# Patient Record
Sex: FEMALE | Race: WHITE | Employment: OTHER | URBAN - NONMETROPOLITAN AREA
[De-identification: names, ages, dates, MRNs, and addresses within clinical notes are randomized per-mention and may not be internally consistent; named-entity substitution may affect disease eponyms.]

---

## 2017-01-05 ENCOUNTER — OFFICE VISIT (OUTPATIENT)
Dept: PSYCHIATRY | Age: 67
End: 2017-01-05

## 2017-01-05 DIAGNOSIS — F31.9 BIPOLAR DEPRESSION (HCC): Primary | ICD-10-CM

## 2017-01-05 DIAGNOSIS — F43.10 POSTTRAUMATIC STRESS DISORDER: ICD-10-CM

## 2017-01-05 PROCEDURE — 99213 OFFICE O/P EST LOW 20 MIN: CPT | Performed by: PSYCHIATRY & NEUROLOGY

## 2017-01-05 RX ORDER — VENLAFAXINE HYDROCHLORIDE 75 MG/1
75 CAPSULE, EXTENDED RELEASE ORAL DAILY
Qty: 3 CAPSULE | Refills: 0 | Status: SHIPPED | OUTPATIENT
Start: 2017-01-05 | End: 2017-02-06

## 2017-01-05 RX ORDER — LORAZEPAM 1 MG/1
TABLET ORAL
Qty: 90 TABLET | Refills: 0 | Status: SHIPPED | OUTPATIENT
Start: 2017-01-05 | End: 2017-02-06 | Stop reason: SDUPTHER

## 2017-01-10 ENCOUNTER — TELEPHONE (OUTPATIENT)
Dept: ENDOCRINOLOGY | Age: 67
End: 2017-01-10

## 2017-01-10 DIAGNOSIS — E16.2 HYPOGLYCEMIA: ICD-10-CM

## 2017-01-12 RX ORDER — ACARBOSE 50 MG/1
50 TABLET ORAL
Qty: 225 TABLET | Refills: 1 | COMMUNITY
Start: 2017-01-12 | End: 2017-03-15 | Stop reason: SDUPTHER

## 2017-01-16 ENCOUNTER — TELEPHONE (OUTPATIENT)
Dept: PSYCHIATRY | Age: 67
End: 2017-01-16

## 2017-01-17 ENCOUNTER — TELEPHONE (OUTPATIENT)
Dept: PSYCHIATRY | Age: 67
End: 2017-01-17

## 2017-01-17 ENCOUNTER — TELEPHONE (OUTPATIENT)
Dept: BARIATRICS/WEIGHT MGMT | Age: 67
End: 2017-01-17

## 2017-01-25 ENCOUNTER — TELEPHONE (OUTPATIENT)
Dept: ENDOCRINOLOGY | Age: 67
End: 2017-01-25

## 2017-01-25 DIAGNOSIS — E55.9 HYPOVITAMINOSIS D: Primary | ICD-10-CM

## 2017-01-25 DIAGNOSIS — E04.2 MULTINODULAR GOITER: ICD-10-CM

## 2017-01-31 ENCOUNTER — TELEPHONE (OUTPATIENT)
Dept: ENDOCRINOLOGY | Age: 67
End: 2017-01-31

## 2017-01-31 DIAGNOSIS — E55.9 HYPOVITAMINOSIS D: Primary | ICD-10-CM

## 2017-02-01 ENCOUNTER — OFFICE VISIT (OUTPATIENT)
Dept: BARIATRICS/WEIGHT MGMT | Age: 67
End: 2017-02-01

## 2017-02-01 VITALS
HEIGHT: 65 IN | TEMPERATURE: 97.7 F | SYSTOLIC BLOOD PRESSURE: 132 MMHG | DIASTOLIC BLOOD PRESSURE: 80 MMHG | BODY MASS INDEX: 23.43 KG/M2 | HEART RATE: 72 BPM | WEIGHT: 140.6 LBS

## 2017-02-01 DIAGNOSIS — F32.89 OTHER DEPRESSION: ICD-10-CM

## 2017-02-01 DIAGNOSIS — M54.2 NECK PAIN: ICD-10-CM

## 2017-02-01 DIAGNOSIS — Z98.84 S/P GASTRIC BYPASS: Primary | ICD-10-CM

## 2017-02-01 DIAGNOSIS — E55.9 VITAMIN D DEFICIENCY: ICD-10-CM

## 2017-02-01 PROCEDURE — 99213 OFFICE O/P EST LOW 20 MIN: CPT | Performed by: SURGERY

## 2017-02-01 RX ORDER — ERGOCALCIFEROL (VITAMIN D2) 1250 MCG
50000 CAPSULE ORAL WEEKLY
Qty: 4 CAPSULE | Refills: 3 | Status: SHIPPED | OUTPATIENT
Start: 2017-02-01 | End: 2022-01-13

## 2017-02-06 ENCOUNTER — OFFICE VISIT (OUTPATIENT)
Dept: PSYCHIATRY | Age: 67
End: 2017-02-06

## 2017-02-06 DIAGNOSIS — F31.9 BIPOLAR DEPRESSION (HCC): Primary | ICD-10-CM

## 2017-02-06 DIAGNOSIS — F43.10 POSTTRAUMATIC STRESS DISORDER: ICD-10-CM

## 2017-02-06 PROCEDURE — 99213 OFFICE O/P EST LOW 20 MIN: CPT | Performed by: PSYCHIATRY & NEUROLOGY

## 2017-02-06 RX ORDER — VENLAFAXINE HYDROCHLORIDE 75 MG/1
75 CAPSULE, EXTENDED RELEASE ORAL DAILY
Qty: 30 CAPSULE | Refills: 3 | Status: SHIPPED | OUTPATIENT
Start: 2017-02-06 | End: 2017-05-02 | Stop reason: SDUPTHER

## 2017-02-06 RX ORDER — TRAZODONE HYDROCHLORIDE 100 MG/1
100 TABLET ORAL NIGHTLY PRN
Qty: 90 TABLET | Refills: 3 | Status: SHIPPED | OUTPATIENT
Start: 2017-02-06 | End: 2018-02-21 | Stop reason: SDUPTHER

## 2017-02-06 RX ORDER — LORAZEPAM 1 MG/1
TABLET ORAL
Qty: 60 TABLET | Refills: 0 | Status: SHIPPED | OUTPATIENT
Start: 2017-02-06 | End: 2018-02-13 | Stop reason: SDUPTHER

## 2017-03-06 ENCOUNTER — OFFICE VISIT (OUTPATIENT)
Dept: PSYCHIATRY | Age: 67
End: 2017-03-06

## 2017-03-06 DIAGNOSIS — F43.10 POSTTRAUMATIC STRESS DISORDER: ICD-10-CM

## 2017-03-06 DIAGNOSIS — F31.9 BIPOLAR DEPRESSION (HCC): Primary | ICD-10-CM

## 2017-03-06 PROCEDURE — 99213 OFFICE O/P EST LOW 20 MIN: CPT | Performed by: PSYCHIATRY & NEUROLOGY

## 2017-03-06 RX ORDER — LAMOTRIGINE 100 MG/1
TABLET ORAL
Qty: 270 TABLET | Refills: 3 | Status: SHIPPED | OUTPATIENT
Start: 2017-03-06 | End: 2018-04-20 | Stop reason: SDUPTHER

## 2017-03-15 ENCOUNTER — OFFICE VISIT (OUTPATIENT)
Dept: ENDOCRINOLOGY | Age: 67
End: 2017-03-15

## 2017-03-15 VITALS
HEART RATE: 91 BPM | SYSTOLIC BLOOD PRESSURE: 120 MMHG | RESPIRATION RATE: 18 BRPM | WEIGHT: 143 LBS | DIASTOLIC BLOOD PRESSURE: 60 MMHG | BODY MASS INDEX: 23.82 KG/M2 | HEIGHT: 65 IN

## 2017-03-15 DIAGNOSIS — E04.2 MULTINODULAR GOITER: ICD-10-CM

## 2017-03-15 DIAGNOSIS — Z98.84 S/P GASTRIC BYPASS: ICD-10-CM

## 2017-03-15 DIAGNOSIS — I10 ESSENTIAL HYPERTENSION: ICD-10-CM

## 2017-03-15 DIAGNOSIS — E16.2 HYPOGLYCEMIA: Primary | ICD-10-CM

## 2017-03-15 PROCEDURE — 99214 OFFICE O/P EST MOD 30 MIN: CPT | Performed by: INTERNAL MEDICINE

## 2017-03-15 RX ORDER — ACARBOSE 50 MG/1
50 TABLET ORAL
Qty: 225 TABLET | Refills: 1 | Status: SHIPPED | OUTPATIENT
Start: 2017-03-15 | End: 2017-04-13 | Stop reason: DRUGHIGH

## 2017-03-16 ENCOUNTER — TELEPHONE (OUTPATIENT)
Dept: ENDOCRINOLOGY | Age: 67
End: 2017-03-16

## 2017-03-20 ENCOUNTER — TELEPHONE (OUTPATIENT)
Dept: ENDOCRINOLOGY | Age: 67
End: 2017-03-20

## 2017-04-04 ENCOUNTER — OFFICE VISIT (OUTPATIENT)
Dept: BARIATRICS/WEIGHT MGMT | Age: 67
End: 2017-04-04

## 2017-04-04 VITALS
HEART RATE: 80 BPM | RESPIRATION RATE: 18 BRPM | BODY MASS INDEX: 23.53 KG/M2 | TEMPERATURE: 98.1 F | HEIGHT: 65 IN | DIASTOLIC BLOOD PRESSURE: 70 MMHG | WEIGHT: 141.2 LBS | SYSTOLIC BLOOD PRESSURE: 114 MMHG

## 2017-04-04 DIAGNOSIS — K91.2 POSTOPERATIVE INTESTINAL MALABSORPTION: ICD-10-CM

## 2017-04-04 DIAGNOSIS — E46 MALNUTRITION (HCC): ICD-10-CM

## 2017-04-04 DIAGNOSIS — F32.89 OTHER DEPRESSION: ICD-10-CM

## 2017-04-04 DIAGNOSIS — E55.9 VITAMIN D DEFICIENCY: ICD-10-CM

## 2017-04-04 DIAGNOSIS — I15.9 SECONDARY HYPERTENSION, UNSPECIFIED: ICD-10-CM

## 2017-04-04 DIAGNOSIS — Z98.84 S/P GASTRIC BYPASS: Primary | ICD-10-CM

## 2017-04-04 DIAGNOSIS — K90.89 OTHER SPECIFIED INTESTINAL MALABSORPTION: ICD-10-CM

## 2017-04-04 DIAGNOSIS — R25.1 TREMOR: ICD-10-CM

## 2017-04-04 DIAGNOSIS — R13.19 OTHER DYSPHAGIA: ICD-10-CM

## 2017-04-04 PROCEDURE — 99213 OFFICE O/P EST LOW 20 MIN: CPT | Performed by: SURGERY

## 2017-04-04 RX ORDER — METOCLOPRAMIDE HYDROCHLORIDE 5 MG/5ML
5-10 SOLUTION ORAL EVERY 6 HOURS PRN
Qty: 400 ML | Refills: 5 | Status: SHIPPED | OUTPATIENT
Start: 2017-04-04 | End: 2021-10-14

## 2017-04-04 RX ORDER — ONDANSETRON 4 MG/1
4 TABLET, ORALLY DISINTEGRATING ORAL EVERY 4 HOURS PRN
Qty: 60 TABLET | Refills: 5 | Status: SHIPPED | OUTPATIENT
Start: 2017-04-04

## 2017-04-05 ENCOUNTER — OFFICE VISIT (OUTPATIENT)
Dept: ENDOCRINOLOGY | Age: 67
End: 2017-04-05

## 2017-04-05 VITALS
DIASTOLIC BLOOD PRESSURE: 60 MMHG | SYSTOLIC BLOOD PRESSURE: 129 MMHG | HEART RATE: 76 BPM | RESPIRATION RATE: 18 BRPM | HEIGHT: 65 IN | BODY MASS INDEX: 23.91 KG/M2 | WEIGHT: 143.5 LBS

## 2017-04-05 DIAGNOSIS — E16.2 HYPOGLYCEMIA: Primary | ICD-10-CM

## 2017-04-05 DIAGNOSIS — E04.2 MULTINODULAR GOITER: ICD-10-CM

## 2017-04-05 PROCEDURE — 99213 OFFICE O/P EST LOW 20 MIN: CPT | Performed by: CLINICAL NURSE SPECIALIST

## 2017-04-05 ASSESSMENT — ENCOUNTER SYMPTOMS
CHEST TIGHTNESS: 0
VOICE CHANGE: 0
DIARRHEA: 0
ABDOMINAL PAIN: 0
EYE REDNESS: 0
COUGH: 0
WHEEZING: 0
NAUSEA: 0
SHORTNESS OF BREATH: 1
CONSTIPATION: 0

## 2017-04-12 ENCOUNTER — OFFICE VISIT (OUTPATIENT)
Dept: ENDOCRINOLOGY | Age: 67
End: 2017-04-12

## 2017-04-12 VITALS
HEIGHT: 65 IN | RESPIRATION RATE: 18 BRPM | WEIGHT: 138 LBS | HEART RATE: 82 BPM | DIASTOLIC BLOOD PRESSURE: 65 MMHG | BODY MASS INDEX: 22.99 KG/M2 | SYSTOLIC BLOOD PRESSURE: 136 MMHG

## 2017-04-12 DIAGNOSIS — R07.9 CHEST PAIN, UNSPECIFIED TYPE: ICD-10-CM

## 2017-04-12 DIAGNOSIS — E55.9 HYPOVITAMINOSIS D: ICD-10-CM

## 2017-04-12 DIAGNOSIS — E04.9 GOITER: ICD-10-CM

## 2017-04-12 DIAGNOSIS — E16.2 HYPOGLYCEMIA: Primary | ICD-10-CM

## 2017-04-12 PROCEDURE — 99215 OFFICE O/P EST HI 40 MIN: CPT | Performed by: INTERNAL MEDICINE

## 2017-04-13 ENCOUNTER — OFFICE VISIT (OUTPATIENT)
Dept: CARDIOLOGY | Age: 67
End: 2017-04-13

## 2017-04-13 ENCOUNTER — TELEPHONE (OUTPATIENT)
Dept: BARIATRICS/WEIGHT MGMT | Age: 67
End: 2017-04-13

## 2017-04-13 VITALS
SYSTOLIC BLOOD PRESSURE: 124 MMHG | BODY MASS INDEX: 24.46 KG/M2 | HEIGHT: 65 IN | DIASTOLIC BLOOD PRESSURE: 66 MMHG | HEART RATE: 75 BPM | WEIGHT: 146.8 LBS

## 2017-04-13 DIAGNOSIS — R06.02 SOB (SHORTNESS OF BREATH) ON EXERTION: ICD-10-CM

## 2017-04-13 DIAGNOSIS — Z82.49 FAMILY HISTORY OF PREMATURE CAD: ICD-10-CM

## 2017-04-13 DIAGNOSIS — R07.89 CHEST PAIN, ATYPICAL: Primary | ICD-10-CM

## 2017-04-13 DIAGNOSIS — Z01.818 PRE-OP EVALUATION: ICD-10-CM

## 2017-04-13 DIAGNOSIS — K20.90 ESOPHAGITIS: Primary | ICD-10-CM

## 2017-04-13 DIAGNOSIS — I10 ESSENTIAL HYPERTENSION: ICD-10-CM

## 2017-04-13 PROCEDURE — 99204 OFFICE O/P NEW MOD 45 MIN: CPT | Performed by: INTERNAL MEDICINE

## 2017-04-13 PROCEDURE — 93000 ELECTROCARDIOGRAM COMPLETE: CPT | Performed by: INTERNAL MEDICINE

## 2017-04-13 RX ORDER — ACARBOSE 50 MG/1
TABLET ORAL
COMMUNITY
End: 2017-04-18 | Stop reason: SDUPTHER

## 2017-04-13 RX ORDER — ACARBOSE 25 MG/1
75 TABLET ORAL
Qty: 270 TABLET | Refills: 3 | Status: SHIPPED | OUTPATIENT
Start: 2017-04-13 | End: 2017-04-24 | Stop reason: SDUPTHER

## 2017-04-17 PROBLEM — Z90.3 S/P GASTRECTOMY: Status: ACTIVE | Noted: 2017-04-17

## 2017-04-18 RX ORDER — ACARBOSE 50 MG/1
75 TABLET ORAL
Qty: 45 TABLET | Refills: 3 | Status: SHIPPED | OUTPATIENT
Start: 2017-04-18 | End: 2017-04-19 | Stop reason: SDUPTHER

## 2017-04-19 RX ORDER — ACARBOSE 50 MG/1
75 TABLET ORAL
Qty: 135 TABLET | Refills: 3 | Status: SHIPPED | OUTPATIENT
Start: 2017-04-19 | End: 2017-04-21

## 2017-04-21 ENCOUNTER — OFFICE VISIT (OUTPATIENT)
Dept: BARIATRICS/WEIGHT MGMT | Age: 67
End: 2017-04-21

## 2017-04-21 VITALS
DIASTOLIC BLOOD PRESSURE: 72 MMHG | SYSTOLIC BLOOD PRESSURE: 130 MMHG | HEIGHT: 65 IN | HEART RATE: 84 BPM | RESPIRATION RATE: 18 BRPM | BODY MASS INDEX: 23.69 KG/M2 | WEIGHT: 142.2 LBS | TEMPERATURE: 97.7 F

## 2017-04-21 DIAGNOSIS — Z98.890 HX OF ESOPHAGECTOMY: ICD-10-CM

## 2017-04-21 DIAGNOSIS — Z90.49 HX OF ESOPHAGECTOMY: ICD-10-CM

## 2017-04-21 DIAGNOSIS — E46 MALNUTRITION (HCC): ICD-10-CM

## 2017-04-21 DIAGNOSIS — R13.19 OTHER DYSPHAGIA: ICD-10-CM

## 2017-04-21 DIAGNOSIS — R25.1 TREMOR: ICD-10-CM

## 2017-04-21 DIAGNOSIS — E55.9 VITAMIN D DEFICIENCY: ICD-10-CM

## 2017-04-21 DIAGNOSIS — Z98.84 HISTORY OF ROUX-EN-Y GASTRIC BYPASS: Primary | ICD-10-CM

## 2017-04-21 DIAGNOSIS — F32.89 OTHER DEPRESSION: ICD-10-CM

## 2017-04-21 DIAGNOSIS — K90.89 OTHER SPECIFIED INTESTINAL MALABSORPTION: ICD-10-CM

## 2017-04-21 DIAGNOSIS — Z90.3 S/P GASTRECTOMY: ICD-10-CM

## 2017-04-21 DIAGNOSIS — I15.9 SECONDARY HYPERTENSION, UNSPECIFIED: ICD-10-CM

## 2017-04-21 PROCEDURE — 99213 OFFICE O/P EST LOW 20 MIN: CPT | Performed by: SURGERY

## 2017-04-24 DIAGNOSIS — E16.2 HYPOGLYCEMIA: Primary | ICD-10-CM

## 2017-04-24 RX ORDER — ACARBOSE 50 MG/1
50 TABLET ORAL
Qty: 90 TABLET | Refills: 5 | Status: SHIPPED | OUTPATIENT
Start: 2017-04-24 | End: 2017-09-05 | Stop reason: SDUPTHER

## 2017-05-02 ENCOUNTER — OFFICE VISIT (OUTPATIENT)
Dept: PSYCHIATRY | Age: 67
End: 2017-05-02

## 2017-05-02 DIAGNOSIS — F43.10 POSTTRAUMATIC STRESS DISORDER: ICD-10-CM

## 2017-05-02 DIAGNOSIS — F31.9 BIPOLAR DEPRESSION (HCC): Primary | ICD-10-CM

## 2017-05-02 PROCEDURE — 99213 OFFICE O/P EST LOW 20 MIN: CPT | Performed by: PSYCHIATRY & NEUROLOGY

## 2017-05-02 RX ORDER — VENLAFAXINE HYDROCHLORIDE 75 MG/1
75 CAPSULE, EXTENDED RELEASE ORAL DAILY
Qty: 90 CAPSULE | Refills: 3 | Status: SHIPPED | OUTPATIENT
Start: 2017-05-02 | End: 2018-04-20 | Stop reason: SDUPTHER

## 2017-05-04 ENCOUNTER — OFFICE VISIT (OUTPATIENT)
Dept: ENDOCRINOLOGY | Age: 67
End: 2017-05-04

## 2017-05-04 VITALS
HEART RATE: 80 BPM | WEIGHT: 139.5 LBS | SYSTOLIC BLOOD PRESSURE: 126 MMHG | RESPIRATION RATE: 16 BRPM | HEIGHT: 64 IN | BODY MASS INDEX: 23.82 KG/M2 | DIASTOLIC BLOOD PRESSURE: 70 MMHG

## 2017-05-04 DIAGNOSIS — E16.2 HYPOGLYCEMIA: Primary | ICD-10-CM

## 2017-05-04 PROCEDURE — 99214 OFFICE O/P EST MOD 30 MIN: CPT | Performed by: INTERNAL MEDICINE

## 2017-05-05 ENCOUNTER — OFFICE VISIT (OUTPATIENT)
Dept: CARDIOLOGY | Age: 67
End: 2017-05-05

## 2017-05-05 ENCOUNTER — TELEPHONE (OUTPATIENT)
Dept: ENDOCRINOLOGY | Age: 67
End: 2017-05-05

## 2017-05-05 VITALS
BODY MASS INDEX: 23.69 KG/M2 | SYSTOLIC BLOOD PRESSURE: 118 MMHG | WEIGHT: 142.2 LBS | DIASTOLIC BLOOD PRESSURE: 70 MMHG | HEIGHT: 65 IN | HEART RATE: 78 BPM

## 2017-05-05 DIAGNOSIS — R06.02 SOB (SHORTNESS OF BREATH) ON EXERTION: ICD-10-CM

## 2017-05-05 DIAGNOSIS — I10 ESSENTIAL HYPERTENSION: Primary | ICD-10-CM

## 2017-05-05 DIAGNOSIS — Z87.898 HISTORY OF CHEST PAIN: ICD-10-CM

## 2017-05-05 DIAGNOSIS — R13.14 PHARYNGOESOPHAGEAL DYSPHAGIA: ICD-10-CM

## 2017-05-05 PROCEDURE — 99213 OFFICE O/P EST LOW 20 MIN: CPT | Performed by: INTERNAL MEDICINE

## 2017-05-22 ENCOUNTER — TELEPHONE (OUTPATIENT)
Dept: ENDOCRINOLOGY | Age: 67
End: 2017-05-22

## 2017-05-24 ENCOUNTER — TELEPHONE (OUTPATIENT)
Dept: ENDOCRINOLOGY | Age: 67
End: 2017-05-24

## 2017-06-26 RX ORDER — LORAZEPAM 1 MG/1
TABLET ORAL
Qty: 90 TABLET | Refills: 0 | Status: SHIPPED | OUTPATIENT
Start: 2017-06-26 | End: 2017-09-05 | Stop reason: SDUPTHER

## 2017-06-29 ENCOUNTER — TELEPHONE (OUTPATIENT)
Dept: ENDOCRINOLOGY | Age: 67
End: 2017-06-29

## 2017-08-02 ENCOUNTER — OFFICE VISIT (OUTPATIENT)
Dept: PSYCHIATRY | Age: 67
End: 2017-08-02
Payer: MEDICARE

## 2017-08-02 DIAGNOSIS — F43.10 POSTTRAUMATIC STRESS DISORDER: ICD-10-CM

## 2017-08-02 DIAGNOSIS — F31.9 BIPOLAR DEPRESSION (HCC): Primary | ICD-10-CM

## 2017-08-02 PROCEDURE — 99213 OFFICE O/P EST LOW 20 MIN: CPT | Performed by: PSYCHIATRY & NEUROLOGY

## 2017-08-02 RX ORDER — VENLAFAXINE HYDROCHLORIDE 150 MG/1
150 CAPSULE, EXTENDED RELEASE ORAL DAILY
Qty: 90 CAPSULE | Refills: 3 | Status: SHIPPED | OUTPATIENT
Start: 2017-08-02 | End: 2018-07-20

## 2017-09-05 ENCOUNTER — OFFICE VISIT (OUTPATIENT)
Dept: ENDOCRINOLOGY | Age: 67
End: 2017-09-05
Payer: MEDICARE

## 2017-09-05 VITALS
BODY MASS INDEX: 22.99 KG/M2 | HEIGHT: 65 IN | RESPIRATION RATE: 18 BRPM | SYSTOLIC BLOOD PRESSURE: 115 MMHG | DIASTOLIC BLOOD PRESSURE: 63 MMHG | HEART RATE: 77 BPM | WEIGHT: 138 LBS

## 2017-09-05 DIAGNOSIS — E16.2 HYPOGLYCEMIA: Primary | ICD-10-CM

## 2017-09-05 DIAGNOSIS — E55.9 HYPOVITAMINOSIS D: ICD-10-CM

## 2017-09-05 DIAGNOSIS — E51.9 VITAMIN B1 DEFICIENCY: ICD-10-CM

## 2017-09-05 DIAGNOSIS — E04.2 MULTINODULAR GOITER: ICD-10-CM

## 2017-09-05 DIAGNOSIS — E21.3 HYPERPARATHYROIDISM (HCC): ICD-10-CM

## 2017-09-05 PROCEDURE — 99214 OFFICE O/P EST MOD 30 MIN: CPT | Performed by: INTERNAL MEDICINE

## 2017-09-05 RX ORDER — THIAMINE HCL 50 MG
50 TABLET ORAL DAILY
Qty: 30 TABLET | Refills: 3 | Status: SHIPPED | OUTPATIENT
Start: 2017-09-05 | End: 2021-10-14

## 2017-09-05 RX ORDER — ACARBOSE 50 MG/1
50 TABLET ORAL
Qty: 270 TABLET | Refills: 1 | Status: SHIPPED | OUTPATIENT
Start: 2017-09-05 | End: 2018-03-06 | Stop reason: SDUPTHER

## 2017-09-05 NOTE — PROGRESS NOTES
Value Date    CHOL 174 09/09/2016    CHOL 188 06/09/2015     Lab Results   Component Value Date    TRIG 50 09/09/2016    TRIG 61 06/09/2015     Lab Results   Component Value Date    HDL 70 09/09/2016    HDL 78 06/09/2015     Lab Results   Component Value Date    LDLCALC 94 09/09/2016    LDLCALC 98 06/09/2015     No results found for: LABVLDL, VLDL  No results found for: CHOLHDLRATIO      Review of Systems  Constitutional: negative for chills and fevers  Eyes: negative for irritation, redness and visual disturbance  Respiratory: negative for cough, shortness of breath and wheezing  Cardiovascular: negative for chest pain, irregular heart beat and palpitations    The remainder of systems were reviewed and negative. Objective:   /63  Pulse 77  Resp 18  Ht 5' 4.96\" (1.65 m)  Wt 138 lb (62.6 kg)  LMP 03/20/1985  BMI 22.99 kg/m2    General:  alert, appears stated age, cooperative and no distress   Oropharynx: normal findings: lips normal without lesions, buccal mucosa normal, gums healthy and tongue midline and normal    Eyes:  negative findings: lids and lashes normal, conjunctivae and sclerae normal, corneas clear and pupils equal, round, reactive to light and accomodation       Neck: no adenopathy, no carotid bruit, no JVD and supple, symmetrical, trachea midline   Thyroid:  nodular   Lung: clear to auscultation bilaterally   Heart:  regular rate and rhythm, S1, S2 normal, no murmur, click, rub or gallop and normal apical impulse   Abdomen: soft, non-tender; bowel sounds normal; no masses,  no organomegaly   Extremities: extremities normal, atraumatic, no cyanosis or edema and Homans sign is negative, no sign of DVT   Pulses: 2+ and symmetric   Skin: warm and dry, no hyperpigmentation, vitiligo, or suspicious lesions   Neuro: normal without focal findings, mental status, speech normal, alert and oriented x3, MARQUEZ, cranial nerves 2-12 intact, muscle tone and strength normal and symmetric.

## 2017-09-05 NOTE — MR AVS SNAPSHOT
After Visit Summary             Maryellen Jaimes   2017 11:00 AM   Office Visit    Description:  Female : 1950   Provider:  Julissa Mueller MD   Department:  Endocrine Diabetes Metabolism [facility]              Your Follow-Up and Future Appointments         Below is a list of your follow-up and future appointments. This may not be a complete list as you may have made appointments directly with providers that we are not aware of or your providers may have made some for you. Please call your providers to confirm appointments. It is important to keep your appointments. Please bring your current insurance card, photo ID, co-pay, and all medication bottles to your appointment. If self-pay, payment is expected at the time of service. Your To-Do List     Future Appointments Provider Department Dept Phone    2017 2:20 PM Julissa Mueller MD Endocrine Diabetes Metabolism [facility] [phone]    Please arrive 15 minutes prior to appointment, bring photo ID and insurance card. Please arrive 15 minutes prior to appointment, bring photo ID and insurance card. Future Orders Complete By Expires    Vitamin B1 [BOT538 Custom]  2017         Information from Your Visit        Department     Name Address Phone Fax    Endocrine Diabetes Metabolism [facility] [address] [address] [facility].  [address]  [address] [place] [address] Avenue      You Were Seen for:         Comments    Hypoglycemia   [141666]         Vital Signs     Blood Pressure Pulse Respirations Height Weight Last Menstrual Period    115/63 77 18 5' 4.96\" (1.65 m) 138 lb (62.6 kg) 1985    Body Mass Index Smoking Status                22.99 kg/m2 Never Smoker             Today's Medication Changes          These changes are accurate as of: 17 12:19 PM.  If you have any questions, ask your nurse or doctor. START taking these medications           vitamin B-1 50 MG tablet   Instructions:   Take 1 tablet by mouth daily at age 72. Based upon the results and risk factors for bone loss, your provider will recommend whether this needs to be repeated. 12/4/2015    Pneumococcal Vaccines (two) for all adults aged 72 and over (1 of 2 - PCV13) 12/4/2015    Yearly Flu Vaccine (1) 8/1/2017    Mammograms are recommended every 2 years for low/average risk patients aged 48 - 69, and every year for high risk patients per updated national guidelines. However these guidelines can be individualized by your provider. 3/17/2019    Cholesterol Screening 9/9/2021    Tetanus Combination Vaccine (2 - Td) 8/11/2022            [employer] Signup           Our records indicate that you have an active [email or web address] account. You can view your After Visit Summary by going to [email or web address] and logging in with your [email or web address] username and password. If you don't have a [email or web address] username and password but a parent or guardian has access to your record, the parent or guardian should login with their own [email or web address] username and password and access your record to view the After Visit Summary. Additional Information  If you have questions, please contact the physician practice where you receive care. Remember, [email or web address] is NOT to be used for urgent needs. For medical emergencies, dial 911. For questions regarding your [email or web address] account call [phone]. If you have a clinical question, please call your doctor's office.

## 2017-09-05 NOTE — LETTER
Endocrine Diabetes Metabolism [facility]  [address]  [address] [place] [address]  Phone: [phone]  Fax: [phone]    Jose Angel Huber MD      10/01/17    Dr. Vilma Halsted    Patient: Mamie Trujillo  MR Number: [id number]  YOB: 1950  Date of Visit: 9/5/2017      Dear Dr. Vilma Halsted    Thank you for the request for consultation for Mamie Trujillo to me for the evaluation of   Chief Complaint   Patient presents with    Other     Hypoglycemia    Discuss Labs     no new orders   . Below are the relevant portions of my assessment and plan of care. Subjective:   68-year-old female who returns for hypoglycemia which developed around [address] following gastric bypass. The patient also has low vitamin D and a multinodular goiter. 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 Patient is treated with acarbose but has previously had intermittent episodes of hypoglycemia. At the last visit I sent her to the [facility] clinic for a second opinion regarding the hypoglycemia, which has been problematic recently. She saw Dr. Lisbeth Lara from the division of endocrinology and Dr. Emmanuel Pruitt from GI surgery. She was placed back on acarbose and advised on blood glucose monitoring. In addition she had some labs which showed low levels of vitamin B1. Low vitamin D levels were confirmed. She has not had severe hypoglycemia since she saw me. She has the support of her family. The patient also had a discussion regarding reoperation with the surgeon. She is grappling with this suggestion and has not yet made up her mind. Regarding the thyroid, there is no pain, choking or hoarseness of the voice.   Past Medical History:   Diagnosis Date    Arthritis     generalized    Bowel obstruction ([address] 2009    Bronchitis 6/2014    DVT, lower extremity ([address] daily 1200 mL 3    metoclopramide (REGLAN) 10 MG/10ML SOLN Take 5-10 mLs by mouth every 6 hours as needed (nausea or vomiting) 400 mL 5    ondansetron (ZOFRAN ODT) 4 MG disintegrating tablet Take 1 tablet by mouth every 4 hours as needed for Nausea or Vomiting 60 tablet 5    lamoTRIgine (LAMICTAL) 100 MG tablet TAKE ONE TABLET BY MOUTH EVERY MORNING AND TWO TABLETS EVERY EVENING AS DIRECTED 270 tablet 3    LORazepam (ATIVAN) 1 MG tablet TAKE ONE TABLET BY MOUTH AT BEDTIME 60 tablet 0    traZODone (DESYREL) 100 MG tablet Take 1 tablet by mouth nightly as needed for Sleep 90 tablet 3    ergocalciferol (ERGOCALCIFEROL) 30088 UNITS capsule Take 1 capsule by mouth once a week (Patient taking differently: Take 50,000 Units by mouth Twice a Week ) 4 capsule 3    vitamin D (CHOLECALCIFEROL) 1000 UNIT TABS tablet Take 2 tablets by mouth daily 60 tablet     glucose blood VI test strips (KIET CONTOUR TEST) strip Pt testing blood sugar 4 times daily. Dx: E16.2  Please add lancets 450 each 1    Cyanocobalamin (VITAMIN B-12 IJ) Inject 1,000 mcg as directed every 30 days.  Multiple Vitamins-Minerals (MULTIVITAMIN PO) Take 1 tablet by mouth daily.  lisinopril (PRINIVIL) 5 MG tablet Take 5 mg by mouth daily.  venlafaxine (EFFEXOR XR) 150 MG extended release capsule Take 1 capsule by mouth daily Combine with 75 mg for total of 225 mg/d 90 capsule 3    Blood Glucose Monitoring Suppl (KIET CONTOUR MONITOR) W/DEVICE KIT 1 kit by Does not apply route once for 1 dose 1 kit 0     No current facility-administered medications for this visit.       No Known Allergies  Health Maintenance   Topic Date Due    Hepatitis C screen  1950    Colon cancer screen colonoscopy  12/04/2000    Zostavax vaccine  12/04/2010    DEXA (modify frequency per FRAX score)  12/04/2015    Pneumococcal low/med risk (1 of 2 - PCV13) 12/04/2015    Flu vaccine (1) 08/01/2017    Breast cancer screen  03/17/2019 Skin: warm and dry, no hyperpigmentation, vitiligo, or suspicious lesions   Neuro: normal without focal findings, mental status, speech normal, alert and oriented x3, MARQUEZ, cranial nerves 2-12 intact, muscle tone and strength normal and symmetric. Assessment/Plan:     1. Hypoglycemia : Relatively stable on medical management. Continue frequent small meals with monitoring as needed. I do not know how long we can keep her going with these regiment alone, therefore I advised her to reconsider the surgical option. 2. Hypovitaminosis D: Multifactorial.  Replacement is underway. 3. Multinodular goiter : Clinically stable. We will continue to monitor for growth. 4. 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 It will be replaced and monitored. Also be checked in a month. 5. Hyperparathyroidism ([address]): Related to calcium and vitamin D malabsorption. We'll monitor. Orders Placed This Encounter   Procedures    Vitamin B1     Standing Status:   Future     Standing Expiration Date:   9/5/2018       · The risks and benefits of my recommendations, as well as other treatment options were discussed with the patient today. Questions were answered. · Follow up: 3 months and as needed. If you have questions, please do not hesitate to call me. I look forward to following Jin along with you.     Sincerely,        Jose Angel Huber MD

## 2017-09-19 ENCOUNTER — HOSPITAL ENCOUNTER (OUTPATIENT)
Dept: GENERAL RADIOLOGY | Age: 67
Discharge: HOME | End: 2017-09-19
Payer: MEDICARE

## 2017-09-19 ENCOUNTER — HOSPITAL ENCOUNTER (OUTPATIENT)
Age: 67
Discharge: HOME | End: 2017-09-19
Payer: MEDICARE

## 2017-09-19 DIAGNOSIS — R52 PAIN: ICD-10-CM

## 2017-09-19 PROCEDURE — 73130 X-RAY EXAM OF HAND: CPT

## 2017-10-18 RX ORDER — LORAZEPAM 1 MG/1
TABLET ORAL
Qty: 90 TABLET | Refills: 0 | Status: SHIPPED | OUTPATIENT
Start: 2017-10-18 | End: 2018-07-20 | Stop reason: SDUPTHER

## 2018-01-31 ENCOUNTER — HOSPITAL ENCOUNTER (OUTPATIENT)
Dept: GENERAL RADIOLOGY | Age: 68
Discharge: HOME | End: 2018-01-31
Payer: MEDICARE

## 2018-01-31 ENCOUNTER — HOSPITAL ENCOUNTER (OUTPATIENT)
Age: 68
Discharge: HOME | End: 2018-01-31
Payer: MEDICARE

## 2018-01-31 DIAGNOSIS — R52 PAIN: ICD-10-CM

## 2018-01-31 PROCEDURE — 73030 X-RAY EXAM OF SHOULDER: CPT

## 2018-01-31 PROCEDURE — 72040 X-RAY EXAM NECK SPINE 2-3 VW: CPT

## 2018-02-02 ENCOUNTER — HOSPITAL ENCOUNTER (OUTPATIENT)
Age: 68
Discharge: HOME | End: 2018-02-02
Payer: MEDICARE

## 2018-02-02 LAB
ALBUMIN SERPL-MCNC: 4.3 G/DL (ref 3.5–5.1)
ALP BLD-CCNC: 100 U/L (ref 38–126)
ALT SERPL-CCNC: 18 U/L (ref 11–66)
ANION GAP SERPL CALCULATED.3IONS-SCNC: 12 MEQ/L (ref 8–16)
ANISOCYTOSIS: ABNORMAL
AST SERPL-CCNC: 19 U/L (ref 5–40)
BASOPHILS # BLD: 0.4 %
BASOPHILS ABSOLUTE: 0 THOU/MM3 (ref 0–0.1)
BILIRUB SERPL-MCNC: 0.2 MG/DL (ref 0.3–1.2)
BUN BLDV-MCNC: 13 MG/DL (ref 7–22)
CALCIUM SERPL-MCNC: 9.2 MG/DL (ref 8.5–10.5)
CHLORIDE BLD-SCNC: 100 MEQ/L (ref 98–111)
CHOLESTEROL, TOTAL: 175 MG/DL (ref 100–199)
CO2: 29 MEQ/L (ref 23–33)
CREAT SERPL-MCNC: 0.7 MG/DL (ref 0.4–1.2)
EOSINOPHIL # BLD: 1.5 %
EOSINOPHILS ABSOLUTE: 0.1 THOU/MM3 (ref 0–0.4)
FERRITIN: 23 NG/ML (ref 10–291)
FOLATE: 7.5 NG/ML (ref 4.8–24.2)
GFR SERPL CREATININE-BSD FRML MDRD: 83 ML/MIN/1.73M2
GLUCOSE BLD-MCNC: 81 MG/DL (ref 70–108)
HCT VFR BLD CALC: 38.2 % (ref 37–47)
HDLC SERPL-MCNC: 82 MG/DL
HEMOGLOBIN: 12.2 GM/DL (ref 12–16)
IRON: 57 UG/DL (ref 50–170)
LDL CHOLESTEROL CALCULATED: 83 MG/DL
LYMPHOCYTES # BLD: 22.1 %
LYMPHOCYTES ABSOLUTE: 1.3 THOU/MM3 (ref 1–4.8)
MCH RBC QN AUTO: 29.2 PG (ref 27–31)
MCHC RBC AUTO-ENTMCNC: 31.9 GM/DL (ref 33–37)
MCV RBC AUTO: 91.8 FL (ref 81–99)
MONOCYTES # BLD: 6.2 %
MONOCYTES ABSOLUTE: 0.4 THOU/MM3 (ref 0.4–1.3)
NUCLEATED RED BLOOD CELLS: 0 /100 WBC
PDW BLD-RTO: 15.6 % (ref 11.5–14.5)
PLATELET # BLD: 257 THOU/MM3 (ref 130–400)
PMV BLD AUTO: 8.6 FL (ref 7.4–10.4)
POTASSIUM SERPL-SCNC: 4.5 MEQ/L (ref 3.5–5.2)
RBC # BLD: 4.17 MILL/MM3 (ref 4.2–5.4)
SEG NEUTROPHILS: 69.8 %
SEGMENTED NEUTROPHILS ABSOLUTE COUNT: 4.1 THOU/MM3 (ref 1.8–7.7)
SODIUM BLD-SCNC: 141 MEQ/L (ref 135–145)
TOTAL IRON BINDING CAPACITY: 330 UG/DL (ref 171–450)
TOTAL IRON BINDING CAPACITY: 347 UG/DL (ref 171–450)
TOTAL PROTEIN: 6.6 G/DL (ref 6.1–8)
TRIGL SERPL-MCNC: 51 MG/DL (ref 0–199)
TSH SERPL DL<=0.05 MIU/L-ACNC: 1.61 UIU/ML (ref 0.4–4.2)
VITAMIN B-12: 213 PG/ML (ref 211–911)
WBC # BLD: 5.9 THOU/MM3 (ref 4.8–10.8)

## 2018-02-02 PROCEDURE — 80053 COMPREHEN METABOLIC PANEL: CPT

## 2018-02-02 PROCEDURE — 36415 COLL VENOUS BLD VENIPUNCTURE: CPT

## 2018-02-02 PROCEDURE — 85025 COMPLETE CBC W/AUTO DIFF WBC: CPT

## 2018-02-02 PROCEDURE — 83550 IRON BINDING TEST: CPT

## 2018-02-02 PROCEDURE — 82607 VITAMIN B-12: CPT

## 2018-02-02 PROCEDURE — 82746 ASSAY OF FOLIC ACID SERUM: CPT

## 2018-02-02 PROCEDURE — 84443 ASSAY THYROID STIM HORMONE: CPT

## 2018-02-02 PROCEDURE — 83540 ASSAY OF IRON: CPT

## 2018-02-02 PROCEDURE — 84466 ASSAY OF TRANSFERRIN: CPT

## 2018-02-02 PROCEDURE — 80061 LIPID PANEL: CPT

## 2018-02-02 PROCEDURE — 82728 ASSAY OF FERRITIN: CPT

## 2018-02-04 LAB — TRANSFERRIN: 276 MG/DL (ref 200–400)

## 2018-02-09 ENCOUNTER — HOSPITAL ENCOUNTER (OUTPATIENT)
Dept: PHYSICAL THERAPY | Age: 68
Setting detail: THERAPIES SERIES
Discharge: HOME | End: 2018-02-09
Payer: MEDICARE

## 2018-02-09 PROCEDURE — G8991 OTHER PT/OT GOAL STATUS: HCPCS

## 2018-02-09 PROCEDURE — 97110 THERAPEUTIC EXERCISES: CPT

## 2018-02-09 PROCEDURE — 97161 PT EVAL LOW COMPLEX 20 MIN: CPT

## 2018-02-09 PROCEDURE — 97140 MANUAL THERAPY 1/> REGIONS: CPT

## 2018-02-09 PROCEDURE — G8990 OTHER PT/OT CURRENT STATUS: HCPCS

## 2018-02-09 ASSESSMENT — PAIN DESCRIPTION - PAIN TYPE: TYPE: CHRONIC PAIN

## 2018-02-09 ASSESSMENT — PAIN SCALES - GENERAL: PAINLEVEL_OUTOF10: 5

## 2018-02-09 ASSESSMENT — PAIN DESCRIPTION - LOCATION: LOCATION: NECK

## 2018-02-09 NOTE — PROGRESS NOTES
I certify that I have examined the patient below and determined that Physical Medicine and Rehabilitation service is necessary; that the secondary diagnosis for the provision of rehabilitation services is consistent with identified needs; that service will be furnished on an outpatient basis while the patient is in my care; that I approve the above plan of care for up to 90 days or as specifically noted above and will review it within that time frame or more often if the patients condition requires. Attestation, signature or co-signature of physician indicates approval of certification requirements.    ________________________ ____________ __________  Physician Signature   Date   Time       [address] [facility]     Time In: 1400  Time Out: 1445  Minutes: 45  Timed Code Treatment Minutes: 23 Minutes     Date: 2018  Patient Name: Hiwot Mccauley,  Gender:  female        CSN: [id number]   : 1950  (79 y.o.)        Referring Practitioner: Rachana Fajardo CNP      Diagnosis: neck pain, R shoulder pain  Treatment Diagnosis: neck pain, R shoulder pain  Additional Pertinent Hx: history of HTN, bariatric surgery, L knee scope, low blood sugar, no L clavical related to bariatic surgery and infection       General:  PT Visit Information  Onset Date: 18  PT Insurance Information: [facility], unlimited visits, massage is covered, $40 copay  Total # of Visits to Date: 1  Plan of Care/Certification Expiration Date: 18  Progress Note Due Date: 18  Progress Note Counter: 1/10        Restrictions/Precautions: General Precautions    See Medical History Questionnaire for information related to allergies and medications. Subjective:  Chart Reviewed: Yes  Patient assessed for rehabilitation services?: Yes  Family / Caregiver Present: No  Comments: next physician visit PRN.  symptoms increased with being on feet, looking straight

## 2018-02-13 RX ORDER — LORAZEPAM 1 MG/1
TABLET ORAL
Qty: 90 TABLET | Refills: 0 | Status: SHIPPED | OUTPATIENT
Start: 2018-02-13 | End: 2018-04-20 | Stop reason: SDUPTHER

## 2018-02-15 ENCOUNTER — HOSPITAL ENCOUNTER (OUTPATIENT)
Dept: PHYSICAL THERAPY | Age: 68
Setting detail: THERAPIES SERIES
Discharge: HOME | End: 2018-02-15
Payer: MEDICARE

## 2018-02-15 PROCEDURE — 97110 THERAPEUTIC EXERCISES: CPT

## 2018-02-15 PROCEDURE — 97140 MANUAL THERAPY 1/> REGIONS: CPT

## 2018-02-15 ASSESSMENT — PAIN DESCRIPTION - LOCATION: LOCATION: NECK

## 2018-02-15 ASSESSMENT — PAIN DESCRIPTION - PAIN TYPE: TYPE: CHRONIC PAIN

## 2018-02-15 ASSESSMENT — PAIN SCALES - GENERAL: PAINLEVEL_OUTOF10: 5

## 2018-02-19 ENCOUNTER — HOSPITAL ENCOUNTER (OUTPATIENT)
Dept: PHYSICAL THERAPY | Age: 68
Setting detail: THERAPIES SERIES
Discharge: HOME | End: 2018-02-19
Payer: MEDICARE

## 2018-02-19 PROCEDURE — 97140 MANUAL THERAPY 1/> REGIONS: CPT

## 2018-02-19 PROCEDURE — 97110 THERAPEUTIC EXERCISES: CPT

## 2018-02-19 ASSESSMENT — PAIN DESCRIPTION - LOCATION: LOCATION: NECK;SHOULDER

## 2018-02-19 ASSESSMENT — PAIN SCALES - GENERAL: PAINLEVEL_OUTOF10: 4

## 2018-02-19 ASSESSMENT — PAIN DESCRIPTION - PAIN TYPE: TYPE: CHRONIC PAIN

## 2018-02-19 ASSESSMENT — PAIN DESCRIPTION - ORIENTATION: ORIENTATION: RIGHT

## 2018-02-21 ENCOUNTER — OFFICE VISIT (OUTPATIENT)
Dept: PSYCHIATRY | Age: 68
End: 2018-02-21
Payer: MEDICARE

## 2018-02-21 DIAGNOSIS — F31.9 BIPOLAR DEPRESSION (HCC): Primary | ICD-10-CM

## 2018-02-21 DIAGNOSIS — F43.10 POSTTRAUMATIC STRESS DISORDER: ICD-10-CM

## 2018-02-21 PROCEDURE — 99213 OFFICE O/P EST LOW 20 MIN: CPT | Performed by: PSYCHIATRY & NEUROLOGY

## 2018-02-21 RX ORDER — TRAZODONE HYDROCHLORIDE 100 MG/1
100 TABLET ORAL NIGHTLY PRN
Qty: 90 TABLET | Refills: 3 | Status: SHIPPED | OUTPATIENT
Start: 2018-02-21 | End: 2019-04-27 | Stop reason: SDUPTHER

## 2018-02-21 NOTE — PROGRESS NOTES
content:  Homocidal ideation: none  Suicidal Ideation:  denies suicidal ideation  Delusions:  no evidence of delusions  Perceptual Disturbance:  denies any perceptual disturbance  Cognition:  oriented to person, place, and time  Concentration succeeded  Memory intact  Fund of knowledge:  good  Abstract thinking:  good  Insight:  good  Judgment:  good    DIAGNOSTIC IMPRESSION  Bipolar depression  PTSD    Plan  No changes  Will await findings from [facility] [employer] [facility] [employer] [place] re medication changes  Current Outpatient Prescriptions   Medication Sig Dispense Refill    traZODone (DESYREL) 100 MG tablet Take 1 tablet by mouth nightly as needed for Sleep 90 tablet 3    LORazepam (ATIVAN) 1 MG tablet TAKE ONE TABLET BY MOUTH AT BEDTIME. 90 tablet 0    LORazepam (ATIVAN) 1 MG tablet TAKE ONE TABLET BY MOUTH AT BEDTIME 90 tablet 0    Thiamine HCl (VITAMIN B-1) 50 MG tablet Take 1 tablet by mouth daily 30 tablet 3    acarbose (PRECOSE) 50 MG tablet Take 1 tablet by mouth 3 times daily (with meals) 270 tablet 1    venlafaxine (EFFEXOR XR) 150 MG extended release capsule Take 1 capsule by mouth daily Combine with 75 mg for total of 225 mg/d 90 capsule 3    venlafaxine (EFFEXOR XR) 75 MG extended release capsule Take 1 capsule by mouth daily Continue on the 150s at the same time.  Total daily will be 225 mg/d. 90 capsule 3    cyproheptadine (PERIACTIN) 4 MG tablet Take 4 mg by mouth 2 times daily as needed      sucralfate (CARAFATE) 1 GM/10ML suspension Take 10 mLs by mouth 4 times daily 1200 mL 3    metoclopramide (REGLAN) 10 MG/10ML SOLN Take 5-10 mLs by mouth every 6 hours as needed (nausea or vomiting) 400 mL 5    ondansetron (ZOFRAN ODT) 4 MG disintegrating tablet Take 1 tablet by mouth every 4 hours as needed for Nausea or Vomiting 60 tablet 5    lamoTRIgine (LAMICTAL) 100 MG tablet TAKE ONE TABLET BY MOUTH EVERY MORNING AND TWO TABLETS EVERY EVENING AS DIRECTED 270 tablet 3    ergocalciferol (ERGOCALCIFEROL) 97187 UNITS capsule

## 2018-02-23 ENCOUNTER — HOSPITAL ENCOUNTER (OUTPATIENT)
Dept: PHYSICAL THERAPY | Age: 68
Setting detail: THERAPIES SERIES
Discharge: HOME | End: 2018-02-23
Payer: MEDICARE

## 2018-02-23 PROCEDURE — 97140 MANUAL THERAPY 1/> REGIONS: CPT

## 2018-02-23 PROCEDURE — 97110 THERAPEUTIC EXERCISES: CPT

## 2018-02-23 ASSESSMENT — PAIN DESCRIPTION - ORIENTATION: ORIENTATION: RIGHT

## 2018-02-23 ASSESSMENT — PAIN DESCRIPTION - PAIN TYPE: TYPE: CHRONIC PAIN

## 2018-02-23 ASSESSMENT — PAIN DESCRIPTION - LOCATION: LOCATION: NECK;SHOULDER

## 2018-02-23 ASSESSMENT — PAIN SCALES - GENERAL: PAINLEVEL_OUTOF10: 4

## 2018-02-23 NOTE — PROGRESS NOTES
myofascial release  Exercise 3: CERVICALretraction X 10, shoulder roll, scap squeeze X 10 ea. Exercise 5: cervical isomets: 5 X 5 sec ea bilat. Exercise 6: door stretch: 3 X bilat  Exercise 7: cervical stretches: upper trap stretch with hand behind back  X 3 bilat. levator stretch X 3 bilat, cervical rotation  Exercise 8: demo release to upper trap/levator R with cane         Activity Tolerance:  Activity Tolerance: Patient Tolerated treatment well    Assessment: Body structures, Functions, Activity limitations: Decreased functional mobility , Decreased ROM, Decreased strength  Assessment: tightness R neck better with positional release  Prognosis: Good  REQUIRES PT FOLLOW UP: Yes    Patient Education:  Patient Education: cervical and lumbar roll, cervical retraction, shoulder roll, scap squeeze. posturing. upper trap/levator stretch, door stretch, head flexion and lateral bend. self release with use of cane                      Plan:  Times per week: 2X/week  Plan weeks: 4 weeks  Specific instructions for Next Treatment: modalities, manual therapy/massage PRN.  ROM, stretching, strength, core strength, posturing  Current Treatment Recommendations: Strengthening, ROM, Functional Mobility Training, IADL Training, Manual Therapy - Joint Manipulation, Manual Therapy - Soft Tissue Mobilization, Home Exercise Program, Modalities, Equipment Evaluation, Education, & procurement, Patient/Caregiver Education & Training  Plan Comment: progress as tolerated    Goals:  Patient goals : decrease pain    Short term goals  Time Frame for Short term goals: see LTG due to short ELOS    Long term goals  Time Frame for Long term goals : 4 weeks  Long term goal 1: decrease average pain <2/10 to tolerate reading 30 min, shopping 1 hour, standing >30 min for meal prep with no increase in symptoms  Long term goal 2: improve posturing to neutral with <3VC/visit for symptom control with all ADL's  Long term goal 3: improve cervical ROM

## 2018-02-26 ENCOUNTER — TELEPHONE (OUTPATIENT)
Dept: PSYCHIATRY | Age: 68
End: 2018-02-26

## 2018-03-02 ENCOUNTER — HOSPITAL ENCOUNTER (OUTPATIENT)
Dept: PHYSICAL THERAPY | Age: 68
Setting detail: THERAPIES SERIES
Discharge: HOME | End: 2018-03-02
Payer: MEDICARE

## 2018-03-05 ENCOUNTER — HOSPITAL ENCOUNTER (OUTPATIENT)
Dept: PHYSICAL THERAPY | Age: 68
Setting detail: THERAPIES SERIES
Discharge: HOME | End: 2018-03-05
Payer: MEDICARE

## 2018-03-05 ENCOUNTER — TELEPHONE (OUTPATIENT)
Dept: ENDOCRINOLOGY | Age: 68
End: 2018-03-05

## 2018-03-05 DIAGNOSIS — E55.9 HYPOVITAMINOSIS D: ICD-10-CM

## 2018-03-05 DIAGNOSIS — E16.2 HYPOGLYCEMIA: Primary | ICD-10-CM

## 2018-03-05 DIAGNOSIS — E04.2 MULTINODULAR GOITER: ICD-10-CM

## 2018-03-05 PROCEDURE — 97140 MANUAL THERAPY 1/> REGIONS: CPT

## 2018-03-05 PROCEDURE — 97110 THERAPEUTIC EXERCISES: CPT

## 2018-03-05 ASSESSMENT — PAIN SCALES - GENERAL: PAINLEVEL_OUTOF10: 3

## 2018-03-05 ASSESSMENT — PAIN DESCRIPTION - PAIN TYPE: TYPE: CHRONIC PAIN

## 2018-03-05 ASSESSMENT — PAIN DESCRIPTION - LOCATION: LOCATION: NECK;SHOULDER

## 2018-03-06 DIAGNOSIS — E16.2 HYPOGLYCEMIA: ICD-10-CM

## 2018-03-06 RX ORDER — ACARBOSE 50 MG/1
50 TABLET ORAL
Qty: 270 TABLET | Refills: 0 | Status: SHIPPED | OUTPATIENT
Start: 2018-03-06

## 2018-03-09 ENCOUNTER — HOSPITAL ENCOUNTER (OUTPATIENT)
Dept: PHYSICAL THERAPY | Age: 68
Setting detail: THERAPIES SERIES
Discharge: HOME | End: 2018-03-09
Payer: MEDICARE

## 2018-03-09 PROCEDURE — 97140 MANUAL THERAPY 1/> REGIONS: CPT

## 2018-03-09 PROCEDURE — 97110 THERAPEUTIC EXERCISES: CPT

## 2018-03-09 ASSESSMENT — PAIN DESCRIPTION - LOCATION: LOCATION: NECK

## 2018-03-09 ASSESSMENT — PAIN DESCRIPTION - PAIN TYPE: TYPE: CHRONIC PAIN

## 2018-03-09 ASSESSMENT — PAIN SCALES - GENERAL: PAINLEVEL_OUTOF10: 4

## 2018-03-09 ASSESSMENT — PAIN DESCRIPTION - ORIENTATION: ORIENTATION: RIGHT

## 2018-03-12 ENCOUNTER — APPOINTMENT (OUTPATIENT)
Dept: PHYSICAL THERAPY | Age: 68
End: 2018-03-12
Payer: MEDICARE

## 2018-03-21 ENCOUNTER — HOSPITAL ENCOUNTER (OUTPATIENT)
Dept: PHYSICAL THERAPY | Age: 68
Setting detail: THERAPIES SERIES
Discharge: HOME | End: 2018-03-21
Payer: MEDICARE

## 2018-03-21 NOTE — PROGRESS NOTES
[facility]    Patient Name: Irene Rao        CSN: [id number]   YOB: 1950  Gender: female  Referring Practitioner: Avinash Arce CNP  Diagnosis: neck pain, R shoulder pain    Patient is discharged from Physical Therapy services at this time. See last note for details related to results of therapy and goal achievement. Reason for discharge:  Patient called and cancelled last visit today due to weather.  She stated she did not want to reschedule as she felt she is doing pretty good with her neck pain      Lasha Watson, PT [id number]

## 2018-04-05 ENCOUNTER — OFFICE VISIT (OUTPATIENT)
Dept: UROLOGY | Age: 68
End: 2018-04-05
Payer: MEDICARE

## 2018-04-05 VITALS
HEIGHT: 65 IN | WEIGHT: 138.6 LBS | SYSTOLIC BLOOD PRESSURE: 119 MMHG | BODY MASS INDEX: 23.09 KG/M2 | DIASTOLIC BLOOD PRESSURE: 73 MMHG

## 2018-04-05 DIAGNOSIS — R39.12 WEAK URINARY STREAM: Primary | ICD-10-CM

## 2018-04-05 DIAGNOSIS — R10.2 PELVIC PAIN IN FEMALE: ICD-10-CM

## 2018-04-05 DIAGNOSIS — R35.0 URINARY FREQUENCY: Primary | ICD-10-CM

## 2018-04-05 DIAGNOSIS — R33.9 INCOMPLETE BLADDER EMPTYING: ICD-10-CM

## 2018-04-05 DIAGNOSIS — R35.0 URINARY FREQUENCY: ICD-10-CM

## 2018-04-05 LAB
BILIRUBIN URINE: NEGATIVE
BLOOD URINE, POC: NEGATIVE
CHARACTER, URINE: CLEAR
COLOR, URINE: YELLOW
GLUCOSE URINE: NEGATIVE MG/DL
KETONES, URINE: NEGATIVE
LEUKOCYTE CLUMPS, URINE: ABNORMAL
NITRITE, URINE: NEGATIVE
PH, URINE: 5.5
POST VOID RESIDUAL (PVR): 0 ML
PROTEIN, URINE: NEGATIVE MG/DL
SPECIFIC GRAVITY, URINE: 1.02 (ref 1–1.03)
UROBILINOGEN, URINE: 0.2 EU/DL

## 2018-04-05 PROCEDURE — 99204 OFFICE O/P NEW MOD 45 MIN: CPT | Performed by: NURSE PRACTITIONER

## 2018-04-05 PROCEDURE — 51798 US URINE CAPACITY MEASURE: CPT | Performed by: NURSE PRACTITIONER

## 2018-04-05 PROCEDURE — 81003 URINALYSIS AUTO W/O SCOPE: CPT | Performed by: NURSE PRACTITIONER

## 2018-04-13 ENCOUNTER — TELEPHONE (OUTPATIENT)
Dept: UROLOGY | Age: 68
End: 2018-04-13

## 2018-04-16 ENCOUNTER — TELEPHONE (OUTPATIENT)
Dept: UROLOGY | Age: 68
End: 2018-04-16

## 2018-04-19 ENCOUNTER — PROCEDURE VISIT (OUTPATIENT)
Dept: UROLOGY | Age: 68
End: 2018-04-19
Payer: MEDICARE

## 2018-04-19 VITALS
WEIGHT: 137.8 LBS | HEIGHT: 65 IN | BODY MASS INDEX: 22.96 KG/M2 | SYSTOLIC BLOOD PRESSURE: 132 MMHG | DIASTOLIC BLOOD PRESSURE: 74 MMHG

## 2018-04-19 DIAGNOSIS — R35.0 URINARY FREQUENCY: Primary | ICD-10-CM

## 2018-04-19 DIAGNOSIS — R10.2 PELVIC PAIN IN FEMALE: Primary | ICD-10-CM

## 2018-04-19 LAB
BILIRUBIN URINE: ABNORMAL
BLOOD URINE, POC: NEGATIVE
CHARACTER, URINE: CLEAR
COLOR, URINE: YELLOW
GLUCOSE URINE: NEGATIVE MG/DL
KETONES, URINE: ABNORMAL
LEUKOCYTE CLUMPS, URINE: ABNORMAL
NITRITE, URINE: NEGATIVE
PH, URINE: 5.5
PROTEIN, URINE: NEGATIVE MG/DL
SPECIFIC GRAVITY, URINE: >= 1.03 (ref 1–1.03)
UROBILINOGEN, URINE: 0.2 EU/DL

## 2018-04-19 PROCEDURE — 52000 CYSTOURETHROSCOPY: CPT | Performed by: UROLOGY

## 2018-04-19 PROCEDURE — 99999 PR OFFICE/OUTPT VISIT,PROCEDURE ONLY: CPT | Performed by: UROLOGY

## 2018-04-19 PROCEDURE — 81003 URINALYSIS AUTO W/O SCOPE: CPT | Performed by: UROLOGY

## 2018-04-20 ENCOUNTER — OFFICE VISIT (OUTPATIENT)
Dept: PSYCHIATRY | Age: 68
End: 2018-04-20
Payer: MEDICARE

## 2018-04-20 DIAGNOSIS — F43.10 POSTTRAUMATIC STRESS DISORDER: ICD-10-CM

## 2018-04-20 DIAGNOSIS — F31.9 BIPOLAR DEPRESSION (HCC): Primary | ICD-10-CM

## 2018-04-20 PROCEDURE — 99212 OFFICE O/P EST SF 10 MIN: CPT | Performed by: PSYCHIATRY & NEUROLOGY

## 2018-04-20 RX ORDER — LAMOTRIGINE 100 MG/1
TABLET ORAL
Qty: 270 TABLET | Refills: 3 | Status: SHIPPED | OUTPATIENT
Start: 2018-04-20 | End: 2019-03-19 | Stop reason: SDUPTHER

## 2018-04-20 RX ORDER — LORAZEPAM 1 MG/1
TABLET ORAL
Qty: 90 TABLET | Refills: 0 | Status: SHIPPED | OUTPATIENT
Start: 2018-04-20 | End: 2019-02-15 | Stop reason: SDUPTHER

## 2018-04-20 RX ORDER — VENLAFAXINE HYDROCHLORIDE 75 MG/1
75 CAPSULE, EXTENDED RELEASE ORAL DAILY
Qty: 90 CAPSULE | Refills: 3 | Status: SHIPPED | OUTPATIENT
Start: 2018-04-20 | End: 2018-07-20

## 2018-04-26 ENCOUNTER — TELEPHONE (OUTPATIENT)
Dept: UROLOGY | Age: 68
End: 2018-04-26

## 2018-04-27 ENCOUNTER — HOSPITAL ENCOUNTER (OUTPATIENT)
Dept: MRI IMAGING | Age: 68
Discharge: HOME | End: 2018-04-27
Payer: MEDICARE

## 2018-04-27 ENCOUNTER — HOSPITAL ENCOUNTER (OUTPATIENT)
Age: 68
Discharge: HOME | End: 2018-04-27
Payer: MEDICARE

## 2018-04-27 ENCOUNTER — OFFICE VISIT (OUTPATIENT)
Dept: NEUROLOGY | Age: 68
End: 2018-04-27
Payer: MEDICARE

## 2018-04-27 VITALS
HEART RATE: 82 BPM | BODY MASS INDEX: 23.32 KG/M2 | SYSTOLIC BLOOD PRESSURE: 110 MMHG | WEIGHT: 140 LBS | DIASTOLIC BLOOD PRESSURE: 70 MMHG | HEIGHT: 65 IN

## 2018-04-27 DIAGNOSIS — G25.0 BENIGN HEAD TREMOR: ICD-10-CM

## 2018-04-27 DIAGNOSIS — G25.2 ACTION TREMOR: ICD-10-CM

## 2018-04-27 DIAGNOSIS — G25.2 ACTION TREMOR: Primary | ICD-10-CM

## 2018-04-27 DIAGNOSIS — R35.0 URINARY FREQUENCY: ICD-10-CM

## 2018-04-27 DIAGNOSIS — R10.2 PELVIC PAIN IN FEMALE: ICD-10-CM

## 2018-04-27 LAB
CREAT SERPL-MCNC: 0.8 MG/DL (ref 0.6–1.3)
FOLATE: 7.4 NG/ML (ref 4.8–24.2)
GFR, ESTIMATED: 76 ML/MIN/1.73M2
VITAMIN B-12: 316 PG/ML (ref 211–911)

## 2018-04-27 PROCEDURE — 99204 OFFICE O/P NEW MOD 45 MIN: CPT | Performed by: PSYCHIATRY & NEUROLOGY

## 2018-04-27 PROCEDURE — 6360000004 HC RX CONTRAST MEDICATION: Performed by: UROLOGY

## 2018-04-27 PROCEDURE — 72197 MRI PELVIS W/O & W/DYE: CPT

## 2018-04-27 PROCEDURE — 36415 COLL VENOUS BLD VENIPUNCTURE: CPT

## 2018-04-27 PROCEDURE — 84207 ASSAY OF VITAMIN B-6: CPT

## 2018-04-27 PROCEDURE — 82607 VITAMIN B-12: CPT

## 2018-04-27 PROCEDURE — 82746 ASSAY OF FOLIC ACID SERUM: CPT

## 2018-04-27 PROCEDURE — 82565 ASSAY OF CREATININE: CPT

## 2018-04-27 PROCEDURE — A9579 GAD-BASE MR CONTRAST NOS,1ML: HCPCS | Performed by: UROLOGY

## 2018-04-27 RX ORDER — ROPINIROLE 0.5 MG/1
TABLET, FILM COATED ORAL
Qty: 90 TABLET | Refills: 3 | Status: SHIPPED | OUTPATIENT
Start: 2018-04-27 | End: 2018-04-27

## 2018-04-27 RX ADMIN — GADOTERIDOL 15 ML: 279.3 INJECTION, SOLUTION INTRAVENOUS at 14:45

## 2018-04-30 ENCOUNTER — TELEPHONE (OUTPATIENT)
Dept: NEUROLOGY | Age: 68
End: 2018-04-30

## 2018-05-01 LAB — VITAMIN B6: 16.2 NMOL/L (ref 20–125)

## 2018-05-02 ENCOUNTER — TELEPHONE (OUTPATIENT)
Dept: NEUROLOGY | Age: 68
End: 2018-05-02

## 2018-05-02 ENCOUNTER — HOSPITAL ENCOUNTER (OUTPATIENT)
Dept: ULTRASOUND IMAGING | Age: 68
Discharge: HOME | End: 2018-05-02
Payer: MEDICARE

## 2018-05-02 ENCOUNTER — HOSPITAL ENCOUNTER (OUTPATIENT)
Dept: MAMMOGRAPHY | Age: 68
Discharge: HOME | End: 2018-05-02
Payer: MEDICARE

## 2018-05-02 DIAGNOSIS — E04.1 THYROID NODULE: ICD-10-CM

## 2018-05-02 DIAGNOSIS — Z12.39 SCREENING BREAST EXAMINATION: ICD-10-CM

## 2018-05-02 PROCEDURE — 77063 BREAST TOMOSYNTHESIS BI: CPT

## 2018-05-02 PROCEDURE — 76536 US EXAM OF HEAD AND NECK: CPT

## 2018-05-03 ENCOUNTER — TELEPHONE (OUTPATIENT)
Dept: UROLOGY | Age: 68
End: 2018-05-03

## 2018-05-03 ENCOUNTER — OFFICE VISIT (OUTPATIENT)
Dept: UROLOGY | Age: 68
End: 2018-05-03
Payer: MEDICARE

## 2018-05-03 VITALS
SYSTOLIC BLOOD PRESSURE: 92 MMHG | HEIGHT: 65 IN | BODY MASS INDEX: 23.32 KG/M2 | DIASTOLIC BLOOD PRESSURE: 64 MMHG | WEIGHT: 140 LBS

## 2018-05-03 DIAGNOSIS — M25.552 ISCHIAL PAIN, LEFT: ICD-10-CM

## 2018-05-03 DIAGNOSIS — R39.198 DIFFICULTY VOIDING: ICD-10-CM

## 2018-05-03 DIAGNOSIS — R39.12 WEAK URINARY STREAM: Primary | ICD-10-CM

## 2018-05-03 LAB
BACTERIA: ABNORMAL /HPF
BILIRUBIN URINE: NEGATIVE
BILIRUBIN URINE: NEGATIVE
BLOOD URINE, POC: NEGATIVE
BLOOD, URINE: NEGATIVE
CASTS 2: ABNORMAL /LPF
CASTS UA: ABNORMAL /LPF
CHARACTER, URINE: ABNORMAL
CHARACTER, URINE: CLEAR
COLOR, URINE: YELLOW
COLOR: YELLOW
CRYSTALS, UA: ABNORMAL
EPITHELIAL CELLS, UA: ABNORMAL /HPF
GLUCOSE URINE: NEGATIVE MG/DL
GLUCOSE URINE: NEGATIVE MG/DL
KETONES, URINE: NEGATIVE
KETONES, URINE: NEGATIVE
LEUKOCYTE CLUMPS, URINE: ABNORMAL
LEUKOCYTE ESTERASE, URINE: ABNORMAL
MISCELLANEOUS 2: ABNORMAL
NITRITE, URINE: NEGATIVE
NITRITE, URINE: NEGATIVE
PH UA: 5.5
PH, URINE: 5.5
POST VOID RESIDUAL (PVR): 0 ML
PROTEIN UA: NEGATIVE
PROTEIN, URINE: NEGATIVE MG/DL
RBC URINE: ABNORMAL /HPF
RENAL EPITHELIAL, UA: ABNORMAL
SPECIFIC GRAVITY, URINE: 1.02 (ref 1–1.03)
SPECIFIC GRAVITY, URINE: >= 1.03 (ref 1–1.03)
UROBILINOGEN, URINE: 0.2 EU/DL
UROBILINOGEN, URINE: 0.2 EU/DL
WBC UA: ABNORMAL /HPF
YEAST: ABNORMAL

## 2018-05-03 PROCEDURE — 81003 URINALYSIS AUTO W/O SCOPE: CPT | Performed by: PHYSICIAN ASSISTANT

## 2018-05-03 PROCEDURE — 99213 OFFICE O/P EST LOW 20 MIN: CPT | Performed by: PHYSICIAN ASSISTANT

## 2018-05-03 PROCEDURE — 51798 US URINE CAPACITY MEASURE: CPT | Performed by: PHYSICIAN ASSISTANT

## 2018-05-03 RX ORDER — CYANOCOBALAMIN (VITAMIN B-12) 3000MCG/ML
DROPS SUBLINGUAL
COMMUNITY
End: 2021-10-14

## 2018-05-03 RX ORDER — LANOLIN ALCOHOL/MO/W.PET/CERES
50 CREAM (GRAM) TOPICAL DAILY
COMMUNITY

## 2018-05-04 ENCOUNTER — TELEPHONE (OUTPATIENT)
Dept: NEUROLOGY | Age: 68
End: 2018-05-04

## 2018-05-05 LAB — URINE CULTURE REFLEX: NORMAL

## 2018-05-10 ENCOUNTER — TELEPHONE (OUTPATIENT)
Dept: UROLOGY | Age: 68
End: 2018-05-10

## 2018-05-11 ENCOUNTER — TELEPHONE (OUTPATIENT)
Dept: UROLOGY | Age: 68
End: 2018-05-11

## 2018-05-11 ENCOUNTER — APPOINTMENT (OUTPATIENT)
Dept: CT IMAGING | Age: 68
End: 2018-05-11
Payer: MEDICARE

## 2018-05-11 ENCOUNTER — HOSPITAL ENCOUNTER (OUTPATIENT)
Dept: CT IMAGING | Age: 68
Discharge: HOME | End: 2018-05-11
Payer: MEDICARE

## 2018-05-11 DIAGNOSIS — G25.0 BENIGN HEAD TREMOR: ICD-10-CM

## 2018-05-11 DIAGNOSIS — G25.2 ACTION TREMOR: ICD-10-CM

## 2018-05-11 PROCEDURE — 70450 CT HEAD/BRAIN W/O DYE: CPT

## 2018-05-15 ENCOUNTER — TELEPHONE (OUTPATIENT)
Dept: UROLOGY | Age: 68
End: 2018-05-15

## 2018-05-16 ENCOUNTER — TELEPHONE (OUTPATIENT)
Dept: UROLOGY | Age: 68
End: 2018-05-16

## 2018-05-22 ENCOUNTER — HOSPITAL ENCOUNTER (OUTPATIENT)
Dept: CT IMAGING | Age: 68
Discharge: HOME | End: 2018-05-22
Payer: MEDICARE

## 2018-05-22 DIAGNOSIS — M25.552 ISCHIAL PAIN, LEFT: ICD-10-CM

## 2018-05-22 PROCEDURE — 6360000004 HC RX CONTRAST MEDICATION: Performed by: PHYSICIAN ASSISTANT

## 2018-05-22 PROCEDURE — 72193 CT PELVIS W/DYE: CPT

## 2018-05-22 RX ADMIN — IOPAMIDOL 100 ML: 755 INJECTION, SOLUTION INTRAVENOUS at 11:43

## 2018-05-23 ENCOUNTER — TELEPHONE (OUTPATIENT)
Dept: UROLOGY | Age: 68
End: 2018-05-23

## 2018-05-24 ENCOUNTER — TELEPHONE (OUTPATIENT)
Dept: UROLOGY | Age: 68
End: 2018-05-24

## 2018-05-30 ENCOUNTER — TELEPHONE (OUTPATIENT)
Dept: UROLOGY | Age: 68
End: 2018-05-30

## 2018-07-10 ENCOUNTER — TELEPHONE (OUTPATIENT)
Dept: UROLOGY | Age: 68
End: 2018-07-10

## 2018-07-10 NOTE — TELEPHONE ENCOUNTER
07/10/2018 Patient called office cancelled appointment with Lena De Jesus 07/12/2018 because  At [facility] said she does not need appointment. Patient asking what  From [facility] said in his report to our office which is in chart. Please call patient at [phone]. da

## 2018-07-16 ENCOUNTER — HOSPITAL ENCOUNTER (OUTPATIENT)
Age: 68
Discharge: HOME | End: 2018-07-16
Payer: MEDICARE

## 2018-07-16 LAB — GLUCOSE, TWO-HOUR POSTPRANDIAL: 82 MG/DL (ref 70–108)

## 2018-07-16 PROCEDURE — 36415 COLL VENOUS BLD VENIPUNCTURE: CPT

## 2018-07-16 PROCEDURE — 82947 ASSAY GLUCOSE BLOOD QUANT: CPT

## 2018-07-16 PROCEDURE — 84681 ASSAY OF C-PEPTIDE: CPT

## 2018-07-17 LAB — C-PEPTIDE: 1.4 NG/ML (ref 1.1–4.4)

## 2018-07-20 ENCOUNTER — OFFICE VISIT (OUTPATIENT)
Dept: PSYCHIATRY | Age: 68
End: 2018-07-20
Payer: MEDICARE

## 2018-07-20 DIAGNOSIS — F43.10 PTSD (POST-TRAUMATIC STRESS DISORDER): Primary | ICD-10-CM

## 2018-07-20 DIAGNOSIS — F31.9 BIPOLAR 1 DISORDER, DEPRESSED (HCC): ICD-10-CM

## 2018-07-20 PROCEDURE — 99213 OFFICE O/P EST LOW 20 MIN: CPT | Performed by: PSYCHIATRY & NEUROLOGY

## 2018-07-20 RX ORDER — LORAZEPAM 1 MG/1
TABLET ORAL
Qty: 90 TABLET | Refills: 0 | Status: SHIPPED | OUTPATIENT
Start: 2018-07-20 | End: 2018-10-17 | Stop reason: SDUPTHER

## 2018-07-20 RX ORDER — VENLAFAXINE HYDROCHLORIDE 150 MG/1
150 CAPSULE, EXTENDED RELEASE ORAL 2 TIMES DAILY
Qty: 180 CAPSULE | Refills: 3 | Status: SHIPPED | OUTPATIENT
Start: 2018-07-20 | End: 2019-09-18 | Stop reason: SDUPTHER

## 2018-07-20 NOTE — PROGRESS NOTES
IMPRESSION  Bipolar I depressed  PTSD    Plan  Increase Effexor  Current Outpatient Prescriptions   Medication Sig Dispense Refill    LORazepam (ATIVAN) 1 MG tablet TAKE ONE TABLET BY MOUTH AT BEDTIME. 90 tablet 0    venlafaxine (EFFEXOR XR) 150 MG extended release capsule Take 1 capsule by mouth 2 times daily 180 capsule 3    vitamin B-6 (PYRIDOXINE) 50 MG tablet Take 50 mg by mouth daily      Cyanocobalamin (VITAMIN B12) 3000 MCG/ML LIQD Place under the tongue      lamoTRIgine (LAMICTAL) 100 MG tablet TAKE ONE TABLET BY MOUTH EVERY MORNING AND TWO TABLETS EVERY EVENING AS DIRECTED 270 tablet 3    acarbose (PRECOSE) 50 MG tablet Take 1 tablet by mouth 3 times daily (with meals) 270 tablet 0    traZODone (DESYREL) 100 MG tablet Take 1 tablet by mouth nightly as needed for Sleep 90 tablet 3    Thiamine HCl (VITAMIN B-1) 50 MG tablet Take 1 tablet by mouth daily 30 tablet 3    cyproheptadine (PERIACTIN) 4 MG tablet Take 4 mg by mouth 2 times daily as needed      metoclopramide (REGLAN) 10 MG/10ML SOLN Take 5-10 mLs by mouth every 6 hours as needed (nausea or vomiting) 400 mL 5    ondansetron (ZOFRAN ODT) 4 MG disintegrating tablet Take 1 tablet by mouth every 4 hours as needed for Nausea or Vomiting 60 tablet 5    ergocalciferol (ERGOCALCIFEROL) 84246 UNITS capsule Take 1 capsule by mouth once a week (Patient taking differently: Take 50,000 Units by mouth Twice a Week ) 4 capsule 3    vitamin D (CHOLECALCIFEROL) 1000 UNIT TABS tablet Take 2 tablets by mouth daily 60 tablet     Blood Glucose Monitoring Suppl (KIET CONTOUR MONITOR) W/DEVICE KIT 1 kit by Does not apply route once for 1 dose 1 kit 0    glucose blood VI test strips (KIET CONTOUR TEST) strip Pt testing blood sugar 4 times daily. Dx: E16.2  Please add lancets 450 each 1    Cyanocobalamin (VITAMIN B-12 IJ) Inject 1,000 mcg as directed every 30 days.  Multiple Vitamins-Minerals (MULTIVITAMIN PO) Take 1 tablet by mouth daily.      

## 2018-09-19 ENCOUNTER — OFFICE VISIT (OUTPATIENT)
Dept: PSYCHIATRY | Age: 68
End: 2018-09-19
Payer: MEDICARE

## 2018-09-19 DIAGNOSIS — F31.9 BIPOLAR 1 DISORDER, DEPRESSED (HCC): Primary | ICD-10-CM

## 2018-09-19 DIAGNOSIS — F43.10 PTSD (POST-TRAUMATIC STRESS DISORDER): ICD-10-CM

## 2018-09-19 PROCEDURE — 99213 OFFICE O/P EST LOW 20 MIN: CPT | Performed by: PSYCHIATRY & NEUROLOGY

## 2018-09-19 NOTE — PROGRESS NOTES
KIT 1 kit by Does not apply route once for 1 dose 1 kit 0    glucose blood VI test strips (KIET CONTOUR TEST) strip Pt testing blood sugar 4 times daily. Dx: E16.2  Please add lancets 450 each 1    Cyanocobalamin (VITAMIN B-12 IJ) Inject 1,000 mcg as directed every 30 days.  Multiple Vitamins-Minerals (MULTIVITAMIN PO) Take 1 tablet by mouth daily.  lisinopril (PRINIVIL) 5 MG tablet Take 5 mg by mouth daily. No current facility-administered medications for this visit.

## 2018-09-26 PROBLEM — Z01.818 PRE-OP EVALUATION: Status: RESOLVED | Noted: 2017-04-13 | Resolved: 2018-09-26

## 2018-10-01 ENCOUNTER — OFFICE VISIT (OUTPATIENT)
Dept: NEUROLOGY | Age: 68
End: 2018-10-01
Payer: MEDICARE

## 2018-10-01 VITALS
DIASTOLIC BLOOD PRESSURE: 66 MMHG | HEART RATE: 62 BPM | HEIGHT: 66 IN | SYSTOLIC BLOOD PRESSURE: 110 MMHG | BODY MASS INDEX: 21.86 KG/M2 | WEIGHT: 136 LBS

## 2018-10-01 DIAGNOSIS — G25.2 ACTION TREMOR: Primary | ICD-10-CM

## 2018-10-01 DIAGNOSIS — G25.0 BENIGN HEAD TREMOR: ICD-10-CM

## 2018-10-01 PROCEDURE — 99213 OFFICE O/P EST LOW 20 MIN: CPT | Performed by: PSYCHIATRY & NEUROLOGY

## 2018-10-01 NOTE — PROGRESS NOTES
WO CONTRAST    CLINICAL INFORMATION: Action tremor, Benign head tremor. COMPARISON: None available. Correlation is made to MRI of the brain dated January 4, 2010. TECHNIQUE: Noncontrast 5 mm axial images were obtained through the brain. All CT scans at this facility use dose modulation, iterative reconstruction, and/or weight-based dosing when appropriate to reduce radiation dose to as low as reasonably achievable. FINDINGS:    There is prominence of the sulcal spaces and ventricular system secondary to generalized, age-related parenchymal volume loss. No intracranial hemorrhage is seen. No mass, mass effect or extra-axial fluid collection is identified. The ventricles are   midline without evidence of hydrocephalus. The basal cisterns are patent. The visualized orbits, temporal bone structures and paranasal sinuses are unremarkable. The calvarium is intact without acute fracture or aggressive, bony destructive process. Impression  Mild, age-related parenchymal volume loss is present. No acute intracranial abnormality is identified. **This report has been created using voice recognition software. It may contain minor errors which are inherent in voice recognition technology. **    Final report electronically signed by Dr. Suzie Wheeler on 5/11/2018 1:34 PM          Assessment:     Diagnosis Orders   1. Action tremor     2. Benign head tremor          She reports her tremor is the same, no progression, her exam is unchanged. There is no voice tremor. She has mild head tremor. She is not interested in medications this time. After detailed discussion with patient we agreed on the following plan. Plan:  1. Continue with current medications. 2. Consider primidone next. 3. Call with any new symptoms or concerns. 4. Follow up in 6 months. Please call if any questions.      Rosie Chao MD

## 2018-10-17 ENCOUNTER — OFFICE VISIT (OUTPATIENT)
Dept: PSYCHIATRY | Age: 68
End: 2018-10-17
Payer: MEDICARE

## 2018-10-17 DIAGNOSIS — F31.9 BIPOLAR 1 DISORDER, DEPRESSED (HCC): ICD-10-CM

## 2018-10-17 DIAGNOSIS — F43.10 PTSD (POST-TRAUMATIC STRESS DISORDER): ICD-10-CM

## 2018-10-17 PROCEDURE — 99212 OFFICE O/P EST SF 10 MIN: CPT | Performed by: PSYCHIATRY & NEUROLOGY

## 2018-10-17 RX ORDER — LORAZEPAM 1 MG/1
TABLET ORAL
Qty: 90 TABLET | Refills: 0 | Status: SHIPPED | OUTPATIENT
Start: 2018-10-17 | End: 2019-01-17

## 2018-11-21 ENCOUNTER — TELEPHONE (OUTPATIENT)
Dept: NEUROLOGY | Age: 68
End: 2018-11-21

## 2018-11-21 RX ORDER — PRIMIDONE 50 MG/1
50 TABLET ORAL NIGHTLY
Qty: 30 TABLET | Refills: 1 | Status: SHIPPED | OUTPATIENT
Start: 2018-11-21 | End: 2019-01-10

## 2019-01-02 ENCOUNTER — TELEPHONE (OUTPATIENT)
Dept: NEUROLOGY | Age: 69
End: 2019-01-02

## 2019-01-03 ENCOUNTER — OFFICE VISIT (OUTPATIENT)
Dept: PSYCHIATRY | Age: 69
End: 2019-01-03
Payer: MEDICARE

## 2019-01-03 DIAGNOSIS — F31.4 BIPOLAR 1 DISORDER, DEPRESSED, SEVERE (HCC): Primary | ICD-10-CM

## 2019-01-03 DIAGNOSIS — F43.10 POST TRAUMATIC STRESS DISORDER: ICD-10-CM

## 2019-01-03 PROCEDURE — 99213 OFFICE O/P EST LOW 20 MIN: CPT | Performed by: PSYCHIATRY & NEUROLOGY

## 2019-01-10 ENCOUNTER — TELEPHONE (OUTPATIENT)
Dept: PSYCHIATRY | Age: 69
End: 2019-01-10

## 2019-01-10 ENCOUNTER — OFFICE VISIT (OUTPATIENT)
Dept: PSYCHIATRY | Age: 69
End: 2019-01-10
Payer: MEDICARE

## 2019-01-10 DIAGNOSIS — F31.32 BIPOLAR 1 DISORDER, DEPRESSED, MODERATE (HCC): Primary | ICD-10-CM

## 2019-01-10 DIAGNOSIS — F43.10 POST TRAUMATIC STRESS DISORDER: ICD-10-CM

## 2019-01-10 PROCEDURE — 99213 OFFICE O/P EST LOW 20 MIN: CPT | Performed by: PSYCHIATRY & NEUROLOGY

## 2019-01-24 ENCOUNTER — HOSPITAL ENCOUNTER (OUTPATIENT)
Age: 69
Discharge: HOME | End: 2019-01-24
Payer: MEDICARE

## 2019-01-24 LAB
ALBUMIN SERPL-MCNC: 4.1 G/DL (ref 3.5–5.1)
ALP BLD-CCNC: 84 U/L (ref 38–126)
ALT SERPL-CCNC: 13 U/L (ref 11–66)
ANION GAP SERPL CALCULATED.3IONS-SCNC: 11 MEQ/L (ref 8–16)
AST SERPL-CCNC: 14 U/L (ref 5–40)
BILIRUB SERPL-MCNC: 0.3 MG/DL (ref 0.3–1.2)
BUN BLDV-MCNC: 19 MG/DL (ref 7–22)
CALCIUM SERPL-MCNC: 8.9 MG/DL (ref 8.5–10.5)
CHLORIDE BLD-SCNC: 105 MEQ/L (ref 98–111)
CO2: 25 MEQ/L (ref 23–33)
CREAT SERPL-MCNC: 0.8 MG/DL (ref 0.4–1.2)
GFR SERPL CREATININE-BSD FRML MDRD: 71 ML/MIN/1.73M2
POTASSIUM SERPL-SCNC: 3.9 MEQ/L (ref 3.5–5.2)
SODIUM BLD-SCNC: 141 MEQ/L (ref 135–145)
TOTAL PROTEIN: 6.1 G/DL (ref 6.1–8)
VITAMIN D 25-HYDROXY: 20 NG/ML (ref 30–100)

## 2019-01-24 PROCEDURE — 84075 ASSAY ALKALINE PHOSPHATASE: CPT

## 2019-01-24 PROCEDURE — 84630 ASSAY OF ZINC: CPT

## 2019-01-24 PROCEDURE — 84155 ASSAY OF PROTEIN SERUM: CPT

## 2019-01-24 PROCEDURE — 82652 VIT D 1 25-DIHYDROXY: CPT

## 2019-01-24 PROCEDURE — 82525 ASSAY OF COPPER: CPT

## 2019-01-24 PROCEDURE — 84425 ASSAY OF VITAMIN B-1: CPT

## 2019-01-24 PROCEDURE — 84590 ASSAY OF VITAMIN A: CPT

## 2019-01-24 PROCEDURE — 82306 VITAMIN D 25 HYDROXY: CPT

## 2019-01-24 PROCEDURE — 82310 ASSAY OF CALCIUM: CPT

## 2019-01-24 PROCEDURE — 36415 COLL VENOUS BLD VENIPUNCTURE: CPT

## 2019-01-24 PROCEDURE — 84520 ASSAY OF UREA NITROGEN: CPT

## 2019-01-24 PROCEDURE — 80051 ELECTROLYTE PANEL: CPT

## 2019-01-24 PROCEDURE — 84460 ALANINE AMINO (ALT) (SGPT): CPT

## 2019-01-24 PROCEDURE — 83036 HEMOGLOBIN GLYCOSYLATED A1C: CPT

## 2019-01-24 PROCEDURE — 82247 BILIRUBIN TOTAL: CPT

## 2019-01-24 PROCEDURE — 84450 TRANSFERASE (AST) (SGOT): CPT

## 2019-01-24 PROCEDURE — 82607 VITAMIN B-12: CPT

## 2019-01-24 PROCEDURE — 82040 ASSAY OF SERUM ALBUMIN: CPT

## 2019-01-24 PROCEDURE — 82565 ASSAY OF CREATININE: CPT

## 2019-01-24 PROCEDURE — 83970 ASSAY OF PARATHORMONE: CPT

## 2019-01-24 PROCEDURE — 82947 ASSAY GLUCOSE BLOOD QUANT: CPT

## 2019-01-25 LAB
AVERAGE GLUCOSE: 66 MG/DL (ref 70–126)
GLUCOSE, TWO-HOUR POSTPRANDIAL: 79 MG/DL (ref 70–108)
HBA1C MFR BLD: 4.2 % (ref 4.4–6.4)
PTH INTACT: 75.5 PG/ML (ref 15–65)
VITAMIN B-12: 317 PG/ML (ref 211–911)

## 2019-01-27 LAB — VITAMIN D 1,25-DIHYDROXY: 67.7 PG/ML (ref 19.9–79.3)

## 2019-01-28 LAB
COPPER: 74 UG/DL (ref 80–155)
ZINC: 52 UG/DL (ref 60–120)

## 2019-01-29 LAB — RETINOL (VITAMIN A): NORMAL

## 2019-01-30 RX ORDER — QUETIAPINE FUMARATE 25 MG/1
TABLET, FILM COATED ORAL
Qty: 30 TABLET | Refills: 3 | Status: SHIPPED | OUTPATIENT
Start: 2019-01-30 | End: 2019-01-31 | Stop reason: SDUPTHER

## 2019-01-31 LAB — VITAMIN B1 WHOLE BLOOD: 109 NMOL/L (ref 70–180)

## 2019-01-31 RX ORDER — QUETIAPINE FUMARATE 50 MG/1
TABLET, FILM COATED ORAL
Qty: 30 TABLET | Refills: 3 | Status: SHIPPED | OUTPATIENT
Start: 2019-01-31 | End: 2019-02-15 | Stop reason: SDUPTHER

## 2019-02-13 ENCOUNTER — HOSPITAL ENCOUNTER (OUTPATIENT)
Dept: PHYSICAL THERAPY | Age: 69
Setting detail: THERAPIES SERIES
Discharge: HOME | End: 2019-02-13
Payer: MEDICARE

## 2019-02-13 PROCEDURE — 97116 GAIT TRAINING THERAPY: CPT

## 2019-02-13 PROCEDURE — 97161 PT EVAL LOW COMPLEX 20 MIN: CPT

## 2019-02-15 ENCOUNTER — OFFICE VISIT (OUTPATIENT)
Dept: PSYCHIATRY | Age: 69
End: 2019-02-15
Payer: MEDICARE

## 2019-02-15 DIAGNOSIS — F43.10 PTSD (POST-TRAUMATIC STRESS DISORDER): ICD-10-CM

## 2019-02-15 DIAGNOSIS — F31.32 BIPOLAR 1 DISORDER, DEPRESSED, MODERATE (HCC): Primary | ICD-10-CM

## 2019-02-15 PROCEDURE — 99213 OFFICE O/P EST LOW 20 MIN: CPT | Performed by: PSYCHIATRY & NEUROLOGY

## 2019-02-15 RX ORDER — QUETIAPINE FUMARATE 50 MG/1
TABLET, FILM COATED ORAL
Qty: 30 TABLET | Refills: 3 | Status: SHIPPED | OUTPATIENT
Start: 2019-02-15 | End: 2019-09-18

## 2019-02-15 RX ORDER — QUETIAPINE FUMARATE 25 MG/1
25 TABLET, FILM COATED ORAL NIGHTLY
Qty: 30 TABLET | Refills: 3 | Status: SHIPPED | OUTPATIENT
Start: 2019-02-15 | End: 2019-06-18

## 2019-02-15 RX ORDER — LORAZEPAM 1 MG/1
TABLET ORAL
Qty: 90 TABLET | Refills: 0 | Status: SHIPPED | OUTPATIENT
Start: 2019-02-15 | End: 2019-03-19 | Stop reason: SDUPTHER

## 2019-02-20 ENCOUNTER — HOSPITAL ENCOUNTER (OUTPATIENT)
Dept: PHYSICAL THERAPY | Age: 69
Setting detail: THERAPIES SERIES
End: 2019-02-20
Payer: MEDICARE

## 2019-02-22 ENCOUNTER — APPOINTMENT (OUTPATIENT)
Dept: PHYSICAL THERAPY | Age: 69
End: 2019-02-22
Payer: MEDICARE

## 2019-02-25 ENCOUNTER — APPOINTMENT (OUTPATIENT)
Dept: PHYSICAL THERAPY | Age: 69
End: 2019-02-25
Payer: MEDICARE

## 2019-03-14 ENCOUNTER — OFFICE VISIT (OUTPATIENT)
Dept: NEUROLOGY | Age: 69
End: 2019-03-14
Payer: MEDICARE

## 2019-03-14 VITALS
HEIGHT: 63 IN | HEART RATE: 80 BPM | WEIGHT: 132 LBS | BODY MASS INDEX: 23.39 KG/M2 | SYSTOLIC BLOOD PRESSURE: 122 MMHG | DIASTOLIC BLOOD PRESSURE: 74 MMHG

## 2019-03-14 DIAGNOSIS — G25.2 ACTION TREMOR: Primary | ICD-10-CM

## 2019-03-14 DIAGNOSIS — G25.0 BENIGN HEAD TREMOR: ICD-10-CM

## 2019-03-14 PROCEDURE — 99213 OFFICE O/P EST LOW 20 MIN: CPT | Performed by: NURSE PRACTITIONER

## 2019-03-19 ENCOUNTER — OFFICE VISIT (OUTPATIENT)
Dept: PSYCHIATRY | Age: 69
End: 2019-03-19
Payer: MEDICARE

## 2019-03-19 DIAGNOSIS — F43.10 POST TRAUMATIC STRESS DISORDER: ICD-10-CM

## 2019-03-19 DIAGNOSIS — F31.75 BIPOLAR DISORDER, IN PARTIAL REMISSION, MOST RECENT EPISODE DEPRESSED (HCC): Primary | ICD-10-CM

## 2019-03-19 DIAGNOSIS — F43.10 PTSD (POST-TRAUMATIC STRESS DISORDER): ICD-10-CM

## 2019-03-19 PROCEDURE — 99212 OFFICE O/P EST SF 10 MIN: CPT | Performed by: PSYCHIATRY & NEUROLOGY

## 2019-03-19 RX ORDER — LORAZEPAM 1 MG/1
TABLET ORAL
Qty: 90 TABLET | Refills: 0 | Status: SHIPPED | OUTPATIENT
Start: 2019-03-19 | End: 2019-08-01 | Stop reason: SDUPTHER

## 2019-03-19 RX ORDER — LAMOTRIGINE 100 MG/1
TABLET ORAL
Qty: 270 TABLET | Refills: 3 | Status: SHIPPED | OUTPATIENT
Start: 2019-03-19 | End: 2020-05-04

## 2019-04-29 RX ORDER — TRAZODONE HYDROCHLORIDE 100 MG/1
100 TABLET ORAL NIGHTLY PRN
Qty: 90 TABLET | Refills: 3 | Status: SHIPPED | OUTPATIENT
Start: 2019-04-29 | End: 2019-06-18

## 2019-04-29 NOTE — TELEPHONE ENCOUNTER
Sami is requesting a medication refill for Trazodone 100mg;#90 with 3 refills;last with a start date of 02/21/18 and last refill date 11/29/18. Patient's last completed appt was on 03/19/19 to return on 06/19/19.     Medication is loaded pending your approval,

## 2019-06-18 ENCOUNTER — OFFICE VISIT (OUTPATIENT)
Dept: PSYCHIATRY | Age: 69
End: 2019-06-18
Payer: MEDICARE

## 2019-06-18 DIAGNOSIS — F43.10 POST TRAUMATIC STRESS DISORDER: ICD-10-CM

## 2019-06-18 DIAGNOSIS — F31.75 BIPOLAR DISORDER, IN PARTIAL REMISSION, MOST RECENT EPISODE DEPRESSED (HCC): Primary | ICD-10-CM

## 2019-06-18 PROCEDURE — 99213 OFFICE O/P EST LOW 20 MIN: CPT | Performed by: PSYCHIATRY & NEUROLOGY

## 2019-06-18 NOTE — PROGRESS NOTES
Chief Complaint   Patient presents with    3 Month Follow-Up     Bipolar PTSD     Alysia Salmon returned after 3 months to follow-up on bipolar disorder and PTSD. She is doing quite well. We reviewed her medications, and made sure that she has an adequate supply. I did not need to give her any refills today, but she will be needing lorazepam soon. She is not having any side effects. She told me that she lowered the dose of Seroquel to 50 mg nightly because it was overly sedating. She feels better on the lower dose. She is also stopped the trazodone because she felt it was causing her to be foggy. She feels better on the doses that she is on currently. I made no changes.   Mental Status Examination    Level of consciousness:  within normal limits  Appearance:  well-appearing, street clothes, in chair, good grooming and good hygiene  Behavior/Motor:  no abnormalities noted  Attitude toward examiner:  cooperative, attentive and good eye contact  Speech:  spontaneous, normal rate, normal volume and well articulated  Mood:  euthymic  Affect:  mood congruent  Thought processes:  linear, goal directed and coherent  Thought content:  Homocidal ideation: none  Suicidal Ideation:  none   Delusions:  no evidence of delusions  Perceptual Disturbance:  denies any perceptual disturbance  Cognition:  oriented to person, place, and time  Concentration succeeded  Memory intact  Fund of knowledge:  good  Abstract thinking:  good  Insight:  good  Judgment:  good    DIAGNOSTIC IMPRESSION  Bipolar depression in remission  PTSD    Plan  Dose reductions as above  Current Outpatient Medications   Medication Sig Dispense Refill    lamoTRIgine (LAMICTAL) 100 MG tablet TAKE ONE TABLET BY MOUTH EVERY MORNING AND TWO TABLETS EVERY EVENING AS DIRECTED 270 tablet 3    QUEtiapine (SEROQUEL) 50 MG tablet Take 1 tablet at bedtime 30 tablet 3    venlafaxine (EFFEXOR XR) 150 MG extended release capsule Take 1 capsule by mouth 2 times daily 180 capsule 3    vitamin B-6 (PYRIDOXINE) 50 MG tablet Take 50 mg by mouth daily      Cyanocobalamin (VITAMIN B12) 3000 MCG/ML LIQD Place under the tongue      acarbose (PRECOSE) 50 MG tablet Take 1 tablet by mouth 3 times daily (with meals) 270 tablet 0    Thiamine HCl (VITAMIN B-1) 50 MG tablet Take 1 tablet by mouth daily 30 tablet 3    cyproheptadine (PERIACTIN) 4 MG tablet Take 4 mg by mouth 2 times daily as needed      metoclopramide (REGLAN) 10 MG/10ML SOLN Take 5-10 mLs by mouth every 6 hours as needed (nausea or vomiting) 400 mL 5    ondansetron (ZOFRAN ODT) 4 MG disintegrating tablet Take 1 tablet by mouth every 4 hours as needed for Nausea or Vomiting 60 tablet 5    ergocalciferol (ERGOCALCIFEROL) 61285 UNITS capsule Take 1 capsule by mouth once a week (Patient taking differently: Take 50,000 Units by mouth Twice a Week ) 4 capsule 3    vitamin D (CHOLECALCIFEROL) 1000 UNIT TABS tablet Take 2 tablets by mouth daily 60 tablet     Blood Glucose Monitoring Suppl ([employer] CONTOUR MONITOR) W/DEVICE KIT 1 kit by Does not apply route once for 1 dose 1 kit 0    glucose blood VI test strips (KIET CONTOUR TEST) strip Pt testing blood sugar 4 times daily. Dx: E16.2  Please add lancets 450 each 1    Cyanocobalamin (VITAMIN B-12 IJ) Inject 1,000 mcg as directed every 30 days.  Multiple Vitamins-Minerals (MULTIVITAMIN PO) Take 1 tablet by mouth daily.  lisinopril (PRINIVIL) 5 MG tablet Take 5 mg by mouth daily. No current facility-administered medications for this visit.

## 2019-08-05 ENCOUNTER — TELEPHONE (OUTPATIENT)
Dept: PSYCHIATRY | Age: 69
End: 2019-08-05

## 2019-08-07 ENCOUNTER — HOSPITAL ENCOUNTER (OUTPATIENT)
Age: 69
Discharge: HOME | End: 2019-08-07
Payer: MEDICARE

## 2019-08-07 ENCOUNTER — HOSPITAL ENCOUNTER (OUTPATIENT)
Dept: GENERAL RADIOLOGY | Age: 69
Discharge: HOME | End: 2019-08-07
Payer: MEDICARE

## 2019-08-07 DIAGNOSIS — M25.531 RIGHT WRIST PAIN: ICD-10-CM

## 2019-08-07 DIAGNOSIS — M25.521 RIGHT ELBOW PAIN: ICD-10-CM

## 2019-08-07 DIAGNOSIS — M79.641 HAND PAIN, RIGHT: ICD-10-CM
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
## 2019-08-07 PROCEDURE — 73110 X-RAY EXAM OF WRIST: CPT

## 2019-08-07 PROCEDURE — 73080 X-RAY EXAM OF ELBOW: CPT

## 2019-08-07 PROCEDURE — 73130 X-RAY EXAM OF HAND: CPT

## 2019-09-18 ENCOUNTER — OFFICE VISIT (OUTPATIENT)
Dept: PSYCHIATRY | Age: 69
End: 2019-09-18
Payer: MEDICARE

## 2019-09-18 DIAGNOSIS — F31.75 BIPOLAR DISORDER, IN PARTIAL REMISSION, MOST RECENT EPISODE DEPRESSED (HCC): Primary | ICD-10-CM

## 2019-09-18 DIAGNOSIS — F43.10 POST TRAUMATIC STRESS DISORDER: ICD-10-CM

## 2019-09-18 PROCEDURE — 99213 OFFICE O/P EST LOW 20 MIN: CPT | Performed by: PSYCHIATRY & NEUROLOGY

## 2019-09-18 RX ORDER — VENLAFAXINE HYDROCHLORIDE 150 MG/1
150 CAPSULE, EXTENDED RELEASE ORAL 2 TIMES DAILY
Qty: 180 CAPSULE | Refills: 3 | Status: SHIPPED | OUTPATIENT
Start: 2019-09-18 | End: 2020-09-21

## 2019-09-18 RX ORDER — QUETIAPINE FUMARATE 25 MG/1
TABLET, FILM COATED ORAL
Qty: 90 TABLET | Refills: 3 | Status: SHIPPED | OUTPATIENT
Start: 2019-09-18 | End: 2020-07-14 | Stop reason: SDUPTHER

## 2019-09-18 RX ORDER — QUETIAPINE FUMARATE 50 MG/1
TABLET, FILM COATED ORAL
Qty: 90 TABLET | Refills: 3 | Status: SHIPPED | OUTPATIENT
Start: 2019-09-18 | End: 2020-07-14 | Stop reason: SDUPTHER

## 2019-09-30 ENCOUNTER — HOSPITAL ENCOUNTER (OUTPATIENT)
Age: 69
Discharge: HOME | End: 2019-09-30
Payer: MEDICARE

## 2019-09-30 LAB
ALBUMIN SERPL-MCNC: 4.1 G/DL (ref 3.5–5.1)
ALP BLD-CCNC: 123 U/L (ref 38–126)
ALT SERPL-CCNC: 16 U/L (ref 11–66)
ANION GAP SERPL CALCULATED.3IONS-SCNC: 9 MEQ/L (ref 8–16)
AST SERPL-CCNC: 20 U/L (ref 5–40)
AVERAGE GLUCOSE: 75 MG/DL (ref 70–126)
BILIRUB SERPL-MCNC: < 0.2 MG/DL (ref 0.3–1.2)
BILIRUBIN DIRECT: < 0.2 MG/DL (ref 0–0.3)
BUN BLDV-MCNC: 16 MG/DL (ref 7–22)
CALCIUM SERPL-MCNC: 9.3 MG/DL (ref 8.5–10.5)
CHLORIDE BLD-SCNC: 105 MEQ/L (ref 98–111)
CO2: 29 MEQ/L (ref 23–33)
CREAT SERPL-MCNC: 0.7 MG/DL (ref 0.4–1.2)
GFR SERPL CREATININE-BSD FRML MDRD: 83 ML/MIN/1.73M2
GLUCOSE, TWO-HOUR POSTPRANDIAL: 85 MG/DL (ref 70–108)
HBA1C MFR BLD: 4.5 % (ref 4.4–6.4)
POTASSIUM SERPL-SCNC: 4.3 MEQ/L (ref 3.5–5.2)
PTH INTACT: 100.1 PG/ML (ref 15–65)
SODIUM BLD-SCNC: 143 MEQ/L (ref 135–145)
TOTAL PROTEIN: 6.4 G/DL (ref 6.1–8)
VITAMIN D 25-HYDROXY: 13 NG/ML (ref 30–100)

## 2019-09-30 PROCEDURE — 84590 ASSAY OF VITAMIN A: CPT

## 2019-09-30 PROCEDURE — 84520 ASSAY OF UREA NITROGEN: CPT

## 2019-09-30 PROCEDURE — 82310 ASSAY OF CALCIUM: CPT

## 2019-09-30 PROCEDURE — 36415 COLL VENOUS BLD VENIPUNCTURE: CPT

## 2019-09-30 PROCEDURE — 82652 VIT D 1 25-DIHYDROXY: CPT

## 2019-09-30 PROCEDURE — 82306 VITAMIN D 25 HYDROXY: CPT

## 2019-09-30 PROCEDURE — 80051 ELECTROLYTE PANEL: CPT

## 2019-09-30 PROCEDURE — 80076 HEPATIC FUNCTION PANEL: CPT

## 2019-09-30 PROCEDURE — 83970 ASSAY OF PARATHORMONE: CPT

## 2019-09-30 PROCEDURE — 82947 ASSAY GLUCOSE BLOOD QUANT: CPT

## 2019-09-30 PROCEDURE — 83036 HEMOGLOBIN GLYCOSYLATED A1C: CPT

## 2019-09-30 PROCEDURE — 82565 ASSAY OF CREATININE: CPT

## 2019-10-03 LAB — VITAMIN D 1,25-DIHYDROXY: 56.1 PG/ML (ref 19.9–79.3)

## 2019-10-04 LAB — RETINOL (VITAMIN A): NORMAL

## 2019-11-05 DIAGNOSIS — F43.10 PTSD (POST-TRAUMATIC STRESS DISORDER): ICD-10-CM

## 2019-11-05 RX ORDER — LORAZEPAM 1 MG/1
TABLET ORAL
Qty: 90 TABLET | Refills: 0 | Status: SHIPPED | OUTPATIENT
Start: 2019-11-05 | End: 2020-01-27

## 2020-03-12 ENCOUNTER — HOSPITAL ENCOUNTER (OUTPATIENT)
Age: 70
Discharge: HOME | End: 2020-03-12
Payer: MEDICARE

## 2020-03-12 ENCOUNTER — NURSE ONLY (OUTPATIENT)
Dept: LAB | Age: 70
End: 2020-03-12

## 2020-03-12 LAB
BACTERIA: ABNORMAL /HPF
BILIRUBIN URINE: NEGATIVE
BLOOD, URINE: NEGATIVE
CASTS 2: ABNORMAL /LPF
CASTS UA: ABNORMAL /LPF
CHARACTER, URINE: CLEAR
COLOR: YELLOW
CRYSTALS, UA: ABNORMAL
EPITHELIAL CELLS, UA: ABNORMAL /HPF
GLUCOSE URINE: NEGATIVE MG/DL
KETONES, URINE: NEGATIVE
LEUKOCYTE ESTERASE, URINE: ABNORMAL
MISCELLANEOUS 2: ABNORMAL
NITRITE, URINE: NEGATIVE
PH UA: 5 (ref 5–9)
PROTEIN UA: NEGATIVE
RBC URINE: ABNORMAL /HPF
RENAL EPITHELIAL, UA: ABNORMAL
SPECIFIC GRAVITY, URINE: 1.02 (ref 1–1.03)
UROBILINOGEN, URINE: 0.2 EU/DL (ref 0–1)
WBC UA: ABNORMAL /HPF
YEAST: ABNORMAL

## 2020-04-14 RX ORDER — LORAZEPAM 1 MG/1
TABLET ORAL
Qty: 90 TABLET | Refills: 0 | Status: SHIPPED | OUTPATIENT
Start: 2020-04-14 | End: 2020-07-14 | Stop reason: SDUPTHER

## 2020-05-04 RX ORDER — LAMOTRIGINE 100 MG/1
TABLET ORAL
Qty: 270 TABLET | Refills: 3 | Status: SHIPPED | OUTPATIENT
Start: 2020-05-04 | End: 2021-05-11 | Stop reason: SDUPTHER

## 2020-05-04 NOTE — TELEPHONE ENCOUNTER
Selma and Stuart Blanchard called into the office stating that she needs a refill on her Lamictal 100mg;#270 with 3 refills;last with a start date of 03/19/19 and last refill date of 02/04/20. Medication is pending your approval for the #270 with 3 refills. Patient's last completed appt was on 09/18/19 to return on 06/15/20; there have been a couple cancellations due to the COVID-19 outbreak.

## 2020-06-15 ENCOUNTER — VIRTUAL VISIT (OUTPATIENT)
Dept: PSYCHIATRY | Age: 70
End: 2020-06-15
Payer: MEDICARE

## 2020-06-15 PROCEDURE — 99213 OFFICE O/P EST LOW 20 MIN: CPT | Performed by: PSYCHIATRY & NEUROLOGY

## 2020-06-15 NOTE — PROGRESS NOTES
tablet Take 1 bedtime daily in addition to the 50s 90 tablet 3    QUEtiapine (SEROQUEL) 50 MG tablet Take 1 bedtime daily in addition to the 25s 90 tablet 3    venlafaxine (EFFEXOR XR) 150 MG extended release capsule Take 1 capsule by mouth 2 times daily 180 capsule 3    vitamin B-6 (PYRIDOXINE) 50 MG tablet Take 50 mg by mouth daily      Cyanocobalamin (VITAMIN B12) 3000 MCG/ML LIQD Place under the tongue      acarbose (PRECOSE) 50 MG tablet Take 1 tablet by mouth 3 times daily (with meals) 270 tablet 0    Thiamine HCl (VITAMIN B-1) 50 MG tablet Take 1 tablet by mouth daily 30 tablet 3    cyproheptadine (PERIACTIN) 4 MG tablet Take 4 mg by mouth 2 times daily as needed      metoclopramide (REGLAN) 10 MG/10ML SOLN Take 5-10 mLs by mouth every 6 hours as needed (nausea or vomiting) 400 mL 5    ondansetron (ZOFRAN ODT) 4 MG disintegrating tablet Take 1 tablet by mouth every 4 hours as needed for Nausea or Vomiting 60 tablet 5    ergocalciferol (ERGOCALCIFEROL) 64821 UNITS capsule Take 1 capsule by mouth once a week (Patient taking differently: Take 50,000 Units by mouth Twice a Week ) 4 capsule 3    vitamin D (CHOLECALCIFEROL) 1000 UNIT TABS tablet Take 2 tablets by mouth daily 60 tablet     Blood Glucose Monitoring Suppl ([employer] CONTOUR MONITOR) W/DEVICE KIT 1 kit by Does not apply route once for 1 dose 1 kit 0    glucose blood VI test strips (KIET CONTOUR TEST) strip Pt testing blood sugar 4 times daily. Dx: E16.2  Please add lancets 450 each 1    Cyanocobalamin (VITAMIN B-12 IJ) Inject 1,000 mcg as directed every 30 days.  Multiple Vitamins-Minerals (MULTIVITAMIN PO) Take 1 tablet by mouth daily.  lisinopril (PRINIVIL) 5 MG tablet Take 5 mg by mouth daily. No current facility-administered medications for this visit.

## 2020-06-25 ENCOUNTER — TELEPHONE (OUTPATIENT)
Dept: PSYCHIATRY | Age: 70
End: 2020-06-25

## 2020-06-29 ENCOUNTER — HOSPITAL ENCOUNTER (OUTPATIENT)
Age: 70
Discharge: HOME | End: 2020-06-29
Payer: MEDICARE

## 2020-06-29 LAB
ANION GAP SERPL CALCULATED.3IONS-SCNC: 13 MEQ/L (ref 8–16)
BUN BLDV-MCNC: 16 MG/DL (ref 7–22)
CALCIUM SERPL-MCNC: 8.6 MG/DL (ref 8.5–10.5)
CHLORIDE BLD-SCNC: 107 MEQ/L (ref 98–111)
CO2: 21 MEQ/L (ref 23–33)
CREAT SERPL-MCNC: 0.8 MG/DL (ref 0.4–1.2)
GFR SERPL CREATININE-BSD FRML MDRD: 71 ML/MIN/1.73M2
GLUCOSE BLD-MCNC: 74 MG/DL (ref 70–108)
POTASSIUM SERPL-SCNC: 4.5 MEQ/L (ref 3.5–5.2)
SODIUM BLD-SCNC: 141 MEQ/L (ref 135–145)

## 2020-06-29 PROCEDURE — 82652 VIT D 1 25-DIHYDROXY: CPT

## 2020-06-29 PROCEDURE — 83036 HEMOGLOBIN GLYCOSYLATED A1C: CPT

## 2020-06-29 PROCEDURE — 80048 BASIC METABOLIC PNL TOTAL CA: CPT

## 2020-06-29 PROCEDURE — 82306 VITAMIN D 25 HYDROXY: CPT

## 2020-06-29 PROCEDURE — 36415 COLL VENOUS BLD VENIPUNCTURE: CPT

## 2020-06-29 PROCEDURE — 83970 ASSAY OF PARATHORMONE: CPT

## 2020-06-30 LAB
AVERAGE GLUCOSE: 75 MG/DL (ref 70–126)
HBA1C MFR BLD: 4.5 % (ref 4.4–6.4)
PTH INTACT: 118.1 PG/ML (ref 15–65)
VITAMIN D 25-HYDROXY: 19 NG/ML (ref 30–100)

## 2020-07-02 LAB — VITAMIN D 1,25-DIHYDROXY: 72.4 PG/ML (ref 19.9–79.3)

## 2020-07-13 ENCOUNTER — TELEPHONE (OUTPATIENT)
Dept: PSYCHIATRY | Age: 70
End: 2020-07-13

## 2020-07-13 NOTE — TELEPHONE ENCOUNTER
Mamie Emanuel was scheduled for a follow up appt. On 7/13, she sell asleep and slept right thru this appt. She apologized and has rescheduled for 7/14.

## 2020-07-14 ENCOUNTER — VIRTUAL VISIT (OUTPATIENT)
Dept: PSYCHIATRY | Age: 70
End: 2020-07-14
Payer: MEDICARE

## 2020-07-14 PROCEDURE — 99212 OFFICE O/P EST SF 10 MIN: CPT | Performed by: PSYCHIATRY & NEUROLOGY

## 2020-07-14 RX ORDER — QUETIAPINE FUMARATE 50 MG/1
TABLET, FILM COATED ORAL
Qty: 90 TABLET | Refills: 3 | Status: SHIPPED | OUTPATIENT
Start: 2020-07-14 | End: 2020-11-17

## 2020-07-14 RX ORDER — LORAZEPAM 1 MG/1
TABLET ORAL
Qty: 90 TABLET | Refills: 0 | Status: SHIPPED | OUTPATIENT
Start: 2020-07-14 | End: 2020-10-28 | Stop reason: SDUPTHER

## 2020-07-14 RX ORDER — QUETIAPINE FUMARATE 25 MG/1
TABLET, FILM COATED ORAL
Qty: 90 TABLET | Refills: 3 | Status: SHIPPED | OUTPATIENT
Start: 2020-07-14 | End: 2020-11-17

## 2020-07-14 NOTE — PROGRESS NOTES
Chief Complaint   Patient presents with    1 Month Follow-Up     Bipolar PTSD     Frank Vega is a 71 y.o. female being evaluated by a Virtual Visit (video visit) encounter to address concerns as mentioned above. A caregiver was present when appropriate. Due to this being a TeleHealth encounter ([facility]), evaluation of the following organ systems was limited: Vitals/Constitutional/EENT/Resp/CV/GI//MS/Neuro/Skin/Heme-Lymph-Imm. Pursuant to the emergency declaration under the [facility], [address] [place] [address] authority and the [employer] Act, this Virtual Visit was conducted with patient's (and/or legal guardian's) consent, to reduce the patient's risk of exposure to COVID-19 and provide necessary medical care. The patient (and/or legal guardian) has also been advised to contact this office for worsening conditions or problems, and seek emergency medical treatment and/or call 911 if deemed necessary. Patient identification was verified at the start of the visit: Yes    Total time spent for this encounter: Not billed by time    Services were provided through a video synchronous discussion virtually to substitute for in-person clinic visit. Patient and provider were located at their individual homes. --Kj Valdez MD on 7/14/2020 at 2:44 PM    An electronic signature was used to authenticate this note. Army Ba was interviewed by milli mitchell for this appointment. She is generally doing well. She is having some medical issues with arthritis, and apparently they are reluctant to give her oral medication because of gastric symptoms, so she is going to be trying some topical preparation. I am not sure what that might be. Mood wise she is holding her own. She did not feel it necessary to alter her medicines any. She did try to discontinue the quetiapine and was not successful.   She was not sleeping well, and returned to the 75 mg evening dose. She did ask for a reorder of that, as well as lorazepam.  All of those were given.     Mental Status Examination    Level of consciousness:  within normal limits  Appearance:  well-appearing, street clothes, in chair, good grooming and good hygiene  Behavior/Motor:  no abnormalities noted  Attitude toward examiner:  cooperative, attentive and good eye contact  Speech:  spontaneous, normal rate, normal volume and well articulated  Mood:  euthymic  Affect:  mood congruent  Thought processes:  linear, goal directed and coherent  Thought content:  Homocidal ideation: none  Suicidal Ideation:  denies suicidal ideation  Delusions:  no evidence of delusions  Perceptual Disturbance:  denies any perceptual disturbance  Cognition:  oriented to person, place, and time  Concentration succeeded  Memory intact  Fund of knowledge:  good  Abstract thinking:  good  Insight:  good  Judgment:  good  Anxiety:   Generalized: No  Excessive Worry: No  Panic Attacks: No  Frequency:  Housebound: No   Obsession: No   Compulsion: No  Flashbacks:Yes  Nightmares Yes  Hyperarousal Yes     DIAGNOSTIC IMPRESSION  Bipolar, partial remission  PTSD    Plan  No changes  Current Outpatient Medications   Medication Sig Dispense Refill    QUEtiapine (SEROQUEL) 25 MG tablet Take 1 bedtime daily in addition to the 50s 90 tablet 3    QUEtiapine (SEROQUEL) 50 MG tablet Take 1 bedtime daily in addition to the 25s 90 tablet 3    LORazepam (ATIVAN) 1 MG tablet TAKE 1 TABLET BY MOUTH AT BEDTIME 90 tablet 0    lamoTRIgine (LAMICTAL) 100 MG tablet TAKE 1 TABLET BY MOUTH IN THE MORNING AND 2 IN THE EVENING AS DIRECTED 270 tablet 3    venlafaxine (EFFEXOR XR) 150 MG extended release capsule Take 1 capsule by mouth 2 times daily 180 capsule 3    vitamin B-6 (PYRIDOXINE) 50 MG tablet Take 50 mg by mouth daily      Cyanocobalamin (VITAMIN B12) 3000 MCG/ML LIQD Place under the tongue      acarbose (PRECOSE) 50 MG tablet Take 1 tablet by mouth 3 times daily (with meals) 270 tablet 0    Thiamine HCl (VITAMIN B-1) 50 MG tablet Take 1 tablet by mouth daily 30 tablet 3    cyproheptadine (PERIACTIN) 4 MG tablet Take 4 mg by mouth 2 times daily as needed      metoclopramide (REGLAN) 10 MG/10ML SOLN Take 5-10 mLs by mouth every 6 hours as needed (nausea or vomiting) 400 mL 5    ondansetron (ZOFRAN ODT) 4 MG disintegrating tablet Take 1 tablet by mouth every 4 hours as needed for Nausea or Vomiting 60 tablet 5    ergocalciferol (ERGOCALCIFEROL) 46496 UNITS capsule Take 1 capsule by mouth once a week (Patient taking differently: Take 50,000 Units by mouth Twice a Week ) 4 capsule 3    vitamin D (CHOLECALCIFEROL) 1000 UNIT TABS tablet Take 2 tablets by mouth daily 60 tablet     Blood Glucose Monitoring Suppl ([employer] CONTOUR MONITOR) W/DEVICE KIT 1 kit by Does not apply route once for 1 dose 1 kit 0    glucose blood VI test strips (KIET CONTOUR TEST) strip Pt testing blood sugar 4 times daily. Dx: E16.2  Please add lancets 450 each 1    Cyanocobalamin (VITAMIN B-12 IJ) Inject 1,000 mcg as directed every 30 days.  Multiple Vitamins-Minerals (MULTIVITAMIN PO) Take 1 tablet by mouth daily.  lisinopril (PRINIVIL) 5 MG tablet Take 5 mg by mouth daily. No current facility-administered medications for this visit.

## 2020-08-17 ENCOUNTER — HOSPITAL ENCOUNTER (EMERGENCY)
Age: 70
Discharge: HOME | End: 2020-08-17
Attending: EMERGENCY MEDICINE
Payer: MEDICARE

## 2020-08-17 ENCOUNTER — APPOINTMENT (OUTPATIENT)
Dept: GENERAL RADIOLOGY | Age: 70
End: 2020-08-17
Payer: MEDICARE

## 2020-08-17 VITALS
RESPIRATION RATE: 16 BRPM | BODY MASS INDEX: 21.86 KG/M2 | DIASTOLIC BLOOD PRESSURE: 63 MMHG | SYSTOLIC BLOOD PRESSURE: 125 MMHG | TEMPERATURE: 97.5 F | HEIGHT: 66 IN | HEART RATE: 83 BPM | OXYGEN SATURATION: 100 % | WEIGHT: 136 LBS

## 2020-08-17 PROCEDURE — 73630 X-RAY EXAM OF FOOT: CPT

## 2020-08-17 PROCEDURE — 73610 X-RAY EXAM OF ANKLE: CPT

## 2020-08-17 PROCEDURE — 99283 EMERGENCY DEPT VISIT LOW MDM: CPT

## 2020-08-17 PROCEDURE — 6370000000 HC RX 637 (ALT 250 FOR IP): Performed by: EMERGENCY MEDICINE

## 2020-08-17 PROCEDURE — 99282 EMERGENCY DEPT VISIT SF MDM: CPT

## 2020-08-17 RX ORDER — ACETAMINOPHEN 500 MG
TABLET ORAL
Status: DISPENSED
Start: 2020-08-17 | End: 2020-08-18

## 2020-08-17 RX ORDER — ACETAMINOPHEN 500 MG
1000 TABLET ORAL ONCE
Status: COMPLETED | OUTPATIENT
Start: 2020-08-17 | End: 2020-08-17

## 2020-08-17 RX ADMIN — ACETAMINOPHEN 1000 MG: 500 TABLET ORAL at 16:47

## 2020-08-17 ASSESSMENT — PAIN SCALES - GENERAL: PAINLEVEL_OUTOF10: 8

## 2020-08-17 ASSESSMENT — ENCOUNTER SYMPTOMS
VOMITING: 0
BACK PAIN: 0
SHORTNESS OF BREATH: 0

## 2020-08-17 NOTE — ED NOTES
Pt. Presents to ED with c/o foot pain, pt. Was kneeling and went to stand up and twisted foot. Pt. Has swelling and slight bruising to top of left foot, ice pack to area. Skin intact. CMS intact. Pt. Has pain to left ankle with palpation.   Ricarda Longo RN  08/17/20 [address] [place] [address]  08/17/20 3425

## 2020-08-17 NOTE — ED PROVIDER NOTES
[address]  [address] [address] [address]  Phone: [phone]    eMERGENCY dEPARTMENT eNCOUnter           [facility]       Chief Complaint   Patient presents with   [address] Injury       Nurses Notes reviewed and I agree except as noted in the HPI. HISTORY OF PRESENT ILLNESS    Gely Reddy is a 71 y.o. female who presented via private vehicle. Chief complaint: Fall, left foot and ankle pain. She tripped and fell at home about 2 hours ago and twisted her left ankle. She is complaining of mild sharp persistent left ankle pain which is worse with walking. Pain did not radiate anywhere. She denies foot weakness or numbness. She also is complaining of mild left foot pain. She was able to walk on her heel. She denies other injuries or symptoms. REVIEW OF SYSTEMS     Review of Systems   Constitutional: Negative for fever. Respiratory: Negative for shortness of breath. Cardiovascular: Negative for chest pain. Gastrointestinal: Negative for vomiting. Musculoskeletal: Negative for back pain and neck pain. Left foot and ankle pain as mentioned above   Neurological: Negative for syncope. Psychiatric/Behavioral: Negative for confusion. PAST MEDICAL HISTORY    has a past medical history of Arthritis, Bowel obstruction ([address] Bronchitis, DVT, lower extremity ([address] History of blood transfusion, Hx of blood clots, Hypertension, Hypoglycemia, Kidney stone, Movement disorder, Multinodular goiter, Muscle strain of left lower extremity, and Psychiatric problem. SURGICAL HISTORY      has a past surgical history that includes Hysterectomy (1992); Cholecystectomy (1994); Gastric bypass surgery (1999); Esophagectomy (2000); Colon surgery (2001); other surgical history; Abdomen surgery; Appendectomy; Dilatation, esophagus; Knee cartilage surgery (Left, october 2014); Foot surgery (Right, 10/14/15);  Upper gastrointestinal endoscopy (06/2016); Suppl (KIET CONTOUR MONITOR) W/DEVICE KIT ONCE Starting Mon 2016, Disp-1 kit, R-0, Normal      glucose blood VI test strips (KIET CONTOUR TEST) strip Disp-450 each, R-1, NormalPt testing blood sugar 4 times daily. Dx: E16.2  Please add lancets       ! ! - Potential duplicate medications found. Please discuss with provider. ALLERGIES     has No Known Allergies. FAMILY HISTORY     She indicated that her mother is . She indicated that her father is . She indicated that her sister is alive. She indicated that her brother is alive. She indicated that her maternal grandmother is . She indicated that her maternal grandfather is . She indicated that her paternal grandmother is . She indicated that her paternal grandfather is . family history includes Cancer in her mother; Diabetes in her father and mother; High Blood Pressure in her brother and sister; Obesity in her sister; Stroke in her father; Thyroid Disease in her sister. SOCIAL HISTORY      reports that she has never smoked. She has never used smokeless tobacco. She reports current alcohol use. She reports that she does not use drugs. PHYSICAL EXAM     INITIAL VITALS:  height is 5' 6\" (1.676 m) and weight is 136 lb (61.7 kg). Her temporal temperature is 97.5 °F (36.4 °C). Her blood pressure is 125/63 and her pulse is 83. Her respiration is 16 and oxygen saturation is 100%. Physical Exam   Constitutional: She appears well-developed and well-nourished. No distress. HENT:   Head: Atraumatic. Neck: Normal range of motion. Neck supple. No C-spine tenderness   Cardiovascular: Normal rate and regular rhythm. No murmur heard. Pulmonary/Chest: Effort normal and breath sounds normal.   Musculoskeletal:      Comments: Examination of the left ankle showed mild swelling of the lateral side, there is slight tenderness to the lateral malleolus and the lateral soft tissue area.   Ligaments are stable. Examination of the left foot showed mild swelling and ecchymosis to the distal dorsal aspect of the foot. She has normal neurovascular examination of her left foot. Neurological: She is alert. DIFFERENTIAL DIAGNOSIS:       DIAGNOSTIC RESULTS       RADIOLOGY:   Left foot and ankle x-rays were unremarkable. LABS:   Labs Reviewed - No data to display    EMERGENCY DEPARTMENT COURSE:   Vitals:    Vitals:    08/17/20 1558   BP: 125/63   Pulse: 83   Resp: 16   Temp: 97.5 °F (36.4 °C)   TempSrc: Temporal   SpO2: 100%   Weight: 136 lb (61.7 kg)   Height: 5' 6\" (1.676 m)     Received Tylenol and ice pack, she reported improvement. She understood the diagnosis and discharge instructions. FINAL IMPRESSION      1. Sprain of left ankle, unspecified ligament, initial encounter    2. Contusion of fifth toe of left foot, initial encounter          DISPOSITION/PLAN   She was discharged home in good condition, she was given discharge instructions and was advised to return if worse or new symptoms.   PATIENT REFERRED TO:  Pauline Calderón MD  [address] [place] [address]    In 2 days        DISCHARGE MEDICATIONS:  Discharge Medication List as of 8/17/2020  4:41 PM          (Please note that portions of this note were completed with a voice recognition program.  Efforts were made to edit the dictations but occasionally words are mis-transcribed.)    MD Nia Kramer Asa, Asa, MD  08/17/20 1559

## 2020-08-17 NOTE — ED NOTES
AVS rev'd with pt. And copy given. Pulse regular. Extremities warm. Respirations regular and quiet. Mucous membranes pink & moist. Alert and oriented times 3. No nausea or vomiting. Range of motion within patient's limits. Skin pink, warm and dry. Calm and cooperative.      Nadia Lui RN  08/17/20 0786

## 2020-09-21 RX ORDER — VENLAFAXINE HYDROCHLORIDE 150 MG/1
CAPSULE, EXTENDED RELEASE ORAL
Qty: 180 CAPSULE | Refills: 0 | Status: SHIPPED | OUTPATIENT
Start: 2020-09-21 | End: 2020-11-17 | Stop reason: SDUPTHER

## 2020-09-21 NOTE — TELEPHONE ENCOUNTER
THIS IS A DR. DUARTE PATIENT; HE IS OUT OF THE OFFICE FOR SEVERAL WEEKS. [facility] [employer] is requesting a medication refill on [facility]'s behalf for Effexor XR 150mg;#180 with 3 refills; last with a start date of 09/18/19 and last refill date of 06/23/20. Medication is pending your approval for a 90 day supply with 0 refills due to the original provider being out of the office; she is scheduled to return with Dr. Felipa Lui on 10/14/20.

## 2020-10-28 RX ORDER — LORAZEPAM 1 MG/1
TABLET ORAL
Qty: 90 TABLET | Refills: 0 | Status: SHIPPED | OUTPATIENT
Start: 2020-10-28 | End: 2021-01-25 | Stop reason: SDUPTHER

## 2020-11-17 ENCOUNTER — VIRTUAL VISIT (OUTPATIENT)
Dept: PSYCHIATRY | Age: 70
End: 2020-11-17
Payer: MEDICARE

## 2020-11-17 PROCEDURE — 99442 PR PHYS/QHP TELEPHONE EVALUATION 11-20 MIN: CPT | Performed by: PSYCHIATRY & NEUROLOGY

## 2020-11-17 RX ORDER — QUETIAPINE FUMARATE 100 MG/1
100 TABLET, FILM COATED ORAL NIGHTLY
Qty: 90 TABLET | Refills: 3 | Status: SHIPPED | OUTPATIENT
Start: 2020-11-17 | End: 2021-10-14 | Stop reason: SDUPTHER

## 2020-11-17 RX ORDER — VENLAFAXINE HYDROCHLORIDE 150 MG/1
CAPSULE, EXTENDED RELEASE ORAL
Qty: 180 CAPSULE | Refills: 0 | Status: SHIPPED | OUTPATIENT
Start: 2020-11-17 | End: 2022-01-13 | Stop reason: SDUPTHER

## 2020-11-17 NOTE — PROGRESS NOTES
Chief Complaint   Patient presents with    3 Month Follow-Up     Bipolar PTSD     Gaye Zarate is a 71 y.o. female evaluated via telephone on 11/17/2020. Consent:  She and/or health care decision maker is aware that that she may receive a bill for this telephone service, depending on her insurance coverage, and has provided verbal consent to proceed: Yes      Documentation:  I communicated with the patient and/or health care decision maker about her psychiatric condition. Details of this discussion including any medical advice provided: yes      I affirm this is a Patient Initiated Episode with a Patient who has not had a related appointment within my department in the past 7 days or scheduled within the next 24 hours. Patient identification was verified at the start of the visit: Yes    Total Time: minutes: 11-20 minutes    Note: not billable if this call serves to triage the patient into an appointment for the relevant concern      Marvel Galarza was interviewed by telephone for this appointment. It had been scheduled for video, but we were unable to get the audio to work so went to the telephone. Reed Hester describes some roughening of her bipolar illness. She notes in particular that she has been more irritable. She thinks other people do not notice but she does. Looking at her regimen, she is on a rather low dose of quetiapine, 75 mg at bedtime only. I suggested that we try increasing that to 100 mg and she was agreeable. I put in a prescription for that level, discontinuing the 25's and 50s that she has currently. I told her she could use those tablets by making them add up to 100 mg. She also asked for a refill on venlafaxine, which was provided without change. We will plan to follow-up in 3 months.   However I cautioned her to let me know if this maneuver does not give her relief, because I can do additional dose increases or change other drugs to try to get this under control before it becomes bad.     Mental Status Examination    Level of consciousness:  within normal limits  Appearance:  well-appearing while on video  Behavior/Motor:  no abnormalities noted  Attitude toward examiner:  cooperative  Speech:  spontaneous, normal rate, normal volume and well articulated  Mood:  irritable  Affect:  mood congruent  Thought processes:  linear, goal directed and coherent  Thought content:  Homocidal ideation: none  Suicidal Ideation:  denies suicidal ideation  Delusions:  no evidence of delusions  Perceptual Disturbance:  denies any perceptual disturbance  Cognition:  oriented to person, place, and time  Concentration succeeded  Memory intact  Fund of knowledge:  good  Abstract thinking:  good  Insight:  good  Judgment:  good  Anxiety:   Generalized: No  Excessive Worry: No  Panic Attacks: No  Frequency:  Housebound: No   Obsession: No   Compulsion: No  Flashbacks:Yes  Nightmares Yes  Hyperarousal Yes     DIAGNOSTIC IMPRESSION  Bipolar moderate  PTSD    Plan  Increase quetiapine slightly    Current Outpatient Medications   Medication Sig Dispense Refill    QUEtiapine (SEROQUEL) 100 MG tablet Take 1 tablet by mouth nightly 90 tablet 3    venlafaxine (EFFEXOR XR) 150 MG extended release capsule Take 1 capsule by mouth twice daily 180 capsule 0    LORazepam (ATIVAN) 1 MG tablet TAKE 1 TABLET BY MOUTH AT BEDTIME 90 tablet 0    lamoTRIgine (LAMICTAL) 100 MG tablet TAKE 1 TABLET BY MOUTH IN THE MORNING AND 2 IN THE EVENING AS DIRECTED 270 tablet 3    vitamin B-6 (PYRIDOXINE) 50 MG tablet Take 50 mg by mouth daily      Cyanocobalamin (VITAMIN B12) 3000 MCG/ML LIQD Place under the tongue      acarbose (PRECOSE) 50 MG tablet Take 1 tablet by mouth 3 times daily (with meals) 270 tablet 0    Thiamine HCl (VITAMIN B-1) 50 MG tablet Take 1 tablet by mouth daily 30 tablet 3    metoclopramide (REGLAN) 10 MG/10ML SOLN Take 5-10 mLs by mouth every 6 hours as needed (nausea or vomiting) 400 mL 5  ondansetron (ZOFRAN ODT) 4 MG disintegrating tablet Take 1 tablet by mouth every 4 hours as needed for Nausea or Vomiting 60 tablet 5    ergocalciferol (ERGOCALCIFEROL) 15623 UNITS capsule Take 1 capsule by mouth once a week (Patient taking differently: Take 50,000 Units by mouth Twice a Week ) 4 capsule 3    vitamin D (CHOLECALCIFEROL) 1000 UNIT TABS tablet Take 2 tablets by mouth daily 60 tablet     Blood Glucose Monitoring Suppl ([employer] CONTOUR MONITOR) W/DEVICE KIT 1 kit by Does not apply route once for 1 dose 1 kit 0    glucose blood VI test strips ([employer] CONTOUR TEST) strip Pt testing blood sugar 4 times daily. Dx: E16.2  Please add lancets 450 each 1    Cyanocobalamin (VITAMIN B-12 IJ) Inject 1,000 mcg as directed every 30 days.  Multiple Vitamins-Minerals (MULTIVITAMIN PO) Take 1 tablet by mouth daily.  lisinopril (PRINIVIL) 5 MG tablet Take 5 mg by mouth daily. No current facility-administered medications for this visit.

## 2021-01-04 ENCOUNTER — HOSPITAL ENCOUNTER (OUTPATIENT)
Age: 71
Discharge: HOME | End: 2021-01-04
Payer: MEDICARE

## 2021-01-04 LAB
ERYTHROCYTE [DISTWIDTH] IN BLOOD BY AUTOMATED COUNT: 14.6 % (ref 11.5–14.5)
ERYTHROCYTE [DISTWIDTH] IN BLOOD BY AUTOMATED COUNT: 52.8 FL (ref 35–45)
HCT VFR BLD CALC: 38.1 % (ref 37–47)
HEMOGLOBIN: 11.5 GM/DL (ref 12–16)
MCH RBC QN AUTO: 29.8 PG (ref 26–33)
MCHC RBC AUTO-ENTMCNC: 30.2 GM/DL (ref 32.2–35.5)
MCV RBC AUTO: 98.7 FL (ref 81–99)
PLATELET # BLD: 288 THOU/MM3 (ref 130–400)
PMV BLD AUTO: 10.4 FL (ref 9.4–12.4)
RBC # BLD: 3.86 MILL/MM3 (ref 4.2–5.4)
WBC # BLD: 6.2 THOU/MM3 (ref 4.8–10.8)

## 2021-01-04 PROCEDURE — 84100 ASSAY OF PHOSPHORUS: CPT

## 2021-01-04 PROCEDURE — 80048 BASIC METABOLIC PNL TOTAL CA: CPT

## 2021-01-04 PROCEDURE — 82306 VITAMIN D 25 HYDROXY: CPT

## 2021-01-04 PROCEDURE — 83970 ASSAY OF PARATHORMONE: CPT

## 2021-01-04 PROCEDURE — 82652 VIT D 1 25-DIHYDROXY: CPT

## 2021-01-04 PROCEDURE — 85027 COMPLETE CBC AUTOMATED: CPT

## 2021-01-04 PROCEDURE — 82607 VITAMIN B-12: CPT

## 2021-01-04 PROCEDURE — 36415 COLL VENOUS BLD VENIPUNCTURE: CPT

## 2021-01-05 LAB
ANION GAP SERPL CALCULATED.3IONS-SCNC: 8 MEQ/L (ref 8–16)
BUN BLDV-MCNC: 12 MG/DL (ref 7–22)
CALCIUM SERPL-MCNC: 9.2 MG/DL (ref 8.5–10.5)
CHLORIDE BLD-SCNC: 103 MEQ/L (ref 98–111)
CO2: 28 MEQ/L (ref 23–33)
CREAT SERPL-MCNC: 0.8 MG/DL (ref 0.4–1.2)
GFR SERPL CREATININE-BSD FRML MDRD: 71 ML/MIN/1.73M2
GLUCOSE BLD-MCNC: 80 MG/DL (ref 70–108)
PHOSPHORUS: 3.8 MG/DL (ref 2.4–4.7)
POTASSIUM SERPL-SCNC: 4.4 MEQ/L (ref 3.5–5.2)
PTH INTACT: 120.2 PG/ML (ref 15–65)
SODIUM BLD-SCNC: 139 MEQ/L (ref 135–145)
VITAMIN B-12: 342 PG/ML (ref 211–911)
VITAMIN D 25-HYDROXY: 13 NG/ML (ref 30–100)

## 2021-01-06 LAB — VITAMIN D 1,25-DIHYDROXY: 67.9 PG/ML (ref 19.9–79.3)

## 2021-01-25 ENCOUNTER — TELEPHONE (OUTPATIENT)
Dept: PSYCHIATRY | Age: 71
End: 2021-01-25

## 2021-01-25 DIAGNOSIS — F43.10 PTSD (POST-TRAUMATIC STRESS DISORDER): ICD-10-CM

## 2021-01-25 RX ORDER — LORAZEPAM 1 MG/1
TABLET ORAL
Qty: 14 TABLET | Refills: 0 | Status: SHIPPED | OUTPATIENT
Start: 2021-01-25 | End: 2021-02-09 | Stop reason: SDUPTHER

## 2021-01-25 NOTE — TELEPHONE ENCOUNTER
Fredis Willett is a former patient of Dr. Binu Cunha. She attended an appointment 11/17 and is to resume care with you on 2/9. She has requested a 90 day supply of Ativan 1mg/HS. Medication has not been loaded until further direction is given. Since patient is new to provider, it was uncertain if 90 day refill would be appropriate. The last 90 day script was written on 10/28/20.

## 2021-02-03 ENCOUNTER — HOSPITAL ENCOUNTER (OUTPATIENT)
Age: 71
Discharge: HOME | End: 2021-02-03
Payer: MEDICARE

## 2021-02-03 ENCOUNTER — HOSPITAL ENCOUNTER (OUTPATIENT)
Dept: GENERAL RADIOLOGY | Age: 71
Discharge: HOME | End: 2021-02-03
Payer: MEDICARE

## 2021-02-03 DIAGNOSIS — M25.531 RIGHT WRIST PAIN: ICD-10-CM
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
## 2021-02-03 DIAGNOSIS — M79.641 RIGHT HAND PAIN: ICD-10-CM

## 2021-02-03 PROCEDURE — 73130 X-RAY EXAM OF HAND: CPT

## 2021-02-03 PROCEDURE — 73110 X-RAY EXAM OF WRIST: CPT

## 2021-02-09 ENCOUNTER — OFFICE VISIT (OUTPATIENT)
Dept: PSYCHIATRY | Age: 71
End: 2021-02-09
Payer: MEDICARE

## 2021-02-09 DIAGNOSIS — F31.75 BIPOLAR DISORDER, IN PARTIAL REMISSION, MOST RECENT EPISODE DEPRESSED (HCC): Primary | ICD-10-CM

## 2021-02-09 DIAGNOSIS — F43.10 PTSD (POST-TRAUMATIC STRESS DISORDER): ICD-10-CM

## 2021-02-09 PROCEDURE — 99205 OFFICE O/P NEW HI 60 MIN: CPT | Performed by: REGISTERED NURSE

## 2021-02-09 RX ORDER — LORAZEPAM 1 MG/1
TABLET ORAL
Qty: 90 TABLET | Refills: 0 | Status: SHIPPED | OUTPATIENT
Start: 2021-02-09 | End: 2021-05-11

## 2021-02-09 NOTE — PROGRESS NOTES
This note will not be viewable in [employer] for the following reason(s). This is a Psychotherapy Note. [address] [id number]  Dept: [phone]  Dept Fax: [phone]  Loc: [phone]    Visit Date: 2/9/2021    SUBJECTIVE DATA     CHIEF COMPLAINT:    Chief Complaint   Patient presents with    Follow-up    Medication Refill       History obtained from: patient    HISTORY OF PRESENT ILLNESS:    Eduard Osborne is a 79 y.o. female who presents to the office for a follow-up appointment. She is a transfer patient from Dr. Sandy Mar and a new patient to this provider. Reports she feels \"more even\" consistently. Started Primidone last year for tremors and immediately began experiencing severe suicidal ideation with \"very detailed plan\" and intent. Suffered a major depressive episode at the time, which took nearly a year to go into remission. Sleep has been a concern some nights as she is awoken by nightmares. Does not wish to make any changes to her medications. Sometimes feels as though she has increasing depression when her blood sugars are unstable; reports they have been well-maintained for the past couple of years. Denies any other concerns at this time. Denies thoughts to harm self  Denies homicidal ideations  Denies hallucinations  Denies delusional thinking    Depression   Patient denies anhedonia, depressed mood, difficulty concentrating, fatigue, feelings of worthlessness/guilt, hopelessness, hypersomnia, impaired memory, insomnia, psychomotor agitation, psychomotor retardation, recurrent thoughts of death, suicidal attempt, suicidal thoughts with specific plan, suicidal thoughts without plan, weight gain and weight loss. Family history significant for no psychiatric illness. Possible organic causes contributing are: none.  Risk factors: negative life event traumatic medical crisis and previous episode of depression. Previous treatment includes medication. She complains of the following side effects from the treatment: none. PTSD--related to the medical crisis she experienced starting 20 years ago   Marked cognitive, affective, and behavioral symptoms in response to reminders of a traumatic event  including flashbacks, severe anxiety, dissociative episodes, fleeing, and combative behaviors. Reports attempting to avoid experiences that may elicit symptoms. Has experienced emotional numbing, diminished interest in everyday activities, and detachment from others at times. Reports she has been experiencing nightmares over the past 20 years involving flashbacks to extremely detailed events that occurred during her medical crisis. She wakes up in a panic and often cannot return to sleep easily, if at all. The nightmares occur about 4 or 5 times a month. The only pattern Mehnaz Morales is able to identify is that she is much more tired than usual at bedtime on the nights she has the nightmares. She is very bothered by the nightmares and feels disappointed in herself because she believes she should \"be over it by now\". Medications  Ativan 1mg at night  Seroquel 100mg at night  Effexor XR 150mg   Lamictal 100mg 1 tablet in morning and 2 tablets in the evening    PSYCHIATRIC HISTORY:  Patient has had prior care with the following:    [x] Psychiatrist    [] Psychologist    [] Other Therapist    [] None    The patient has had 0 lifetime suicide attempts. Patient reports 0 psych hospital admissions.     Past psychiatric medications include:   Trintellix  Celexa  trazodone      Adverse reactionsfrom psychotropic medications:    denies      Lifetime Psychiatric Review of Systems         Arabella or Hypomania:  yes - history of manic episodes starting in early 20s; last manic episode \"many years ago\"     Panic Attacks:  no     Phobias:  no     Obsessions and Compulsions:  no     Body or Vocal Tics:  no     Hallucinations:  yes - Family History  denies    PAST MEDICAL HISTORY:    Past Medical History:   Diagnosis Date    Arthritis     generalized    Bowel obstruction ([address] 2009    Bronchitis 6/2014    D[facility] lower [facility] [address]     History of blood transfusion     Hx of blood clots     DVT    Hypertension     Hypoglycemia     Kidney stone 2008    Movement disorder     Multinodular goiter 2/26/2015    Muscle strain of left lower extremity 8/27/2014    Psychiatric problem     Depression       PAST SURGICAL HISTORY:    Past Surgical History:   Procedure Laterality Date    ABDOMEN SURGERY      APPENDECTOMY      CHOLECYSTECTOMY  1994    COLON SURGERY  2001    substernal colon intra position    COLONOSCOPY  06/21/2016    DILATATION, ESOPHAGUS      ENDOSCOPY, COLON, DIAGNOSTIC      ESOPHAGECTOMY  2000    FOOT SURGERY Right 10/14/15    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    GASTRIC BYPASS SURGERY  1999    HYSTERECTOMY  1992    KNEE CARTILAGE SURGERY Left october 2014    OTHER SURGICAL HISTORY      substernal colon interposition    UPPER GASTROINTESTINAL ENDOSCOPY  06/2016       PREVIOUSMEDICATIONS:  Outpatient Medications Prior to Visit   Medication Sig Dispense Refill    QUEtiapine (SEROQUEL) 100 MG tablet Take 1 tablet by mouth nightly 90 tablet 3    venlafaxine (EFFEXOR XR) 150 MG extended release capsule Take 1 capsule by mouth twice daily 180 capsule 0    lamoTRIgine (LAMICTAL) 100 MG tablet TAKE 1 TABLET BY MOUTH IN THE MORNING AND 2 IN THE EVENING AS DIRECTED 270 tablet 3    LORazepam (ATIVAN) 1 MG tablet TAKE 1 TABLET BY MOUTH AT BEDTIME 14 tablet 0    vitamin B-6 (PYRIDOXINE) 50 MG tablet Take 50 mg by mouth daily      Cyanocobalamin (VITAMIN B12) 3000 MCG/ML LIQD Place under the tongue      acarbose (PRECOSE) 50 MG tablet Take 1 tablet by mouth 3 times daily (with meals) 270 tablet 0    Thiamine HCl (VITAMIN B-1) 50 MG tablet Take 1 tablet by mouth daily 30 tablet 3    metoclopramide (REGLAN) 10 MG/10ML SOLN Take 5-10 mLs by mouth every 6 hours as needed (nausea or vomiting) 400 mL 5    ondansetron (ZOFRAN ODT) 4 MG disintegrating tablet Take 1 tablet by mouth every 4 hours as needed for Nausea or Vomiting 60 tablet 5    ergocalciferol (ERGOCALCIFEROL) 06089 UNITS capsule Take 1 capsule by mouth once a week (Patient taking differently: Take 50,000 Units by mouth Twice a Week ) 4 capsule 3    vitamin D (CHOLECALCIFEROL) 1000 UNIT TABS tablet Take 2 tablets by mouth daily 60 tablet     Blood Glucose Monitoring Suppl ([place] CONTOUR MONITOR) W/DEVICE KIT 1 kit by Does not apply route once for 1 dose 1 kit 0    glucose blood VI test strips (KIET CONTOUR TEST) strip Pt testing blood sugar 4 times daily. Dx: E16.2  Please add lancets 450 each 1    Cyanocobalamin (VITAMIN B-12 IJ) Inject 1,000 mcg as directed every 30 days.  Multiple Vitamins-Minerals (MULTIVITAMIN PO) Take 1 tablet by mouth daily.  lisinopril (PRINIVIL) 5 MG tablet Take 5 mg by mouth daily. No facility-administered medications prior to visit. ALLERGIES:    Primidone    REVIEW OF SYSTEMS:    Review of Systems    The patient sees Thena Cogan, MD as her primary care provider. SPECIALISTS: endocrinologist    OBJECTIVE DATA     LMP 03/20/1985 Comment: hysterectomy in her 35s    Physical Exam    Mental Status Evaluation:   Orientation: Alert, oriented, thought content appropriate   Mood:. Euthymic and Within Normal Limits      Affect:  Normal and Mood Congruent      Appearance:  Age Appropriate, Casually Dressed, Within Normal Limits, Clean, Well Groomed and Clothing Appropriate for Age   Activity:  Within Normal Limits, Cooperative, Good Eye Contact and Seated Calmly   Gait/Posture: Normal   Speech:  Clear, Fluent, Normal Pitch and Volume, Age and Situation Appropriate   Thought Process: Within Normal Limits   Thought Content:   Within Normal Limits   Cognition:  Grossly Intact   Memory: Intact   Insight:  Age Appropriate Judgment: Good   Suicidal Ideations: Denies Suicidal Ideation   Homicidal Ideations: Negative for homicidal ideation   Medication Side Effects: Absent       Attention Span Attention span and concentration were age appropriate       Screenings Completed in This Encounter:     Anxiety and Depression:                   No flowsheet data found. Interpretation of MARCI-7 score: 5-9 = mild anxiety, 10-14 = moderate anxiety, 15+ = severe anxiety. Recommend referral to behavioral health for scores 10 or greater. DIAGNOSIS AND ASSESSMENT DATA     DIAGNOSIS:   1. PTSD (post-traumatic stress disorder)        PLAN   Follow-up:  Return in about 6 months (around 8/9/2021), or if symptoms worsen or fail to improve, for medication management, follow-up. Continue medications as prescribed    Prescriptions for this encounter:  New Prescriptions    No medications on file       Orders Placed This Encounter   Medications    LORazepam (ATIVAN) 1 MG tablet     Sig: TAKE 1 TABLET BY MOUTH AT BEDTIME     Dispense:  90 tablet     Refill:  0       Medications Discontinued During This Encounter   Medication Reason    LORazepam (ATIVAN) 1 MG tablet REORDER       Additional orders:  No orders of the defined types were placed in this encounter. Risks, potential side effects, possibledrug-drug interactions, benefits and alternate treatments discussed in detail. All questions answered. Patient stated understanding and is agreeable to treatment plan. Patient has been instructed to seek emergency help via the emergency and/or calling 911 should symptoms become severe, worsen, or with other concerning symptoms. Patient instructed to goimmediately to the emergency room and/or call 911 with any suicidal or homicidal ideations or if audio/visual hallucinations develop  Patient stated understanding and agrees. Patient given crisis center information.     I spent a total of 60 minutes with the patient and over half of that time was spent on counselingand coordination of care regarding topics discussed above. Provider Signature:  Electronically signed by EVA Lopez CNP on 2/9/2021 at 7:55 PM    **This report has been created using voice recognition software. It may contain minor errors which are inherent in voice recognition technology. **

## 2021-05-11 DIAGNOSIS — F43.10 PTSD (POST-TRAUMATIC STRESS DISORDER): ICD-10-CM

## 2021-05-11 RX ORDER — LAMOTRIGINE 100 MG/1
TABLET ORAL
Qty: 270 TABLET | Refills: 0 | Status: SHIPPED | OUTPATIENT
Start: 2021-05-11 | End: 2021-09-07 | Stop reason: SDUPTHER

## 2021-05-11 RX ORDER — LORAZEPAM 1 MG/1
TABLET ORAL
Qty: 30 TABLET | Refills: 0 | Status: SHIPPED | OUTPATIENT
Start: 2021-05-11 | End: 2021-06-21 | Stop reason: SDUPTHER

## 2021-06-21 DIAGNOSIS — F43.10 PTSD (POST-TRAUMATIC STRESS DISORDER): ICD-10-CM

## 2021-06-21 RX ORDER — LORAZEPAM 1 MG/1
1 TABLET ORAL NIGHTLY PRN
Qty: 30 TABLET | Refills: 0 | Status: SHIPPED | OUTPATIENT
Start: 2021-06-21 | End: 2021-07-19

## 2021-06-21 NOTE — TELEPHONE ENCOUNTER
Lindsey Wilsonlist called into the office leaving a VM stating that she needs a refill on her Ativan 1mg;#30 with 0 refills;last with a start date of 05/11/21. She reports that she is out and she takes 1 nightly; she is requesting for the medication to be filled today if possible; states that she is having an outpatient procedure tomorrow and will not be able to  tomorrow. Medication is pending your approval for a 30 day supply with 0 refills; she is scheduled to return on 10/14/21 with this provider; she is a transfer from Dr. Chris Hines and Yolanda Curry; she last seen Yolanda Curry in Feb 2021 and was to return in 08/2021.

## 2021-07-15 DIAGNOSIS — F43.10 PTSD (POST-TRAUMATIC STRESS DISORDER): ICD-10-CM

## 2021-07-15 NOTE — TELEPHONE ENCOUNTER
Sharyn Castro is a former patient of Dr. Qamar Murray who was last seen 2/9 and is to resume care with you 10/14/21. [facility] has requested a refill of Ativan 1mg/hs indicating that the last 30 day refill was dispensed 6/21/21.     Medication has been loaded pending approval.

## 2021-07-15 NOTE — TELEPHONE ENCOUNTER
This writer spoke with patient . She has a broken finger and has had reconstructive surgery involving removing part of a bone on it and it is in a cast.  She takes norco at night and regular tylenol during the day. She has been on the two medications (norco/ativan) for 21 days and expects to discontinue Norco when she gets her cast off on 7/30. She does not have Narcan at home.

## 2021-07-15 NOTE — TELEPHONE ENCOUNTER
Please check with patient if she is on Hydrocodone/APAP (Norco)? OARRs was reviewed and it appears she picked up 2 prescriptions for Norco - one at the end of June and the other at the beginning of July. Did she have surgery? Is she still on the medication? If she is still on the medication does she have Narcan at home? Also verify how many tabs of the Lorazepam she has remaining.

## 2021-07-19 RX ORDER — LORAZEPAM 1 MG/1
1 TABLET ORAL NIGHTLY PRN
Qty: 30 TABLET | Refills: 0 | Status: SHIPPED | OUTPATIENT
Start: 2021-07-19 | End: 2021-08-12

## 2021-08-11 DIAGNOSIS — F43.10 PTSD (POST-TRAUMATIC STRESS DISORDER): ICD-10-CM

## 2021-08-12 RX ORDER — LORAZEPAM 1 MG/1
1 TABLET ORAL NIGHTLY PRN
Qty: 30 TABLET | Refills: 0 | Status: SHIPPED | OUTPATIENT
Start: 2021-08-12 | End: 2021-09-07

## 2021-08-12 NOTE — TELEPHONE ENCOUNTER
Sami is requesting a medication refill on [facility]'s behalf for Ativan 1mg;#30 with 0 refills; last with a start and fill date of 07/19/21. Medication is pending your approval for a 30 day supply with 0 refills; she is scheduled to return on 10/14/21; she was last seen on 02/09/21 by Bianca Clay; she had also seen Dr. Linda Soriano as well.

## 2021-09-07 DIAGNOSIS — F43.10 PTSD (POST-TRAUMATIC STRESS DISORDER): ICD-10-CM

## 2021-09-07 RX ORDER — LAMOTRIGINE 100 MG/1
TABLET ORAL
Qty: 90 TABLET | Refills: 0 | Status: SHIPPED | OUTPATIENT
Start: 2021-09-07 | End: 2021-10-12 | Stop reason: SDUPTHER

## 2021-09-07 RX ORDER — LORAZEPAM 1 MG/1
1 TABLET ORAL NIGHTLY PRN
Qty: 30 TABLET | Refills: 0 | Status: SHIPPED | OUTPATIENT
Start: 2021-09-07 | End: 2021-10-07

## 2021-09-07 NOTE — TELEPHONE ENCOUNTER
OARRs reviewed. Approved refills for only 30 days, including the Lorazepam even though the request is about 10 days early. I have not yet seen the patient; refills are only to cover the patient. Will discuss 90d Rx at visit.

## 2021-09-07 NOTE — TELEPHONE ENCOUNTER
[place] along with Morena Simmons called into the office requesting a couple medications: Ativan 1mg;#30 with 0 refills;last with a start date of 08/12/21 and last refill date of 08/16/21 along with Lamictal 100mg;#270 with 0 refills;last with a start date of 05/11/21. She is requesting that all her medications are sent in for 90 days so that they are all on a schedule. Due to not having seen this provider yet; her Lamictal is pending your approval for the 90 day supply and her Ativan is for a 30 day supply. She is scheduled to be seen on 10/14/21. Last seen on 02/09/21 by Ana Field.

## 2021-10-11 DIAGNOSIS — F43.10 PTSD (POST-TRAUMATIC STRESS DISORDER): ICD-10-CM

## 2021-10-11 RX ORDER — LAMOTRIGINE 100 MG/1
TABLET ORAL
Qty: 90 TABLET | Refills: 0 | OUTPATIENT
Start: 2021-10-11

## 2021-10-11 RX ORDER — LORAZEPAM 1 MG/1
TABLET ORAL
Qty: 30 TABLET | Refills: 0 | OUTPATIENT
Start: 2021-10-11

## 2021-10-11 NOTE — TELEPHONE ENCOUNTER
[facility] along with Erma Menjivar called into the office stating that she does not have enough lamictal 100mg;#90 with 0 refills to get until her Thursday appt; she reports that the last fill date was on the last sent date of 09/07/21. While on the phone, she said that she had #4 left of her Ativan so that would be addressed at her appt this week; she understood. Medication is pending your approval for a 30 day supply with 0 refills; she is a transfer patient.

## 2021-10-12 RX ORDER — LAMOTRIGINE 100 MG/1
TABLET ORAL
Qty: 90 TABLET | Refills: 0 | Status: SHIPPED | OUTPATIENT
Start: 2021-10-12 | End: 2021-11-02 | Stop reason: SDUPTHER

## 2021-10-14 ENCOUNTER — OFFICE VISIT (OUTPATIENT)
Dept: PSYCHIATRY | Age: 71
End: 2021-10-14
Payer: MEDICARE

## 2021-10-14 VITALS — HEIGHT: 66 IN | WEIGHT: 132.6 LBS | BODY MASS INDEX: 21.31 KG/M2

## 2021-10-14 DIAGNOSIS — Z79.899 LONG TERM CURRENT USE OF ANTIPSYCHOTIC MEDICATION: ICD-10-CM

## 2021-10-14 DIAGNOSIS — E55.9 VITAMIN D DEFICIENCY: ICD-10-CM

## 2021-10-14 DIAGNOSIS — F41.9 ANXIETY: ICD-10-CM

## 2021-10-14 DIAGNOSIS — F43.10 PTSD (POST-TRAUMATIC STRESS DISORDER): Primary | ICD-10-CM

## 2021-10-14 PROCEDURE — 90792 PSYCH DIAG EVAL W/MED SRVCS: CPT | Performed by: NURSE PRACTITIONER

## 2021-10-14 RX ORDER — LORAZEPAM 1 MG/1
1 TABLET ORAL NIGHTLY PRN
COMMUNITY
End: 2021-10-14 | Stop reason: SDUPTHER

## 2021-10-14 RX ORDER — QUETIAPINE FUMARATE 100 MG/1
100 TABLET, FILM COATED ORAL NIGHTLY
Qty: 90 TABLET | Refills: 0 | Status: SHIPPED | OUTPATIENT
Start: 2021-10-14 | End: 2022-01-13 | Stop reason: SDUPTHER

## 2021-10-14 RX ORDER — LORAZEPAM 1 MG/1
1 TABLET ORAL NIGHTLY PRN
Qty: 30 TABLET | Refills: 2 | Status: SHIPPED | OUTPATIENT
Start: 2021-10-14 | End: 2022-01-12
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
## 2021-11-02 RX ORDER — LAMOTRIGINE 100 MG/1
TABLET ORAL
Qty: 270 TABLET | Refills: 0 | Status: SHIPPED | OUTPATIENT
Start: 2021-11-02 | End: 2022-01-13 | Stop reason: SDUPTHER

## 2021-11-02 NOTE — TELEPHONE ENCOUNTER
[address] [place] [address] is requesting refills on the behalf of the patient for lamotrigine 100mg tablet, 1 tablet in the morning and 2 tablets in the evening as directed, 90 day supply. Last Rx was sent 10/12/21 for 90 tablets, 0 refills. Patient is scheduled to return 01/13/21 for a follow up. Prescription loaded for Lamotrigine 100mg, 1 tablet in the morning, 2 tablets in the evening as directed. #270 tablets for 90 days, 0 refills.

## 2021-11-15 ENCOUNTER — HOSPITAL ENCOUNTER (OUTPATIENT)
Age: 71
Setting detail: SPECIMEN
Discharge: HOME | End: 2021-11-15
Payer: MEDICARE

## 2021-11-15 PROCEDURE — 87636 SARSCOV2 & INF A&B AMP PRB: CPT

## 2021-11-16 LAB
INFLUENZA A: NOT DETECTED
INFLUENZA B: NOT DETECTED
SARS-COV-2 RNA, RT PCR: NOT DETECTED

## 2021-11-16 RX ORDER — LAMOTRIGINE 100 MG/1
TABLET ORAL
Qty: 270 TABLET | Refills: 0 | Status: CANCELLED | OUTPATIENT
Start: 2021-11-16

## 2021-11-16 NOTE — TELEPHONE ENCOUNTER
Patient called stating she lost her Rx for lamotrigine, states she has been without medication 2-3 days. She did contact pharmacy prior and they gave her 3 days worth of medication and advised her to contact pcp. Patient states she believes she picked up only #90 tablets. Patient was advised that insurance may not cover additional prescription. Called [facility] pharmacy, spoke with Min, he reports lamotrigine was dispensed on 11/05/2021 #270 tablets. Patient will need new prescription.

## 2021-11-16 NOTE — TELEPHONE ENCOUNTER
Patient called back in and spoke with Shola Sanabria, she found her prescription bottle and no longer needs medication sent to pharmacy.

## 2022-01-06 ENCOUNTER — HOSPITAL ENCOUNTER (OUTPATIENT)
Age: 72
Discharge: HOME | End: 2022-01-06
Payer: MEDICARE

## 2022-01-06 DIAGNOSIS — F41.9 ANXIETY: ICD-10-CM

## 2022-01-06 DIAGNOSIS — Z79.899 LONG TERM CURRENT USE OF ANTIPSYCHOTIC MEDICATION: ICD-10-CM

## 2022-01-06 DIAGNOSIS — E55.9 VITAMIN D DEFICIENCY: ICD-10-CM

## 2022-01-06 DIAGNOSIS — F43.10 PTSD (POST-TRAUMATIC STRESS DISORDER): ICD-10-CM

## 2022-01-06 LAB
ALBUMIN SERPL-MCNC: 3.8 G/DL (ref 3.5–5.1)
ALP BLD-CCNC: 118 U/L (ref 38–126)
ALT SERPL-CCNC: 19 U/L (ref 11–66)
ANION GAP SERPL CALCULATED.3IONS-SCNC: 11 MEQ/L (ref 8–16)
AST SERPL-CCNC: 17 U/L (ref 5–40)
BASOPHILS # BLD: 0.7 %
BASOPHILS ABSOLUTE: 0 THOU/MM3 (ref 0–0.1)
BILIRUB SERPL-MCNC: 0.3 MG/DL (ref 0.3–1.2)
BUN BLDV-MCNC: 15 MG/DL (ref 7–22)
CALCIUM SERPL-MCNC: 9 MG/DL (ref 8.5–10.5)
CHLORIDE BLD-SCNC: 108 MEQ/L (ref 98–111)
CHOLESTEROL, TOTAL: 146 MG/DL (ref 100–199)
CO2: 26 MEQ/L (ref 23–33)
CREAT SERPL-MCNC: 0.7 MG/DL (ref 0.4–1.2)
EOSINOPHIL # BLD: 1.2 %
EOSINOPHILS ABSOLUTE: 0.1 THOU/MM3 (ref 0–0.4)
ERYTHROCYTE [DISTWIDTH] IN BLOOD BY AUTOMATED COUNT: 14.6 % (ref 11.5–14.5)
ERYTHROCYTE [DISTWIDTH] IN BLOOD BY AUTOMATED COUNT: 52.7 FL (ref 35–45)
GFR SERPL CREATININE-BSD FRML MDRD: 82 ML/MIN/1.73M2
GLUCOSE BLD-MCNC: 72 MG/DL (ref 70–108)
HCT VFR BLD CALC: 37 % (ref 37–47)
HDLC SERPL-MCNC: 62 MG/DL
HEMOGLOBIN: 11.2 GM/DL (ref 12–16)
IMMATURE GRANS (ABS): 0.01 THOU/MM3 (ref 0–0.07)
IMMATURE GRANULOCYTES: 0.2 %
LDL CHOLESTEROL CALCULATED: 74 MG/DL
LYMPHOCYTES # BLD: 22.1 %
LYMPHOCYTES ABSOLUTE: 1.3 THOU/MM3 (ref 1–4.8)
MCH RBC QN AUTO: 29.5 PG (ref 26–33)
MCHC RBC AUTO-ENTMCNC: 30.3 GM/DL (ref 32.2–35.5)
MCV RBC AUTO: 97.4 FL (ref 81–99)
MONOCYTES # BLD: 7.2 %
MONOCYTES ABSOLUTE: 0.4 THOU/MM3 (ref 0.4–1.3)
NUCLEATED RED BLOOD CELLS: 0 /100 WBC
PLATELET # BLD: 264 THOU/MM3 (ref 130–400)
PMV BLD AUTO: 10.4 FL (ref 9.4–12.4)
POTASSIUM SERPL-SCNC: 3.9 MEQ/L (ref 3.5–5.2)
RBC # BLD: 3.8 MILL/MM3 (ref 4.2–5.4)
SEG NEUTROPHILS: 68.6 %
SEGMENTED NEUTROPHILS ABSOLUTE COUNT: 4 THOU/MM3 (ref 1.8–7.7)
SODIUM BLD-SCNC: 145 MEQ/L (ref 135–145)
TOTAL PROTEIN: 5.6 G/DL (ref 6.1–8)
TRIGL SERPL-MCNC: 50 MG/DL (ref 0–199)
TSH SERPL DL<=0.05 MIU/L-ACNC: 2.05 UIU/ML (ref 0.4–4.2)
WBC # BLD: 5.8 THOU/MM3 (ref 4.8–10.8)

## 2022-01-06 PROCEDURE — 84436 ASSAY OF TOTAL THYROXINE: CPT

## 2022-01-06 PROCEDURE — 82306 VITAMIN D 25 HYDROXY: CPT

## 2022-01-06 PROCEDURE — 84443 ASSAY THYROID STIM HORMONE: CPT

## 2022-01-06 PROCEDURE — 85025 COMPLETE CBC W/AUTO DIFF WBC: CPT

## 2022-01-06 PROCEDURE — 93005 ELECTROCARDIOGRAM TRACING: CPT | Performed by: NURSE PRACTITIONER

## 2022-01-06 PROCEDURE — 80053 COMPREHEN METABOLIC PANEL: CPT

## 2022-01-06 PROCEDURE — 80061 LIPID PANEL: CPT

## 2022-01-06 PROCEDURE — 82746 ASSAY OF FOLIC ACID SERUM: CPT

## 2022-01-06 PROCEDURE — 83970 ASSAY OF PARATHORMONE: CPT

## 2022-01-06 PROCEDURE — 82607 VITAMIN B-12: CPT

## 2022-01-06 PROCEDURE — 36415 COLL VENOUS BLD VENIPUNCTURE: CPT

## 2022-01-07 LAB
EKG ATRIAL RATE: 76 BPM
EKG P AXIS: 60 DEGREES
EKG P-R INTERVAL: 148 MS
EKG Q-T INTERVAL: 374 MS
EKG QRS DURATION: 52 MS
EKG QTC CALCULATION (BAZETT): 420 MS
EKG R AXIS: 46 DEGREES
EKG T AXIS: 59 DEGREES
EKG VENTRICULAR RATE: 76 BPM
FOLATE: 5.6 NG/ML (ref 4.8–24.2)
PTH INTACT: 100.7 PG/ML (ref 15–65)
VITAMIN B-12: < 150 PG/ML (ref 211–911)
VITAMIN D 25-HYDROXY: 13 NG/ML (ref 30–100)

## 2022-01-07 PROCEDURE — 93010 ELECTROCARDIOGRAM REPORT: CPT | Performed by: INTERNAL MEDICINE

## 2022-01-09 LAB — THYROXINE (T4): 4 UG/DL (ref 4.5–10.9)

## 2022-01-13 ENCOUNTER — TELEPHONE (OUTPATIENT)
Dept: PSYCHIATRY | Age: 72
End: 2022-01-13

## 2022-01-13 ENCOUNTER — OFFICE VISIT (OUTPATIENT)
Dept: PSYCHIATRY | Age: 72
End: 2022-01-13
Payer: MEDICARE

## 2022-01-13 DIAGNOSIS — F43.10 PTSD (POST-TRAUMATIC STRESS DISORDER): Primary | ICD-10-CM

## 2022-01-13 DIAGNOSIS — F41.9 ANXIETY: ICD-10-CM

## 2022-01-13 PROCEDURE — 99214 OFFICE O/P EST MOD 30 MIN: CPT | Performed by: NURSE PRACTITIONER

## 2022-01-13 RX ORDER — QUETIAPINE FUMARATE 100 MG/1
100 TABLET, FILM COATED ORAL NIGHTLY
Qty: 90 TABLET | Refills: 0 | Status: SHIPPED | OUTPATIENT
Start: 2022-01-13 | End: 2022-06-16 | Stop reason: SDUPTHER

## 2022-01-13 RX ORDER — LORAZEPAM 1 MG/1
1 TABLET ORAL NIGHTLY PRN
Qty: 30 TABLET | Refills: 2 | Status: SHIPPED | OUTPATIENT
Start: 2022-01-13 | End: 2022-04-18

## 2022-01-13 RX ORDER — LAMOTRIGINE 100 MG/1
TABLET ORAL
Qty: 270 TABLET | Refills: 0 | Status: SHIPPED | OUTPATIENT
Start: 2022-01-13 | End: 2022-05-19

## 2022-01-13 RX ORDER — LORAZEPAM 1 MG/1
1 TABLET ORAL NIGHTLY
COMMUNITY
End: 2022-01-13 | Stop reason: SDUPTHER

## 2022-01-13 RX ORDER — VENLAFAXINE HYDROCHLORIDE 150 MG/1
CAPSULE, EXTENDED RELEASE ORAL
Qty: 180 CAPSULE | Refills: 0 | Status: SHIPPED | OUTPATIENT
Start: 2022-01-13 | End: 2022-06-16 | Stop reason: SDUPTHER

## 2022-01-13 RX ORDER — VENLAFAXINE HYDROCHLORIDE 75 MG/1
75 CAPSULE, EXTENDED RELEASE ORAL DAILY
Qty: 30 CAPSULE | Refills: 1 | Status: SHIPPED | OUTPATIENT
Start: 2022-01-13 | End: 2022-02-10
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
## 2022-01-13 NOTE — TELEPHONE ENCOUNTER
Per provider; patient is wanting to get all of her medications filled on the same dates; I spoke with Teri Box at [facility], she reported the following:    Effexor XR 75mg is able to fill as of today 01/13/22, XR 150mg (last filled on 12/13/21 so she is not able to pick it up until 02/19/22, Seroquel 100mg (last filled on 12/13/21) can not  until 03/01/22, Lamictal 100mg is able to be filled as of today 01/13/22 and her Ativan 1mg is able to also be filled as of today 01/13/22. Patient is agreeable to filling partials so that she is able to  all her Rxs on the same day; please advise on partials.

## 2022-01-14 NOTE — TELEPHONE ENCOUNTER
Is there any way the pharmacy can utilize the Rx they have on file for the medications to do the partials and override with the insurance company or do cash pay with the patient? It seems the only medications off the schedule are the Effexor XR and Seroquel. Is there any way we can get the Effexor XR 150mg filled on 3/13/2022 which is when she will get her Ativan again? For the seroquel, will they need a Rx specifically for the number of days from 3/1/2022 to April 13, 2022 which would be when she gets the Ativan filled? Patient gets the Ativan filled monthly. Her other medications are 90 days. We are trying to get all other medications such that they are filled every 90d but at the same time of the month as the Ativan so that patient does not have to make numerous trips to the pharmacy.

## 2022-01-14 NOTE — TELEPHONE ENCOUNTER
Spoke with the pharmacy; staff states that since she just picked up her Effexor XR medications along with her Seroquel \"not that long ago\" then they would have to wait until the March time to be able to put a note on only filling partials at that time. I expressed to them that the patient is wanting it focused around when she she fills her Ativan Rx; she said that they would still have to wait until the March time?

## 2022-01-18 ENCOUNTER — TELEPHONE (OUTPATIENT)
Dept: PSYCHOLOGY | Age: 72
End: 2022-01-18

## 2022-01-18 NOTE — TELEPHONE ENCOUNTER
Pt called in. She states she started the increase of Effexor on Sunday (1/16/2022) to 225mg daily but is not noticing any difference yet. Pt is wanting to know how long she should wait to see a difference. Please advise.

## 2022-01-18 NOTE — TELEPHONE ENCOUNTER
Patient notified, states she had hoped medication would work a little quicker but states she will call the office should she need anything else and will give the medication some more time to take effect.

## 2022-02-10 ENCOUNTER — TELEPHONE (OUTPATIENT)
Dept: PSYCHIATRY | Age: 72
End: 2022-02-10

## 2022-02-10 NOTE — TELEPHONE ENCOUNTER
Please have patient verify what dose of Effexor XR she is taking. Verify symptoms/concerns. Any stressors?

## 2022-02-10 NOTE — TELEPHONE ENCOUNTER
Carlotta Fritz called back into the office; she said that she is taking Effexor XR 2 daily of the 150mg dose and then 1 daily of her Effexor XR 75mg dose; she read straight from her bottles in which it states a total of only 225mg/daily but with the 2 daily of 150mgs that exceeds that amount. I double checked with her several times to verify the dose she is taking and she is certain that she takes 2-150mg and 1-75mg dose. She reports that she does not have any new stressors in her life, she said that when she is unable to handle things, she always calls her son and she knew that something was not right because she breaks down in tears, has been more worrisome and anxious. She said that she thought she was \"getting used\" to her new dosage. She noticed the changes around 2-3 days after starting the new increased dose. She noted that she only has 2 left of her 75mg dose; but the pharmacy should have 1 refill left. Please advise.

## 2022-02-10 NOTE — TELEPHONE ENCOUNTER
Spoke with Alyssa Zeng and informed her of this information; she repeated the instructions back to me and understood and said \"sounds good. \"

## 2022-02-10 NOTE — TELEPHONE ENCOUNTER
Darion Layne arrived late for her appt ([address] [place] [address]) scheduled for Veronica Stevens was r/s for a 3/17 appt. Darion Layne indicated justin she is still feeling depressed, having episodes of crying, she and son feel that she was doing better before the change (effexor increase) and requesting to go back to that. Office staff told pt that medication can take up to 6/8 weeks to show full benefit. Asking for any additional advice.

## 2022-03-17 ENCOUNTER — OFFICE VISIT (OUTPATIENT)
Dept: PSYCHIATRY | Age: 72
End: 2022-03-17
Payer: MEDICARE

## 2022-03-17 DIAGNOSIS — F41.9 ANXIETY: ICD-10-CM

## 2022-03-17 DIAGNOSIS — F43.10 PTSD (POST-TRAUMATIC STRESS DISORDER): Primary | ICD-10-CM

## 2022-03-17 PROCEDURE — 99213 OFFICE O/P EST LOW 20 MIN: CPT | Performed by: NURSE PRACTITIONER
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
## 2022-03-21 RX ORDER — QUETIAPINE FUMARATE 100 MG/1
100 TABLET, FILM COATED ORAL NIGHTLY
Qty: 90 TABLET | Refills: 0 | OUTPATIENT
Start: 2022-03-21

## 2022-03-21 NOTE — TELEPHONE ENCOUNTER
[facility] is requesting a refill on the following medications:  Requested Prescriptions     Pending Prescriptions Disp Refills    QUEtiapine (SEROQUEL) 100 MG tablet [Pharmacy Med Name: QUEtiapine Fumarate 100 MG Oral Tablet] 90 tablet 0     Sig: Take 1 tablet by mouth nightly       Date of last visit: 3/17/2022  Date of next visit (if applicable):6/16/2022  Date of last refill: 12/23/2021 per request. Refill sent 01/13/2022. Pharmacy is closed. Will reach out to verify that refills are needed.    Pharmacy Name: [facility]

## 2022-04-18 DIAGNOSIS — F41.9 ANXIETY: ICD-10-CM

## 2022-04-18 NOTE — TELEPHONE ENCOUNTER
[facility] is requesting a medication refill on Adry's behalf for Ativan 1mg;#30 with 2 refills; last with a start dte of 01/13/22 and last refill date of 03/13/22. Medication is pending your approval for a 30 day supply with 2 refills; she is scheduled to return on 06/16/22. Last seen on 03/17/22.

## 2022-04-19 RX ORDER — LORAZEPAM 1 MG/1
TABLET ORAL
Qty: 30 TABLET | Refills: 2 | Status: SHIPPED | OUTPATIENT
Start: 2022-04-19 | End: 2022-06-16 | Stop reason: DRUGHIGH

## 2022-05-18 NOTE — TELEPHONE ENCOUNTER
Spoke with Sharyn Castro; she notes that she takes her medication regularly and (1 tablet in the AM and 2 in the evening); she counted the pills while on the phone, she has 15 pills left.

## 2022-05-18 NOTE — TELEPHONE ENCOUNTER
Please have patient verify how much she has remaining and how she is taking the medication. Based on last fill date of 3/13/2022 for 90d supply she should have more than 10 pills remaining. She should have around 2 weeks remaining.

## 2022-05-18 NOTE — TELEPHONE ENCOUNTER
[facility] is requesting a medication refill on Adry's behalf for Lamictal 100mg along with Harleen Wilkinson calling in herself as well requesting the same medication; she notes that she has about 10 pills left. The last Rx was sent in on 01/13/22 but the pharmacy marked the last fill for 03/13/22 for a 90 day supply. Medication is pending your approval for a 90 day supply (patient's request) with 0 refills; she is scheduled to return on 06/16/22. Last seen on 03/17/22.

## 2022-05-19 RX ORDER — LAMOTRIGINE 100 MG/1
TABLET ORAL
Qty: 270 TABLET | Refills: 0 | Status: SHIPPED | OUTPATIENT
Start: 2022-05-19 | End: 2022-06-16 | Stop reason: SDUPTHER

## 2022-06-01 ENCOUNTER — APPOINTMENT (OUTPATIENT)
Dept: CT IMAGING | Age: 72
End: 2022-06-01
Payer: MEDICARE

## 2022-06-01 ENCOUNTER — APPOINTMENT (OUTPATIENT)
Dept: GENERAL RADIOLOGY | Age: 72
End: 2022-06-01
Payer: MEDICARE

## 2022-06-01 ENCOUNTER — HOSPITAL ENCOUNTER (EMERGENCY)
Age: 72
Discharge: HOME | End: 2022-06-01
Attending: EMERGENCY MEDICINE
Payer: MEDICARE

## 2022-06-01 VITALS
SYSTOLIC BLOOD PRESSURE: 144 MMHG | RESPIRATION RATE: 13 BRPM | HEART RATE: 76 BPM | BODY MASS INDEX: 20.09 KG/M2 | WEIGHT: 125 LBS | DIASTOLIC BLOOD PRESSURE: 84 MMHG | OXYGEN SATURATION: 98 % | HEIGHT: 66 IN

## 2022-06-01 DIAGNOSIS — S16.1XXA ACUTE STRAIN OF NECK MUSCLE, INITIAL ENCOUNTER: ICD-10-CM

## 2022-06-01 DIAGNOSIS — S09.90XA INJURY OF HEAD, INITIAL ENCOUNTER: ICD-10-CM

## 2022-06-01 DIAGNOSIS — S80.01XA CONTUSION OF RIGHT KNEE, INITIAL ENCOUNTER: ICD-10-CM

## 2022-06-01 DIAGNOSIS — N30.00 ACUTE CYSTITIS WITHOUT HEMATURIA: ICD-10-CM
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
## 2022-06-01 LAB
ALBUMIN SERPL-MCNC: 3.4 GM/DL (ref 3.4–5)
ALP BLD-CCNC: 132 U/L (ref 46–116)
ALT SERPL-CCNC: 28 U/L (ref 14–63)
AMORPHOUS: ABNORMAL
ANION GAP: 12 MEQ/L (ref 8–16)
AST SERPL-CCNC: 22 U/L (ref 15–37)
BACTERIA: ABNORMAL
BASOPHILS # BLD: 0.4 % (ref 0–3)
BILIRUB SERPL-MCNC: 0.3 MG/DL (ref 0.2–1)
BILIRUBIN URINE: NEGATIVE
BLOOD, URINE: NEGATIVE
BUN BLDV-MCNC: 24 MG/DL (ref 7–18)
CASTS UA: ABNORMAL /LPF
CHARACTER, URINE: ABNORMAL
CHLORIDE BLD-SCNC: 107 MEQ/L (ref 98–107)
CO2: 26 MEQ/L (ref 21–32)
COLOR: YELLOW
CREAT SERPL-MCNC: 0.8 MG/DL (ref 0.6–1.3)
CRYSTALS, UA: ABNORMAL
EKG ATRIAL RATE: 72 BPM
EKG P AXIS: 52 DEGREES
EKG P-R INTERVAL: 154 MS
EKG Q-T INTERVAL: 402 MS
EKG QRS DURATION: 56 MS
EKG QTC CALCULATION (BAZETT): 440 MS
EKG R AXIS: 38 DEGREES
EKG T AXIS: 55 DEGREES
EKG VENTRICULAR RATE: 72 BPM
EOSINOPHILS RELATIVE PERCENT: 4.4 % (ref 0–4)
EPITHELIAL CELLS, UA: ABNORMAL /HPF
GFR, ESTIMATED: 75 ML/MIN/1.73M2
GLUCOSE BLD-MCNC: 60 MG/DL (ref 74–106)
GLUCOSE, URINE: NEGATIVE MG/DL
HCT VFR BLD CALC: 33.1 % (ref 37–47)
HEMOGLOBIN: 10.9 GM/DL (ref 12–16)
KETONES, URINE: NEGATIVE
LEUKOCYTE ESTERASE, URINE: NEGATIVE
LYMPHOCYTES # BLD: 20.1 % (ref 15–47)
MAGNESIUM: 1.8 MG/DL (ref 1.8–2.4)
MCH RBC QN AUTO: 29.1 PG (ref 27–31)
MCHC RBC AUTO-ENTMCNC: 32.9 GM/DL (ref 33–37)
MCV RBC AUTO: 88.5 FL (ref 81–99)
MONOCYTES: 8 % (ref 0–12)
MUCUS: ABNORMAL
NITRITE, URINE: POSITIVE
PDW BLD-RTO: 15.2 % (ref 11.5–14.5)
PH UA: 5.5 (ref 5–9)
PLATELET # BLD: 263 THOU/MM3 (ref 130–400)
PMV BLD AUTO: 7.8 FL (ref 7.4–10.4)
POC CALCIUM: 8.4 MG/DL (ref 8.5–10.1)
POTASSIUM SERPL-SCNC: 3.6 MEQ/L (ref 3.5–5.1)
PROTEIN UA: NEGATIVE MG/DL
RBC # BLD: 3.74 MILL/MM3 (ref 4.2–5.4)
RBC UA: ABNORMAL /HPF
REFLEX TO URINE C & S: ABNORMAL
SEGS: 67.1 % (ref 43–75)
SODIUM BLD-SCNC: 145 MEQ/L (ref 136–145)
SPECIFIC GRAVITY UA: >= 1.03 (ref 1–1.03)
TOTAL PROTEIN: 5.9 GM/DL (ref 6.4–8.2)
TROPONIN, HIGH SENSITIVITY: 5.9 PG/ML (ref 0–51.3)
UROBILINOGEN, URINE: 0.2 EU/DL (ref 0–1)
WBC # BLD: 6.9 THOU/MM3 (ref 4.8–10.8)
WBC UA: ABNORMAL /HPF

## 2022-06-01 PROCEDURE — 93010 ELECTROCARDIOGRAM REPORT: CPT | Performed by: INTERNAL MEDICINE

## 2022-06-01 PROCEDURE — 85025 COMPLETE CBC W/AUTO DIFF WBC: CPT

## 2022-06-01 PROCEDURE — 81001 URINALYSIS AUTO W/SCOPE: CPT

## 2022-06-01 PROCEDURE — 71046 X-RAY EXAM CHEST 2 VIEWS: CPT

## 2022-06-01 PROCEDURE — 84484 ASSAY OF TROPONIN QUANT: CPT

## 2022-06-01 PROCEDURE — 73564 X-RAY EXAM KNEE 4 OR MORE: CPT

## 2022-06-01 PROCEDURE — 6370000000 HC RX 637 (ALT 250 FOR IP): Performed by: EMERGENCY MEDICINE

## 2022-06-01 PROCEDURE — 80053 COMPREHEN METABOLIC PANEL: CPT

## 2022-06-01 PROCEDURE — 83735 ASSAY OF MAGNESIUM: CPT

## 2022-06-01 PROCEDURE — 72125 CT NECK SPINE W/O DYE: CPT

## 2022-06-01 PROCEDURE — 99285 EMERGENCY DEPT VISIT HI MDM: CPT

## 2022-06-01 PROCEDURE — 93005 ELECTROCARDIOGRAM TRACING: CPT | Performed by: EMERGENCY MEDICINE

## 2022-06-01 PROCEDURE — 70450 CT HEAD/BRAIN W/O DYE: CPT

## 2022-06-01 RX ORDER — NITROFURANTOIN 25; 75 MG/1; MG/1
100 CAPSULE ORAL 2 TIMES DAILY
Qty: 20 CAPSULE | Refills: 0 | Status: SHIPPED | OUTPATIENT
Start: 2022-06-01 | End: 2022-06-11

## 2022-06-01 RX ORDER — NITROFURANTOIN 25; 75 MG/1; MG/1
100 CAPSULE ORAL ONCE
Status: COMPLETED | OUTPATIENT
Start: 2022-06-01 | End: 2022-06-01

## 2022-06-01 RX ADMIN — Medication: at 18:24

## 2022-06-01 RX ADMIN — NITROFURANTOIN MONOHYDRATE/MACROCRYSTALLINE 100 MG: 25; 75 CAPSULE ORAL at 18:12

## 2022-06-01 ASSESSMENT — PAIN DESCRIPTION - LOCATION: LOCATION: EYE

## 2022-06-01 ASSESSMENT — PAIN DESCRIPTION - FREQUENCY: FREQUENCY: INTERMITTENT

## 2022-06-01 ASSESSMENT — PAIN DESCRIPTION - PAIN TYPE: TYPE: ACUTE PAIN

## 2022-06-01 ASSESSMENT — PAIN SCALES - GENERAL: PAINLEVEL_OUTOF10: 7

## 2022-06-01 ASSESSMENT — PAIN - FUNCTIONAL ASSESSMENT: PAIN_FUNCTIONAL_ASSESSMENT: 0-10

## 2022-06-01 ASSESSMENT — PAIN DESCRIPTION - DESCRIPTORS: DESCRIPTORS: ACHING

## 2022-06-01 ASSESSMENT — PAIN DESCRIPTION - ORIENTATION: ORIENTATION: LEFT

## 2022-06-01 NOTE — ED NOTES
Patient in stable condition. Alert and oriented x3. Unlabored breathing present. Patient aware of plan of care. Patient discharge instructions given and explained. Follow up information instructions given. Prescription order explained to patient. Pharmacy with patient verified. Patient agreeable to plan of care. Patient states understanding and denies any questions or concerns. Patient ambulated out of ER with no complications.         Chantel Schaefer RN  06/01/22 [id number]

## 2022-06-01 NOTE — ED PROVIDER NOTES
[facility]  eMERGENCY dEPARTMENT eNCOUnter             [address] COMPLAINT    Chief Complaint   Patient presents with    Fall    Dizziness       Nurses Notes reviewed and I agree except as noted in the HPI. HPI    Horace Xavier is a 70 y.o. female who presents for evaluation of injuries sustained 5/29/2022. She was home alone, lost her balance, and fell into a door jam. She was able to get herself up after about 10 minutes, and and went to bed. She has since developed a lot of bruising and swelling around her left eye, left cheek, and pain and swelling of the right knee and left upper arm. Pain is 7/10, aching, constant. She has a chronic soft tissue swelling in the right side of her chest and neck, which she states are the same as usual, and not painful. She is complaining of headache, and some word-finding difficulty ever since the fall. REVIEW OF SYSTEMS      Review of Systems   Constitutional: Positive for malaise/fatigue. HENT: Negative for congestion and sore throat. Eyes: Negative for blurred vision. Respiratory: Negative for cough and shortness of breath. Cardiovascular: Negative for chest pain and palpitations. Gastrointestinal: Negative for abdominal pain and vomiting. Genitourinary: Positive for frequency. Negative for dysuria. Musculoskeletal: Positive for falls and myalgias. Negative for back pain and neck pain. Neurological: Positive for weakness and headaches. Negative for dizziness, sensory change, focal weakness and loss of consciousness. Endo/Heme/Allergies: Bruises/bleeds easily.        PAST MEDICAL HISTORY     has a past medical history of Arthritis, Bowel obstruction ([address] Bronchitis, DVT, lower extremity ([address] History of blood transfusion, Hx of blood clots, Hypertension, Hypoglycemia, Kidney stone, Movement disorder, Multinodular goiter, Muscle strain of left lower extremity, Psychiatric problem, and Vitamin D deficiency. SURGICAL HISTORY     has a past surgical history that includes Hysterectomy (1992); Cholecystectomy (1994); Gastric bypass surgery (1999); Esophagectomy (2000); Colon surgery (2001); other surgical history; Abdomen surgery; Appendectomy; Dilatation, esophagus; Knee cartilage surgery (Left, october 2014); Foot surgery (Right, 10/14/15); Upper gastrointestinal endoscopy (06/2016); Colonoscopy (06/21/2016); Endoscopy, colon, diagnostic; and Hand tendon surgery (Right, 06/2021). CURRENT MEDICATIONS    Discharge Medication List as of 6/1/2022  6:12 PM      CONTINUE these medications which have NOT CHANGED    Details   lamoTRIgine (LAMICTAL) 100 MG tablet TAKE 1 TABLET BY MOUTH IN THE MORNING AND 2 TABLETS IN THE EVENING AS DIRECTED, Disp-270 tablet, R-0Normal      LORazepam (ATIVAN) 1 MG tablet TAKE 1 TABLET BY MOUTH NIGHTLY AS NEEDED FOR ANXIETY FOR 90 DAYS, Disp-30 tablet, R-2Normal      VITAMIN D, CHOLECALCIFEROL, PO Take 1,000 Units by mouth four times a weekHistorical Med      QUEtiapine (SEROQUEL) 100 MG tablet Take 1 tablet by mouth nightly, Disp-90 tablet, R-0Normal      venlafaxine (EFFEXOR XR) 150 MG extended release capsule Take 1 capsule by mouth twice daily, Disp-180 capsule, R-0Normal      vitamin B-6 (PYRIDOXINE) 50 MG tablet Take 50 mg by mouth dailyHistorical Med      acarbose (PRECOSE) 50 MG tablet Take 1 tablet by mouth 3 times daily (with meals), Disp-270 tablet, R-0Normal      ondansetron (ZOFRAN ODT) 4 MG disintegrating tablet Take 1 tablet by mouth every 4 hours as needed for Nausea or Vomiting, Disp-60 tablet, R-5Normal      Blood Glucose Monitoring Suppl (KIET CONTOUR MONITOR) W/DEVICE KIT ONCE Starting Mon 2/1/2016, Disp-1 kit, R-0, Normal      glucose blood VI test strips (KIET CONTOUR TEST) strip Disp-450 each, R-1, NormalPt testing blood sugar 4 times daily. Dx: E16.2  Please add lancets      Cyanocobalamin (VITAMIN B-12 IJ) Inject 1,000 mcg as directed every 30 days. Multiple Vitamins-Minerals (MULTIVITAMIN PO) Take 1 tablet by mouth daily. lisinopril (PRINIVIL) 5 MG tablet Take 5 mg by mouth daily. ALLERGIES    is allergic to primidone. FAMILY HISTORY    She indicated that her mother is . She indicated that her father is . She indicated that both of her sisters are alive. She indicated that her brother is alive. She indicated that her maternal grandmother is . She indicated that her maternal grandfather is . She indicated that her paternal grandmother is . She indicated that her paternal grandfather is . family history includes Cancer in her mother; Depression in her sister; Diabetes in her father and mother; Heart Disease in her brother and sister; High Blood Pressure in her brother and sister; Obesity in her sister; Stroke in her father; Thyroid Disease in her sister. SOCIAL HISTORY     reports that she has never smoked. She has never used smokeless tobacco. She reports previous alcohol use. She reports that she does not use drugs. PHYSICAL EXAM       INITIAL VITALS: BP (!) 144/84   Pulse 76   Resp 13   Ht 5' 6\" (1.676 m)   Wt 125 lb (56.7 kg)   LMP 1985 Comment: hysterectomy in her 30s  SpO2 98%   BMI 20.18 kg/m²      Physical Exam  Vitals and nursing note reviewed. Exam conducted with a chaperone present. Constitutional:       Appearance: She is not toxic-appearing. HENT:      Head:      Comments: Fading contusion left parietal area, large amount of subacute bruising, swelling, hematoma left periorbital and left cheek. Right Ear: Tympanic membrane and ear canal normal.      Left Ear: Tympanic membrane and ear canal normal.      Nose: No congestion or rhinorrhea. Mouth/Throat:      Mouth: Mucous membranes are moist.      Pharynx: No oropharyngeal exudate or posterior oropharyngeal erythema.    Eyes:      Conjunctiva/sclera: Conjunctivae normal.      Pupils: Pupils are equal, round, and reactive to light. Neck:      Comments: Large, soft, soft tissue mass left side of neck and chest. Nontender, chronic, per patient. Cardiovascular:      Rate and Rhythm: Normal rate and regular rhythm. Heart sounds: No murmur heard. Pulmonary:      Effort: Pulmonary effort is normal. No respiratory distress. Breath sounds: Normal breath sounds. No wheezing. Abdominal:      General: Bowel sounds are normal.      Palpations: Abdomen is soft. There is no mass. Tenderness: There is no abdominal tenderness. Musculoskeletal:         General: Tenderness: right knee with bruising, no deformity, FROM. Cervical back: Neck supple. No tenderness. Comments: Bruising left upper arm, FROM, no deformity   Skin:     General: Skin is warm and dry. Coloration: Skin is pale. Findings: Bruising present. Neurological:      General: No focal deficit present. Mental Status: She is alert and oriented to person, place, and time. Psychiatric:         Behavior: Behavior normal.             DIAGNOSTIC RESULTS    EKG     Normal sinus rhythm  Normal ECG  When compared with ECG of 06-JAN-2022 13:57,  No significant change was found  Confirmed by Franci Corral ([id number]) on 6/1/2022 6:20:32 PM      RADIOLOGY:    CT HEAD WO CONTRAST   Final Result   No acute abnormality of the brain. This document has been electronically signed by: Riley Case MD on    06/01/2022 05:08 PM      All CTs at this facility use dose modulation techniques and iterative    reconstructions, and/or weight-based dosing   when appropriate to reduce radiation to a low as reasonably achievable. CT CERVICAL SPINE WO CONTRAST   Final Result   1. No acute cervical spine fracture. 2. Very large collection of gas within the left anterior pleural or    extrapleural space extending into the neck.  This likely represents a large    diaphragmatic hernia containing a dilated segment of bowel but etiology is    not determined with certainty. Recommend further evaluation with a CT    chest, abdomen and pelvis. This document has been electronically signed by: Lamar Fonseca MD on    06/01/2022 05:18 PM      All CTs at this facility use dose modulation techniques and iterative    reconstructions, and/or weight-based dosing   when appropriate to reduce radiation to a low as reasonably achievable. XR CHEST (2 VW)   Final Result   Stable radiographic appearance of the chest. No evidence of an acute process. **This report has been created using voice recognition software. It may contain minor errors which are inherent in voice recognition technology. **      Final report electronically signed by Dr. Karen Moore MD on 6/1/2022 4:56 PM      XR KNEE RIGHT (MIN 4 VIEWS)   Final Result    Mild tricompartmental osteoarthritis which has mildly worsened in the interval.               **This report has been created using voice recognition software. It may contain minor errors which are inherent in voice recognition technology. **      Final report electronically signed by Dr. Karen Moore MD on 6/1/2022 4:52 PM             LABS:     Labs Reviewed   CBC WITH AUTO DIFFERENTIAL - Abnormal; Notable for the following components:       Result Value    RBC 3.74 (*)     Hemoglobin 10.9 (*)     Hematocrit 33.1 (*)     MCHC 32.9 (*)     RDW 15.2 (*)     Eosinophils % 4.4 (*)     All other components within normal limits   COMPREHENSIVE METABOLIC PANEL - Abnormal; Notable for the following components:    Glucose 60 (*)     BUN 24 (*)     POC CALCIUM 8.4 (*)     Alkaline Phosphatase 132 (*)     Total Protein 5.9 (*)     All other components within normal limits   URINALYSIS WITH REFLEX TO CULTURE - Abnormal; Notable for the following components:    Nitrite, Urine POSITIVE (*)     All other components within normal limits   GLOMERULAR FILTRATION RATE, ESTIMATED - Abnormal; Notable for the following components:    GFR, Estimated 75 (*)     All other components within normal limits   TROPONIN   MAGNESIUM   ANION GAP       Vitals:    Vitals:    06/01/22 1605   BP: (!) 144/84   Pulse: 76   Resp: 13   SpO2: 98%   Weight: 125 lb (56.7 kg)   Height: 5' 6\" (1.676 m)       EMERGENCY DEPARTMENT COURSE:    She remained stable. First dose Macrobid given for UTI. No fractures found. 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 She will follow up with her physician. FINAL IMPRESSION      1. Fall on same level from slipping, tripping or stumbling, initial encounter    2. Acute cystitis without hematuria    3. Injury of head, initial encounter    4. Acute strain of neck muscle, initial encounter    5.  Contusion of right knee, initial encounter        DISPOSITION/PLAN    DISPOSITION Decision To Discharge 06/01/2022 06:09:34 PM      PATIENT REFERRED TO:    Nati Conrad MD  [address] [place] [address]    Schedule an appointment as soon as possible for a visit         DISCHARGE MEDICATIONS:    Discharge Medication List as of 6/1/2022  6:12 PM      START taking these medications    Details   nitrofurantoin, macrocrystal-monohydrate, (MACROBID) 100 MG capsule Take 1 capsule by mouth 2 times daily for 10 days, Disp-20 capsule, R-0Normal                (Please note that portions of this note were completed with a voice recognition program.  Efforts were made to edit the dictations but occasionally words are mis-transcribed.)      Kelsi Chao MD  06/02/22 0038

## 2022-06-01 NOTE — ED TRIAGE NOTES
Pt arrival to the ER pt fell a few days ago on Sunday at home alone. Patient reports she hit her head on her door and hurt her head and left arm and right knee. Patient thinks she was on the ground maybe ten minutes before getting up and went to bed right away. Pt has severe bruising to the left eye, swelling of the left cheek, right knee pain and swelling, bruise noted on left upper arm. Denies N/V/D. Patient states she has been having issues with finding her words- son states its been worse today and her cheek has swollen a lot more the last half hour. Patient is alert and oriented breathing with ease. Patient came from family doctor in [facility] and they suggested her to come here. Son at bedside.

## 2022-06-02 ASSESSMENT — ENCOUNTER SYMPTOMS
VOMITING: 0
BLURRED VISION: 0
COUGH: 0
SORE THROAT: 0
ABDOMINAL PAIN: 0
SHORTNESS OF BREATH: 0
BACK PAIN: 0

## 2022-06-10 ENCOUNTER — APPOINTMENT (OUTPATIENT)
Dept: GENERAL RADIOLOGY | Age: 72
End: 2022-06-10
Payer: MEDICARE

## 2022-06-10 ENCOUNTER — HOSPITAL ENCOUNTER (EMERGENCY)
Age: 72
Discharge: HOME | End: 2022-06-10
Attending: FAMILY MEDICINE
Payer: MEDICARE

## 2022-06-10 ENCOUNTER — APPOINTMENT (OUTPATIENT)
Dept: CT IMAGING | Age: 72
End: 2022-06-10
Payer: MEDICARE

## 2022-06-10 VITALS
SYSTOLIC BLOOD PRESSURE: 161 MMHG | TEMPERATURE: 97.6 F | OXYGEN SATURATION: 96 % | RESPIRATION RATE: 16 BRPM | DIASTOLIC BLOOD PRESSURE: 79 MMHG | HEART RATE: 88 BPM

## 2022-06-10 DIAGNOSIS — S60.221A CONTUSION OF RIGHT HAND, INITIAL ENCOUNTER: ICD-10-CM

## 2022-06-10 DIAGNOSIS — R26.9 GAIT DISTURBANCE: ICD-10-CM
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
## 2022-06-10 DIAGNOSIS — I10 ESSENTIAL HYPERTENSION: ICD-10-CM
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
## 2022-06-10 DIAGNOSIS — S09.90XA CLOSED HEAD INJURY, INITIAL ENCOUNTER: ICD-10-CM

## 2022-06-10 LAB
ALBUMIN SERPL-MCNC: 3.2 GM/DL (ref 3.4–5)
ALP BLD-CCNC: 166 U/L (ref 46–116)
ALT SERPL-CCNC: 34 U/L (ref 14–63)
ANION GAP: 8 MEQ/L (ref 8–16)
AST SERPL-CCNC: 28 U/L (ref 15–37)
BASOPHILS # BLD: 0.4 % (ref 0–3)
BILIRUB SERPL-MCNC: 0.2 MG/DL (ref 0.2–1)
BUN BLDV-MCNC: 12 MG/DL (ref 7–18)
CHLORIDE BLD-SCNC: 107 MEQ/L (ref 98–107)
CO2: 29 MEQ/L (ref 21–32)
CREAT SERPL-MCNC: 0.7 MG/DL (ref 0.6–1.3)
EOSINOPHILS RELATIVE PERCENT: 3.5 % (ref 0–4)
GFR, ESTIMATED: 88 ML/MIN/1.73M2
GLUCOSE BLD-MCNC: 86 MG/DL (ref 74–106)
HCT VFR BLD CALC: 33 % (ref 37–47)
HEMOGLOBIN: 10.8 GM/DL (ref 12–16)
LYMPHOCYTES # BLD: 20.1 % (ref 15–47)
MCH RBC QN AUTO: 28.9 PG (ref 27–31)
MCHC RBC AUTO-ENTMCNC: 32.8 GM/DL (ref 33–37)
MCV RBC AUTO: 88.2 FL (ref 81–99)
MONOCYTES: 7.6 % (ref 0–12)
PDW BLD-RTO: 15.3 % (ref 11.5–14.5)
PLATELET # BLD: 292 THOU/MM3 (ref 130–400)
PMV BLD AUTO: 7.4 FL (ref 7.4–10.4)
POC CALCIUM: 8.6 MG/DL (ref 8.5–10.1)
POTASSIUM SERPL-SCNC: 4 MEQ/L (ref 3.5–5.1)
RBC # BLD: 3.74 MILL/MM3 (ref 4.2–5.4)
SEGS: 68.4 % (ref 43–75)
SODIUM BLD-SCNC: 144 MEQ/L (ref 136–145)
TOTAL PROTEIN: 5.9 GM/DL (ref 6.4–8.2)
WBC # BLD: 7.4 THOU/MM3 (ref 4.8–10.8)

## 2022-06-10 PROCEDURE — 36415 COLL VENOUS BLD VENIPUNCTURE: CPT

## 2022-06-10 PROCEDURE — 85025 COMPLETE CBC W/AUTO DIFF WBC: CPT

## 2022-06-10 PROCEDURE — 73130 X-RAY EXAM OF HAND: CPT

## 2022-06-10 PROCEDURE — 70450 CT HEAD/BRAIN W/O DYE: CPT

## 2022-06-10 PROCEDURE — 73110 X-RAY EXAM OF WRIST: CPT

## 2022-06-10 PROCEDURE — 99284 EMERGENCY DEPT VISIT MOD MDM: CPT

## 2022-06-10 PROCEDURE — 80053 COMPREHEN METABOLIC PANEL: CPT

## 2022-06-10 PROCEDURE — 72125 CT NECK SPINE W/O DYE: CPT

## 2022-06-10 ASSESSMENT — ENCOUNTER SYMPTOMS
SHORTNESS OF BREATH: 0
WHEEZING: 0
ABDOMINAL PAIN: 0
NAUSEA: 0
VOMITING: 0
SORE THROAT: 0
DIARRHEA: 0
COUGH: 0
COLOR CHANGE: 1
BACK PAIN: 0

## 2022-06-10 ASSESSMENT — PAIN DESCRIPTION - LOCATION: LOCATION: HEAD

## 2022-06-10 ASSESSMENT — PAIN SCALES - GENERAL: PAINLEVEL_OUTOF10: 8

## 2022-06-10 ASSESSMENT — PAIN - FUNCTIONAL ASSESSMENT: PAIN_FUNCTIONAL_ASSESSMENT: 0-10

## 2022-06-10 NOTE — ED PROVIDER NOTES
[address]/[facility] COMPLAINT       Chief Complaint   Patient presents with    Fall    Headache    Hand Pain     bilateral       Nurses Notes reviewed and I agree except as noted in the HPI. HISTORY OF PRESENT ILLNESS    Maria G Azevedo is a 70 y.o. female who presents for evaluation of closed head injury after sustaining a fall yesterday. Patient states that she got up from a bent knee position and then felt dizzy, lost her balance, and fell. She hit her head on a mental and also tried to catch her fall with her hand and so now notes some pain involving the right thumb and hand. Patient does have some pain in the wrist as well but does maintain good range of motion. Range of motion in the thumb is at baseline. Patient had no loss of consciousness. She has pain in the right side of her neck that has been present since the last time she fell which was 10 days ago. Bruising to her face from that fall has improved. She no longer has a hematoma noted to the left forehead. Patient reports that she is had a balance issue for the past 4 years. She has had a work-up and nothing in particular was found. She states that when she ambulates she does walk from \"side to side\". REVIEW OF SYSTEMS     Review of Systems   Constitutional: Negative for activity change, appetite change, chills and fever. HENT: Negative for ear pain and sore throat. Respiratory: Negative for cough, shortness of breath and wheezing. Cardiovascular: Negative for chest pain and leg swelling. Gastrointestinal: Negative for abdominal pain, diarrhea, nausea and vomiting. Genitourinary: Negative for dysuria, flank pain and hematuria. Musculoskeletal: Positive for arthralgias and gait problem. Negative for back pain, joint swelling and neck pain. Skin: Positive for color change. Negative for rash and wound. Neurological: Positive for headaches.  Negative for weakness and light-headedness. Psychiatric/Behavioral: Negative for agitation and hallucinations. The patient is not nervous/anxious. PAST MEDICAL HISTORY    has a past medical history of Arthritis, Bowel obstruction ([address] Bronchitis, DVT, lower extremity ([address] History of blood transfusion, Hx of blood clots, Hypertension, Hypoglycemia, Kidney stone, Movement disorder, Multinodular goiter, Muscle strain of left lower extremity, Psychiatric problem, and Vitamin D deficiency. SURGICAL HISTORY      has a past surgical history that includes Hysterectomy (1992); Cholecystectomy (1994); Gastric bypass surgery (1999); Esophagectomy (2000); Colon surgery (2001); other surgical history; Abdomen surgery; Appendectomy; Dilatation, esophagus; Knee cartilage surgery (Left, october 2014); Foot surgery (Right, 10/14/15); Upper gastrointestinal endoscopy (06/2016); Colonoscopy (06/21/2016); Endoscopy, colon, diagnostic; and Hand tendon surgery (Right, 06/2021). CURRENT MEDICATIONS       Previous Medications    ACARBOSE (PRECOSE) 50 MG TABLET    Take 1 tablet by mouth 3 times daily (with meals)    BLOOD GLUCOSE MONITORING SUPPL (SYMIC BIOMEDICAL CONTOUR MONITOR) W/DEVICE KIT    1 kit by Does not apply route once for 1 dose    CYANOCOBALAMIN (VITAMIN B-12 IJ)    Inject 1,000 mcg as directed every 30 days. GLUCOSE BLOOD VI TEST STRIPS (KIET CONTOUR TEST) STRIP    Pt testing blood sugar 4 times daily. Dx: E16.2  Please add lancets    LAMOTRIGINE (LAMICTAL) 100 MG TABLET    TAKE 1 TABLET BY MOUTH IN THE MORNING AND 2 TABLETS IN THE EVENING AS DIRECTED    LISINOPRIL (PRINIVIL) 5 MG TABLET    Take 5 mg by mouth daily. LORAZEPAM (ATIVAN) 1 MG TABLET    TAKE 1 TABLET BY MOUTH NIGHTLY AS NEEDED FOR ANXIETY FOR 90 DAYS    MULTIPLE VITAMINS-MINERALS (MULTIVITAMIN PO)    Take 1 tablet by mouth daily.     NITROFURANTOIN, MACROCRYSTAL-MONOHYDRATE, (MACROBID) 100 MG CAPSULE    Take 1 capsule by mouth 2 times daily for 10 days ONDANSETRON (ZOFRAN ODT) 4 MG DISINTEGRATING TABLET    Take 1 tablet by mouth every 4 hours as needed for Nausea or Vomiting    QUETIAPINE (SEROQUEL) 100 MG TABLET    Take 1 tablet by mouth nightly    VENLAFAXINE (EFFEXOR XR) 150 MG EXTENDED RELEASE CAPSULE    Take 1 capsule by mouth twice daily    VITAMIN B-6 (PYRIDOXINE) 50 MG TABLET    Take 50 mg by mouth daily    VITAMIN D, CHOLECALCIFEROL, PO    Take 1,000 Units by mouth four times a week       ALLERGIES     is allergic to primidone. FAMILY HISTORY     She indicated that her mother is . She indicated that her father is . She indicated that both of her sisters are alive. She indicated that her brother is alive. She indicated that her maternal grandmother is . She indicated that her maternal grandfather is . She indicated that her paternal grandmother is . She indicated that her paternal grandfather is . family history includes Cancer in her mother; Depression in her sister; Diabetes in her father and mother; Heart Disease in her brother and sister; High Blood Pressure in her brother and sister; Obesity in her sister; Stroke in her father; Thyroid Disease in her sister. SOCIAL HISTORY      reports that she has never smoked. She has never used smokeless tobacco. She reports previous alcohol use. She reports that she does not use drugs. PHYSICAL EXAM     INITIAL VITALS:  temperature is 97.6 °F (36.4 °C). Her blood pressure is 161/79 (abnormal) and her pulse is 88. Her respiration is 16 and oxygen saturation is 96%. Physical Exam  Vitals and nursing note reviewed. Constitutional:       General: She is not in acute distress. Appearance: She is well-developed. HENT:      Head: Normocephalic and atraumatic. Ears:      Comments: Hearing aids in place  Eyes:      General:         Right eye: No discharge. Left eye: No discharge. Extraocular Movements: Extraocular movements intact. voice recognition technology. **      Final report electronically signed by DR Hans Preston on 6/10/2022 1:01 PM      CT Head WO Contrast   Final Result      1. Stable CT scan of the brain, no interval change since previous study dated 6/1/2022. **This report has been created using voice recognition software. It may contain minor errors which are inherent in voice recognition technology. **      Final report electronically signed by DR Hans Preston on 6/10/2022 12:54 PM      XR WRIST RIGHT (MIN 3 VIEWS)   Final Result   No fracture or acute bony abnormality is noted in the right hand and right wrist. Moderate chronic arthritic changes are most pronounced at the first [facility] joint where there are erosions at the base of the fifth metacarpal.      **This report has been created using voice recognition software. It may contain minor errors which are inherent in voice recognition technology. **      Final report electronically signed by Dr Oma Camarillo on 6/10/2022 12:42 PM      XR HAND RIGHT (MIN 3 VIEWS)   Final Result   No fracture or acute bony abnormality is noted in the right hand and right wrist. Moderate chronic arthritic changes are most pronounced at the first [facility] joint where there are erosions at the base of the fifth metacarpal.      **This report has been created using voice recognition software. It may contain minor errors which are inherent in voice recognition technology. **      Final report electronically signed by Dr Oma Camarillo on 6/10/2022 12:42 PM            LABS:   Labs Reviewed   CBC WITH AUTO DIFFERENTIAL - Abnormal; Notable for the following components:       Result Value    RBC 3.74 (*)     Hemoglobin 10.8 (*)     Hematocrit 33.0 (*)     MCHC 32.8 (*)     RDW 15.3 (*)     All other components within normal limits   COMPREHENSIVE METABOLIC PANEL - Abnormal; Notable for the following components:    Alkaline Phosphatase 166 (*)     Total Protein 5.9 (*)     Albumin 3.2 (*)     All other components within normal limits   GLOMERULAR FILTRATION RATE, ESTIMATED - Abnormal; Notable for the following components:    GFR, Estimated 88 (*)     All other components within normal limits   ANION GAP       DEPARTMENT COURSE:   Vitals:    Vitals:    06/10/22 1126 06/10/22 1331   BP: (!) 167/76 (!) 161/79   Pulse: 88    Resp: 16    Temp: 97.6 °F (36.4 °C)    SpO2: 96%        MDM:  Presents after sustaining a fall at home yesterday. Patient is alert and oriented and neurological examination is within normal limits except for gait. Patient does veer to the right when ambulating and she reports that this has been occurring for the past 4 years. Imaging studies performed today do not reveal any concerning findings. Patient's blood pressure noted to be elevated so she is recommend to continue taking her blood pressure medication as directed. She is recommended to move slowly when transitioning between positions. She is also recommended to get a Life Alert and not climb on step stools or ladders. Written prescription for wheeled walker with brakes also provided. Recommended follow-up with PCP as soon as possible. CRITICAL CARE:   None    CONSULTS:  None    PROCEDURES:  None    FINAL IMPRESSION      1. Fall at home, initial encounter    2. Closed head injury, initial encounter    3. Contusion of right hand, initial encounter    4. Gait disturbance    5.  Essential hypertension          DISPOSITION/PLAN   Discharge    PATIENT REFERRED TO:  Bhakti Howard MD  [address] [place] [address] [address]  Daren Dudley y  [phone]    Schedule an appointment as soon as possible for a visit         DISCHARGEMEDICATIONS:  New Prescriptions    No medications on file       (Please note that portions of this note were completedwith a voice recognition program.  Efforts were made to edit the dictations but occasionally words are mis-transcribed.)    MD Emily Anand MD  06/10/22 1403

## 2022-06-10 NOTE — ED NOTES
Patient in stable condition. Alert and oriented x3. Unlabored breathing present. Patient aware of plan of care. Patient discharge instructions given and explained. Follow up information instructions given. Prescription order explained to patient. Dr. Rafal Bello gave written order for a walker with brakes for patient to fill. Patient agreeable to plan of care. Patient states understanding and denies any questions or concerns. Patient ambulated out of ER with standy by assist due to unstable gait with no further complications.         Deja Carcamo RN  06/10/22 [id number]

## 2022-06-10 NOTE — ED TRIAGE NOTES
Pt states standing up and lost balance yesterday. Reports headache and bilateral hand pain at this time. Denies loss of consciousness.

## 2022-06-16 ENCOUNTER — OFFICE VISIT (OUTPATIENT)
Dept: PSYCHIATRY | Age: 72
End: 2022-06-16
Payer: MEDICARE

## 2022-06-16 DIAGNOSIS — Z91.81 HISTORY OF FALL: ICD-10-CM

## 2022-06-16 DIAGNOSIS — F43.10 PTSD (POST-TRAUMATIC STRESS DISORDER): Primary | ICD-10-CM

## 2022-06-16 DIAGNOSIS — F41.9 ANXIETY: ICD-10-CM

## 2022-06-16 DIAGNOSIS — R41.3 MEMORY CHANGE: ICD-10-CM

## 2022-06-16 PROCEDURE — 99214 OFFICE O/P EST MOD 30 MIN: CPT | Performed by: NURSE PRACTITIONER

## 2022-06-16 PROCEDURE — 1123F ACP DISCUSS/DSCN MKR DOCD: CPT | Performed by: NURSE PRACTITIONER

## 2022-06-16 RX ORDER — LORAZEPAM 0.5 MG/1
0.5 TABLET ORAL NIGHTLY PRN
Qty: 30 TABLET | Refills: 2 | Status: SHIPPED | OUTPATIENT
Start: 2022-06-16 | End: 2022-09-01 | Stop reason: HOSPADM

## 2022-06-16 RX ORDER — VENLAFAXINE HYDROCHLORIDE 150 MG/1
CAPSULE, EXTENDED RELEASE ORAL
Qty: 180 CAPSULE | Refills: 0 | Status: SHIPPED | OUTPATIENT
Start: 2022-06-16 | End: 2022-09-01 | Stop reason: SDUPTHER

## 2022-06-16 RX ORDER — LAMOTRIGINE 100 MG/1
TABLET ORAL
Qty: 270 TABLET | Refills: 0 | Status: SHIPPED | OUTPATIENT
Start: 2022-06-16 | End: 2022-09-01 | Stop reason: SDUPTHER

## 2022-06-16 RX ORDER — QUETIAPINE FUMARATE 100 MG/1
100 TABLET, FILM COATED ORAL NIGHTLY
Qty: 90 TABLET | Refills: 0 | Status: SHIPPED | OUTPATIENT
Start: 2022-06-16 | End: 2022-09-01 | Stop reason: SDUPTHER
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
## 2022-07-19 ENCOUNTER — OFFICE VISIT (OUTPATIENT)
Dept: PSYCHIATRY | Age: 72
End: 2022-07-19
Payer: MEDICARE

## 2022-07-19 VITALS — HEIGHT: 65 IN | BODY MASS INDEX: 20.19 KG/M2 | WEIGHT: 121.2 LBS

## 2022-07-19 DIAGNOSIS — R41.3 MEMORY CHANGE: ICD-10-CM

## 2022-07-19 DIAGNOSIS — F43.10 PTSD (POST-TRAUMATIC STRESS DISORDER): Primary | ICD-10-CM

## 2022-07-19 DIAGNOSIS — Z91.81 HISTORY OF FALL: ICD-10-CM

## 2022-07-19 DIAGNOSIS — F41.9 ANXIETY: ICD-10-CM

## 2022-07-19 PROCEDURE — 90833 PSYTX W PT W E/M 30 MIN: CPT | Performed by: NURSE PRACTITIONER

## 2022-07-19 PROCEDURE — 99213 OFFICE O/P EST LOW 20 MIN: CPT | Performed by: NURSE PRACTITIONER

## 2022-07-21 ENCOUNTER — TELEPHONE (OUTPATIENT)
Dept: PSYCHIATRY | Age: 72
End: 2022-07-21

## 2022-07-21 NOTE — TELEPHONE ENCOUNTER
Sarah Loron called into the office stating her case with home health was closed as she was out of [place] for several weeks. She asks to have new referral placed to home health.

## 2022-07-22 DIAGNOSIS — Z91.81 HISTORY OF FALL: ICD-10-CM

## 2022-07-22 DIAGNOSIS — R41.3 MEMORY CHANGE: Primary | ICD-10-CM
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
## 2022-08-24 ENCOUNTER — HOSPITAL ENCOUNTER (OUTPATIENT)
Age: 72
Discharge: HOME | End: 2022-08-24
Payer: MEDICARE

## 2022-08-24 LAB
ALBUMIN SERPL-MCNC: 4.1 G/DL (ref 3.5–5.1)
ALP BLD-CCNC: 129 U/L (ref 38–126)
ALT SERPL-CCNC: 20 U/L (ref 11–66)
ANION GAP SERPL CALCULATED.3IONS-SCNC: 12 MEQ/L (ref 8–16)
AST SERPL-CCNC: 19 U/L (ref 5–40)
BASOPHILS # BLD: 0.4 %
BASOPHILS ABSOLUTE: 0 THOU/MM3 (ref 0–0.1)
BILIRUB SERPL-MCNC: 0.2 MG/DL (ref 0.3–1.2)
BUN BLDV-MCNC: 20 MG/DL (ref 7–22)
CALCIUM SERPL-MCNC: 9.1 MG/DL (ref 8.5–10.5)
CHLORIDE BLD-SCNC: 104 MEQ/L (ref 98–111)
CHOLESTEROL, FASTING: 147 MG/DL (ref 100–199)
CO2: 24 MEQ/L (ref 23–33)
CREAT SERPL-MCNC: 0.7 MG/DL (ref 0.4–1.2)
EOSINOPHIL # BLD: 1.4 %
EOSINOPHILS ABSOLUTE: 0.1 THOU/MM3 (ref 0–0.4)
ERYTHROCYTE [DISTWIDTH] IN BLOOD BY AUTOMATED COUNT: 15.7 % (ref 11.5–14.5)
ERYTHROCYTE [DISTWIDTH] IN BLOOD BY AUTOMATED COUNT: 54.7 FL (ref 35–45)
GFR SERPL CREATININE-BSD FRML MDRD: 82 ML/MIN/1.73M2
GLUCOSE FASTING: 90 MG/DL (ref 70–108)
HCT VFR BLD CALC: 37.6 % (ref 37–47)
HDLC SERPL-MCNC: 71 MG/DL
HEMOGLOBIN: 11.7 GM/DL (ref 12–16)
IMMATURE GRANS (ABS): 0.04 THOU/MM3 (ref 0–0.07)
IMMATURE GRANULOCYTES: 0.4 %
LDL CHOLESTEROL CALCULATED: 66 MG/DL
LYMPHOCYTES # BLD: 20.7 %
LYMPHOCYTES ABSOLUTE: 1.9 THOU/MM3 (ref 1–4.8)
MCH RBC QN AUTO: 29.6 PG (ref 26–33)
MCHC RBC AUTO-ENTMCNC: 31.1 GM/DL (ref 32.2–35.5)
MCV RBC AUTO: 95.2 FL (ref 81–99)
MONOCYTES # BLD: 7.3 %
MONOCYTES ABSOLUTE: 0.7 THOU/MM3 (ref 0.4–1.3)
NUCLEATED RED BLOOD CELLS: 0 /100 WBC
PLATELET # BLD: 310 THOU/MM3 (ref 130–400)
PMV BLD AUTO: 10.2 FL (ref 9.4–12.4)
POTASSIUM SERPL-SCNC: 4.2 MEQ/L (ref 3.5–5.2)
PTH INTACT: 84.4 PG/ML (ref 15–65)
RBC # BLD: 3.95 MILL/MM3 (ref 4.2–5.4)
SEG NEUTROPHILS: 69.8 %
SEGMENTED NEUTROPHILS ABSOLUTE COUNT: 6.4 THOU/MM3 (ref 1.8–7.7)
SODIUM BLD-SCNC: 140 MEQ/L (ref 135–145)
TOTAL PROTEIN: 5.7 G/DL (ref 6.1–8)
TRIGLYCERIDE, FASTING: 51 MG/DL (ref 0–199)
VITAMIN D 25-HYDROXY: 16 NG/ML (ref 30–100)
WBC # BLD: 9.2 THOU/MM3 (ref 4.8–10.8)

## 2022-08-24 PROCEDURE — 82652 VIT D 1 25-DIHYDROXY: CPT

## 2022-08-24 PROCEDURE — 83970 ASSAY OF PARATHORMONE: CPT

## 2022-08-24 PROCEDURE — 80061 LIPID PANEL: CPT

## 2022-08-24 PROCEDURE — 80053 COMPREHEN METABOLIC PANEL: CPT

## 2022-08-24 PROCEDURE — 85025 COMPLETE CBC W/AUTO DIFF WBC: CPT

## 2022-08-24 PROCEDURE — 36415 COLL VENOUS BLD VENIPUNCTURE: CPT

## 2022-08-24 PROCEDURE — 85027 COMPLETE CBC AUTOMATED: CPT

## 2022-08-24 PROCEDURE — 82306 VITAMIN D 25 HYDROXY: CPT

## 2022-08-27 LAB — VITAMIN D 1,25-DIHYDROXY: 63.9 PG/ML (ref 19.9–79.3)

## 2022-09-01 ENCOUNTER — OFFICE VISIT (OUTPATIENT)
Dept: PSYCHIATRY | Age: 72
End: 2022-09-01
Payer: MEDICARE

## 2022-09-01 VITALS — HEIGHT: 65 IN | WEIGHT: 112.6 LBS | BODY MASS INDEX: 18.76 KG/M2

## 2022-09-01 DIAGNOSIS — Z91.81 HISTORY OF FALL: ICD-10-CM

## 2022-09-01 DIAGNOSIS — F43.10 PTSD (POST-TRAUMATIC STRESS DISORDER): Primary | ICD-10-CM

## 2022-09-01 DIAGNOSIS — F41.9 ANXIETY: ICD-10-CM

## 2022-09-01 DIAGNOSIS — R41.3 MEMORY CHANGE: ICD-10-CM

## 2022-09-01 PROCEDURE — 90833 PSYTX W PT W E/M 30 MIN: CPT | Performed by: NURSE PRACTITIONER

## 2022-09-01 PROCEDURE — 99214 OFFICE O/P EST MOD 30 MIN: CPT | Performed by: NURSE PRACTITIONER

## 2022-09-01 RX ORDER — VENLAFAXINE HYDROCHLORIDE 150 MG/1
CAPSULE, EXTENDED RELEASE ORAL
Qty: 180 CAPSULE | Refills: 1 | Status: SHIPPED | OUTPATIENT
Start: 2022-09-01

## 2022-09-01 RX ORDER — LAMOTRIGINE 100 MG/1
TABLET ORAL
Qty: 270 TABLET | Refills: 0 | Status: SHIPPED | OUTPATIENT
Start: 2022-09-01

## 2022-09-01 RX ORDER — QUETIAPINE FUMARATE 100 MG/1
100 TABLET, FILM COATED ORAL NIGHTLY
Qty: 90 TABLET | Refills: 1 | Status: SHIPPED | OUTPATIENT
Start: 2022-09-01
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
## 2022-09-22 ENCOUNTER — APPOINTMENT (OUTPATIENT)
Dept: CT IMAGING | Age: 72
End: 2022-09-22
Payer: MEDICARE

## 2022-09-22 ENCOUNTER — HOSPITAL ENCOUNTER (EMERGENCY)
Age: 72
Discharge: HOME | End: 2022-09-22
Payer: MEDICARE

## 2022-09-22 ENCOUNTER — APPOINTMENT (OUTPATIENT)
Dept: GENERAL RADIOLOGY | Age: 72
End: 2022-09-22
Payer: MEDICARE

## 2022-09-22 VITALS
HEART RATE: 84 BPM | BODY MASS INDEX: 18.76 KG/M2 | RESPIRATION RATE: 12 BRPM | SYSTOLIC BLOOD PRESSURE: 132 MMHG | OXYGEN SATURATION: 97 % | TEMPERATURE: 97.8 F | DIASTOLIC BLOOD PRESSURE: 94 MMHG | WEIGHT: 112.6 LBS | HEIGHT: 65 IN

## 2022-09-22 DIAGNOSIS — S42.351A CLOSED DISPLACED COMMINUTED FRACTURE OF SHAFT OF RIGHT HUMERUS, INITIAL ENCOUNTER: Primary | ICD-10-CM

## 2022-09-22 LAB — GLUCOSE BLD-MCNC: 93 MG/DL (ref 70–108)

## 2022-09-22 PROCEDURE — 72125 CT NECK SPINE W/O DYE: CPT

## 2022-09-22 PROCEDURE — 96374 THER/PROPH/DIAG INJ IV PUSH: CPT

## 2022-09-22 PROCEDURE — 96375 TX/PRO/DX INJ NEW DRUG ADDON: CPT

## 2022-09-22 PROCEDURE — 73060 X-RAY EXAM OF HUMERUS: CPT

## 2022-09-22 PROCEDURE — 29105 APPLICATION LONG ARM SPLINT: CPT

## 2022-09-22 PROCEDURE — 6360000002 HC RX W HCPCS

## 2022-09-22 PROCEDURE — 6360000002 HC RX W HCPCS: Performed by: NURSE PRACTITIONER

## 2022-09-22 PROCEDURE — 70450 CT HEAD/BRAIN W/O DYE: CPT

## 2022-09-22 PROCEDURE — 99284 EMERGENCY DEPT VISIT MOD MDM: CPT

## 2022-09-22 PROCEDURE — 73030 X-RAY EXAM OF SHOULDER: CPT

## 2022-09-22 PROCEDURE — 82948 REAGENT STRIP/BLOOD GLUCOSE: CPT

## 2022-09-22 PROCEDURE — 96376 TX/PRO/DX INJ SAME DRUG ADON: CPT

## 2022-09-22 PROCEDURE — 93005 ELECTROCARDIOGRAM TRACING: CPT | Performed by: NURSE PRACTITIONER

## 2022-09-22 RX ORDER — ONDANSETRON 2 MG/ML
INJECTION INTRAMUSCULAR; INTRAVENOUS
Status: COMPLETED
Start: 2022-09-22 | End: 2022-09-22

## 2022-09-22 RX ORDER — ONDANSETRON 2 MG/ML
4 INJECTION INTRAMUSCULAR; INTRAVENOUS ONCE
Status: COMPLETED | OUTPATIENT
Start: 2022-09-22 | End: 2022-09-22

## 2022-09-22 RX ORDER — OXYCODONE HYDROCHLORIDE AND ACETAMINOPHEN 5; 325 MG/1; MG/1
1 TABLET ORAL EVERY 6 HOURS PRN
Qty: 20 TABLET | Refills: 0 | Status: SHIPPED | OUTPATIENT
Start: 2022-09-22 | End: 2022-09-27

## 2022-09-22 RX ADMIN — ONDANSETRON 4 MG: 2 INJECTION INTRAMUSCULAR; INTRAVENOUS at 18:46

## 2022-09-22 RX ADMIN — HYDROMORPHONE HYDROCHLORIDE 1 MG: 1 INJECTION, SOLUTION INTRAMUSCULAR; INTRAVENOUS; SUBCUTANEOUS at 18:46

## 2022-09-22 RX ADMIN — HYDROMORPHONE HYDROCHLORIDE 1 MG: 1 INJECTION, SOLUTION INTRAMUSCULAR; INTRAVENOUS; SUBCUTANEOUS at 20:14

## 2022-09-22 RX ADMIN — Medication 1 MG: at 18:46

## 2022-09-22 ASSESSMENT — ENCOUNTER SYMPTOMS
DIARRHEA: 0
CHEST TIGHTNESS: 0
RHINORRHEA: 0
VOMITING: 0
PHOTOPHOBIA: 0
EYE PAIN: 0
BACK PAIN: 0
SHORTNESS OF BREATH: 0
COLOR CHANGE: 0
ABDOMINAL PAIN: 0
ABDOMINAL DISTENTION: 0
NAUSEA: 1

## 2022-09-22 ASSESSMENT — PAIN DESCRIPTION - ORIENTATION: ORIENTATION: RIGHT

## 2022-09-22 ASSESSMENT — PAIN DESCRIPTION - LOCATION: LOCATION: SHOULDER

## 2022-09-22 ASSESSMENT — PAIN SCALES - GENERAL: PAINLEVEL_OUTOF10: 10

## 2022-09-22 NOTE — ED NOTES
Pt medicated per verbal order from Louis Christianson NP, [address] [place] [address] at bedside.       Kristie Whitehead RN  09/22/22 1340
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
## 2022-09-23 LAB
EKG ATRIAL RATE: 83 BPM
EKG P AXIS: 57 DEGREES
EKG P-R INTERVAL: 144 MS
EKG Q-T INTERVAL: 344 MS
EKG QRS DURATION: 54 MS
EKG QTC CALCULATION (BAZETT): 404 MS
EKG R AXIS: 56 DEGREES
EKG T AXIS: 23 DEGREES
EKG VENTRICULAR RATE: 83 BPM

## 2022-09-23 PROCEDURE — 93010 ELECTROCARDIOGRAM REPORT: CPT | Performed by: INTERNAL MEDICINE

## 2022-09-26 ENCOUNTER — TELEPHONE (OUTPATIENT)
Dept: PSYCHIATRY | Age: 72
End: 2022-09-26

## 2022-09-26 NOTE — TELEPHONE ENCOUNTER
Misbah Burns called in concerned about medications. She has broken her leg and was started on Percocet due to theinjury. She is concerned about any interactions with the medications that Lanre Gamez prescribes. Scheduled patient to follow up 09/30/2022. Please advise if medication changes are needed at this time.

## 2022-09-27 NOTE — TELEPHONE ENCOUNTER
Percocet is okay with her current psychotropic medications. It may cause some increased drowsiness, especially given patient age, but overall should be tolerated. What is the reason for the appointment?

## 2022-09-27 NOTE — TELEPHONE ENCOUNTER
Patient informed and voiced understanding. Patient was scheduled 09/29/2022 for medication follow up after appointment 09/01/2022. This appointment was rescheduled as provider was unavailable at time of appointment. Patient did ask to R/S for 10/03/2022 instead as she will be looking into nursing home options 09/29/2022. Appointment rescheduled.

## 2022-09-29 DIAGNOSIS — F41.9 ANXIETY: ICD-10-CM

## 2022-09-29 RX ORDER — LORAZEPAM 0.5 MG/1
0.5 TABLET ORAL NIGHTLY PRN
Qty: 30 TABLET | Refills: 0 | Status: CANCELLED | OUTPATIENT
Start: 2022-09-29 | End: 2022-10-29

## 2022-09-29 NOTE — TELEPHONE ENCOUNTER
Meagan Page called requesting a refill on the following medications:  Requested Prescriptions     Pending Prescriptions Disp Refills    LORazepam (ATIVAN) 0.5 MG tablet 30 tablet 0     Sig: Take 1 tablet by mouth nightly as needed for Anxiety for up to 30 days. Aidee Varela reports that she does try to take a half tablet instead of a whole tablet.      Date of last visit: 9/1/2022  Date of next visit (if applicable):10/3/2022  [facility]

## 2022-09-30 NOTE — TELEPHONE ENCOUNTER
Cleo Galeana states she called the office and informed the office that she could not d/c the medication. She states when she tried to d/c the medication she couldn't sleep. States she was up for two nights with no sleep. Cleo Galeana reports this was about 10 days after she was advised to d/c so she re-started medication at previous lower dose. Cleo Duffymino states she is unable to cut the 0.5 mg as it's difficult to cut without breaking into multiple pieces-even with a pill cutter. Cleo Galeana asks if smaller dose is available in Lorazepam so she doesn't have to cut tablets. Cleo Galeana states she is scheduled to have surgery Tuesday and she should cancel her appointment Monday with provider. Advised to keep appointment as it's a good idea to follow up regarding medications. Cleo Galeana states her son should be home to help her with the video appointment.

## 2022-10-03 ENCOUNTER — TELEMEDICINE (OUTPATIENT)
Dept: PSYCHIATRY | Age: 72
End: 2022-10-03
Payer: MEDICARE

## 2022-10-03 DIAGNOSIS — R41.3 MEMORY CHANGE: ICD-10-CM

## 2022-10-03 DIAGNOSIS — Z91.81 HISTORY OF FALL: ICD-10-CM

## 2022-10-03 DIAGNOSIS — F41.9 ANXIETY: ICD-10-CM

## 2022-10-03 DIAGNOSIS — F43.10 PTSD (POST-TRAUMATIC STRESS DISORDER): Primary | ICD-10-CM

## 2022-10-03 PROCEDURE — 99214 OFFICE O/P EST MOD 30 MIN: CPT | Performed by: NURSE PRACTITIONER

## 2022-10-03 PROCEDURE — 1123F ACP DISCUSS/DSCN MKR DOCD: CPT | Performed by: NURSE PRACTITIONER
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
## 2022-10-27 ENCOUNTER — TRANSCRIBE ORDERS (OUTPATIENT)
Dept: ADMINISTRATIVE | Age: 72
End: 2022-10-27

## 2022-10-27 DIAGNOSIS — Z91.81 HISTORY OF FALLING: Primary | ICD-10-CM

## 2022-11-28 ENCOUNTER — HOSPITAL ENCOUNTER (OUTPATIENT)
Dept: MRI IMAGING | Age: 72
Discharge: HOME | End: 2022-11-28
Payer: MEDICARE

## 2022-11-28 ENCOUNTER — HOSPITAL ENCOUNTER (OUTPATIENT)
Dept: NEUROLOGY | Age: 72
Discharge: HOME | End: 2022-11-28
Payer: MEDICARE

## 2022-11-28 DIAGNOSIS — Z91.81 HISTORY OF FALLING: ICD-10-CM

## 2022-11-28 PROCEDURE — 95819 EEG AWAKE AND ASLEEP: CPT | Performed by: PSYCHIATRY & NEUROLOGY

## 2022-11-28 PROCEDURE — 95819 EEG AWAKE AND ASLEEP: CPT

## 2022-11-28 NOTE — PROGRESS NOTES
[facility] Laboratory Technician worksheet       EEG Date: 2022    Name: Swapnil Jones   : 1950   Age: 70 y.o. SEX: female    Room: op    MRN: [id number]     CSN: [id number]    Ordering Provider: ALICIA Langley  EEG Number: [id number] Time of Test:  1130    Hand: Right   Sedation: No    H.V. Done: No  age protocol  Photic: Yes    Sleep: Yes   Drowsy: Yes   Sleep Deprived: Yes    Seizures observed: no    Mentality: alert       Clinical History: patient has been having falls recently, no LOC or or head hits reported. CT of the head 2022  Impression   1. No acute intracranial findings. 2. Mild right frontal scalp swelling. Past Medical History:       Diagnosis Date    Arthritis     generalized    Bowel obstruction ([address]     Bronchitis 2014    DVT, lower extremity [facility]     History of blood transfusion     Hx of blood clots     DVT    Hypertension     Hypoglycemia     Kidney stone     Movement disorder     Multinodular goiter 2015    Muscle strain of left lower extremity 2014    Psychiatric problem     Depression    Vitamin D deficiency          Prior to Admission medications    Medication Sig Start Date End Date Taking?  Authorizing Provider   oxyCODONE-Acetaminophen (PERCOCET PO) Take by mouth    Historical Provider, MD   QUEtiapine (SEROQUEL) 100 MG tablet Take 1 tablet by mouth nightly 22   EVA Saldana CNP   venlafaxine (EFFEXOR XR) 150 MG extended release capsule Take 1 capsule by mouth twice daily 22   EVA Saldana CNP   lamoTRIgine (LAMICTAL) 100 MG tablet TAKE 1 TABLET BY MOUTH IN THE MORNING AND 2 TABLETS IN THE EVENING AS DIRECTED 22   EVA Saldana CNP   VITAMIN D, CHOLECALCIFEROL, PO Take 1,000 Units by mouth four times a week    Historical Provider, MD   vitamin B-6 (PYRIDOXINE) 50 MG tablet Take 50 mg by mouth daily    Historical Provider, MD   acarbose (PRECOSE) 50 MG tablet Take 1 tablet by mouth 3 times daily (with meals)  Patient taking differently: Take 50 mg by mouth 4 times daily 3/6/18   EVA Balderas - CNS   ondansetron (ZOFRAN ODT) 4 MG disintegrating tablet Take 1 tablet by mouth every 4 hours as needed for Nausea or Vomiting 4/4/17   Vinay Coehn MD   Blood Glucose Monitoring Suppl ([employer] CONTOUR MONITOR) W/DEVICE KIT 1 kit by Does not apply route once for 1 dose 2/1/16 3/14/19  EVA Escoto CNP   glucose blood VI test strips (KIET CONTOUR TEST) strip Pt testing blood sugar 4 times daily. Dx: E16.2  Please add lancets 11/11/15   EVA Mcginnis CNP   Cyanocobalamin (VITAMIN B-12 IJ) Inject 1,000 mcg as directed every 30 days. Historical Provider, MD   Multiple Vitamins-Minerals (MULTIVITAMIN PO) Take 1 tablet by mouth daily. Historical Provider, MD   lisinopril (PRINIVIL;ZESTRIL) 5 MG tablet Take 5 mg by mouth daily.       Historical Provider, MD       Technician: Donell Mustafa 11/28/2022

## 2023-05-05 ENCOUNTER — HOSPITAL ENCOUNTER (OUTPATIENT)
Dept: CT IMAGING | Age: 73
Discharge: HOME | End: 2023-05-05
Payer: MEDICARE

## 2023-05-05 DIAGNOSIS — R10.9 STOMACH ACHE: ICD-10-CM

## 2023-05-05 LAB
CREAT BLD-MCNC: 0.8 MG/DL (ref 0.5–1.2)
GFR SERPL CREATININE-BSD FRML MDRD: > 60 ML/MIN/1.73M2

## 2023-05-05 PROCEDURE — 6360000004 HC RX CONTRAST MEDICATION: Performed by: INTERNAL MEDICINE

## 2023-05-05 PROCEDURE — 74177 CT ABD & PELVIS W/CONTRAST: CPT

## 2023-05-05 PROCEDURE — 82565 ASSAY OF CREATININE: CPT

## 2023-05-05 RX ADMIN — IOPAMIDOL 100 ML: 755 INJECTION, SOLUTION INTRAVENOUS at 12:30

## 2023-05-10 ENCOUNTER — HOSPITAL ENCOUNTER (OUTPATIENT)
Dept: MAMMOGRAPHY | Age: 73
Discharge: HOME | End: 2023-05-10
Payer: MEDICARE

## 2023-05-10 DIAGNOSIS — Z12.39 BREAST SCREENING: ICD-10-CM

## 2023-05-10 PROCEDURE — 77063 BREAST TOMOSYNTHESIS BI: CPT

## 2023-06-11 RX ORDER — TEMAZEPAM 15 MG/1
CAPSULE ORAL
Qty: 30 CAPSULE | Refills: 0 | OUTPATIENT
Start: 2023-06-11

## 2023-09-04 NOTE — TELEPHONE ENCOUNTER
[facility] has requested refills of Atvian 1mg/hs and a 90 day supply of Lamictal 100mg one tab in the morning and two/hs on Prabha's behalf. She attended an appointment 2/9 and is to return 8/10.
Approved--Controlled substances monitoring: possible medication side effects, risk of tolerance and/or dependence, and alternative treatments discussed, no signs of potential drug abuse or diversion identified, and OARRS report reviewed today- activity consistent with treatment plan    Electronically signed by EVA Kwan CNP on 5/11/21 at 9:23 AM EDT
Alert and oriented to person, place and time

## 2024-06-13 NOTE — TELEPHONE ENCOUNTER
Left message to reschedule the home sleep study.   This was discontinued at last visit. Please clarify with patient the reason she is seeking refill.

## 2024-10-07 ENCOUNTER — TELEPHONE (OUTPATIENT)
Dept: ENT CLINIC | Age: 74
End: 2024-10-07

## 2024-10-07 NOTE — TELEPHONE ENCOUNTER
Patient is coming for an appt 10/8 for a marisa of the neck and no images were sent.  Please check into this.

## 2024-10-08 ENCOUNTER — HOSPITAL ENCOUNTER (OUTPATIENT)
Dept: MRI IMAGING | Age: 74
Discharge: HOME | End: 2024-10-08
Attending: RADIOLOGY

## 2024-10-08 ENCOUNTER — OFFICE VISIT (OUTPATIENT)
Dept: ENT CLINIC | Age: 74
End: 2024-10-08

## 2024-10-08 VITALS
WEIGHT: 137.6 LBS | BODY MASS INDEX: 22.11 KG/M2 | DIASTOLIC BLOOD PRESSURE: 82 MMHG | TEMPERATURE: 97.1 F | OXYGEN SATURATION: 99 % | SYSTOLIC BLOOD PRESSURE: 136 MMHG | HEART RATE: 85 BPM | HEIGHT: 66 IN

## 2024-10-08 DIAGNOSIS — R13.14 PHARYNGOESOPHAGEAL DYSPHAGIA: ICD-10-CM

## 2024-10-08 DIAGNOSIS — Z90.3 S/P GASTRECTOMY: Primary | ICD-10-CM

## 2024-10-08 DIAGNOSIS — Z00.6 ENCOUNTER FOR EXAMINATION FOR NORMAL COMPARISON OR CONTROL IN CLINICAL RESEARCH PROGRAM: ICD-10-CM

## 2025-06-03 ENCOUNTER — LAB (OUTPATIENT)
Dept: LAB | Age: 75
End: 2025-06-03

## 2025-06-05 ENCOUNTER — TRANSCRIBE ORDERS (OUTPATIENT)
Dept: ADMINISTRATIVE | Age: 75
End: 2025-06-05

## 2025-06-05 DIAGNOSIS — C18.9 MALIGNANT NEOPLASM OF COLON, UNSPECIFIED PART OF COLON (HCC): Primary | ICD-10-CM

## 2025-06-09 ENCOUNTER — HOSPITAL ENCOUNTER (OUTPATIENT)
Dept: CT IMAGING | Age: 75
Discharge: HOME | End: 2025-06-09
Attending: INTERNAL MEDICINE
Payer: MEDICARE

## 2025-06-09 DIAGNOSIS — C18.9 MALIGNANT NEOPLASM OF COLON, UNSPECIFIED PART OF COLON (HCC): ICD-10-CM

## 2025-06-09 LAB — POC CREATININE WHOLE BLOOD: 0.7 MG/DL (ref 0.5–1.2)

## 2025-06-09 PROCEDURE — 82565 ASSAY OF CREATININE: CPT

## 2025-06-09 PROCEDURE — 74178 CT ABD&PLV WO CNTR FLWD CNTR: CPT

## 2025-06-09 PROCEDURE — 74177 CT ABD & PELVIS W/CONTRAST: CPT

## 2025-06-09 PROCEDURE — 6360000004 HC RX CONTRAST MEDICATION: Performed by: INTERNAL MEDICINE

## 2025-06-09 RX ORDER — IOPAMIDOL 755 MG/ML
80 INJECTION, SOLUTION INTRAVASCULAR
Status: COMPLETED | OUTPATIENT
Start: 2025-06-09 | End: 2025-06-09

## 2025-06-09 RX ADMIN — IOHEXOL 50 ML: 240 INJECTION, SOLUTION INTRATHECAL; INTRAVASCULAR; INTRAVENOUS; ORAL at 15:03

## 2025-06-09 RX ADMIN — IOPAMIDOL 80 ML: 755 INJECTION, SOLUTION INTRAVENOUS at 15:03

## 2025-06-11 ENCOUNTER — PREP FOR PROCEDURE (OUTPATIENT)
Dept: SURGERY | Age: 75
End: 2025-06-11

## 2025-06-11 ENCOUNTER — HOSPITAL ENCOUNTER (OUTPATIENT)
Age: 75
Discharge: HOME | End: 2025-06-11
Payer: MEDICARE

## 2025-06-11 ENCOUNTER — OFFICE VISIT (OUTPATIENT)
Dept: SURGERY | Age: 75
End: 2025-06-11
Payer: MEDICARE

## 2025-06-11 VITALS
SYSTOLIC BLOOD PRESSURE: 162 MMHG | OXYGEN SATURATION: 98 % | TEMPERATURE: 97.4 F | WEIGHT: 132 LBS | HEIGHT: 65 IN | HEART RATE: 82 BPM | BODY MASS INDEX: 21.99 KG/M2 | DIASTOLIC BLOOD PRESSURE: 92 MMHG

## 2025-06-11 DIAGNOSIS — Z01.818 PRE-OP TESTING: ICD-10-CM

## 2025-06-11 DIAGNOSIS — C19 ADENOCARCINOMA OF RECTOSIGMOID JUNCTION (HCC): Primary | ICD-10-CM

## 2025-06-11 DIAGNOSIS — C19 CANCER OF RECTOSIGMOID JUNCTION (HCC): ICD-10-CM

## 2025-06-11 DIAGNOSIS — C19 ADENOCARCINOMA OF RECTOSIGMOID JUNCTION (HCC): ICD-10-CM

## 2025-06-11 LAB
ALBUMIN SERPL BCG-MCNC: 4.4 G/DL (ref 3.4–4.9)
ALP SERPL-CCNC: 147 U/L (ref 38–126)
ALT SERPL W/O P-5'-P-CCNC: 38 U/L (ref 10–35)
ANION GAP SERPL CALC-SCNC: 12 MEQ/L (ref 8–16)
AST SERPL-CCNC: 29 U/L (ref 10–35)
BILIRUB SERPL-MCNC: 0.2 MG/DL (ref 0.3–1.2)
BUN SERPL-MCNC: 12 MG/DL (ref 8–23)
CALCIUM SERPL-MCNC: 10.2 MG/DL (ref 8.8–10.2)
CEA SERPL-MCNC: 10.8 NG/ML (ref 0–3.8)
CHLORIDE SERPL-SCNC: 101 MEQ/L (ref 98–111)
CO2 SERPL-SCNC: 28 MEQ/L (ref 22–29)
CREAT SERPL-MCNC: 0.7 MG/DL (ref 0.5–0.9)
DEPRECATED RDW RBC AUTO: 49 FL (ref 35–45)
EKG ATRIAL RATE: 71 BPM
EKG P AXIS: 72 DEGREES
EKG P-R INTERVAL: 150 MS
EKG Q-T INTERVAL: 406 MS
EKG QRS DURATION: 54 MS
EKG QTC CALCULATION (BAZETT): 441 MS
EKG R AXIS: 39 DEGREES
EKG T AXIS: 77 DEGREES
EKG VENTRICULAR RATE: 71 BPM
ERYTHROCYTE [DISTWIDTH] IN BLOOD BY AUTOMATED COUNT: 13.7 % (ref 11.5–14.5)
GFR SERPL CREATININE-BSD FRML MDRD: 90 ML/MIN/1.73M2
GLUCOSE SERPL-MCNC: 101 MG/DL (ref 74–109)
HCT VFR BLD AUTO: 43.8 % (ref 37–47)
HGB BLD-MCNC: 14.2 GM/DL (ref 12–16)
MCH RBC QN AUTO: 31.5 PG (ref 26–33)
MCHC RBC AUTO-ENTMCNC: 32.4 GM/DL (ref 32.2–35.5)
MCV RBC AUTO: 97.1 FL (ref 81–99)
PLATELET # BLD AUTO: 321 THOU/MM3 (ref 130–400)
PMV BLD AUTO: 9.8 FL (ref 9.4–12.4)
POTASSIUM SERPL-SCNC: 4.7 MEQ/L (ref 3.5–5.2)
PROT SERPL-MCNC: 7 G/DL (ref 6.4–8.3)
RBC # BLD AUTO: 4.51 MILL/MM3 (ref 4.2–5.4)
SODIUM SERPL-SCNC: 141 MEQ/L (ref 135–145)
WBC # BLD AUTO: 8.5 THOU/MM3 (ref 4.8–10.8)

## 2025-06-11 PROCEDURE — 3077F SYST BP >= 140 MM HG: CPT | Performed by: SURGERY

## 2025-06-11 PROCEDURE — 1036F TOBACCO NON-USER: CPT | Performed by: SURGERY

## 2025-06-11 PROCEDURE — 80053 COMPREHEN METABOLIC PANEL: CPT

## 2025-06-11 PROCEDURE — G8420 CALC BMI NORM PARAMETERS: HCPCS | Performed by: SURGERY

## 2025-06-11 PROCEDURE — 99204 OFFICE O/P NEW MOD 45 MIN: CPT | Performed by: SURGERY

## 2025-06-11 PROCEDURE — 3080F DIAST BP >= 90 MM HG: CPT | Performed by: SURGERY

## 2025-06-11 PROCEDURE — 1123F ACP DISCUSS/DSCN MKR DOCD: CPT | Performed by: SURGERY

## 2025-06-11 PROCEDURE — 93010 ELECTROCARDIOGRAM REPORT: CPT | Performed by: INTERNAL MEDICINE

## 2025-06-11 PROCEDURE — G8400 PT W/DXA NO RESULTS DOC: HCPCS | Performed by: SURGERY

## 2025-06-11 PROCEDURE — G8427 DOCREV CUR MEDS BY ELIG CLIN: HCPCS | Performed by: SURGERY

## 2025-06-11 PROCEDURE — 93005 ELECTROCARDIOGRAM TRACING: CPT | Performed by: SURGERY

## 2025-06-11 PROCEDURE — 82378 CARCINOEMBRYONIC ANTIGEN: CPT

## 2025-06-11 PROCEDURE — 36415 COLL VENOUS BLD VENIPUNCTURE: CPT

## 2025-06-11 PROCEDURE — 85027 COMPLETE CBC AUTOMATED: CPT

## 2025-06-11 PROCEDURE — 3017F COLORECTAL CA SCREEN DOC REV: CPT | Performed by: SURGERY

## 2025-06-11 PROCEDURE — 1090F PRES/ABSN URINE INCON ASSESS: CPT | Performed by: SURGERY

## 2025-06-11 RX ORDER — ACETAMINOPHEN 325 MG/1
650 TABLET ORAL EVERY 6 HOURS PRN
COMMUNITY

## 2025-06-11 RX ORDER — BUSPIRONE HYDROCHLORIDE 7.5 MG/1
7.5 TABLET ORAL DAILY PRN
COMMUNITY
Start: 2025-06-01

## 2025-06-11 RX ORDER — LORATADINE 10 MG/1
10 TABLET ORAL DAILY
COMMUNITY
Start: 2025-06-06

## 2025-06-11 RX ORDER — BUSPIRONE HYDROCHLORIDE 10 MG/1
10 TABLET ORAL NIGHTLY
COMMUNITY
Start: 2025-06-01

## 2025-06-12 ENCOUNTER — TELEPHONE (OUTPATIENT)
Dept: SURGERY | Age: 75
End: 2025-06-12

## 2025-06-12 RX ORDER — METRONIDAZOLE 500 MG/1
TABLET ORAL
Qty: 3 TABLET | Refills: 0 | Status: SHIPPED | OUTPATIENT
Start: 2025-06-12

## 2025-06-12 ASSESSMENT — ENCOUNTER SYMPTOMS
CHOKING: 1
APNEA: 0
SORE THROAT: 0
STRIDOR: 0
COLOR CHANGE: 0
TROUBLE SWALLOWING: 1
PHOTOPHOBIA: 0
EYE ITCHING: 1
SHORTNESS OF BREATH: 0
NAUSEA: 1
BLOOD IN STOOL: 0
ABDOMINAL PAIN: 1
RHINORRHEA: 1
CHEST TIGHTNESS: 0
COUGH: 1
WHEEZING: 0
EYE PAIN: 0
RECTAL PAIN: 0
CONSTIPATION: 1
DIARRHEA: 1
SINUS PRESSURE: 0
VOMITING: 0
FACIAL SWELLING: 0
BACK PAIN: 1
ANAL BLEEDING: 0
EYE REDNESS: 0
VOICE CHANGE: 1
ABDOMINAL DISTENTION: 1
EYE DISCHARGE: 0

## 2025-06-12 NOTE — PROGRESS NOTES
VITAMIN D, CHOLECALCIFEROL, PO Take 1,000 Units by mouth four times a week      vitamin B-6 (PYRIDOXINE) 50 MG tablet Take 1 tablet by mouth daily      acarbose (PRECOSE) 50 MG tablet Take 1 tablet by mouth 3 times daily (with meals) 270 tablet 0    ondansetron (ZOFRAN ODT) 4 MG disintegrating tablet Take 1 tablet by mouth every 4 hours as needed for Nausea or Vomiting 60 tablet 5    glucose blood VI test strips (KIET CONTOUR TEST) strip Pt testing blood sugar 4 times daily.  Dx: E16.2  Please add lancets 450 each 1    Cyanocobalamin (VITAMIN B-12 IJ) Inject 1,000 mcg as directed every 30 days.      Blood Glucose Monitoring Suppl ([employer] CONTOUR MONITOR) W/DEVICE KIT 1 kit by Does not apply route once for 1 dose 1 kit 0     No current facility-administered medications for this visit.       No Known Allergies    Family History   Problem Relation Age of Onset    Cancer Mother         thyroid    Diabetes Mother     Diabetes Father     Stroke Father     Parkinson's Disease Father     Obesity Sister         HX of Gastric Bypass for 1 sister     Thyroid Disease Sister     High Blood Pressure Sister     Depression Sister     Heart Disease Sister     High Blood Pressure Brother     Heart Disease Brother     Breast Cancer Paternal Aunt 40    Breast Cancer Paternal Aunt 42       Social History     Socioeconomic History    Marital status:      Spouse name: Not on file    Number of children: 2    Years of education: 15    Highest education level: Some college, no degree   Occupational History    Not on file   Tobacco Use    Smoking status: Never     Passive exposure: Never    Smokeless tobacco: Never   Vaping Use    Vaping status: Never Used   Substance and Sexual Activity    Alcohol use: Not Currently     Alcohol/week: 0.0 standard drinks of alcohol    Drug use: No    Sexual activity: Not Currently   Other Topics Concern    Not on file   Social History Narrative    10/14/2021    LEVEL OF EDUCATION: graduated high

## 2025-06-12 NOTE — TELEPHONE ENCOUNTER
Per Uriel Madera yes she can start it whenever or after surgery. Will not interfere with anything.     Patient notified and voiced understanding.  Patient has also opted to not be seen through PAT nurses prior to surgery.  Her Home Health nurse went over everything with her again today and she does not feel she needs to be seen.

## 2025-06-12 NOTE — PROGRESS NOTES
KELVIN; I spoke with Prabha, She currently is in Assisted Living at [facility]. Her nurse came in this morning and they reviewed her pre op instructions that she received from Dr. Hale's office and ordered the dulcolax, Miralax and liquid items needed for bowel prep the day before surgery.     We made a plan for Prabha's nurse to call KELVIN [phone] tomorrow when she's at [facility] and we can do a speaker call and review medications instructions and PAT review at that time.  Thank you.

## 2025-06-12 NOTE — H&P
Adry Moura (:  1950)      ASSESSMENT:  1.  Rectosigmoid colon adenocarcinoma  2.  History gastric bypass  3.  History subtotal esophagectomy with colonic interposition  4.  Total gastrectomy  5.  History hysterectomy, cholecystectomy and appendectomy     PLAN:  1. Schedule Adry for open low anterior resection  2. She will undergo pre-operative clearance per anesthesia guidelines with risk factors listed under the past medical history diagnosis & problem list.  3. The risks, benefits and alternatives were discussed with Adry including non-operative management.  Robotic, laparoscopic and open techniques discussed. All questions answered. She understands and wishes to proceed with surgical intervention.  4. Restrictions discussed with Adry and she expresses understanding.  5. She is advised to call back directly if there are further questions/concerns, or if her symptoms worsen prior to surgery.  6.  Follow-up GI service as directed     SUBJECTIVE/OBJECTIVE:          Chief Complaint   Patient presents with    Surgical Consult       NP ref by Dr. Dixon - Invasive moderately differentiated colorectal adenocarcinoma      NANI Armando is a 74-year-old female who presents with a new diagnosis of colorectal mass found to be adenocarcinoma at 16 cm.  She has a complex history with what I believed to have been a bariatric surgery in .  This subsequently developed complications with a lobulated mass in the upper esophagus.  At [facility] she underwent a near-total esophagectomy with a colonic interposition.  She subsequently had feeding tubes and also developed gastric necrosis.  Had to go back to surgery for a total gastrectomy in addition to her subtotal esophagectomy.  Currently has a [place] jejunostomy per reports that replaced her esophagus.  History of polyps and required colonoscopy.  Mass found to be about 3 cm in diameter.  Milka ink placed distally.  Upper endoscopy appears to be okay

## 2025-06-12 NOTE — TELEPHONE ENCOUNTER
Patient scheduled for an open low anterior resection on 06-19.  Patient's dentist prescribed Clindamycin as a precautionary as she needs to have a tooth extracted;She does not have any active infection.  She is wanting to hold off having her tooth pulled until after her surgery with Dr. Hale.      Is it okay for her to take the Clindamycin 300mg BID #10 prior to her surgery on 06-10 or should she wait?

## 2025-06-17 ENCOUNTER — PREP FOR PROCEDURE (OUTPATIENT)
Dept: SURGERY | Age: 75
End: 2025-06-17

## 2025-06-17 RX ORDER — METRONIDAZOLE 500 MG/100ML
500 INJECTION, SOLUTION INTRAVENOUS
Status: CANCELLED | OUTPATIENT
Start: 2025-06-17 | End: 2025-06-17

## 2025-06-17 RX ORDER — SODIUM CHLORIDE 9 MG/ML
INJECTION, SOLUTION INTRAVENOUS PRN
Status: CANCELLED | OUTPATIENT
Start: 2025-06-17

## 2025-06-17 RX ORDER — SODIUM CHLORIDE 0.9 % (FLUSH) 0.9 %
5-40 SYRINGE (ML) INJECTION PRN
Status: CANCELLED | OUTPATIENT
Start: 2025-06-17

## 2025-06-17 RX ORDER — SODIUM CHLORIDE 9 MG/ML
INJECTION, SOLUTION INTRAVENOUS CONTINUOUS
Status: CANCELLED | OUTPATIENT
Start: 2025-06-17

## 2025-06-17 RX ORDER — SODIUM CHLORIDE 0.9 % (FLUSH) 0.9 %
5-40 SYRINGE (ML) INJECTION EVERY 12 HOURS SCHEDULED
Status: CANCELLED | OUTPATIENT
Start: 2025-06-17

## 2025-06-18 ENCOUNTER — ANESTHESIA EVENT (OUTPATIENT)
Dept: OPERATING ROOM | Age: 75
End: 2025-06-18
Payer: MEDICARE

## 2025-06-19 ENCOUNTER — ANESTHESIA (OUTPATIENT)
Dept: OPERATING ROOM | Age: 75
End: 2025-06-19
Payer: MEDICARE

## 2025-06-19 ENCOUNTER — HOSPITAL ENCOUNTER (INPATIENT)
Age: 75
LOS: 7 days | Discharge: NURSING, REHAB OR LONG-TERM CARE | DRG: 330 | End: 2025-06-26
Attending: SURGERY | Admitting: SURGERY
Payer: MEDICARE

## 2025-06-19 DIAGNOSIS — C19 CANCER OF RECTOSIGMOID JUNCTION (HCC): ICD-10-CM

## 2025-06-19 DIAGNOSIS — Z90.49 S/P PARTIAL RESECTION OF COLON: Primary | ICD-10-CM

## 2025-06-19 DIAGNOSIS — F31.32 BIPOLAR 1 DISORDER, DEPRESSED, MODERATE (HCC): ICD-10-CM

## 2025-06-19 PROBLEM — C18.9 COLON CANCER (HCC): Status: ACTIVE | Noted: 2025-06-19

## 2025-06-19 LAB
ABO GROUP BLD: NORMAL
GLUCOSE BLD STRIP.AUTO-MCNC: 131 MG/DL (ref 70–108)
GLUCOSE BLD STRIP.AUTO-MCNC: 199 MG/DL (ref 70–108)
GLUCOSE BLD STRIP.AUTO-MCNC: 204 MG/DL (ref 70–108)
HCT VFR BLD AUTO: 39.1 % (ref 37–47)
HGB BLD-MCNC: 12.7 GM/DL (ref 12–16)
IAT IGG-SP REAG SERPL QL: NORMAL
POTASSIUM SERPL-SCNC: 3.6 MEQ/L (ref 3.5–5.2)
RH BLD: NORMAL

## 2025-06-19 PROCEDURE — 2580000003 HC RX 258

## 2025-06-19 PROCEDURE — 3700000001 HC ADD 15 MINUTES (ANESTHESIA): Performed by: SURGERY

## 2025-06-19 PROCEDURE — 82948 REAGENT STRIP/BLOOD GLUCOSE: CPT

## 2025-06-19 PROCEDURE — 7100000001 HC PACU RECOVERY - ADDTL 15 MIN: Performed by: SURGERY

## 2025-06-19 PROCEDURE — 3600000014 HC SURGERY LEVEL 4 ADDTL 15MIN: Performed by: SURGERY

## 2025-06-19 PROCEDURE — 86885 COOMBS TEST INDIRECT QUAL: CPT

## 2025-06-19 PROCEDURE — 6360000002 HC RX W HCPCS: Performed by: NURSE PRACTITIONER

## 2025-06-19 PROCEDURE — 2709999900 HC NON-CHARGEABLE SUPPLY: Performed by: SURGERY

## 2025-06-19 PROCEDURE — 88307 TISSUE EXAM BY PATHOLOGIST: CPT

## 2025-06-19 PROCEDURE — 7100000011 HC PHASE II RECOVERY - ADDTL 15 MIN: Performed by: SURGERY

## 2025-06-19 PROCEDURE — 7100000000 HC PACU RECOVERY - FIRST 15 MIN: Performed by: SURGERY

## 2025-06-19 PROCEDURE — 3700000000 HC ANESTHESIA ATTENDED CARE: Performed by: SURGERY

## 2025-06-19 PROCEDURE — 6370000000 HC RX 637 (ALT 250 FOR IP): Performed by: NURSE PRACTITIONER

## 2025-06-19 PROCEDURE — 2720000010 HC SURG SUPPLY STERILE: Performed by: SURGERY

## 2025-06-19 PROCEDURE — 44145 PARTIAL REMOVAL OF COLON: CPT | Performed by: SURGERY

## 2025-06-19 PROCEDURE — 3600000004 HC SURGERY LEVEL 4 BASE: Performed by: SURGERY

## 2025-06-19 PROCEDURE — 2580000003 HC RX 258: Performed by: NURSE PRACTITIONER

## 2025-06-19 PROCEDURE — 86900 BLOOD TYPING SEROLOGIC ABO: CPT

## 2025-06-19 PROCEDURE — P9045 ALBUMIN (HUMAN), 5%, 250 ML: HCPCS

## 2025-06-19 PROCEDURE — 85014 HEMATOCRIT: CPT

## 2025-06-19 PROCEDURE — 2500000003 HC RX 250 WO HCPCS

## 2025-06-19 PROCEDURE — 2500000003 HC RX 250 WO HCPCS: Performed by: NURSE PRACTITIONER

## 2025-06-19 PROCEDURE — 7100000010 HC PHASE II RECOVERY - FIRST 15 MIN: Performed by: SURGERY

## 2025-06-19 PROCEDURE — 86901 BLOOD TYPING SEROLOGIC RH(D): CPT

## 2025-06-19 PROCEDURE — 6360000002 HC RX W HCPCS: Performed by: STUDENT IN AN ORGANIZED HEALTH CARE EDUCATION/TRAINING PROGRAM

## 2025-06-19 PROCEDURE — 6360000002 HC RX W HCPCS

## 2025-06-19 PROCEDURE — 84132 ASSAY OF SERUM POTASSIUM: CPT

## 2025-06-19 PROCEDURE — 36415 COLL VENOUS BLD VENIPUNCTURE: CPT

## 2025-06-19 PROCEDURE — 85018 HEMOGLOBIN: CPT

## 2025-06-19 PROCEDURE — 1200000000 HC SEMI PRIVATE

## 2025-06-19 DEVICE — INTEGRA® MILTEX® BETHUNE RIB SHEARS, 14-1/4", DOUBLE CURVED HANDLES
Type: IMPLANTABLE DEVICE | Status: FUNCTIONAL
Brand: INTEGRA® MILTEX®

## 2025-06-19 RX ORDER — ONDANSETRON 4 MG/1
4 TABLET, ORALLY DISINTEGRATING ORAL EVERY 8 HOURS PRN
Status: DISCONTINUED | OUTPATIENT
Start: 2025-06-19 | End: 2025-06-26 | Stop reason: HOSPADM

## 2025-06-19 RX ORDER — SODIUM CHLORIDE 9 MG/ML
INJECTION, SOLUTION INTRAVENOUS CONTINUOUS
Status: DISCONTINUED | OUTPATIENT
Start: 2025-06-19 | End: 2025-06-19 | Stop reason: HOSPADM

## 2025-06-19 RX ORDER — ALBUMIN HUMAN 50 G/1000ML
SOLUTION INTRAVENOUS
Status: DISCONTINUED | OUTPATIENT
Start: 2025-06-19 | End: 2025-06-19 | Stop reason: SDUPTHER

## 2025-06-19 RX ORDER — TEMAZEPAM 15 MG/1
15 CAPSULE ORAL NIGHTLY PRN
Status: DISCONTINUED | OUTPATIENT
Start: 2025-06-19 | End: 2025-06-26 | Stop reason: HOSPADM

## 2025-06-19 RX ORDER — DIPHENHYDRAMINE HYDROCHLORIDE 50 MG/ML
12.5 INJECTION, SOLUTION INTRAMUSCULAR; INTRAVENOUS
Status: CANCELLED | OUTPATIENT
Start: 2025-06-19

## 2025-06-19 RX ORDER — LAMOTRIGINE 100 MG/1
200 TABLET ORAL NIGHTLY
Status: DISCONTINUED | OUTPATIENT
Start: 2025-06-19 | End: 2025-06-26 | Stop reason: HOSPADM

## 2025-06-19 RX ORDER — FENTANYL CITRATE 50 UG/ML
50 INJECTION, SOLUTION INTRAMUSCULAR; INTRAVENOUS EVERY 5 MIN PRN
Refills: 0 | Status: CANCELLED | OUTPATIENT
Start: 2025-06-19

## 2025-06-19 RX ORDER — POTASSIUM CHLORIDE 1500 MG/1
40 TABLET, EXTENDED RELEASE ORAL PRN
Status: DISCONTINUED | OUTPATIENT
Start: 2025-06-19 | End: 2025-06-26 | Stop reason: HOSPADM

## 2025-06-19 RX ORDER — SODIUM CHLORIDE 9 MG/ML
INJECTION, SOLUTION INTRAVENOUS PRN
Status: DISCONTINUED | OUTPATIENT
Start: 2025-06-19 | End: 2025-06-19 | Stop reason: HOSPADM

## 2025-06-19 RX ORDER — DROPERIDOL 2.5 MG/ML
0.62 INJECTION, SOLUTION INTRAMUSCULAR; INTRAVENOUS
Status: COMPLETED | OUTPATIENT
Start: 2025-06-19 | End: 2025-06-19

## 2025-06-19 RX ORDER — DEXTROSE MONOHYDRATE 100 MG/ML
INJECTION, SOLUTION INTRAVENOUS CONTINUOUS PRN
Status: DISCONTINUED | OUTPATIENT
Start: 2025-06-19 | End: 2025-06-26 | Stop reason: HOSPADM

## 2025-06-19 RX ORDER — LOSARTAN POTASSIUM 50 MG/1
50 TABLET ORAL EVERY MORNING
Status: DISCONTINUED | OUTPATIENT
Start: 2025-06-20 | End: 2025-06-26 | Stop reason: HOSPADM

## 2025-06-19 RX ORDER — HYDROMORPHONE HYDROCHLORIDE 2 MG/ML
INJECTION, SOLUTION INTRAMUSCULAR; INTRAVENOUS; SUBCUTANEOUS
Status: DISCONTINUED | OUTPATIENT
Start: 2025-06-19 | End: 2025-06-19 | Stop reason: SDUPTHER

## 2025-06-19 RX ORDER — DROPERIDOL 2.5 MG/ML
INJECTION, SOLUTION INTRAMUSCULAR; INTRAVENOUS
Status: COMPLETED
Start: 2025-06-19 | End: 2025-06-19

## 2025-06-19 RX ORDER — SODIUM CHLORIDE 0.9 % (FLUSH) 0.9 %
5-40 SYRINGE (ML) INJECTION PRN
Status: CANCELLED | OUTPATIENT
Start: 2025-06-19

## 2025-06-19 RX ORDER — GLUCAGON 1 MG/ML
1 KIT INJECTION PRN
Status: DISCONTINUED | OUTPATIENT
Start: 2025-06-19 | End: 2025-06-26 | Stop reason: HOSPADM

## 2025-06-19 RX ORDER — ONDANSETRON 2 MG/ML
4 INJECTION INTRAMUSCULAR; INTRAVENOUS
Status: CANCELLED | OUTPATIENT
Start: 2025-06-19

## 2025-06-19 RX ORDER — ROCURONIUM BROMIDE 10 MG/ML
INJECTION, SOLUTION INTRAVENOUS
Status: DISCONTINUED | OUTPATIENT
Start: 2025-06-19 | End: 2025-06-19 | Stop reason: SDUPTHER

## 2025-06-19 RX ORDER — SODIUM CHLORIDE 0.9 % (FLUSH) 0.9 %
5-40 SYRINGE (ML) INJECTION EVERY 12 HOURS SCHEDULED
Status: DISCONTINUED | OUTPATIENT
Start: 2025-06-19 | End: 2025-06-19 | Stop reason: HOSPADM

## 2025-06-19 RX ORDER — ONDANSETRON 2 MG/ML
4 INJECTION INTRAMUSCULAR; INTRAVENOUS EVERY 6 HOURS PRN
Status: DISCONTINUED | OUTPATIENT
Start: 2025-06-19 | End: 2025-06-26 | Stop reason: HOSPADM

## 2025-06-19 RX ORDER — SODIUM CHLORIDE 0.9 % (FLUSH) 0.9 %
5-40 SYRINGE (ML) INJECTION EVERY 12 HOURS SCHEDULED
Status: CANCELLED | OUTPATIENT
Start: 2025-06-19

## 2025-06-19 RX ORDER — LAMOTRIGINE 100 MG/1
100 TABLET ORAL EVERY MORNING
Status: DISCONTINUED | OUTPATIENT
Start: 2025-06-20 | End: 2025-06-26 | Stop reason: HOSPADM

## 2025-06-19 RX ORDER — SODIUM CHLORIDE, SODIUM LACTATE, POTASSIUM CHLORIDE, CALCIUM CHLORIDE 600; 310; 30; 20 MG/100ML; MG/100ML; MG/100ML; MG/100ML
INJECTION, SOLUTION INTRAVENOUS
Status: DISCONTINUED | OUTPATIENT
Start: 2025-06-19 | End: 2025-06-19 | Stop reason: SDUPTHER

## 2025-06-19 RX ORDER — METRONIDAZOLE 500 MG/100ML
500 INJECTION, SOLUTION INTRAVENOUS
Status: COMPLETED | OUTPATIENT
Start: 2025-06-19 | End: 2025-06-19

## 2025-06-19 RX ORDER — ENOXAPARIN SODIUM 100 MG/ML
40 INJECTION SUBCUTANEOUS DAILY
Status: DISCONTINUED | OUTPATIENT
Start: 2025-06-20 | End: 2025-06-26 | Stop reason: HOSPADM

## 2025-06-19 RX ORDER — PROPOFOL 10 MG/ML
INJECTION, EMULSION INTRAVENOUS
Status: DISCONTINUED | OUTPATIENT
Start: 2025-06-19 | End: 2025-06-19 | Stop reason: SDUPTHER

## 2025-06-19 RX ORDER — SODIUM CHLORIDE 0.9 % (FLUSH) 0.9 %
5-40 SYRINGE (ML) INJECTION PRN
Status: DISCONTINUED | OUTPATIENT
Start: 2025-06-19 | End: 2025-06-26 | Stop reason: HOSPADM

## 2025-06-19 RX ORDER — BUSPIRONE HYDROCHLORIDE 10 MG/1
10 TABLET ORAL NIGHTLY
Status: DISCONTINUED | OUTPATIENT
Start: 2025-06-19 | End: 2025-06-26 | Stop reason: HOSPADM

## 2025-06-19 RX ORDER — OXYCODONE HYDROCHLORIDE 5 MG/1
10 TABLET ORAL EVERY 4 HOURS PRN
Status: DISCONTINUED | OUTPATIENT
Start: 2025-06-19 | End: 2025-06-24

## 2025-06-19 RX ORDER — MORPHINE SULFATE 2 MG/ML
2 INJECTION, SOLUTION INTRAMUSCULAR; INTRAVENOUS
Status: DISCONTINUED | OUTPATIENT
Start: 2025-06-19 | End: 2025-06-26 | Stop reason: HOSPADM

## 2025-06-19 RX ORDER — QUETIAPINE FUMARATE 25 MG/1
25 TABLET, FILM COATED ORAL NIGHTLY
Status: DISCONTINUED | OUTPATIENT
Start: 2025-06-19 | End: 2025-06-26 | Stop reason: HOSPADM

## 2025-06-19 RX ORDER — INSULIN LISPRO 100 [IU]/ML
0-4 INJECTION, SOLUTION INTRAVENOUS; SUBCUTANEOUS
Status: DISCONTINUED | OUTPATIENT
Start: 2025-06-19 | End: 2025-06-26 | Stop reason: HOSPADM

## 2025-06-19 RX ORDER — LIDOCAINE HCL/PF 100 MG/5ML
SYRINGE (ML) INJECTION
Status: DISCONTINUED | OUTPATIENT
Start: 2025-06-19 | End: 2025-06-19 | Stop reason: SDUPTHER

## 2025-06-19 RX ORDER — OXYCODONE HYDROCHLORIDE 5 MG/1
5 TABLET ORAL EVERY 4 HOURS PRN
Status: DISCONTINUED | OUTPATIENT
Start: 2025-06-19 | End: 2025-06-24

## 2025-06-19 RX ORDER — MORPHINE SULFATE 4 MG/ML
4 INJECTION, SOLUTION INTRAMUSCULAR; INTRAVENOUS
Status: DISCONTINUED | OUTPATIENT
Start: 2025-06-19 | End: 2025-06-26 | Stop reason: HOSPADM

## 2025-06-19 RX ORDER — SODIUM CHLORIDE 0.9 % (FLUSH) 0.9 %
5-40 SYRINGE (ML) INJECTION PRN
Status: DISCONTINUED | OUTPATIENT
Start: 2025-06-19 | End: 2025-06-19 | Stop reason: HOSPADM

## 2025-06-19 RX ORDER — SODIUM CHLORIDE 0.9 % (FLUSH) 0.9 %
5-40 SYRINGE (ML) INJECTION EVERY 12 HOURS SCHEDULED
Status: DISCONTINUED | OUTPATIENT
Start: 2025-06-19 | End: 2025-06-26 | Stop reason: HOSPADM

## 2025-06-19 RX ORDER — SODIUM CHLORIDE 9 MG/ML
INJECTION, SOLUTION INTRAVENOUS CONTINUOUS
Status: DISCONTINUED | OUTPATIENT
Start: 2025-06-19 | End: 2025-06-22

## 2025-06-19 RX ORDER — METRONIDAZOLE 500 MG/100ML
INJECTION, SOLUTION INTRAVENOUS
Status: DISCONTINUED | OUTPATIENT
Start: 2025-06-19 | End: 2025-06-19 | Stop reason: SDUPTHER

## 2025-06-19 RX ORDER — DEXAMETHASONE SODIUM PHOSPHATE 4 MG/ML
INJECTION, SOLUTION INTRA-ARTICULAR; INTRALESIONAL; INTRAMUSCULAR; INTRAVENOUS; SOFT TISSUE
Status: DISCONTINUED | OUTPATIENT
Start: 2025-06-19 | End: 2025-06-19 | Stop reason: SDUPTHER

## 2025-06-19 RX ORDER — SODIUM CHLORIDE 9 MG/ML
INJECTION, SOLUTION INTRAVENOUS PRN
Status: CANCELLED | OUTPATIENT
Start: 2025-06-19

## 2025-06-19 RX ORDER — POTASSIUM CHLORIDE 7.45 MG/ML
10 INJECTION INTRAVENOUS PRN
Status: DISCONTINUED | OUTPATIENT
Start: 2025-06-19 | End: 2025-06-26 | Stop reason: HOSPADM

## 2025-06-19 RX ORDER — SODIUM CHLORIDE 9 MG/ML
INJECTION, SOLUTION INTRAVENOUS PRN
Status: DISCONTINUED | OUTPATIENT
Start: 2025-06-19 | End: 2025-06-26 | Stop reason: HOSPADM

## 2025-06-19 RX ORDER — FENTANYL CITRATE 50 UG/ML
INJECTION, SOLUTION INTRAMUSCULAR; INTRAVENOUS
Status: DISCONTINUED | OUTPATIENT
Start: 2025-06-19 | End: 2025-06-19 | Stop reason: SDUPTHER

## 2025-06-19 RX ORDER — PANTOPRAZOLE SODIUM 40 MG/10ML
40 INJECTION, POWDER, LYOPHILIZED, FOR SOLUTION INTRAVENOUS DAILY
Status: DISCONTINUED | OUTPATIENT
Start: 2025-06-20 | End: 2025-06-24

## 2025-06-19 RX ORDER — VENLAFAXINE HYDROCHLORIDE 75 MG/1
225 CAPSULE, EXTENDED RELEASE ORAL
Status: DISCONTINUED | OUTPATIENT
Start: 2025-06-20 | End: 2025-06-26 | Stop reason: HOSPADM

## 2025-06-19 RX ORDER — NALOXONE HYDROCHLORIDE 0.4 MG/ML
INJECTION, SOLUTION INTRAMUSCULAR; INTRAVENOUS; SUBCUTANEOUS PRN
Status: CANCELLED | OUTPATIENT
Start: 2025-06-19

## 2025-06-19 RX ORDER — LOSARTAN POTASSIUM 25 MG/1
25 TABLET ORAL EVERY EVENING
Status: DISCONTINUED | OUTPATIENT
Start: 2025-06-19 | End: 2025-06-26 | Stop reason: HOSPADM

## 2025-06-19 RX ADMIN — FENTANYL CITRATE 50 MCG: 50 INJECTION, SOLUTION INTRAMUSCULAR; INTRAVENOUS at 07:46

## 2025-06-19 RX ADMIN — MORPHINE SULFATE 4 MG: 4 INJECTION, SOLUTION INTRAMUSCULAR; INTRAVENOUS at 18:09

## 2025-06-19 RX ADMIN — ONDANSETRON 4 MG: 2 INJECTION, SOLUTION INTRAMUSCULAR; INTRAVENOUS at 15:16

## 2025-06-19 RX ADMIN — SODIUM CHLORIDE: 0.9 INJECTION, SOLUTION INTRAVENOUS at 07:22

## 2025-06-19 RX ADMIN — QUETIAPINE FUMARATE 25 MG: 25 TABLET ORAL at 20:27

## 2025-06-19 RX ADMIN — LOSARTAN POTASSIUM 25 MG: 25 TABLET, FILM COATED ORAL at 16:55

## 2025-06-19 RX ADMIN — FENTANYL CITRATE 50 MCG: 50 INJECTION, SOLUTION INTRAMUSCULAR; INTRAVENOUS at 08:29

## 2025-06-19 RX ADMIN — HYDROMORPHONE HYDROCHLORIDE 0.5 MG: 1 INJECTION, SOLUTION INTRAMUSCULAR; INTRAVENOUS; SUBCUTANEOUS at 10:35

## 2025-06-19 RX ADMIN — MORPHINE SULFATE 4 MG: 4 INJECTION, SOLUTION INTRAMUSCULAR; INTRAVENOUS at 15:15

## 2025-06-19 RX ADMIN — HYDROMORPHONE HYDROCHLORIDE 0.5 MG: 1 INJECTION, SOLUTION INTRAMUSCULAR; INTRAVENOUS; SUBCUTANEOUS at 10:30

## 2025-06-19 RX ADMIN — INSULIN LISPRO 1 UNITS: 100 INJECTION, SOLUTION INTRAVENOUS; SUBCUTANEOUS at 20:27

## 2025-06-19 RX ADMIN — LAMOTRIGINE 200 MG: 100 TABLET ORAL at 20:27

## 2025-06-19 RX ADMIN — WATER 2000 MG: 1 INJECTION INTRAMUSCULAR; INTRAVENOUS; SUBCUTANEOUS at 08:00

## 2025-06-19 RX ADMIN — SODIUM CHLORIDE: 0.9 INJECTION, SOLUTION INTRAVENOUS at 16:50

## 2025-06-19 RX ADMIN — ONDANSETRON 4 MG: 2 INJECTION, SOLUTION INTRAMUSCULAR; INTRAVENOUS at 13:23

## 2025-06-19 RX ADMIN — Medication 80 MG: at 07:46

## 2025-06-19 RX ADMIN — DROPERIDOL 0.62 MG: 2.5 INJECTION, SOLUTION INTRAMUSCULAR; INTRAVENOUS at 10:40

## 2025-06-19 RX ADMIN — HYDROMORPHONE HYDROCHLORIDE 0.25 MG: 2 INJECTION INTRAMUSCULAR; INTRAVENOUS; SUBCUTANEOUS at 09:36

## 2025-06-19 RX ADMIN — ROCURONIUM BROMIDE 30 MG: 10 INJECTION, SOLUTION INTRAVENOUS at 08:29

## 2025-06-19 RX ADMIN — ROCURONIUM BROMIDE 50 MG: 10 INJECTION, SOLUTION INTRAVENOUS at 07:46

## 2025-06-19 RX ADMIN — SODIUM CHLORIDE, POTASSIUM CHLORIDE, SODIUM LACTATE AND CALCIUM CHLORIDE: 600; 310; 30; 20 INJECTION, SOLUTION INTRAVENOUS at 07:37

## 2025-06-19 RX ADMIN — BUSPIRONE HYDROCHLORIDE 10 MG: 10 TABLET ORAL at 20:27

## 2025-06-19 RX ADMIN — FENTANYL CITRATE 50 MCG: 50 INJECTION, SOLUTION INTRAMUSCULAR; INTRAVENOUS at 08:40

## 2025-06-19 RX ADMIN — FENTANYL CITRATE 50 MCG: 50 INJECTION, SOLUTION INTRAMUSCULAR; INTRAVENOUS at 08:19

## 2025-06-19 RX ADMIN — METRONIDAZOLE 500 MG: 500 INJECTION, SOLUTION INTRAVENOUS at 07:30

## 2025-06-19 RX ADMIN — ALBUMIN (HUMAN) 12.5 G: 12.5 INJECTION, SOLUTION INTRAVENOUS at 09:22

## 2025-06-19 RX ADMIN — DEXAMETHASONE SODIUM PHOSPHATE 8 MG: 4 INJECTION, SOLUTION INTRAMUSCULAR; INTRAVENOUS at 07:54

## 2025-06-19 RX ADMIN — OXYCODONE 10 MG: 5 TABLET ORAL at 20:27

## 2025-06-19 RX ADMIN — ALBUMIN (HUMAN) 12.5 G: 12.5 INJECTION, SOLUTION INTRAVENOUS at 09:03

## 2025-06-19 RX ADMIN — METRONIDAZOLE 500 MG: 500 SOLUTION INTRAVENOUS at 07:57

## 2025-06-19 RX ADMIN — SUGAMMADEX 200 MG: 100 INJECTION, SOLUTION INTRAVENOUS at 09:55

## 2025-06-19 RX ADMIN — ROCURONIUM BROMIDE 20 MG: 10 INJECTION, SOLUTION INTRAVENOUS at 09:02

## 2025-06-19 RX ADMIN — PROPOFOL 150 MG: 10 INJECTION, EMULSION INTRAVENOUS at 07:46

## 2025-06-19 RX ADMIN — NALOXEGOL OXALATE 25 MG: 25 TABLET, FILM COATED ORAL at 07:19

## 2025-06-19 RX ADMIN — SODIUM CHLORIDE, POTASSIUM CHLORIDE, SODIUM LACTATE AND CALCIUM CHLORIDE: 600; 310; 30; 20 INJECTION, SOLUTION INTRAVENOUS at 10:01

## 2025-06-19 RX ADMIN — HYDROMORPHONE HYDROCHLORIDE 0.5 MG: 2 INJECTION INTRAMUSCULAR; INTRAVENOUS; SUBCUTANEOUS at 09:12

## 2025-06-19 ASSESSMENT — PAIN DESCRIPTION - ORIENTATION: ORIENTATION: MID

## 2025-06-19 ASSESSMENT — PAIN DESCRIPTION - DESCRIPTORS
DESCRIPTORS: SHARP
DESCRIPTORS: ACHING
DESCRIPTORS: SHARP
DESCRIPTORS: ACHING

## 2025-06-19 ASSESSMENT — PAIN DESCRIPTION - LOCATION
LOCATION: ABDOMEN

## 2025-06-19 ASSESSMENT — PAIN DESCRIPTION - PAIN TYPE
TYPE: SURGICAL PAIN

## 2025-06-19 ASSESSMENT — PAIN - FUNCTIONAL ASSESSMENT
PAIN_FUNCTIONAL_ASSESSMENT: 0-10
PAIN_FUNCTIONAL_ASSESSMENT: PREVENTS OR INTERFERES WITH MANY ACTIVE NOT PASSIVE ACTIVITIES

## 2025-06-19 ASSESSMENT — PAIN SCALES - GENERAL
PAINLEVEL_OUTOF10: 5
PAINLEVEL_OUTOF10: 0
PAINLEVEL_OUTOF10: 5
PAINLEVEL_OUTOF10: 5
PAINLEVEL_OUTOF10: 7
PAINLEVEL_OUTOF10: 5
PAINLEVEL_OUTOF10: 8
PAINLEVEL_OUTOF10: 5
PAINLEVEL_OUTOF10: 7

## 2025-06-19 NOTE — ANESTHESIA POSTPROCEDURE EVALUATION
Department of Anesthesiology  Postprocedure Note    Patient: Adry Moura  MRN: [id number]  YOB: 1950  Date of evaluation: 6/19/2025    Procedure Summary       Date: 06/19/25 Room / Location: [facility] OR 07 / [facility] OR    Anesthesia Start: 0737 Anesthesia Stop: 1020    Procedure: Open low anterior resection Diagnosis:       Cancer of rectosigmoid junction (HCC)      (Cancer of rectosigmoid junction (HCC) [C19])    Surgeons: Leoncio Hale MD Responsible Provider: Chemo Martinez DO    Anesthesia Type: general ASA Status: 3            Anesthesia Type: No value filed.    Amadeo Phase I: Amadeo Score: 9    Amadeo Phase II:      Anesthesia Post Evaluation    Patient location during evaluation: PACU  Patient participation: complete - patient participated  Level of consciousness: awake and alert  Airway patency: patent  Nausea & Vomiting: no vomiting and nausea  Cardiovascular status: hemodynamically stable  Respiratory status: acceptable  Hydration status: stable  Pain management: adequate    No notable events documented.

## 2025-06-19 NOTE — INTERVAL H&P NOTE
Update History & Physical    The patient's History and Physical was reviewed with the patient and I examined the patient. There was no change. The surgical site was confirmed by the patient and me.     Plan: The risks, benefits, expected outcome, and alternative to the recommended procedure have been discussed with the patient. Patient understands and wants to proceed with the procedure.     Electronically signed by Leoncio Hale MD on 6/19/2025 at 6:03 AM

## 2025-06-19 NOTE — PROGRESS NOTES
Pt admitted to  5K7 via via cart/stretcher from surgery.  Complaints: surgery.    IV lactated Ringer's infusing into the hand left, condition patent and no redness at a rate of 100 mls/ hour with about 100 mls in the bag still. IV site free of s/s of infection or infiltration. Vital signs obtained. Assessment and data collection initiated. Two nurse skin assessment performed by Darcie VILLASENOR and Fozia VILLASENOR. Oriented to room. Policies and procedures for  explained. All questions answered with no further questions at this time. Fall prevention and safety brochure discussed with patient.  Bed alarm on. Call light in reach.Oriented to room.   Darcie Burgos RN, RN 6/19/2025 5:54 PM     Explained patients right to have family, representative or physician notified of their admission.  Patient has Declined for physician to be notified.  Patient has Declined for family/representative to be notified.

## 2025-06-19 NOTE — BRIEF OP NOTE
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 Item Serial No.  Lot No. LRB No. Used Action   GRAFT HUM TISS L 8 X W 5 CM SZ 40 SQCM AMNION-CHORION-AMNION - [id number]  GRAFT HUM TISS L 8 X W 5 CM SZ 40 SQCM AMNION-CHORION-AMNION  [employer] [id number] N/A 1 Implanted         Drains:   Negative Pressure Wound Therapy Abdomen Lower;Medial (Active)       Urinary Catheter 06/19/25 Orellana-Temperature (Active)       Findings:  Infection Present At Time Of Surgery (PATOS) (choose all levels that have infection present):  No infection present    Other Findings: see op note    Colon Resection  Operation performed with curative intent Yes   Tumor Location (select all that apply) Rectosigmoid junction   Extent of colon and vascular resection (select all that apply) Other LAR       Electronically signed by Leoncio Hale MD on 6/19/2025 at 10:08 AM

## 2025-06-19 NOTE — PROGRESS NOTES
1016 Arouses to name on arrival to PACU , HOB elevated and O2 3L NC applied , C/O # 7 ABD pain and drifts back to sleep   1024 awake and oriented C/O # 7 ABD pain ,  1030 Medicated with Dilaudid 0.5 mg IV  1035 Pain unchanged , Medicated with Dilaudid 0.5 mg IV   1040 C/o slight nausea , medicated with Droperidol , pt states pain easing up  1055 eyes closed resp easy , awakens to name , states pain a # 5 and nausea gone   Meets criteria for discharge , transported to [facility]  1103 Meets criteria for discharge , transported

## 2025-06-19 NOTE — OP NOTE
[address] [address]                            OPERATIVE REPORT      PATIENT NAME: MYRIAM AMBROCIO              : 1950  MED REC NO: [id number]                       ROOM: [facility]                                               (General) POOL                                               RM    ACCOUNT NO: [id number]                       ADMIT DATE: 2025  PROVIDER: Leoncio Hale MD      DATE OF PROCEDURE:  2025    SURGEON:  Leoncio Hale MD    PREOPERATIVE DIAGNOSES:    1. Rectosigmoid colonic adenocarcinoma.  2. History of gastric bypass.  3. History of subtotal esophagectomy with colonic interposition.  4. Total gastrectomy.  5. Hysterectomy, cholecystectomy, and appendectomy.    POSTOPERATIVE DIAGNOSES:    1. Rectosigmoid colonic adenocarcinoma.  2. History of gastric bypass.  3. History of subtotal esophagectomy with colonic interposition.  4. Total gastrectomy.  5. Hysterectomy, cholecystectomy, and appendectomy.    PROCEDURE:  Low anterior resection.    ASSISTANTS:  PARAG Crook and Uriel Madera CNP.    ANESTHESIA:  General/local.    ESTIMATED BLOOD LOSS:  40 mL.    DRAINS:  None.    COMPLICATIONS:  None.    DISPOSITION:  Stable to the recovery room.    INDICATIONS:  The patient is a 74-year-old female, who was found to have a rectosigmoid mass that was adenocarcinoma.  She was in need of a resection.  Complicated history with a subtotal esophagectomy and colonic interposition.  Has a history of gastric bypass as well as eventual total gastrectomy.  Both operative and nonoperative intervention plans were discussed.  Risks of surgery were further discussed.  Some of the risks included, but were not limited to, bleeding, infection, the need for reoperation, severe chronic postoperative pain or numbness, major vascular or nerve injury, cardiopulmonary complications, anesthetic complications, seroma or

## 2025-06-19 NOTE — ANESTHESIA PRE PROCEDURE
Department of Anesthesiology  Preprocedure Note       Name:  Adry Moura   Age:  74 y.o.  :  1950                                          MRN:  [id number]         Date:  2025      Surgeon: Surgeon(s):  Leoncio Hale MD    Procedure: Procedure(s):  Open low anterior resection    Medications prior to admission:   Prior to Admission medications    Medication Sig Start Date End Date Taking? Authorizing Provider   temazepam (RESTORIL) 15 MG capsule TAKE ONE (1) CAPSULE BY MOUTH ONCE A DAY AT BEDTIME AS NEEDED. 25  María Neil APRN - CNP   Menthol, Topical Analgesic, (BIOFREEZE COOL THE PAIN) 4 % GEL Apply 1 application  topically as needed (pain) Neck arms and shoulder    Darnell Reyna MD   dicyclomine (BENTYL) 20 MG tablet Take 1 tablet by mouth every 6 hours    Darnell Reyna MD   losartan (COZAAR) 25 MG tablet Take 1 tablet by mouth every evening    Darnell Reyna MD   Multiple Vitamins-Minerals (EYE HEALTH AREDS 2 PO) Take 1 tablet by mouth in the morning and at bedtime    Darnell Reyna MD   QUEtiapine (SEROQUEL) 25 MG tablet Take 1 tablet by mouth at bedtime    Darnell Reyna MD   Vitamin A 2400 MCG (8000 UT) CAPS Take 1 tablet by mouth daily    Darnell Reyna MD   Ergocalciferol (VITAMIN D2) 10 MCG (400 UNIT) TABS Take 1,250 mcg by mouth daily    Darnell Reyna MD   metroNIDAZOLE (FLAGYL) 500 MG tablet 1st dose at 4 pm, 2nd dose at 5 pm, 3rd dose at 11 pm evening prior to surgery 25   Leoncio Hale MD   acetaminophen (TYLENOL) 325 MG tablet Take 2 tablets by mouth every 6 hours as needed for Pain    Darnell Reyna MD   busPIRone (BUSPAR) 10 MG tablet Take 1 tablet by mouth at bedtime 25   Darnell Reyna MD   busPIRone (BUSPAR) 7.5 MG tablet Take 1 tablet by mouth daily as needed 25   Darnell Reyna MD   loratadine (CLARITIN) 10 MG tablet Take 2 tablets by mouth daily 25

## 2025-06-19 NOTE — PROGRESS NOTES
Pt returned to [facility] room 20. Vitals and assessment as charted. 0.9 infusing, to count from PACU. Pt has crackers and water. Family at the bedside. Pt and family verbalized understanding of discharge criteria and call light use. Call light in reach.

## 2025-06-19 NOTE — PROGRESS NOTES
Patient oriented to Same Day department and admitted to Same Day Surgery room 20.   Patient verbalized approval for first name, last initial with physician name on unit whiteboard.     Plan of care reviewed with patient.   Patient room whiteboard filled out and discussed with patient and responsible adult.     Call light in reach.   Bed in lowest position, locked, with one bed rail up.   SCDs and warming blanket in place.  Appropriate arm bands on patient.   Bathroom offered.   All questions and concerns of patient addressed.        Meds to Beds:   Patient informed of [facility] Meds to Beds program during admission. Patient has declined use of program.

## 2025-06-20 LAB
ANION GAP SERPL CALC-SCNC: 11 MEQ/L (ref 8–16)
BASOPHILS ABSOLUTE: 0 THOU/MM3 (ref 0–0.1)
BASOPHILS NFR BLD AUTO: 0.1 %
BUN SERPL-MCNC: 10 MG/DL (ref 8–23)
CALCIUM SERPL-MCNC: 7.9 MG/DL (ref 8.8–10.2)
CHLORIDE SERPL-SCNC: 106 MEQ/L (ref 98–111)
CO2 SERPL-SCNC: 24 MEQ/L (ref 22–29)
CREAT SERPL-MCNC: 0.6 MG/DL (ref 0.5–0.9)
DEPRECATED RDW RBC AUTO: 48.1 FL (ref 35–45)
EOSINOPHIL NFR BLD AUTO: 0 %
EOSINOPHILS ABSOLUTE: 0 THOU/MM3 (ref 0–0.4)
ERYTHROCYTE [DISTWIDTH] IN BLOOD BY AUTOMATED COUNT: 13.6 % (ref 11.5–14.5)
GFR SERPL CREATININE-BSD FRML MDRD: > 90 ML/MIN/1.73M2
GLUCOSE BLD STRIP.AUTO-MCNC: 114 MG/DL (ref 70–108)
GLUCOSE BLD STRIP.AUTO-MCNC: 117 MG/DL (ref 70–108)
GLUCOSE BLD STRIP.AUTO-MCNC: 120 MG/DL (ref 70–108)
GLUCOSE BLD STRIP.AUTO-MCNC: 134 MG/DL (ref 70–108)
GLUCOSE SERPL-MCNC: 181 MG/DL (ref 74–109)
HCT VFR BLD AUTO: 30.8 % (ref 37–47)
HGB BLD-MCNC: 10 GM/DL (ref 12–16)
IMM GRANULOCYTES # BLD AUTO: 0.01 THOU/MM3 (ref 0–0.07)
IMM GRANULOCYTES NFR BLD AUTO: 0.1 %
LYMPHOCYTES ABSOLUTE: 1.5 THOU/MM3 (ref 1–4.8)
LYMPHOCYTES NFR BLD AUTO: 18 %
MCH RBC QN AUTO: 31.7 PG (ref 26–33)
MCHC RBC AUTO-ENTMCNC: 32.5 GM/DL (ref 32.2–35.5)
MCV RBC AUTO: 97.8 FL (ref 81–99)
MONOCYTES ABSOLUTE: 1.1 THOU/MM3 (ref 0.4–1.3)
MONOCYTES NFR BLD AUTO: 12.9 %
NEUTROPHILS ABSOLUTE: 5.9 THOU/MM3 (ref 1.8–7.7)
NEUTROPHILS NFR BLD AUTO: 68.9 %
NRBC BLD AUTO-RTO: 0 /100 WBC
PLATELET # BLD AUTO: 324 THOU/MM3 (ref 130–400)
PMV BLD AUTO: 9.6 FL (ref 9.4–12.4)
POTASSIUM SERPL-SCNC: 3.6 MEQ/L (ref 3.5–5.2)
RBC # BLD AUTO: 3.15 MILL/MM3 (ref 4.2–5.4)
SODIUM SERPL-SCNC: 141 MEQ/L (ref 135–145)
WBC # BLD AUTO: 8.6 THOU/MM3 (ref 4.8–10.8)

## 2025-06-20 PROCEDURE — 36415 COLL VENOUS BLD VENIPUNCTURE: CPT

## 2025-06-20 PROCEDURE — 85025 COMPLETE CBC W/AUTO DIFF WBC: CPT

## 2025-06-20 PROCEDURE — 99024 POSTOP FOLLOW-UP VISIT: CPT | Performed by: SURGERY

## 2025-06-20 PROCEDURE — 2580000003 HC RX 258: Performed by: NURSE PRACTITIONER

## 2025-06-20 PROCEDURE — 2500000003 HC RX 250 WO HCPCS: Performed by: NURSE PRACTITIONER

## 2025-06-20 PROCEDURE — 6370000000 HC RX 637 (ALT 250 FOR IP): Performed by: NURSE PRACTITIONER

## 2025-06-20 PROCEDURE — 6360000002 HC RX W HCPCS: Performed by: NURSE PRACTITIONER

## 2025-06-20 PROCEDURE — 1200000000 HC SEMI PRIVATE

## 2025-06-20 PROCEDURE — 80048 BASIC METABOLIC PNL TOTAL CA: CPT

## 2025-06-20 PROCEDURE — 82948 REAGENT STRIP/BLOOD GLUCOSE: CPT

## 2025-06-20 RX ADMIN — SODIUM CHLORIDE: 0.9 INJECTION, SOLUTION INTRAVENOUS at 01:03

## 2025-06-20 RX ADMIN — LOSARTAN POTASSIUM 50 MG: 50 TABLET, FILM COATED ORAL at 08:54

## 2025-06-20 RX ADMIN — LOSARTAN POTASSIUM 25 MG: 25 TABLET, FILM COATED ORAL at 18:43

## 2025-06-20 RX ADMIN — MORPHINE SULFATE 4 MG: 4 INJECTION, SOLUTION INTRAMUSCULAR; INTRAVENOUS at 18:45

## 2025-06-20 RX ADMIN — MORPHINE SULFATE 4 MG: 4 INJECTION, SOLUTION INTRAMUSCULAR; INTRAVENOUS at 11:27

## 2025-06-20 RX ADMIN — PANTOPRAZOLE SODIUM 40 MG: 40 INJECTION, POWDER, FOR SOLUTION INTRAVENOUS at 08:53

## 2025-06-20 RX ADMIN — MORPHINE SULFATE 4 MG: 4 INJECTION, SOLUTION INTRAMUSCULAR; INTRAVENOUS at 05:17

## 2025-06-20 RX ADMIN — TEMAZEPAM 15 MG: 15 CAPSULE ORAL at 22:46

## 2025-06-20 RX ADMIN — MORPHINE SULFATE 4 MG: 4 INJECTION, SOLUTION INTRAMUSCULAR; INTRAVENOUS at 08:56

## 2025-06-20 RX ADMIN — OXYCODONE 10 MG: 5 TABLET ORAL at 01:20

## 2025-06-20 RX ADMIN — SODIUM CHLORIDE, PRESERVATIVE FREE 10 ML: 5 INJECTION INTRAVENOUS at 20:54

## 2025-06-20 RX ADMIN — MORPHINE SULFATE 4 MG: 4 INJECTION, SOLUTION INTRAMUSCULAR; INTRAVENOUS at 15:54

## 2025-06-20 RX ADMIN — ENOXAPARIN SODIUM 40 MG: 100 INJECTION SUBCUTANEOUS at 08:53

## 2025-06-20 RX ADMIN — LAMOTRIGINE 100 MG: 100 TABLET ORAL at 08:55

## 2025-06-20 RX ADMIN — QUETIAPINE FUMARATE 25 MG: 25 TABLET ORAL at 20:55

## 2025-06-20 RX ADMIN — BUSPIRONE HYDROCHLORIDE 10 MG: 10 TABLET ORAL at 20:54

## 2025-06-20 RX ADMIN — VENLAFAXINE HYDROCHLORIDE 225 MG: 75 CAPSULE, EXTENDED RELEASE ORAL at 08:53

## 2025-06-20 RX ADMIN — NALOXEGOL OXALATE 25 MG: 25 TABLET, FILM COATED ORAL at 05:07

## 2025-06-20 RX ADMIN — LAMOTRIGINE 200 MG: 100 TABLET ORAL at 20:55

## 2025-06-20 ASSESSMENT — PAIN SCALES - GENERAL
PAINLEVEL_OUTOF10: 7
PAINLEVEL_OUTOF10: 7
PAINLEVEL_OUTOF10: 8
PAINLEVEL_OUTOF10: 8
PAINLEVEL_OUTOF10: 4
PAINLEVEL_OUTOF10: 7

## 2025-06-20 ASSESSMENT — PAIN DESCRIPTION - LOCATION
LOCATION: ABDOMEN
LOCATION: ABDOMEN
LOCATION: ABDOMEN;BACK
LOCATION: SHOULDER;BACK
LOCATION: ABDOMEN
LOCATION: ABDOMEN

## 2025-06-20 ASSESSMENT — PAIN DESCRIPTION - ORIENTATION: ORIENTATION: MID

## 2025-06-20 ASSESSMENT — PAIN DESCRIPTION - DESCRIPTORS
DESCRIPTORS: PRESSURE
DESCRIPTORS: NAGGING

## 2025-06-20 NOTE — CARE COORDINATION
Case Management Assessment Initial Evaluation    Date/Time of Evaluation: 6/20/2025 11:54 AM  Assessment Completed by: Nancy Simmons RN    If patient is discharged prior to next notation, then this note serves as note for discharge by case management.    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 General Surgery attending, Lovenox, ISS, IV Protonix, prn Dilaudid, Morphine, Roxicodone, and Zofran, electrolyte replacement protocol, clear liquid diet, ambulate, incentive spirometry, SCD's, PT/OT, SS.     Procedures:   6/19 Open low anterior resection     Imaging: None    Patient Goals/Plan/Treatment Preferences: Prabha resides at [facility]. She is independent with her ADL's. They manage her medications. Prabha has a new rollator. She is just getting accustomed to it. Prabha reports she completed new HPOA documents at [facility], will request they fax a copy over. Prabha reports they have discussed her return to [facility] skilled when discharged from the hospital. Her son may be able to provide her transportation back to the facility. SS updated.      06/20/25 5036   Service Assessment   Patient Orientation Alert and Oriented;Person;Place;Situation;Self   Cognition Alert   History Provided By Patient;Child/Family;Medical Record   Primary Caregiver Self   Accompanied By/Relationship Daughter, Wendy   Support Systems Children;Family Members

## 2025-06-20 NOTE — PROGRESS NOTES
MD [address] DR GENERAL SURGERY  General Surgery Postoperative Progress Note    Pt Name: Adry Moura  Medical Record Number: [id number]  Date of Birth 1950   Today's Date: 6/20/2025    CC:No chief complaint on file.      ASSESSMENT   POD # 1 status post low anterior resection for cancer tolerating clear no bowel function yet  HD # 1  PLAN   Hemoglobin 10(14 preop) had 1 bloody bowel movement immediately postop but none  Blood pressure and vital signs fine we will just follow hemoglobin for now continue Lovenox  Orellana removed  SUBJECTIVE   Adry is doing ok. She denies any nausea or vomiting, has not passed flatus or had a bowel movement. She is tolerating a ADULT DIET; Clear Liquid; 4 carb choices (60 gm/meal); No Carbonated Beverages. Her pain is well controlled on current medications. She has been ambulating in the halls.   CURRENT MEDICATIONS   Scheduled Meds:   busPIRone  10 mg Oral Nightly    losartan  25 mg Oral QPM    losartan  50 mg Oral QAM    lamoTRIgine  100 mg Oral QAM    lamoTRIgine  200 mg Oral Nightly    QUEtiapine  25 mg Oral Nightly    venlafaxine  225 mg Oral Daily with breakfast    sodium chloride flush  5-40 mL IntraVENous 2 times per day    enoxaparin  40 mg SubCUTAneous Daily    naloxegol  25 mg Oral QAM AC    pantoprazole  40 mg IntraVENous Daily    insulin lispro  0-4 Units SubCUTAneous 4x Daily AC & HS     Continuous Infusions:   sodium chloride      sodium chloride 125 mL/hr at 06/20/25 0103    dextrose       PRN Meds:.temazepam, sodium chloride flush, sodium chloride, ondansetron **OR** ondansetron, potassium chloride **OR** potassium alternative oral replacement **OR** potassium chloride, oxyCODONE **OR** oxyCODONE, morphine **OR** morphine, glucose, dextrose bolus **OR** dextrose bolus, glucagon (rDNA), dextrose  ROS:   History obtained from chart review and the patient, General ROS: ok  OBJECTIVE   CURRENT VITALS:  height is 1.676 m (5' 6\") and weight is 59.5

## 2025-06-21 LAB
BASOPHILS ABSOLUTE: 0 THOU/MM3 (ref 0–0.1)
BASOPHILS NFR BLD AUTO: 0.3 %
DEPRECATED RDW RBC AUTO: 50.3 FL (ref 35–45)
DEPRECATED RDW RBC AUTO: 51 FL (ref 35–45)
EOSINOPHIL NFR BLD AUTO: 0.3 %
EOSINOPHILS ABSOLUTE: 0 THOU/MM3 (ref 0–0.4)
ERYTHROCYTE [DISTWIDTH] IN BLOOD BY AUTOMATED COUNT: 13.7 % (ref 11.5–14.5)
ERYTHROCYTE [DISTWIDTH] IN BLOOD BY AUTOMATED COUNT: 13.8 % (ref 11.5–14.5)
GLUCOSE BLD STRIP.AUTO-MCNC: 107 MG/DL (ref 70–108)
GLUCOSE BLD STRIP.AUTO-MCNC: 108 MG/DL (ref 70–108)
GLUCOSE BLD STRIP.AUTO-MCNC: 95 MG/DL (ref 70–108)
GLUCOSE BLD STRIP.AUTO-MCNC: 99 MG/DL (ref 70–108)
HCT VFR BLD AUTO: 24.1 % (ref 37–47)
HCT VFR BLD AUTO: 24.9 % (ref 37–47)
HGB BLD-MCNC: 7.5 GM/DL (ref 12–16)
HGB BLD-MCNC: 7.8 GM/DL (ref 12–16)
IMM GRANULOCYTES # BLD AUTO: 0.03 THOU/MM3 (ref 0–0.07)
IMM GRANULOCYTES NFR BLD AUTO: 0.4 %
LYMPHOCYTES ABSOLUTE: 1.3 THOU/MM3 (ref 1–4.8)
LYMPHOCYTES NFR BLD AUTO: 17 %
MCH RBC QN AUTO: 31.5 PG (ref 26–33)
MCH RBC QN AUTO: 31.6 PG (ref 26–33)
MCHC RBC AUTO-ENTMCNC: 31.1 GM/DL (ref 32.2–35.5)
MCHC RBC AUTO-ENTMCNC: 31.3 GM/DL (ref 32.2–35.5)
MCV RBC AUTO: 100.4 FL (ref 81–99)
MCV RBC AUTO: 101.7 FL (ref 81–99)
MONOCYTES ABSOLUTE: 0.8 THOU/MM3 (ref 0.4–1.3)
MONOCYTES NFR BLD AUTO: 10.2 %
NEUTROPHILS ABSOLUTE: 5.5 THOU/MM3 (ref 1.8–7.7)
NEUTROPHILS NFR BLD AUTO: 71.8 %
NRBC BLD AUTO-RTO: 0 /100 WBC
PLATELET # BLD AUTO: 251 THOU/MM3 (ref 130–400)
PLATELET # BLD AUTO: 263 THOU/MM3 (ref 130–400)
PMV BLD AUTO: 9 FL (ref 9.4–12.4)
PMV BLD AUTO: 9.3 FL (ref 9.4–12.4)
RBC # BLD AUTO: 2.37 MILL/MM3 (ref 4.2–5.4)
RBC # BLD AUTO: 2.48 MILL/MM3 (ref 4.2–5.4)
WBC # BLD AUTO: 7 THOU/MM3 (ref 4.8–10.8)
WBC # BLD AUTO: 7.7 THOU/MM3 (ref 4.8–10.8)

## 2025-06-21 PROCEDURE — 1200000000 HC SEMI PRIVATE

## 2025-06-21 PROCEDURE — 2580000003 HC RX 258: Performed by: NURSE PRACTITIONER

## 2025-06-21 PROCEDURE — 6370000000 HC RX 637 (ALT 250 FOR IP): Performed by: NURSE PRACTITIONER

## 2025-06-21 PROCEDURE — 36415 COLL VENOUS BLD VENIPUNCTURE: CPT

## 2025-06-21 PROCEDURE — 85025 COMPLETE CBC W/AUTO DIFF WBC: CPT

## 2025-06-21 PROCEDURE — 6360000002 HC RX W HCPCS: Performed by: NURSE PRACTITIONER

## 2025-06-21 PROCEDURE — 2580000003 HC RX 258: Performed by: STUDENT IN AN ORGANIZED HEALTH CARE EDUCATION/TRAINING PROGRAM

## 2025-06-21 PROCEDURE — 99232 SBSQ HOSP IP/OBS MODERATE 35: CPT | Performed by: STUDENT IN AN ORGANIZED HEALTH CARE EDUCATION/TRAINING PROGRAM

## 2025-06-21 PROCEDURE — 85027 COMPLETE CBC AUTOMATED: CPT

## 2025-06-21 PROCEDURE — 82948 REAGENT STRIP/BLOOD GLUCOSE: CPT

## 2025-06-21 RX ADMIN — BUSPIRONE HYDROCHLORIDE 10 MG: 10 TABLET ORAL at 20:32

## 2025-06-21 RX ADMIN — PANTOPRAZOLE SODIUM 40 MG: 40 INJECTION, POWDER, FOR SOLUTION INTRAVENOUS at 09:37

## 2025-06-21 RX ADMIN — SODIUM CHLORIDE: 0.9 INJECTION, SOLUTION INTRAVENOUS at 21:46

## 2025-06-21 RX ADMIN — LAMOTRIGINE 100 MG: 100 TABLET ORAL at 09:37

## 2025-06-21 RX ADMIN — LOSARTAN POTASSIUM 25 MG: 25 TABLET, FILM COATED ORAL at 18:29

## 2025-06-21 RX ADMIN — TEMAZEPAM 15 MG: 15 CAPSULE ORAL at 21:42

## 2025-06-21 RX ADMIN — LOSARTAN POTASSIUM 50 MG: 50 TABLET, FILM COATED ORAL at 09:37

## 2025-06-21 RX ADMIN — VENLAFAXINE HYDROCHLORIDE 225 MG: 75 CAPSULE, EXTENDED RELEASE ORAL at 09:37

## 2025-06-21 RX ADMIN — NALOXEGOL OXALATE 25 MG: 25 TABLET, FILM COATED ORAL at 05:11

## 2025-06-21 RX ADMIN — SODIUM CHLORIDE: 0.9 INJECTION, SOLUTION INTRAVENOUS at 02:11

## 2025-06-21 RX ADMIN — LAMOTRIGINE 200 MG: 100 TABLET ORAL at 20:32

## 2025-06-21 RX ADMIN — OXYCODONE 10 MG: 5 TABLET ORAL at 09:37

## 2025-06-21 RX ADMIN — MORPHINE SULFATE 4 MG: 4 INJECTION, SOLUTION INTRAMUSCULAR; INTRAVENOUS at 13:47

## 2025-06-21 RX ADMIN — MORPHINE SULFATE 4 MG: 4 INJECTION, SOLUTION INTRAMUSCULAR; INTRAVENOUS at 10:55

## 2025-06-21 RX ADMIN — QUETIAPINE FUMARATE 25 MG: 25 TABLET ORAL at 20:32

## 2025-06-21 RX ADMIN — ENOXAPARIN SODIUM 40 MG: 100 INJECTION SUBCUTANEOUS at 09:37

## 2025-06-21 ASSESSMENT — PAIN DESCRIPTION - LOCATION
LOCATION: ABDOMEN
LOCATION: BACK;NECK
LOCATION: BACK;NECK

## 2025-06-21 ASSESSMENT — PAIN SCALES - GENERAL
PAINLEVEL_OUTOF10: 8
PAINLEVEL_OUTOF10: 5
PAINLEVEL_OUTOF10: 5
PAINLEVEL_OUTOF10: 9
PAINLEVEL_OUTOF10: 8

## 2025-06-21 ASSESSMENT — PAIN DESCRIPTION - ORIENTATION
ORIENTATION: LOWER
ORIENTATION: LOWER

## 2025-06-21 NOTE — PLAN OF CARE
Problem: Discharge Planning  Goal: Discharge to home or other facility with appropriate resources  Outcome: Progressing  Flowsheets (Taken 6/21/2025 0315)  Discharge to home or other facility with appropriate resources:   Identify barriers to discharge with patient and caregiver   Arrange for needed discharge resources and transportation as appropriate   Identify discharge learning needs (meds, wound care, etc)     Problem: Chronic Conditions and Co-morbidities  Goal: Patient's chronic conditions and co-morbidity symptoms are monitored and maintained or improved  Outcome: Progressing  Flowsheets (Taken 6/21/2025 0315)  Care Plan - Patient's Chronic Conditions and Co-Morbidity Symptoms are Monitored and Maintained or Improved:   Collaborate with multidisciplinary team to address chronic and comorbid conditions and prevent exacerbation or deterioration   Monitor and assess patient's chronic conditions and comorbid symptoms for stability, deterioration, or improvement     Problem: Pain  Goal: Verbalizes/displays adequate comfort level or baseline comfort level  Outcome: Progressing  Flowsheets (Taken 6/21/2025 0315)  Verbalizes/displays adequate comfort level or baseline comfort level:   Encourage patient to monitor pain and request assistance   Assess pain using appropriate pain scale   Administer analgesics based on type and severity of pain and evaluate response   Implement non-pharmacological measures as appropriate and evaluate response     Problem: Safety - Adult  Goal: Free from fall injury  Outcome: Progressing  Flowsheets (Taken 6/21/2025 0315)  Free From Fall Injury: Instruct family/caregiver on patient safety     Problem: ABCDS Injury Assessment  Goal: Absence of physical injury  Outcome: Progressing  Flowsheets (Taken 6/21/2025 0315)  Absence of Physical Injury: Implement safety measures based on patient assessment   Care plan reviewed with patient.  Patient verbalizes understanding of the plan of care

## 2025-06-21 NOTE — PROGRESS NOTES
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 female s/p LAR on  for cancer. Final pathology pending    Acute blood loss anemia, patient denies bowel function with reported bloody smear of a bowel movement post op. Hgb 7.8 from 12.7.     Blood pressure and vital signs remain stable. No recurrent or frequent bloody bowel movements.    PLAN  Continue clear liquid diet, awaiting return of bowel function or patient reports improved appetite.   Hold Lovenox at this time, with the exception of the acute anemia no external or physiological signs of hemorrhage.   Decrease IVF to 75 mL/hr  Continue to encourage ambulation and out of bed.       SUBJECTIVE  Pt seen and examined. Patient sitting up in chair after ambulating. Patient states over all she feels well. Denies return of bowel function yet.1 reported blood bowel movement but patient denies any recent BM. AFVSS.  Pain controlled. She hasn't tried clears as of yet today but does report improving appetite. Voiding w/o difficulty after baker removal.   No new complaints.      OBJECTIVE  CURRENT VITALS:  height is 1.676 m (5' 6\") and weight is 59.5 kg (131 lb 2 oz). Her oral temperature is 98.3 °F (36.8 °C). Her blood pressure is 111/54 (abnormal) and her pulse is 94. Her respiration is 16 and oxygen saturation is 90%.  Body mass index is 21.16 kg/m².  Temperature Range (24h):Temp: 98.3 °F (36.8 °C) Temp  Av.7 °F (37.1 °C)  Min: 98.1 °F (36.7 °C)  Max: 99.5 °F (37.5 °C)  BP Range (24h): Systolic (24hrs), Av , Min:111 , Max:149     Diastolic (24hrs), Av, Min:51, Max:66    Pulse Range (24h): Pulse  Avg:  release capsule 225 mg, 225 mg, Oral, Daily with breakfast, Uriel Madera, APRN - CNP, 225 mg at 06/21/25 0937    sodium chloride flush 0.9 % injection 5-40 mL, 5-40 mL, IntraVENous, 2 times per day, Uriel Madera APRN - CNP, 10 mL at 06/20/25 2054    sodium chloride flush 0.9 % injection 5-40 mL, 5-40 mL, IntraVENous, PRN, Uriel Madera, EVA - CNP    0.9 % sodium chloride infusion, , IntraVENous, PRN, Uriel Madera APRN - CNP    ondansetron (ZOFRAN-ODT) disintegrating tablet 4 mg, 4 mg, Oral, Q8H PRN **OR** ondansetron (ZOFRAN) injection 4 mg, 4 mg, IntraVENous, Q6H PRN, Uriel Madera APRN - CNP, 4 mg at 06/19/25 1516    0.9 % sodium chloride infusion, , IntraVENous, Continuous, Uriel Madera APRN - CNP, Last Rate: 125 mL/hr at 06/21/25 0211, New Bag at 06/21/25 0211    potassium chloride (KLOR-CON M) extended release tablet 40 mEq, 40 mEq, Oral, PRN **OR** potassium bicarb-citric acid (EFFER-K) effervescent tablet 40 mEq, 40 mEq, Oral, PRN **OR** potassium chloride 10 mEq/100 mL IVPB (Peripheral Line), 10 mEq, IntraVENous, PRN, Uriel Madera APRN - CNP    oxyCODONE (ROXICODONE) immediate release tablet 5 mg, 5 mg, Oral, Q4H PRN **OR** oxyCODONE (ROXICODONE) immediate release tablet 10 mg, 10 mg, Oral, Q4H PRN, Uriel Madera APRN - CNP, 10 mg at 06/21/25 0937    morphine (PF) injection 2 mg, 2 mg, IntraVENous, Q2H PRN **OR** morphine injection 4 mg, 4 mg, IntraVENous, Q2H PRN, Uriel Madera, APRN - CNP, 4 mg at 06/21/25 1347    [Held by provider] enoxaparin (LOVENOX) injection 40 mg, 40 mg, SubCUTAneous, Daily, Uriel Madera, APRN - CNP, 40 mg at 06/21/25 0937    naloxegol (MOVANTIK) tablet 25 mg, 25 mg, Oral, QAM AC, Uriel Madera, APRN - CNP, 25 mg at 06/21/25 0511    pantoprazole (PROTONIX) injection 40 mg, 40 mg, IntraVENous, Daily, Uriel Madera, APRN - CNP, 40 mg at 06/21/25 0937    insulin lispro (HUMALOG,ADMELOG) injection vial 0-4 Units, 0-4 Units,

## 2025-06-22 LAB
BASOPHILS ABSOLUTE: 0 THOU/MM3 (ref 0–0.1)
BASOPHILS NFR BLD AUTO: 0.4 %
DEPRECATED RDW RBC AUTO: 50.9 FL (ref 35–45)
EOSINOPHIL NFR BLD AUTO: 2.3 %
EOSINOPHILS ABSOLUTE: 0.2 THOU/MM3 (ref 0–0.4)
ERYTHROCYTE [DISTWIDTH] IN BLOOD BY AUTOMATED COUNT: 13.7 % (ref 11.5–14.5)
GLUCOSE BLD STRIP.AUTO-MCNC: 69 MG/DL (ref 70–108)
GLUCOSE BLD STRIP.AUTO-MCNC: 72 MG/DL (ref 70–108)
GLUCOSE BLD STRIP.AUTO-MCNC: 78 MG/DL (ref 70–108)
GLUCOSE BLD STRIP.AUTO-MCNC: 79 MG/DL (ref 70–108)
GLUCOSE BLD STRIP.AUTO-MCNC: 88 MG/DL (ref 70–108)
HCT VFR BLD AUTO: 24.1 % (ref 37–47)
HGB BLD-MCNC: 7.4 GM/DL (ref 12–16)
IMM GRANULOCYTES # BLD AUTO: 0.02 THOU/MM3 (ref 0–0.07)
IMM GRANULOCYTES NFR BLD AUTO: 0.3 %
LYMPHOCYTES ABSOLUTE: 1.3 THOU/MM3 (ref 1–4.8)
LYMPHOCYTES NFR BLD AUTO: 18.6 %
MCH RBC QN AUTO: 31.4 PG (ref 26–33)
MCHC RBC AUTO-ENTMCNC: 30.7 GM/DL (ref 32.2–35.5)
MCV RBC AUTO: 102.1 FL (ref 81–99)
MONOCYTES ABSOLUTE: 0.7 THOU/MM3 (ref 0.4–1.3)
MONOCYTES NFR BLD AUTO: 9.8 %
NEUTROPHILS ABSOLUTE: 4.8 THOU/MM3 (ref 1.8–7.7)
NEUTROPHILS NFR BLD AUTO: 68.6 %
NRBC BLD AUTO-RTO: 0 /100 WBC
PLATELET # BLD AUTO: 242 THOU/MM3 (ref 130–400)
PMV BLD AUTO: 9.3 FL (ref 9.4–12.4)
RBC # BLD AUTO: 2.36 MILL/MM3 (ref 4.2–5.4)
WBC # BLD AUTO: 7 THOU/MM3 (ref 4.8–10.8)

## 2025-06-22 PROCEDURE — 85025 COMPLETE CBC W/AUTO DIFF WBC: CPT

## 2025-06-22 PROCEDURE — 1200000000 HC SEMI PRIVATE

## 2025-06-22 PROCEDURE — 82948 REAGENT STRIP/BLOOD GLUCOSE: CPT

## 2025-06-22 PROCEDURE — 6360000002 HC RX W HCPCS: Performed by: NURSE PRACTITIONER

## 2025-06-22 PROCEDURE — 2500000003 HC RX 250 WO HCPCS: Performed by: NURSE PRACTITIONER

## 2025-06-22 PROCEDURE — 36415 COLL VENOUS BLD VENIPUNCTURE: CPT

## 2025-06-22 PROCEDURE — 99232 SBSQ HOSP IP/OBS MODERATE 35: CPT | Performed by: STUDENT IN AN ORGANIZED HEALTH CARE EDUCATION/TRAINING PROGRAM

## 2025-06-22 PROCEDURE — 6370000000 HC RX 637 (ALT 250 FOR IP): Performed by: NURSE PRACTITIONER

## 2025-06-22 RX ADMIN — VENLAFAXINE HYDROCHLORIDE 225 MG: 75 CAPSULE, EXTENDED RELEASE ORAL at 08:32

## 2025-06-22 RX ADMIN — LAMOTRIGINE 200 MG: 100 TABLET ORAL at 21:03

## 2025-06-22 RX ADMIN — QUETIAPINE FUMARATE 25 MG: 25 TABLET ORAL at 21:03

## 2025-06-22 RX ADMIN — MORPHINE SULFATE 4 MG: 4 INJECTION, SOLUTION INTRAMUSCULAR; INTRAVENOUS at 17:58

## 2025-06-22 RX ADMIN — PANTOPRAZOLE SODIUM 40 MG: 40 INJECTION, POWDER, FOR SOLUTION INTRAVENOUS at 08:34

## 2025-06-22 RX ADMIN — BUSPIRONE HYDROCHLORIDE 10 MG: 10 TABLET ORAL at 21:03

## 2025-06-22 RX ADMIN — LOSARTAN POTASSIUM 25 MG: 25 TABLET, FILM COATED ORAL at 17:58

## 2025-06-22 RX ADMIN — OXYCODONE 10 MG: 5 TABLET ORAL at 21:07

## 2025-06-22 RX ADMIN — NALOXEGOL OXALATE 25 MG: 25 TABLET, FILM COATED ORAL at 06:32

## 2025-06-22 RX ADMIN — SODIUM CHLORIDE, PRESERVATIVE FREE 10 ML: 5 INJECTION INTRAVENOUS at 08:34

## 2025-06-22 RX ADMIN — LOSARTAN POTASSIUM 50 MG: 50 TABLET, FILM COATED ORAL at 08:33

## 2025-06-22 RX ADMIN — SODIUM CHLORIDE, PRESERVATIVE FREE 10 ML: 5 INJECTION INTRAVENOUS at 21:03

## 2025-06-22 RX ADMIN — LAMOTRIGINE 100 MG: 100 TABLET ORAL at 08:33

## 2025-06-22 ASSESSMENT — PAIN SCALES - GENERAL
PAINLEVEL_OUTOF10: 7
PAINLEVEL_OUTOF10: 7
PAINLEVEL_OUTOF10: 8

## 2025-06-22 ASSESSMENT — PAIN DESCRIPTION - LOCATION
LOCATION: ABDOMEN
LOCATION: ABDOMEN

## 2025-06-22 ASSESSMENT — PAIN DESCRIPTION - ORIENTATION: ORIENTATION: LOWER

## 2025-06-22 NOTE — PLAN OF CARE
Problem: Discharge Planning  Goal: Discharge to home or other facility with appropriate resources  Outcome: Progressing  Flowsheets (Taken 6/21/2025 2228)  Discharge to home or other facility with appropriate resources:   Identify barriers to discharge with patient and caregiver   Arrange for needed discharge resources and transportation as appropriate     Problem: Chronic Conditions and Co-morbidities  Goal: Patient's chronic conditions and co-morbidity symptoms are monitored and maintained or improved  Outcome: Progressing  Flowsheets (Taken 6/21/2025 2228)  Care Plan - Patient's Chronic Conditions and Co-Morbidity Symptoms are Monitored and Maintained or Improved:   Monitor and assess patient's chronic conditions and comorbid symptoms for stability, deterioration, or improvement   Collaborate with multidisciplinary team to address chronic and comorbid conditions and prevent exacerbation or deterioration     Problem: Pain  Goal: Verbalizes/displays adequate comfort level or baseline comfort level  Outcome: Progressing  Flowsheets (Taken 6/21/2025 2228)  Verbalizes/displays adequate comfort level or baseline comfort level:   Encourage patient to monitor pain and request assistance   Assess pain using appropriate pain scale   Administer analgesics based on type and severity of pain and evaluate response   Implement non-pharmacological measures as appropriate and evaluate response     Problem: Safety - Adult  Goal: Free from fall injury  Outcome: Progressing  Flowsheets (Taken 6/21/2025 2228)  Free From Fall Injury: Instruct family/caregiver on patient safety     Problem: ABCDS Injury Assessment  Goal: Absence of physical injury  Outcome: Progressing  Flowsheets (Taken 6/21/2025 2228)  Absence of Physical Injury: Implement safety measures based on patient assessment   Care plan reviewed with patient.  Patient verbalizes understanding of the plan of care and contributes to goal setting.    Principal Discharge DX:	Gastroenteritis   1

## 2025-06-22 NOTE — PROGRESS NOTES
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 female s/p LAR on  for cancer. Final pathology pending    Acute blood loss anemia, patient denies bowel function with reported bloody smear of a bowel movement post op. Hgb 7.4 from 7.5 (Hgb 7.8 from 12.7).     Blood pressure and vital signs remain stable. Per report patient is having dark/bloody stools.     PLAN  Will advance diet with return of bowel function.   Okay to resume lovenox with 24 hours of stable Hgb.   DC IVF  Continue to encourage ambulation and out of bed.       SUBJECTIVE  Pt seen and examined. Patient resting comfortably in bed. Patient states over all she feels well. Per nursing report, patient has had multiple bowel movements with some dark and bloody discoloration.. AFVSS.  Pain controlled.No new complaints.      OBJECTIVE  CURRENT VITALS:  height is 1.676 m (5' 6\") and weight is 59.5 kg (131 lb 2 oz). Her oral temperature is 98.1 °F (36.7 °C). Her blood pressure is 121/53 (abnormal) and her pulse is 95. Her respiration is 18 and oxygen saturation is 91%.  Body mass index is 21.16 kg/m².  Temperature Range (24h):Temp: 98.1 °F (36.7 °C) Temp  Av.1 °F (36.7 °C)  Min: 97.6 °F (36.4 °C)  Max: 98.6 °F (37 °C)  BP Range (24h): Systolic (24hrs), Av , Min:97 , Max:121     Diastolic (24hrs), Av, Min:50, Max:54    Pulse Range (24h): Pulse  Av  Min: 90  Max: 99  Respiration Range (24h): Resp  Av.2  Min: 16  Max: 18  Current Pulse Ox (24h):  SpO2: 91 %  Pulse Ox Range (24h):  SpO2  Av.8 %  Min: 90 %  Max: 93 %  Oxygen Amount and Delivery: O2 Flow Rate (L/min): 0

## 2025-06-23 LAB
ABO GROUP BLD: NORMAL
BASOPHILS ABSOLUTE: 0 THOU/MM3 (ref 0–0.1)
BASOPHILS NFR BLD AUTO: 0.2 %
DEPRECATED RDW RBC AUTO: 52.7 FL (ref 35–45)
EOSINOPHIL NFR BLD AUTO: 2.5 %
EOSINOPHILS ABSOLUTE: 0.1 THOU/MM3 (ref 0–0.4)
ERYTHROCYTE [DISTWIDTH] IN BLOOD BY AUTOMATED COUNT: 14 % (ref 11.5–14.5)
GLUCOSE BLD STRIP.AUTO-MCNC: 107 MG/DL (ref 70–108)
GLUCOSE BLD STRIP.AUTO-MCNC: 135 MG/DL (ref 70–108)
GLUCOSE BLD STRIP.AUTO-MCNC: 81 MG/DL (ref 70–108)
GLUCOSE BLD STRIP.AUTO-MCNC: 83 MG/DL (ref 70–108)
GLUCOSE BLD STRIP.AUTO-MCNC: 89 MG/DL (ref 70–108)
HCT VFR BLD AUTO: 22.5 % (ref 37–47)
HCT VFR BLD AUTO: 25.2 % (ref 37–47)
HGB BLD-MCNC: 6.8 GM/DL (ref 12–16)
HGB BLD-MCNC: 8.3 GM/DL (ref 12–16)
IAT IGG-SP REAG SERPL QL: NORMAL
IMM GRANULOCYTES # BLD AUTO: 0.03 THOU/MM3 (ref 0–0.07)
IMM GRANULOCYTES NFR BLD AUTO: 0.6 %
LYMPHOCYTES ABSOLUTE: 1.4 THOU/MM3 (ref 1–4.8)
LYMPHOCYTES NFR BLD AUTO: 26.2 %
MCH RBC QN AUTO: 31.2 PG (ref 26–33)
MCHC RBC AUTO-ENTMCNC: 30.2 GM/DL (ref 32.2–35.5)
MCV RBC AUTO: 103.2 FL (ref 81–99)
MONOCYTES ABSOLUTE: 0.4 THOU/MM3 (ref 0.4–1.3)
MONOCYTES NFR BLD AUTO: 7.9 %
NEUTROPHILS ABSOLUTE: 3.3 THOU/MM3 (ref 1.8–7.7)
NEUTROPHILS NFR BLD AUTO: 62.6 %
NRBC BLD AUTO-RTO: 0 /100 WBC
PLATELET # BLD AUTO: 238 THOU/MM3 (ref 130–400)
PMV BLD AUTO: 9.6 FL (ref 9.4–12.4)
RBC # BLD AUTO: 2.18 MILL/MM3 (ref 4.2–5.4)
RH BLD: NORMAL
WBC # BLD AUTO: 5.2 THOU/MM3 (ref 4.8–10.8)

## 2025-06-23 PROCEDURE — 97162 PT EVAL MOD COMPLEX 30 MIN: CPT

## 2025-06-23 PROCEDURE — 99024 POSTOP FOLLOW-UP VISIT: CPT | Performed by: NURSE PRACTITIONER

## 2025-06-23 PROCEDURE — APPSS45 APP SPLIT SHARED TIME 31-45 MINUTES: Performed by: NURSE PRACTITIONER

## 2025-06-23 PROCEDURE — 2500000003 HC RX 250 WO HCPCS: Performed by: NURSE PRACTITIONER

## 2025-06-23 PROCEDURE — 85014 HEMATOCRIT: CPT

## 2025-06-23 PROCEDURE — 97110 THERAPEUTIC EXERCISES: CPT

## 2025-06-23 PROCEDURE — 85018 HEMOGLOBIN: CPT

## 2025-06-23 PROCEDURE — 82948 REAGENT STRIP/BLOOD GLUCOSE: CPT

## 2025-06-23 PROCEDURE — 86885 COOMBS TEST INDIRECT QUAL: CPT

## 2025-06-23 PROCEDURE — 86923 COMPATIBILITY TEST ELECTRIC: CPT

## 2025-06-23 PROCEDURE — 86900 BLOOD TYPING SEROLOGIC ABO: CPT

## 2025-06-23 PROCEDURE — P9016 RBC LEUKOCYTES REDUCED: HCPCS

## 2025-06-23 PROCEDURE — 97535 SELF CARE MNGMENT TRAINING: CPT

## 2025-06-23 PROCEDURE — 6360000002 HC RX W HCPCS: Performed by: NURSE PRACTITIONER

## 2025-06-23 PROCEDURE — 86901 BLOOD TYPING SEROLOGIC RH(D): CPT

## 2025-06-23 PROCEDURE — 36430 TRANSFUSION BLD/BLD COMPNT: CPT

## 2025-06-23 PROCEDURE — 1200000000 HC SEMI PRIVATE

## 2025-06-23 PROCEDURE — 85025 COMPLETE CBC W/AUTO DIFF WBC: CPT

## 2025-06-23 PROCEDURE — 36415 COLL VENOUS BLD VENIPUNCTURE: CPT

## 2025-06-23 PROCEDURE — 30233N1 TRANSFUSION OF NONAUTOLOGOUS RED BLOOD CELLS INTO PERIPHERAL VEIN, PERCUTANEOUS APPROACH: ICD-10-PCS | Performed by: SURGERY

## 2025-06-23 PROCEDURE — 6370000000 HC RX 637 (ALT 250 FOR IP): Performed by: NURSE PRACTITIONER

## 2025-06-23 PROCEDURE — 97166 OT EVAL MOD COMPLEX 45 MIN: CPT

## 2025-06-23 PROCEDURE — 97116 GAIT TRAINING THERAPY: CPT

## 2025-06-23 RX ORDER — SODIUM CHLORIDE 9 MG/ML
INJECTION, SOLUTION INTRAVENOUS PRN
Status: DISCONTINUED | OUTPATIENT
Start: 2025-06-23 | End: 2025-06-26 | Stop reason: HOSPADM

## 2025-06-23 RX ADMIN — LOSARTAN POTASSIUM 25 MG: 25 TABLET, FILM COATED ORAL at 18:14

## 2025-06-23 RX ADMIN — NALOXEGOL OXALATE 25 MG: 25 TABLET, FILM COATED ORAL at 05:09

## 2025-06-23 RX ADMIN — SODIUM CHLORIDE, PRESERVATIVE FREE 10 ML: 5 INJECTION INTRAVENOUS at 20:50

## 2025-06-23 RX ADMIN — PANTOPRAZOLE SODIUM 40 MG: 40 INJECTION, POWDER, FOR SOLUTION INTRAVENOUS at 08:46

## 2025-06-23 RX ADMIN — VENLAFAXINE HYDROCHLORIDE 225 MG: 75 CAPSULE, EXTENDED RELEASE ORAL at 08:45

## 2025-06-23 RX ADMIN — LOSARTAN POTASSIUM 50 MG: 50 TABLET, FILM COATED ORAL at 08:46

## 2025-06-23 RX ADMIN — OXYCODONE 10 MG: 5 TABLET ORAL at 08:46

## 2025-06-23 RX ADMIN — LAMOTRIGINE 100 MG: 100 TABLET ORAL at 08:46

## 2025-06-23 RX ADMIN — MORPHINE SULFATE 4 MG: 4 INJECTION, SOLUTION INTRAMUSCULAR; INTRAVENOUS at 20:51

## 2025-06-23 RX ADMIN — BUSPIRONE HYDROCHLORIDE 10 MG: 10 TABLET ORAL at 20:50

## 2025-06-23 RX ADMIN — LAMOTRIGINE 200 MG: 100 TABLET ORAL at 20:49

## 2025-06-23 RX ADMIN — QUETIAPINE FUMARATE 25 MG: 25 TABLET ORAL at 20:50

## 2025-06-23 RX ADMIN — OXYCODONE 10 MG: 5 TABLET ORAL at 18:15

## 2025-06-23 RX ADMIN — SODIUM CHLORIDE, PRESERVATIVE FREE 10 ML: 5 INJECTION INTRAVENOUS at 08:46

## 2025-06-23 ASSESSMENT — PAIN DESCRIPTION - LOCATION
LOCATION: ABDOMEN

## 2025-06-23 ASSESSMENT — PAIN SCALES - GENERAL
PAINLEVEL_OUTOF10: 7
PAINLEVEL_OUTOF10: 4
PAINLEVEL_OUTOF10: 7

## 2025-06-23 ASSESSMENT — PAIN DESCRIPTION - DESCRIPTORS
DESCRIPTORS: ACHING;DISCOMFORT
DESCRIPTORS: ACHING;DISCOMFORT
DESCRIPTORS: ACHING
DESCRIPTORS: ACHING;DISCOMFORT

## 2025-06-23 ASSESSMENT — PAIN DESCRIPTION - ORIENTATION
ORIENTATION: MID

## 2025-06-23 ASSESSMENT — PAIN - FUNCTIONAL ASSESSMENT: PAIN_FUNCTIONAL_ASSESSMENT: ACTIVITIES ARE NOT PREVENTED

## 2025-06-23 NOTE — PROGRESS NOTES
[facility]  EVA Khan-CNP for Dr. Napoles covering for Dr. Leoncio Hale  General Surgery Postoperative Progress Note    Pt Name: Adry Moura  Medical Record Number: [id number]  Date of Birth 1950   Today's Date: 6/23/2025    ASSESSMENT   POD # 4 Status post low anterior resection for adenocarcinoma  Acute blood loss anemia  Postoperative weakness  Diabetes Mellitus   History of DVT  History of gastric bypass then later total gastrectomy   History of subtotal esophagectomy with colonic interposition   has a past medical history of Arthritis, Bowel obstruction (HCC), Bronchitis, Diabetes mellitus (HCC), Difficult intubation, DVT, lower extremity (HCC), GERD (gastroesophageal reflux disease), History of blood transfusion, Hx of blood clots, Hypertension, Hypoglycemia, Kidney stone, Macular degeneration, Movement disorder, Multinodular goiter, Muscle strain of left lower extremity, Pinched nerve, Psychiatric problem, Urinary incontinence, and Vitamin D deficiency.  PLAN   Continue full liquids. Ensure ordered TID.  PT/OT to see today  Continue Movantik   IV to INT  Voiding spontaneously  Continue to monitor bowel movements. More maroon in color now. Had one overnight.   Chems ACHS w SS coverage only as needed  Give 1 unit PRBC today. H&H 1 hour post. Consent signed.  Hold Lovenox but SCDs to be in place unless up ambulating  Pain & nausea control  Prevena vac in place. Keep in place for 7 days total.   Awaiting surgical pathology  Labs reviewed. Hemoglobin 6.8 this morning. Continue to trend and monitor closely.   Patient feeling tired but otherwise pretty good. Patient likely had oozing from anastomosis post operatively with large bloody BM right after surgery but now more maroon in color.   SUBJECTIVE   Patient was stable overnight. VS reviewed. BPs are soft but patient states she does not have any lightheadedness/dizziness. Just feels more tired than usual. She is sore but no

## 2025-06-23 NOTE — PROGRESS NOTES
[facility]  INPATIENT PHYSICAL THERAPY  EVALUATION  [facility] ONC MED 5K - 5K-07/007-A    Discharge Recommendations: Continue to assess pending progress, Therapy recommended at discharge, Home with Home health PT, Subacute/Skilled Nursing Facility  Equipment Recommendations:    monitor for needs          Time In: 1434  Time Out: 1509  Timed Code Treatment Minutes: 25 Minutes  Minutes: 35          Date: 2025  Patient Name: Adry Moura,  Gender:  female        MRN: [id number]  : 1950  (74 y.o.)      Referring Practitioner: Leoncio Hale MD  Diagnosis: Cancer of rectosigmoid junction (HCC)  Additional Pertinent Hx: 74-year-old female who presents with a new diagnosis of colorectal mass found to be adenocarcinoma at 16 cm.  She has a complex history with bariatric surgery in .  This subsequently developed complications with a lobulated mass in the upper esophagus.  At [facility] she underwent a near-total esophagectomy with a colonic interposition.  She subsequently had feeding tubes and also developed gastric necrosis.  Had to go back to surgery for a total gastrectomy in addition to her subtotal esophagectomy.  Currently has a Maidens jejunostomy per reports that replaced her esophagus. Pt underwent Low anterior resection for adenocarcinoma on  with Dr Hale.     Restrictions/Precautions:  Restrictions/Precautions: General Precautions, Fall Risk        Required Braces or Orthoses?: Yes  Other: Abdominal Binder      Subjective:  Chart Reviewed: Yes  Patient assessed for rehabilitation services?: Yes  Subjective: Pt up in recliner and is ready to return to bed, but wants to walk a little before that. RN approved session as pt was also still receiving a blood transfusion.    General:     Vision: Impaired  Vision Exceptions: Wears glasses at all times  Hearing: Exceptions to WFL  Hearing Exceptions: Bilateral hearing aid       Pain: /10: abdomen    Vitals: Vitals not  activities, Endurance training  General Plan:  (4-5x GM)    Goals:  Patient Goals : go home  Short Term Goals  Time Frame for Short Term Goals: at discharge  Short Term Goal 1: Pt to be Mod I for supine <> sit to get in/out of bed  Short Term Goal 2: Pt to be Mod I for sit <> stand to get up to ambulate  Short Term Goal 3: Pt to ambulate 80 ft with RW with  Supervision for household distances  Long Term Goals  Time Frame for Long Term Goals : not set due to short ELOS    Following session, patient left in safe position in bed, with alarm, and call light within reach      Rena Diaz, MPT [id number]

## 2025-06-23 NOTE — PROGRESS NOTES
Spiritual Health History and Assessment/Progress Note  [facility]    (P) Grief, Loss, and Adjustments,  , (P) Adjustment to illness,      Name: Adry Moura MRN: [id number]    Age: 74 y.o.     Sex: female   Language: English   Anabaptist: Denominational   Cancer of rectosigmoid junction (HCC)     Date: 6/23/2025            Total Time Calculated: (P) 20 min              Spiritual Assessment began in [facility] ONC MED 5K        Referral/Consult From: (P) Rounding   Encounter Overview/Reason: (P) Grief, Loss, and Adjustments  Service Provided For: (P) Patient    Jolanta, Belief, Meaning:   Patient identifies as spiritual, is connected with a jolanta tradition or spiritual practice, and has beliefs or practices that help with coping during difficult times  Family/Friends No family/friends present      Importance and Influence:  Patient has spiritual/personal beliefs that influence decisions regarding their health  Family/Friends No family/friends present    Community:  Patient is connected with a spiritual community and feels well-supported. Support system includes: Children  Family/Friends No family/friends present    Assessment and Plan of Care:     Patient, Prabha, shared about her hospitalization and the feelings that accompany her medical journey of \"one step forward, one step back.\"  She shared about her desire for a quality of life and her hope that cheerfulness can accompany her to the end of life. She shared about the pride she has in her four children and handful of grandchildren. Patient feels close to God during this time and expressed peace with the dying process.     Patient Interventions include: Facilitated expression of thoughts and feelings, Explored spiritual coping/struggle/distress, Engaged in theological reflection, Affirmed coping skills/support systems, Provided sacramental/Adventist ritual, and Facilitated life review and/ or legacy  Family/Friends Interventions include: No  family/friends present    Patient Plan of Care: Spiritual Care available upon further referral  Family/Friends Plan of Care: No family/friends present    Electronically signed by Chaplain Giovani on 6/23/2025 at 9:15 AM

## 2025-06-23 NOTE — PLAN OF CARE
Problem: Discharge Planning  Goal: Discharge to home or other facility with appropriate resources  Outcome: Progressing  Flowsheets (Taken 6/22/2025 2324)  Discharge to home or other facility with appropriate resources:   Identify barriers to discharge with patient and caregiver   Arrange for needed discharge resources and transportation as appropriate   Identify discharge learning needs (meds, wound care, etc)     Problem: Chronic Conditions and Co-morbidities  Goal: Patient's chronic conditions and co-morbidity symptoms are monitored and maintained or improved  Outcome: Progressing  Flowsheets (Taken 6/22/2025 2324)  Care Plan - Patient's Chronic Conditions and Co-Morbidity Symptoms are Monitored and Maintained or Improved:   Monitor and assess patient's chronic conditions and comorbid symptoms for stability, deterioration, or improvement   Collaborate with multidisciplinary team to address chronic and comorbid conditions and prevent exacerbation or deterioration     Problem: Pain  Goal: Verbalizes/displays adequate comfort level or baseline comfort level  Outcome: Progressing  Flowsheets (Taken 6/22/2025 2324)  Verbalizes/displays adequate comfort level or baseline comfort level:   Encourage patient to monitor pain and request assistance   Administer analgesics based on type and severity of pain and evaluate response   Assess pain using appropriate pain scale   Implement non-pharmacological measures as appropriate and evaluate response     Problem: Safety - Adult  Goal: Free from fall injury  Outcome: Progressing  Flowsheets (Taken 6/22/2025 2324)  Free From Fall Injury: Instruct family/caregiver on patient safety     Problem: ABCDS Injury Assessment  Goal: Absence of physical injury  Outcome: Progressing  Flowsheets (Taken 6/22/2025 2324)  Absence of Physical Injury: Implement safety measures based on patient assessment   Care plan reviewed with patient.  Patient verbalizes understanding of the plan of care

## 2025-06-23 NOTE — PROGRESS NOTES
Patient received sacramental anointing by a . If you are in need of  support, please call [phone]. If you are in need of a  after 6:30pm, please call the house supervisor for the on-call .     Rev. Tenzin Payne MDiv, [facility]  Director of Spiritual Health  O: [phone]    To reach a  call [phone]

## 2025-06-23 NOTE — DISCHARGE INSTR - COC
Continuity of Care Form    Patient Name: Adry Moura   :  1950  MRN:  [id number]    Admit date:  2025  Discharge date:  2025    Code Status Order: Full Code   Advance Directives:    Date/Time Healthcare Directive Type of Healthcare Directive Copy in Chart Healthcare Agent Appointed Healthcare Agent's Name Healthcare Agent's Phone Number    [phone] Yes, patient has an advance directive for healthcare treatment  Durable power of  for health care;Living will  Yes, copy in chart  Adult Children  Wendy Collins  [phone]             Admitting Physician:  Leoncio Hale MD  PCP: María Neil APRN - CNP    Discharging Nurse: Fozia VILLASENOR  Discharging Hospital Unit/Room#: 5K-07/007-A  Discharging Unit Phone Number: [phone]    Emergency Contact:   Extended Emergency Contact Information  Primary Emergency Contact: Raf Kern, [facility]  Home Phone: [phone]  Relation: Child   needed? No  Secondary Emergency Contact: Shayla Collins   [place]  Home Phone: [phone]  Relation: Child   needed? 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

## 2025-06-23 NOTE — PLAN OF CARE
Problem: Discharge Planning  Goal: Discharge to home or other facility with appropriate resources  6/23/2025 1716 by Humaira Portillo RN  Outcome: Progressing  Flowsheets (Taken 6/22/2025 2324 by Autumn Wood, RN)  Discharge to home or other facility with appropriate resources:   Identify barriers to discharge with patient and caregiver   Arrange for needed discharge resources and transportation as appropriate   Identify discharge learning needs (meds, wound care, etc)     Problem: Chronic Conditions and Co-morbidities  Goal: Patient's chronic conditions and co-morbidity symptoms are monitored and maintained or improved  Outcome: Progressing  Flowsheets (Taken 6/22/2025 2324 by Autumn Wood, RN)  Care Plan - Patient's Chronic Conditions and Co-Morbidity Symptoms are Monitored and Maintained or Improved:   Monitor and assess patient's chronic conditions and comorbid symptoms for stability, deterioration, or improvement   Collaborate with multidisciplinary team to address chronic and comorbid conditions and prevent exacerbation or deterioration     Problem: Pain  Goal: Verbalizes/displays adequate comfort level or baseline comfort level  Outcome: Progressing  Flowsheets (Taken 6/22/2025 2324 by Autumn Wood, RN)  Verbalizes/displays adequate comfort level or baseline comfort level:   Encourage patient to monitor pain and request assistance   Administer analgesics based on type and severity of pain and evaluate response   Assess pain using appropriate pain scale   Implement non-pharmacological measures as appropriate and evaluate response     Problem: Safety - Adult  Goal: Free from fall injury  Outcome: Progressing  Flowsheets (Taken 6/22/2025 2324 by Autumn Wood, RN)  Free From Fall Injury: Instruct family/caregiver on patient safety     Problem: ABCDS Injury Assessment  Goal: Absence of physical injury  Outcome: Progressing  Flowsheets (Taken 6/22/2025 2324 by Israel  Autumn Araujo RN)  Absence of Physical Injury: Implement safety measures based on patient assessment     Problem: Respiratory - Adult  Goal: Achieves optimal ventilation and oxygenation  Outcome: Progressing  Flowsheets (Taken 6/23/2025 1716)  Achieves optimal ventilation and oxygenation:   Assess for changes in respiratory status   Assess for changes in mentation and behavior   Assess and instruct to report shortness of breath or any respiratory difficulty     Problem: Cardiovascular - Adult  Goal: Maintains optimal cardiac output and hemodynamic stability  Outcome: Progressing  Flowsheets (Taken 6/23/2025 1716)  Maintains optimal cardiac output and hemodynamic stability:   Monitor blood pressure and heart rate   Monitor urine output and notify Licensed Independent Practitioner for values outside of normal range     Problem: Skin/Tissue Integrity - Adult  Goal: Skin integrity remains intact  Outcome: Progressing  Flowsheets (Taken 6/23/2025 1716)  Skin Integrity Remains Intact: Monitor for areas of redness and/or skin breakdown     Problem: Gastrointestinal - Adult  Goal: Minimal or absence of nausea and vomiting  Outcome: Progressing  Flowsheets (Taken 6/23/2025 1716)  Minimal or absence of nausea and vomiting:   Administer ordered antiemetic medications as needed   Advance diet as tolerated, if ordered   Care plan reviewed with patient and daughter.  Patient and daughter verbalize understanding of the plan of care and contribute to goal setting.

## 2025-06-23 NOTE — PROGRESS NOTES
Bathroom Shower/Tub: Walk-in shower, Shower chair without back  Bathroom Toilet: Handicap height       Prior Level of Assist for ADLs: Independent  Prior Level of Assist for Homemaking: Independent  Homemaking Responsibilities: Yes  Prior Level of Assist for Transfers: Independent  Prior Level of Assist for Ambulation: Independent household ambulator, with or without device  Has the patient had two or more falls in the past year or any fall with injury in the past year?: No    Active : No  Patient's  Info: Children, [place]  Occupation: Retired  Additional Comments: Pt reports Mod I for ADLs, light house work, and laundry. Pt uses a 4WW.    VISION:Corrected    HEARING:  WFL    COGNITION: Slow Processing, Decreased Recall, Decreased Insight, Decreased Problem Solving, and Decreased Safety Awareness    RANGE OF MOTION:  Bilateral Upper Extremity:  WFL    STRENGTH:  Bilateral Upper Extremity:  WFL    Hand Dominance: Right    SENSATION:   WFL    ADL:   Feeding: Independent.     Grooming: Contact Guard Assistance.     Lower Extremity Dressing: Maximum Assistance.  To thread BLEs into brief  Toileting: Minimal Assistance.     Toilet Transfer: Minimal Assistance.  .    BALANCE:  Sitting Balance:  Supervision.    Standing Balance: Contact Guard Assistance.      BED MOBILITY:  Supine to Sit: Stand By Assistance      TRANSFERS:  Sit to Stand:  Contact Guard Assistance.    Stand to Sit: Contact Guard Assistance.      FUNCTIONAL MOBILITY:  Assistive Device: 4 Wheeled Walker  Assist Level:  Minimal Assistance.   Distance: To and from bathroom  Pt demos difficulty managing walker and is unsafe with locking/unlocking breaks. Pt may benefit from use of 2WW until cognition improves.      Activity Tolerance:  Patient tolerance of  treatment: Fair treatment tolerance, Limited by pain, Limited by fatigue, and Reduced activity pace      Functional Outcome Measures:   -PAC Inpatient Daily Activity Raw Score: 17      Modified [place]:  Premorbid Functional Status: Not Applicable  Current Functional Status:  Not Applicable    Education:  Learners: Patient  Plan of Care, Role of OT,ADL's, Importance of Increasing Activity, Fall Prevention, and Assistive Device Safety    Assessment:  Assessment: 75 yo admitted d/t rectosigmoid cancer. Pt presents to OT with decreased cognition, strength, balance, and activity tolerance impacting safety and IND with ADLs. Pt would benefit from further skilled OT to address barriers in order to return to PLOF. Without OT, pt is at risk for falls, hospital readmissions, and increased caregiver burden.    Performance deficits / Impairments: Decreased functional mobility , Decreased balance, Decreased ADL status, Decreased strength, Decreased cognition  Prognosis: Fair  REQUIRES OT FOLLOW-UP: Yes  Decision Making: Medium Complexity    Treatment Initiated: Treatment and education initiated within context of evaluation.  Evaluation time included review of current medical information, gathering information related to past medical, social and functional history, completion of standardized testing, formal and informal observation of tasks, assessment of data and development of plan of care and goals.  Treatment time included skilled education and facilitation of tasks to increase safety and independence with ADL's for improved functional independence and quality of life.    Plan:  Times Per Week: 5x  Current Treatment Recommendations: Balance training, Functional mobility training, Endurance training, Cognitive reorientation, Pain management, Self-Care / ADL, Home management training, Equipment evaluation, education, & procurement, Patient/Caregiver education & training, Safety education & training, Cognitive/Perceptual training.  See long-term goal time frame for expected duration of plan of care.  If no long-term goals established, a short length of stay is anticipated.    Goals:  Patient goals : mobilize

## 2025-06-23 NOTE — CONSENT
Informed Consent for Blood Component Transfusion Note    I have discussed with the patient the rationale for blood component transfusion; its benefits in treating or preventing fatigue, organ damage, or death; and its risk which includes mild transfusion reactions, rare risk of blood borne infection, or more serious but rare reactions. I have discussed the alternatives to transfusion, including the risk and consequences of not receiving transfusion. The patient had an opportunity to ask questions and had agreed to proceed with transfusion of blood components.    Electronically signed by EVA SORENSEN CNP on 6/23/25 at 8:53 AM EDT

## 2025-06-23 NOTE — PLAN OF CARE
Problem: Discharge Planning  Goal: Discharge to home or other facility with appropriate resources  6/23/2025 1139 by Beverley Serrano LSW  Outcome: Progressing   SW consult received. See SW note 6/23/25

## 2025-06-23 NOTE — CARE COORDINATION
6/23/25, 11:06 AM EDT  Discharge Planning Evaluation  Social work consult received, patient from [facility].   Patient and daughter Shayla preference is to return to [facility] SNF.    Would patient be willing to go to a skilled facility if needed: yes.  Is there skilled care available at current facility: yes.  Barriers to return to current living situation: mobility  Spoke with Cayla at the facility.  Patient bed hold: yes  Anticipated transport plan: pending mobility, family vs ambulette  Patient's Healthcare Decision Maker: Named in Scanned ACP Document      RENAE met with pt this morning, daughter Shayla at bedside. Pt reports she has 2 children, daughter Shayla lives in [facility] and son Raf in [place]. Pt reports she has been in [facility] for 2 years, was in another AL facility prior to [facility].     During conversation, pt focusing on a fall she had a few years back. Pt admits to getting confused at times, daughter reports she had brought this concern up to nursing, believes to be pain medication related.     RENAE did talk with Cayla with [facility], they can accept pt on skilled care. RENAE did let her know it may be a couple days before she's ready for dc. Cayla did ask that after tomorrow at noon to contact  [facility] with plans as she will be off.

## 2025-06-24 LAB
ANION GAP SERPL CALC-SCNC: 9 MEQ/L (ref 8–16)
BACTERIA URNS QL MICRO: ABNORMAL /HPF
BILIRUB UR QL STRIP.AUTO: NEGATIVE
BUN SERPL-MCNC: 4 MG/DL (ref 8–23)
CALCIUM SERPL-MCNC: 8.5 MG/DL (ref 8.8–10.2)
CASTS #/AREA URNS LPF: ABNORMAL /LPF
CASTS 2: ABNORMAL /LPF
CHARACTER UR: CLEAR
CHLORIDE SERPL-SCNC: 108 MEQ/L (ref 98–111)
CO2 SERPL-SCNC: 28 MEQ/L (ref 22–29)
COLOR, UA: YELLOW
CREAT SERPL-MCNC: 0.6 MG/DL (ref 0.5–0.9)
CRYSTALS URNS MICRO: ABNORMAL
EPITHELIAL CELLS, UA: ABNORMAL /HPF
GFR SERPL CREATININE-BSD FRML MDRD: > 90 ML/MIN/1.73M2
GLUCOSE BLD STRIP.AUTO-MCNC: 103 MG/DL (ref 70–108)
GLUCOSE BLD STRIP.AUTO-MCNC: 79 MG/DL (ref 70–108)
GLUCOSE BLD STRIP.AUTO-MCNC: 93 MG/DL (ref 70–108)
GLUCOSE BLD STRIP.AUTO-MCNC: 97 MG/DL (ref 70–108)
GLUCOSE SERPL-MCNC: 79 MG/DL (ref 74–109)
GLUCOSE UR QL STRIP.AUTO: NEGATIVE MG/DL
HCT VFR BLD AUTO: 25.8 % (ref 37–47)
HGB BLD-MCNC: 8.3 GM/DL (ref 12–16)
HGB UR QL STRIP.AUTO: NEGATIVE
KETONES UR QL STRIP.AUTO: NEGATIVE
MISCELLANEOUS 2: ABNORMAL
NITRITE UR QL STRIP: NEGATIVE
PH UR STRIP.AUTO: 6 [PH] (ref 5–9)
POTASSIUM SERPL-SCNC: 3.5 MEQ/L (ref 3.5–5.2)
PROT UR STRIP.AUTO-MCNC: NEGATIVE MG/DL
RBC URINE: ABNORMAL /HPF
RENAL EPI CELLS #/AREA URNS HPF: ABNORMAL /[HPF]
SODIUM SERPL-SCNC: 145 MEQ/L (ref 135–145)
SP GR UR REFRACT.AUTO: 1.01 (ref 1–1.03)
UROBILINOGEN, URINE: 0.2 EU/DL (ref 0–1)
WBC #/AREA URNS HPF: ABNORMAL /HPF
WBC #/AREA URNS HPF: ABNORMAL /[HPF]
YEAST LIKE FUNGI URNS QL MICRO: ABNORMAL

## 2025-06-24 PROCEDURE — 6370000000 HC RX 637 (ALT 250 FOR IP): Performed by: NURSE PRACTITIONER

## 2025-06-24 PROCEDURE — 6360000002 HC RX W HCPCS: Performed by: NURSE PRACTITIONER

## 2025-06-24 PROCEDURE — 2500000003 HC RX 250 WO HCPCS: Performed by: NURSE PRACTITIONER

## 2025-06-24 PROCEDURE — 85018 HEMOGLOBIN: CPT

## 2025-06-24 PROCEDURE — 99024 POSTOP FOLLOW-UP VISIT: CPT | Performed by: NURSE PRACTITIONER

## 2025-06-24 PROCEDURE — APPSS45 APP SPLIT SHARED TIME 31-45 MINUTES: Performed by: NURSE PRACTITIONER

## 2025-06-24 PROCEDURE — 36415 COLL VENOUS BLD VENIPUNCTURE: CPT

## 2025-06-24 PROCEDURE — 81001 URINALYSIS AUTO W/SCOPE: CPT

## 2025-06-24 PROCEDURE — 1200000000 HC SEMI PRIVATE

## 2025-06-24 PROCEDURE — 97535 SELF CARE MNGMENT TRAINING: CPT

## 2025-06-24 PROCEDURE — 82948 REAGENT STRIP/BLOOD GLUCOSE: CPT

## 2025-06-24 PROCEDURE — 87086 URINE CULTURE/COLONY COUNT: CPT

## 2025-06-24 PROCEDURE — 85014 HEMATOCRIT: CPT

## 2025-06-24 PROCEDURE — 80048 BASIC METABOLIC PNL TOTAL CA: CPT

## 2025-06-24 RX ORDER — ACETAMINOPHEN 325 MG/1
650 TABLET ORAL EVERY 8 HOURS SCHEDULED
Status: DISCONTINUED | OUTPATIENT
Start: 2025-06-24 | End: 2025-06-26 | Stop reason: HOSPADM

## 2025-06-24 RX ORDER — PANTOPRAZOLE SODIUM 40 MG/1
40 TABLET, DELAYED RELEASE ORAL
Status: DISCONTINUED | OUTPATIENT
Start: 2025-06-24 | End: 2025-06-26 | Stop reason: HOSPADM

## 2025-06-24 RX ORDER — TRAMADOL HYDROCHLORIDE 50 MG/1
50 TABLET ORAL EVERY 6 HOURS PRN
Status: DISCONTINUED | OUTPATIENT
Start: 2025-06-24 | End: 2025-06-26 | Stop reason: HOSPADM

## 2025-06-24 RX ADMIN — SODIUM CHLORIDE, PRESERVATIVE FREE 10 ML: 5 INJECTION INTRAVENOUS at 09:06

## 2025-06-24 RX ADMIN — VENLAFAXINE HYDROCHLORIDE 225 MG: 75 CAPSULE, EXTENDED RELEASE ORAL at 09:05

## 2025-06-24 RX ADMIN — LAMOTRIGINE 100 MG: 100 TABLET ORAL at 09:06

## 2025-06-24 RX ADMIN — SODIUM CHLORIDE, PRESERVATIVE FREE 10 ML: 5 INJECTION INTRAVENOUS at 21:05

## 2025-06-24 RX ADMIN — ACETAMINOPHEN 650 MG: 325 TABLET ORAL at 21:05

## 2025-06-24 RX ADMIN — PANTOPRAZOLE SODIUM 40 MG: 40 INJECTION, POWDER, FOR SOLUTION INTRAVENOUS at 09:06

## 2025-06-24 RX ADMIN — LOSARTAN POTASSIUM 25 MG: 25 TABLET, FILM COATED ORAL at 17:48

## 2025-06-24 RX ADMIN — NALOXEGOL OXALATE 25 MG: 25 TABLET, FILM COATED ORAL at 04:16

## 2025-06-24 RX ADMIN — PANTOPRAZOLE SODIUM 40 MG: 40 TABLET, DELAYED RELEASE ORAL at 11:25

## 2025-06-24 RX ADMIN — LOSARTAN POTASSIUM 50 MG: 50 TABLET, FILM COATED ORAL at 09:06

## 2025-06-24 RX ADMIN — BUSPIRONE HYDROCHLORIDE 10 MG: 10 TABLET ORAL at 21:05

## 2025-06-24 RX ADMIN — ACETAMINOPHEN 650 MG: 325 TABLET ORAL at 14:33

## 2025-06-24 RX ADMIN — QUETIAPINE FUMARATE 25 MG: 25 TABLET ORAL at 21:05

## 2025-06-24 RX ADMIN — LAMOTRIGINE 200 MG: 100 TABLET ORAL at 21:05

## 2025-06-24 ASSESSMENT — PAIN DESCRIPTION - DESCRIPTORS: DESCRIPTORS: ACHING;DISCOMFORT

## 2025-06-24 ASSESSMENT — PAIN SCALES - GENERAL
PAINLEVEL_OUTOF10: 6
PAINLEVEL_OUTOF10: 6

## 2025-06-24 ASSESSMENT — PAIN DESCRIPTION - ORIENTATION: ORIENTATION: MID

## 2025-06-24 ASSESSMENT — PAIN DESCRIPTION - LOCATION: LOCATION: ABDOMEN

## 2025-06-24 NOTE — PROGRESS NOTES
[facility]  STRZ ONC MED 5K  Occupational Therapy  Daily Note    Discharge Recommendations: Subacute/skilled nursing facility  Equipment Recommendations: No      Time In: 1410  Time Out: 1434  Timed Code Treatment Minutes: 24 Minutes  Minutes: 24          Date: 2025  Patient Name: Adry Moura,   Gender: female      Room: -07/007-A  MRN: [id number]  : 1950  (74 y.o.)  Referring Practitioner: Leoncio Hale MD  Diagnosis: Cancer of rectosigmoid junction (HCC)  Additional Pertinent Hx: 74-year-old female who presents with a new diagnosis of colorectal mass found to be adenocarcinoma at 16 cm.  She has a complex history with bariatric surgery in .  This subsequently developed complications with a lobulated mass in the upper esophagus.  At [facility] she underwent a near-total esophagectomy with a colonic interposition.  She subsequently had feeding tubes and also developed gastric necrosis.  Had to go back to surgery for a total gastrectomy in addition to her subtotal esophagectomy.  Currently has a [place] jejunostomy per reports that replaced her esophagus. Pt underwent Low anterior resection for adenocarcinomaon  with Dr Hale.    Restrictions/Precautions:  Restrictions/Precautions: General Precautions, Fall Risk  Required Braces or Orthoses  Other: Abdominal Binder     Social/Functional History:  Lives With: Other (Comment)  Type of Home: Assisted living  Home Access: Level entry  Home Equipment: Walker - 4-Wheeled   Bathroom Shower/Tub: Walk-in shower, Shower chair without back  Bathroom Toilet: Handicap height       Prior Level of Assist for ADLs: Independent  Prior Level of Assist for Homemaking: Independent  Homemaking Responsibilities: Yes  Prior Level of Assist for Transfers: Independent  Prior Level of Assist for Ambulation: Independent household ambulator, with or without device  Has the patient had two or more falls in the past year or any fall with  distances within unit     Functional Outcome Measures:   AM-PAC Inpatient Daily Activity Raw Score: 19     Modified [place]:  Current Functional Status:  Not Applicable    Education:  Learners: Patient and Family  ADL's, Reviewed Prior Education, Home Safety, Importance of Increasing Activity, and Assistive Device Safety    ASSESSMENT:     Activity Tolerance:  Patient tolerance of  treatment: Good treatment tolerance      Plan: Times Per Week: 5x  Current Treatment Recommendations: Balance training, Functional mobility training, Endurance training, Cognitive reorientation, Pain management, Self-Care / ADL, Home management training, Equipment evaluation, education, & procurement, Patient/Caregiver education & training, Safety education & training, Cognitive/Perceptual training    Goals  Short Term Goals  Time Frame for Short Term Goals: upon discharge  Short Term Goal 1: Patient will tolerate standing for 6 min during functional task with Sup to increase ease with sink side grooming.  Short Term Goal 2: Patient will demonstrate safe walker mgmt during all t/fs and mobility with 0 cues for locking/unlocking breaks and Mod I to improve IND with ADLs.  Short Term Goal 3: Patient will demonstrate LB dressing with SBA and AE PRN.    Following session, patient left in safe position in bed, with alarm, and call light within reach

## 2025-06-24 NOTE — PROGRESS NOTES
[facility]  PHYSICAL THERAPY MISSED TREATMENT NOTE  STRZ ONC MED 5K    Date: 2025  Patient Name: Adry Moura        MRN: [id number]   : 1950  (74 y.o.)  Gender: female   Referring Practitioner: Leoncio Hale MD            REASON FOR MISSED TREATMENT:  Attempted to see patient this afternoon. Working with OT. Will follow up as time allows.      Sosa Nieves, PTA  2:53 PM  2025

## 2025-06-24 NOTE — PLAN OF CARE
Problem: Discharge Planning  Goal: Discharge to home or other facility with appropriate resources  6/24/2025 1209 by Humaira Portillo RN  Outcome: Progressing  Flowsheets (Taken 6/24/2025 0010 by Sharmaine Calderon RN)  Discharge to home or other facility with appropriate resources:   Identify barriers to discharge with patient and caregiver   Identify discharge learning needs (meds, wound care, etc)     Problem: Chronic Conditions and Co-morbidities  Goal: Patient's chronic conditions and co-morbidity symptoms are monitored and maintained or improved  6/24/2025 1209 by Humaira Portillo RN  Outcome: Progressing  Flowsheets (Taken 6/24/2025 0010 by Sharmaine Calderon RN)  Care Plan - Patient's Chronic Conditions and Co-Morbidity Symptoms are Monitored and Maintained or Improved: Monitor and assess patient's chronic conditions and comorbid symptoms for stability, deterioration, or improvement     Problem: Pain  Goal: Verbalizes/displays adequate comfort level or baseline comfort level  6/24/2025 1209 by Humaira Portillo RN  Outcome: Progressing  Flowsheets (Taken 6/24/2025 0010 by Sharmaine Calderon RN)  Verbalizes/displays adequate comfort level or baseline comfort level:   Encourage patient to monitor pain and request assistance   Assess pain using appropriate pain scale   Administer analgesics based on type and severity of pain and evaluate response     Problem: Safety - Adult  Goal: Free from fall injury  6/24/2025 1209 by Humaira Portillo RN  Outcome: Progressing  Flowsheets (Taken 6/24/2025 0010 by Sharmaine Calderon RN)  Free From Fall Injury: Instruct family/caregiver on patient safety     Problem: ABCDS Injury Assessment  Goal: Absence of physical injury  6/24/2025 1209 by Humaira Portillo RN  Outcome: Progressing  Flowsheets (Taken 6/24/2025 0010 by Sharmaine Calderon RN)  Absence of Physical Injury: Implement safety measures based on patient assessment     Problem: Respiratory - Adult  Goal:

## 2025-06-24 NOTE — PLAN OF CARE
Problem: Discharge Planning  Goal: Discharge to home or other facility with appropriate resources  6/24/2025 0010 by Sharmaine Calderon RN  Outcome: Progressing  Flowsheets (Taken 6/24/2025 0010)  Discharge to home or other facility with appropriate resources:   Identify barriers to discharge with patient and caregiver   Identify discharge learning needs (meds, wound care, etc)     Problem: Chronic Conditions and Co-morbidities  Goal: Patient's chronic conditions and co-morbidity symptoms are monitored and maintained or improved  6/24/2025 0010 by Sharmaine Calderon RN  Outcome: Progressing  Flowsheets (Taken 6/24/2025 0010)  Care Plan - Patient's Chronic Conditions and Co-Morbidity Symptoms are Monitored and Maintained or Improved: Monitor and assess patient's chronic conditions and comorbid symptoms for stability, deterioration, or improvement     Problem: Pain  Goal: Verbalizes/displays adequate comfort level or baseline comfort level  6/24/2025 0010 by Sharmaine Calderon RN  Outcome: Progressing  Flowsheets (Taken 6/24/2025 0010)  Verbalizes/displays adequate comfort level or baseline comfort level:   Encourage patient to monitor pain and request assistance   Assess pain using appropriate pain scale   Administer analgesics based on type and severity of pain and evaluate response     Problem: Safety - Adult  Goal: Free from fall injury  6/24/2025 0010 by Sharmaine Calderon RN  Outcome: Progressing  Flowsheets (Taken 6/24/2025 0010)  Free From Fall Injury: Instruct family/caregiver on patient safety     Problem: ABCDS Injury Assessment  Goal: Absence of physical injury  6/24/2025 0010 by Sharmaine Calderon RN  Outcome: Progressing  Flowsheets (Taken 6/24/2025 0010)  Absence of Physical Injury: Implement safety measures based on patient assessment     Problem: Respiratory - Adult  Goal: Achieves optimal ventilation and oxygenation  6/24/2025 0010 by Sharmaine Calderon RN  Outcome: Progressing     Problem:

## 2025-06-24 NOTE — PROGRESS NOTES
Upon assessment this AM, patient able to answer all orientation questions but patient is confused. This RN asked patient how she was feeling and patient stated her brain felt \"foggy\". Patient said she did not feel like her normal self.    This RN told patient we would be obtaining a urine sample to check for a UTI. Patient then became very defensive and asked this RN why we would be checking for that.  This RN reiterated that patient said she felt off this morning, and patient daughter had mentioned yesterday that she acted this way when she had a UTI in the past.   Patient then stated - \"well that's not how you get a urine sample\". This RN asked patient to explain what she meant. Patient then stated \" you don't get a urine sample with a needle and a syringe\". This RN was drawing up patient medication Protonix and explained that to the patient. This RN also explained the process of how we would obtain the urine sample next time she urinated.    Patient then stated she has not been getting protonix and did not want it. This RN educated patient that she has been taking protonix for the past couple days and that this RN gave it to her yesterday morning along with her other morning medications. Patient still refusing saying she will not take it till her daughter comes and that she has not been taking it.  This RN asked if patient would take her other morning medications and patient is also refusing. Patient having intermittent confusion.  Uriel Madera NP notified via perfect serve.

## 2025-06-24 NOTE — PROGRESS NOTES
[facility]  EVA Khan-KANWAL for Dr. Leoncio Hale  General Surgery Postoperative Progress Note    Pt Name: Adry Moura  Medical Record Number: [id number]  Date of Birth 1950   Today's Date: 6/24/2025    ASSESSMENT   POD # 5 Status post low anterior resection for adenocarcinoma  Acute blood loss anemia - stable   Intermittent confusion yesterday   Postoperative weakness  Diabetes Mellitus   History of DVT  History of gastric bypass then later total gastrectomy   History of subtotal esophagectomy with colonic interposition   has a past medical history of Arthritis, Bowel obstruction (HCC), Bronchitis, Diabetes mellitus (HCC), Difficult intubation, DVT, lower extremity (HCC), GERD (gastroesophageal reflux disease), History of blood transfusion, Hx of blood clots, Hypertension, Hypoglycemia, Kidney stone, Macular degeneration, Movement disorder, Multinodular goiter, Muscle strain of left lower extremity, Pinched nerve, Psychiatric problem, Urinary incontinence, and Vitamin D deficiency.  PLAN   Continue full liquids until better bowel function. Ensure ordered TID.  Check UA/culture  PT/OT following  Continue Movantik   Schedule Tylenol. Avoid narcotics if possible. PRN Ultram added.   Chems ACHS w SS coverage only as needed  Resume Lovenox - monitor for signs of bleeding  Pain & nausea control  Prevena vac in place. Keep in place for 7 days total.   Surgical pathology reviewed. Was not back on rounds so was not reviewed with patient. Will go over in am.   Labs reviewed. Hemoglobin 8.3 post 1 unit PRBC. Stable this morning. Continue monitor.   Clinically, patient looks pretty good. Daughter was concerned with some confusion/paranoia yesterday but she did have two Oxy tabs which was the most she has taken since surgery. Has not had any narcotics today and very appropriate. Continue to monitor closely.   SUBJECTIVE   Patient sitting up in bed. Was paranoid this morning and somewhat  propria into the pericolic   tissue   Macroscopic tumor perforation: Not identified   Lymphatic and/or vascular invasion: Not identified   Perineural invasion: Present   Tumor budding score: Intermediate (5-9)   Treatment effect: No known presurgical therapy   Margin status for invasive carcinoma: All margins negative for invasive   carcinoma   Margin status for noninvasive tumor: All margins negative for   high-grade dysplasia/intramucosal carcinoma and low-grade dysplasia   Regional lymph node status: All regional lymph nodes negative for tumor    Number of lymph nodes examined: 5   Tumor deposits: Not identified   pTNM classification, AJCC eighth edition   pT3   pN0     Comment: A thorough lymph node dissection was attempted after treatment   with lymph node fixative.  Unfortunately, fewer than 12 lymph nodes are   identified.     [id number]                                                      <Sign Out  Signature>     RADIOLOGY   No new imaging    Electronically signed by VEA SORENSEN CNP on 6/24/2025 at 12:56 PM

## 2025-06-25 DIAGNOSIS — C19 ADENOCARCINOMA OF RECTOSIGMOID JUNCTION (HCC): Primary | ICD-10-CM

## 2025-06-25 LAB
ANION GAP SERPL CALC-SCNC: 10 MEQ/L (ref 8–16)
BACTERIA UR CULT: ABNORMAL
BILIRUB UR QL STRIP.AUTO: NEGATIVE
BUN SERPL-MCNC: 5 MG/DL (ref 8–23)
CALCIUM SERPL-MCNC: 8.5 MG/DL (ref 8.8–10.2)
CHARACTER UR: CLEAR
CHLORIDE SERPL-SCNC: 107 MEQ/L (ref 98–111)
CO2 SERPL-SCNC: 28 MEQ/L (ref 22–29)
COLOR, UA: YELLOW
CREAT SERPL-MCNC: 0.6 MG/DL (ref 0.5–0.9)
DEPRECATED RDW RBC AUTO: 55.4 FL (ref 35–45)
ERYTHROCYTE [DISTWIDTH] IN BLOOD BY AUTOMATED COUNT: 15.4 % (ref 11.5–14.5)
GFR SERPL CREATININE-BSD FRML MDRD: > 90 ML/MIN/1.73M2
GLUCOSE BLD STRIP.AUTO-MCNC: 75 MG/DL (ref 70–108)
GLUCOSE BLD STRIP.AUTO-MCNC: 76 MG/DL (ref 70–108)
GLUCOSE BLD STRIP.AUTO-MCNC: 82 MG/DL (ref 70–108)
GLUCOSE BLD STRIP.AUTO-MCNC: 95 MG/DL (ref 70–108)
GLUCOSE SERPL-MCNC: 79 MG/DL (ref 74–109)
GLUCOSE UR QL STRIP.AUTO: NEGATIVE MG/DL
HCT VFR BLD AUTO: 28.2 % (ref 37–47)
HGB BLD-MCNC: 8.7 GM/DL (ref 12–16)
HGB UR QL STRIP.AUTO: NEGATIVE
KETONES UR QL STRIP.AUTO: NEGATIVE
MCH RBC QN AUTO: 31 PG (ref 26–33)
MCHC RBC AUTO-ENTMCNC: 30.9 GM/DL (ref 32.2–35.5)
MCV RBC AUTO: 100.4 FL (ref 81–99)
NITRITE UR QL STRIP: NEGATIVE
ORGANISM: ABNORMAL
PH UR STRIP.AUTO: 7 [PH] (ref 5–9)
PLATELET # BLD AUTO: 315 THOU/MM3 (ref 130–400)
PMV BLD AUTO: 9.1 FL (ref 9.4–12.4)
POTASSIUM SERPL-SCNC: 3.5 MEQ/L (ref 3.5–5.2)
PROT UR STRIP.AUTO-MCNC: NEGATIVE MG/DL
RBC # BLD AUTO: 2.81 MILL/MM3 (ref 4.2–5.4)
SODIUM SERPL-SCNC: 145 MEQ/L (ref 135–145)
SP GR UR REFRACT.AUTO: 1.01 (ref 1–1.03)
UROBILINOGEN, URINE: 1 EU/DL (ref 0–1)
WBC # BLD AUTO: 7.6 THOU/MM3 (ref 4.8–10.8)
WBC #/AREA URNS HPF: NEGATIVE /[HPF]

## 2025-06-25 PROCEDURE — 36415 COLL VENOUS BLD VENIPUNCTURE: CPT

## 2025-06-25 PROCEDURE — 82948 REAGENT STRIP/BLOOD GLUCOSE: CPT

## 2025-06-25 PROCEDURE — 81003 URINALYSIS AUTO W/O SCOPE: CPT

## 2025-06-25 PROCEDURE — 97530 THERAPEUTIC ACTIVITIES: CPT

## 2025-06-25 PROCEDURE — 2500000003 HC RX 250 WO HCPCS: Performed by: NURSE PRACTITIONER

## 2025-06-25 PROCEDURE — 1200000000 HC SEMI PRIVATE

## 2025-06-25 PROCEDURE — 99024 POSTOP FOLLOW-UP VISIT: CPT

## 2025-06-25 PROCEDURE — 80048 BASIC METABOLIC PNL TOTAL CA: CPT

## 2025-06-25 PROCEDURE — 85027 COMPLETE CBC AUTOMATED: CPT

## 2025-06-25 PROCEDURE — APPNB30 APP NON BILLABLE TIME 0-30 MINS

## 2025-06-25 PROCEDURE — 6360000002 HC RX W HCPCS: Performed by: NURSE PRACTITIONER

## 2025-06-25 PROCEDURE — 6370000000 HC RX 637 (ALT 250 FOR IP): Performed by: NURSE PRACTITIONER

## 2025-06-25 RX ORDER — QUETIAPINE FUMARATE 50 MG/1
50 TABLET, FILM COATED ORAL NIGHTLY
DISCHARGE
Start: 2025-06-25

## 2025-06-25 RX ADMIN — NALOXEGOL OXALATE 25 MG: 25 TABLET, FILM COATED ORAL at 05:32

## 2025-06-25 RX ADMIN — ENOXAPARIN SODIUM 40 MG: 100 INJECTION SUBCUTANEOUS at 08:43

## 2025-06-25 RX ADMIN — TRAMADOL HYDROCHLORIDE 50 MG: 50 TABLET, COATED ORAL at 18:44

## 2025-06-25 RX ADMIN — TRAMADOL HYDROCHLORIDE 50 MG: 50 TABLET, COATED ORAL at 10:00

## 2025-06-25 RX ADMIN — LAMOTRIGINE 200 MG: 100 TABLET ORAL at 20:39

## 2025-06-25 RX ADMIN — QUETIAPINE FUMARATE 25 MG: 25 TABLET ORAL at 20:39

## 2025-06-25 RX ADMIN — SODIUM CHLORIDE, PRESERVATIVE FREE 10 ML: 5 INJECTION INTRAVENOUS at 20:39

## 2025-06-25 RX ADMIN — ACETAMINOPHEN 650 MG: 325 TABLET ORAL at 13:52

## 2025-06-25 RX ADMIN — BUSPIRONE HYDROCHLORIDE 10 MG: 10 TABLET ORAL at 20:39

## 2025-06-25 RX ADMIN — LAMOTRIGINE 100 MG: 100 TABLET ORAL at 08:43

## 2025-06-25 RX ADMIN — SODIUM CHLORIDE, PRESERVATIVE FREE 10 ML: 5 INJECTION INTRAVENOUS at 08:44

## 2025-06-25 RX ADMIN — LOSARTAN POTASSIUM 25 MG: 25 TABLET, FILM COATED ORAL at 17:27

## 2025-06-25 RX ADMIN — ACETAMINOPHEN 650 MG: 325 TABLET ORAL at 05:32

## 2025-06-25 RX ADMIN — PANTOPRAZOLE SODIUM 40 MG: 40 TABLET, DELAYED RELEASE ORAL at 05:32

## 2025-06-25 RX ADMIN — ACETAMINOPHEN 650 MG: 325 TABLET ORAL at 20:39

## 2025-06-25 RX ADMIN — VENLAFAXINE HYDROCHLORIDE 225 MG: 75 CAPSULE, EXTENDED RELEASE ORAL at 08:44

## 2025-06-25 RX ADMIN — LOSARTAN POTASSIUM 50 MG: 50 TABLET, FILM COATED ORAL at 08:43

## 2025-06-25 ASSESSMENT — PAIN - FUNCTIONAL ASSESSMENT
PAIN_FUNCTIONAL_ASSESSMENT: ACTIVITIES ARE NOT PREVENTED

## 2025-06-25 ASSESSMENT — PAIN DESCRIPTION - ORIENTATION
ORIENTATION: MID

## 2025-06-25 ASSESSMENT — PAIN SCALES - GENERAL
PAINLEVEL_OUTOF10: 6
PAINLEVEL_OUTOF10: 7
PAINLEVEL_OUTOF10: 3
PAINLEVEL_OUTOF10: 6
PAINLEVEL_OUTOF10: 7

## 2025-06-25 ASSESSMENT — PAIN DESCRIPTION - PAIN TYPE: TYPE: SURGICAL PAIN

## 2025-06-25 ASSESSMENT — PAIN DESCRIPTION - LOCATION
LOCATION: ABDOMEN

## 2025-06-25 ASSESSMENT — PAIN DESCRIPTION - DESCRIPTORS
DESCRIPTORS: ACHING;DISCOMFORT

## 2025-06-25 ASSESSMENT — PAIN SCALES - WONG BAKER: WONGBAKER_NUMERICALRESPONSE: NO HURT

## 2025-06-25 NOTE — DISCHARGE INSTRUCTIONS
DR. BOLES'S DISCHARGE INSTRUCTIONS    Pt Name: Adry Moura  Medical Record Number: [id number]  Today's Date: 6/25/2025      ACTIVITY INSTRUCTIONS:  [] Rest today. Resume light to normal activity tomorrow.   [] You may resume normal activity tomorrow. Do not engage in strenuous activity that may place stress on your incision.  [x] Do not drive for 3-5 days or while taking the pain medication. Avoid heavy lifting, tugging, pullings greater than 10 lbs until seen in the office.      DIET INSTRUCTIONS:  [x]Regular diet as tolerated.    MEDICATIONS  [x]Prescription sent with you to be used as directed.   [x]Ultram   Do not drink alcohol or drive while taking these medications. You may experience dizziness or drowsiness with these medications. You may also experience constipation which can be relieved with stool softners or laxatives.  **Pain medication at discharge - use only as prescribed- refills may be available to you at your follow up appointments if needed and warranted.  Narcotics should be used for only short term and we highly encouraged our patients to wean off appropriately and use other means for pain such as non pharmacologic measures and over the counter tylenol or ibuprofen if no restrictions apply. We do  know that surgical pain is real and will not hesitate to help eliminate some of your discomfort. However we will not be able to completely make you pain free and it is important to determine what pain level is tolerable for you **  [x]You may resume your daily prescription medication schedule unless otherwise specified.     Take the Zofran exactly as prescribed to control nausea and vomiting.    Use Colace and MiraLAX asneeded to prevent constipation.  Do not allow yourself to become constipated.    Drink at least 64 ounces of liquids per day.    WOUND/DRESSING INSTRUCTIONS:  Always ensure you and your care giver clean hands before and after caring for the wound.    [x] Allow surgical glue to fall  off on their own.   [x] Ice operative site for 20 minutes 4 times a day as needed.     [x] May wash over incision in shower, but do not soak in a bath.  [x] Keep the abdominal binder in place during the day. May remove to shower and at night.    ABDOMINAL/LAPAROSCOPIC SURGERY  [x]You are encouraged to get up and move around as this helps with circulation, prevents blood clots from forming and speeds up the healing process. Call the office if you develop pain or swelling in your legs. Do not massage sore muscles in the legs.  [x]Breath deeply and cough from time to time. This helps to clear your lungs and helps prevent pneumonia.  [x]Supporting your incision with a pillow or your hand helps to minimize discomfort and pain.  [x]Laparoscopic patients may develop shoulder pain in the first 48 hours from the gas used during the procedure a heating pad may help alleviate this discomfort.    FOLLOW-UP CARE. SPECIFICALLY WATCH FOR:   Fever over 101 degrees by mouth   Increased redness, warmth, hardness at operative site.   Blood soaked dressing (small amounts of oozing may be normal.)   Increased or progressive drainage from the surgical area   Inability to urinate or blood in the urine   Pain not relieved by the medications ordered   Persistent nausea and/or vomiting, unable to retain fluids.   Pain or swelling in your legs.   Shortness of breath.  Call the office if you develop any of the above symptoms.     FOLLOW-UP APPOINTMENT   [x]1 week   []2 weeks:    []Other    Call my office if you have any problem that concerns you [phone]. After hours, you can reach the answering service via the office phone number. IF YOU NEED IMMEDIATE ATTENTION, GO TO THE EMERGENCY ROOM AND YOUR DOCTOR WILL BE CONTACTED.      Prepared by Uriel Madera CNP for   JOE BOLES MD [facility]  [id number]

## 2025-06-25 NOTE — PLAN OF CARE
Problem: Discharge Planning  Goal: Discharge to home or other facility with appropriate resources  6/25/2025 1011 by Fozia Dias RN  Outcome: Progressing  Flowsheets (Taken 6/25/2025 1011)  Discharge to home or other facility with appropriate resources:   Identify barriers to discharge with patient and caregiver   Identify discharge learning needs (meds, wound care, etc)   Arrange for needed discharge resources and transportation as appropriate     Problem: Chronic Conditions and Co-morbidities  Goal: Patient's chronic conditions and co-morbidity symptoms are monitored and maintained or improved  6/25/2025 1011 by Fozia Dias RN  Outcome: Progressing  Flowsheets (Taken 6/25/2025 1011)  Care Plan - Patient's Chronic Conditions and Co-Morbidity Symptoms are Monitored and Maintained or Improved: Monitor and assess patient's chronic conditions and comorbid symptoms for stability, deterioration, or improvement     Problem: Pain  Goal: Verbalizes/displays adequate comfort level or baseline comfort level  6/25/2025 1011 by Fozia Dias RN  Outcome: Progressing  Flowsheets (Taken 6/25/2025 1011)  Verbalizes/displays adequate comfort level or baseline comfort level:   Encourage patient to monitor pain and request assistance   Administer analgesics based on type and severity of pain and evaluate response   Assess pain using appropriate pain scale   Implement non-pharmacological measures as appropriate and evaluate response     Problem: Safety - Adult  Goal: Free from fall injury  6/25/2025 1011 by Fozia Dias RN  Outcome: Progressing  Flowsheets (Taken 6/25/2025 1011)  Free From Fall Injury: Instruct family/caregiver on patient safety     Problem: ABCDS Injury Assessment  Goal: Absence of physical injury  6/25/2025 1011 by Fozia Dias RN  Outcome: Progressing  Flowsheets (Taken 6/25/2025 1011)  Absence of Physical Injury: Implement safety measures based on patient assessment     Problem: Respiratory -  Adult  Goal: Achieves optimal ventilation and oxygenation  6/25/2025 1011 by Fozia Dias RN  Outcome: Progressing  Flowsheets (Taken 6/25/2025 1011)  Achieves optimal ventilation and oxygenation:   Assess for changes in respiratory status   Assess for changes in mentation and behavior   Position to facilitate oxygenation and minimize respiratory effort   Oxygen supplementation based on oxygen saturation or arterial blood gases   Encourage broncho-pulmonary hygiene including cough, deep breathe, incentive spirometry   Assess and instruct to report shortness of breath or any respiratory difficulty     Problem: Cardiovascular - Adult  Goal: Maintains optimal cardiac output and hemodynamic stability  6/25/2025 1011 by Fozia Dias RN  Outcome: Progressing  Flowsheets (Taken 6/25/2025 1011)  Maintains optimal cardiac output and hemodynamic stability: Monitor blood pressure and heart rate     Problem: Skin/Tissue Integrity - Adult  Goal: Skin integrity remains intact  6/25/2025 1011 by Fozia Dias RN  Outcome: Progressing  Flowsheets (Taken 6/25/2025 1011)  Skin Integrity Remains Intact: Monitor for areas of redness and/or skin breakdown     Problem: Gastrointestinal - Adult  Goal: Minimal or absence of nausea and vomiting  6/25/2025 1011 by Fozia Dias RN  Outcome: Progressing  Flowsheets (Taken 6/25/2025 1011)  Minimal or absence of nausea and vomiting: Administer IV fluids as ordered to ensure adequate hydration     Problem: Neurosensory - Adult  Goal: Achieves stable or improved neurological status  6/25/2025 1011 by Fozia Dias RN  Outcome: Progressing  Flowsheets (Taken 6/25/2025 1011)  Achieves stable or improved neurological status: Assess for and report changes in neurological status     Problem: Musculoskeletal - Adult  Goal: Return mobility to safest level of function  6/25/2025 1011 by Fozia Dias RN  Outcome: Progressing  Flowsheets (Taken 6/25/2025 1011)  Return Mobility to Safest Level of

## 2025-06-25 NOTE — PLAN OF CARE
Problem: Discharge Planning  Goal: Discharge to home or other facility with appropriate resources  6/24/2025 2350 by Sharmaine Calderon RN  Outcome: Progressing     Problem: Chronic Conditions and Co-morbidities  Goal: Patient's chronic conditions and co-morbidity symptoms are monitored and maintained or improved  6/24/2025 2350 by Sharmaine Calderon RN  Outcome: Progressing     Problem: Pain  Goal: Verbalizes/displays adequate comfort level or baseline comfort level  6/24/2025 2350 by Sharmaine Calderon RN  Outcome: Progressing     Problem: Safety - Adult  Goal: Free from fall injury  6/24/2025 2350 by Sharmaine Calderon RN  Outcome: Progressing     Problem: ABCDS Injury Assessment  Goal: Absence of physical injury  6/24/2025 2350 by Sharmaine Calderon RN  Outcome: Progressing     Problem: Respiratory - Adult  Goal: Achieves optimal ventilation and oxygenation  6/24/2025 2350 by Sharmaine Calderon RN  Outcome: Progressing     Problem: Cardiovascular - Adult  Goal: Maintains optimal cardiac output and hemodynamic stability  6/24/2025 2350 by Sharmaine Calderon RN  Outcome: Progressing     Problem: Skin/Tissue Integrity - Adult  Goal: Skin integrity remains intact  6/24/2025 2350 by Sharmaine Calderon RN  Outcome: Progressing     Problem: Gastrointestinal - Adult  Goal: Minimal or absence of nausea and vomiting  6/24/2025 2350 by Sharmaine Calderon RN  Outcome: Progressing     Problem: Neurosensory - Adult  Goal: Achieves stable or improved neurological status  6/24/2025 2350 by Sharmaine Calderon RN  Outcome: Progressing     Problem: Musculoskeletal - Adult  Goal: Return mobility to safest level of function  6/24/2025 2350 by Sharmaine Calderon RN  Outcome: Progressing     Care plan reviewed with patient.  Patient verbalizes understanding of the plan of care and contributes to goal setting.

## 2025-06-25 NOTE — PROGRESS NOTES
Formerly named [facility]  [address]  General Surgery Postoperative Progress Note    Pt Name: Adry Moura  Medical Record Number: [id number]  Date of Birth 1950   Today's Date: 6/25/2025    ASSESSMENT   POD # 6 Status post low anterior resection for adenocarcinoma  Acute blood loss anemia - stable   Intermittent confusion yesterday   Postoperative weakness  Diabetes Mellitus   History of DVT  History of gastric bypass then later total gastrectomy   History of subtotal esophagectomy with colonic interposition   has a past medical history of Arthritis, Bowel obstruction (HCC), Bronchitis, Diabetes mellitus (HCC), Difficult intubation, DVT, lower extremity (HCC), GERD (gastroesophageal reflux disease), History of blood transfusion, Hx of blood clots, Hypertension, Hypoglycemia, Kidney stone, Macular degeneration, Movement disorder, Multinodular goiter, Muscle strain of left lower extremity, Pinched nerve, Psychiatric problem, Urinary incontinence, and Vitamin D deficiency.  PLAN   Tolerating soft diet without issue. Ensure ordered TID.  Urine culture with mixed growth. Will recollect.  PT/OT following. Recommending SNF  Continue Movantik   Schedule Tylenol. Avoid narcotics if possible. PRN Ultram added.   Chems ACHS w SS coverage only as needed  Resume Lovenox - monitor for signs of bleeding  Pain & nausea control  Prevena vac in place. Keep in place for 7 days total.   Surgical pathology reviewed. Surgical pathology reviewed with patient and daughter at bedside. Will place outpatient oncology referral at discharge.  Labs reviewed. Hemoglobin 8.7 post 1 unit PRBC. Stable this morning. Continue monitor.   Clinically, patient looks pretty good. Patient passing gas and having bowel movements. Up with assistance. Daughter at bedside. Case discussed with case management. No pre-CERT needed. Will discharge to SNF [facility] in  [place]. Later, patient decided she wanted to stay at  (24h):Temp: 98.4 °F (36.9 °C) Temp  Av.4 °F (36.9 °C)  Min: 97.9 °F (36.6 °C)  Max: 98.8 °F (37.1 °C)  BP Range (24h): Systolic (24hrs), Av , Min:134 , Max:170     Diastolic (24hrs), Av, Min:63, Max:84    Pulse Range (24h): Pulse  Av.8  Min: 75  Max: 85  Respiration Range (24h): Resp  Av  Min: 18  Max: 18  Current Pulse Ox (24h):  SpO2: 98 %  Pulse Ox Range (24h):  SpO2  Av.7 %  Min: 96 %  Max: 98 %  Oxygen Amount and Delivery: O2 Flow Rate (L/min): 0 L/min  Incentive Spirometry Tx:            GENERAL: alert and oriented, no distress  LUNGS: left sided bulging air mass with breathing from colon interposition, right side clear  HEART: normal rate and regular rhythm  ABDOMEN: non-distended, soft, incisional tenderness, bowel sounds present  INCISION: prevena in place   EXTREMITY: no cyanosis, clubbing or edema  In: 1220 [P.O.:1220]  Out: -   Date 25 0000 - 25 2359   Shift 2529-4891 0939-9171 2286-2495 24 Hour Total   INTAKE   P.O. 200 300  500   Shift Total(mL/kg) 200(3.4) 300(5)  500(8.4)   OUTPUT   Shift Total(mL/kg)       Weight (kg) 59.5 59.5 59.5 59.5     LABS     Recent Labs     25  0626 25  1605 25  0539 25  0525   WBC 5.2  --   --  7.6   HGB 6.8* 8.3* 8.3* 8.7*   HCT 22.5* 25.2* 25.8* 28.2*     --   --  315   NA  --   --  145 145   K  --   --  3.5 3.5   CL  --   --  108 107   CO2  --   --  28 28   BUN  --   --  4* 5*   CREATININE  --   --  0.6 0.6   CALCIUM  --   --  8.5* 8.5*    Contains abnormal data Culture, Reflexed, Urine  Order: [id number]   Status: Final result       Next appt: 2025 at 10:30 AM in General Surgery (BRANT KO, EVA - CNP)    Test Result Released: Yes (not seen)    Specimen Information: Urine, clean catch   0 Result Notes      Component  Ref Range & Units (hover)    Urine Culture Reflex  Abnormal   Mixed growth. Further ID and susceptibility testing cannot be performed. Recollection is suggested if

## 2025-06-25 NOTE — PROGRESS NOTES
[facility]  [facility] ONC MED 5K  Occupational Therapy  Daily Note    Discharge Recommendations: Subacute/skilled nursing facility and Patient would benefit from continued OT at discharge  Equipment Recommendations: No        Time In: 1357  Time Out: 1425  Timed Code Treatment Minutes: 28 Minutes  Minutes: 28          Date: 2025  Patient Name: Adry Moura,   Gender: female      Room: [address] [place] [address]  MRN: [id number]  : 1950  (74 y.o.)  Referring Practitioner: Leoncio Hale MD  Diagnosis: Cancer of rectosigmoid junction (HCC)  Additional Pertinent Hx: 74-year-old female who presents with a new diagnosis of colorectal mass found to be adenocarcinoma at 16 cm.  She has a complex history with bariatric surgery in .  This subsequently developed complications with a lobulated mass in the upper esophagus.  At [facility] she underwent a near-total esophagectomy with a colonic interposition.  She subsequently had feeding tubes and also developed gastric necrosis.  Had to go back to surgery for a total gastrectomy in addition to her subtotal esophagectomy.  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 Pt underwent Low anterior resection for adenocarcinomaon  with Dr Hale.    Restrictions/Precautions:  Restrictions/Precautions: General Precautions, Fall Risk  Required Braces or Orthoses  Other: Abdominal Binder     Social/Functional History:  Lives With: Other (Comment)  Type of Home: Assisted living  Home Access: Level entry  Home Equipment: Walker - 4-Wheeled   Bathroom Shower/Tub: Walk-in shower, Shower chair without back  Bathroom Toilet: Handicap height       Prior Level of Assist for ADLs: Independent  Prior Level of Assist for Homemaking: Independent  Homemaking Responsibilities: Yes  Prior Level of Assist for Transfers: Independent  Prior Level of Assist for Ambulation: Independent household ambulator, with or without device  Has the patient

## 2025-06-25 NOTE — CARE COORDINATION
6/25/25, 11:18 AM EDT    DISCHARGE PLANNING EVALUATION    SW spoke with provider, anticipating discharge today. RENAE did call [facility], spoke with Bandar, updated on this, they are ready for pt when she's discharged.

## 2025-06-26 VITALS
TEMPERATURE: 97.9 F | HEART RATE: 83 BPM | WEIGHT: 131.13 LBS | SYSTOLIC BLOOD PRESSURE: 150 MMHG | OXYGEN SATURATION: 96 % | BODY MASS INDEX: 21.07 KG/M2 | HEIGHT: 66 IN | DIASTOLIC BLOOD PRESSURE: 70 MMHG | RESPIRATION RATE: 18 BRPM

## 2025-06-26 LAB
GLUCOSE BLD STRIP.AUTO-MCNC: 71 MG/DL (ref 70–108)
GLUCOSE BLD STRIP.AUTO-MCNC: 81 MG/DL (ref 70–108)

## 2025-06-26 PROCEDURE — 82948 REAGENT STRIP/BLOOD GLUCOSE: CPT

## 2025-06-26 PROCEDURE — 0DTN0ZZ RESECTION OF SIGMOID COLON, OPEN APPROACH: ICD-10-PCS | Performed by: SURGERY

## 2025-06-26 PROCEDURE — 2500000003 HC RX 250 WO HCPCS: Performed by: NURSE PRACTITIONER

## 2025-06-26 PROCEDURE — APPNB30 APP NON BILLABLE TIME 0-30 MINS

## 2025-06-26 PROCEDURE — 6360000002 HC RX W HCPCS: Performed by: NURSE PRACTITIONER

## 2025-06-26 PROCEDURE — 99024 POSTOP FOLLOW-UP VISIT: CPT

## 2025-06-26 PROCEDURE — 0DNU0ZZ RELEASE OMENTUM, OPEN APPROACH: ICD-10-PCS | Performed by: SURGERY

## 2025-06-26 PROCEDURE — 6370000000 HC RX 637 (ALT 250 FOR IP): Performed by: NURSE PRACTITIONER

## 2025-06-26 RX ORDER — TRAMADOL HYDROCHLORIDE 50 MG/1
50 TABLET ORAL EVERY 6 HOURS PRN
Qty: 28 TABLET | Refills: 0 | Status: SHIPPED | OUTPATIENT
Start: 2025-06-26 | End: 2025-06-26 | Stop reason: SDUPTHER

## 2025-06-26 RX ADMIN — NALOXEGOL OXALATE 25 MG: 25 TABLET, FILM COATED ORAL at 05:12

## 2025-06-26 RX ADMIN — LAMOTRIGINE 100 MG: 100 TABLET ORAL at 07:53

## 2025-06-26 RX ADMIN — SODIUM CHLORIDE, PRESERVATIVE FREE 10 ML: 5 INJECTION INTRAVENOUS at 07:50

## 2025-06-26 RX ADMIN — TRAMADOL HYDROCHLORIDE 50 MG: 50 TABLET, COATED ORAL at 00:55

## 2025-06-26 RX ADMIN — ENOXAPARIN SODIUM 40 MG: 100 INJECTION SUBCUTANEOUS at 07:50

## 2025-06-26 RX ADMIN — VENLAFAXINE HYDROCHLORIDE 225 MG: 75 CAPSULE, EXTENDED RELEASE ORAL at 07:51

## 2025-06-26 RX ADMIN — ACETAMINOPHEN 650 MG: 325 TABLET ORAL at 05:12

## 2025-06-26 RX ADMIN — PANTOPRAZOLE SODIUM 40 MG: 40 TABLET, DELAYED RELEASE ORAL at 05:12

## 2025-06-26 RX ADMIN — TRAMADOL HYDROCHLORIDE 50 MG: 50 TABLET, COATED ORAL at 07:52

## 2025-06-26 RX ADMIN — LOSARTAN POTASSIUM 50 MG: 50 TABLET, FILM COATED ORAL at 07:50

## 2025-06-26 ASSESSMENT — PAIN - FUNCTIONAL ASSESSMENT: PAIN_FUNCTIONAL_ASSESSMENT: ACTIVITIES ARE NOT PREVENTED

## 2025-06-26 ASSESSMENT — PAIN SCALES - WONG BAKER
WONGBAKER_NUMERICALRESPONSE: NO HURT
WONGBAKER_NUMERICALRESPONSE: NO HURT

## 2025-06-26 ASSESSMENT — PAIN DESCRIPTION - DESCRIPTORS
DESCRIPTORS: ACHING
DESCRIPTORS: ACHING;DISCOMFORT

## 2025-06-26 ASSESSMENT — PAIN DESCRIPTION - ORIENTATION
ORIENTATION: MID
ORIENTATION: MID

## 2025-06-26 ASSESSMENT — PAIN DESCRIPTION - LOCATION
LOCATION: ABDOMEN
LOCATION: ABDOMEN

## 2025-06-26 ASSESSMENT — PAIN DESCRIPTION - FREQUENCY: FREQUENCY: INTERMITTENT

## 2025-06-26 ASSESSMENT — PAIN DESCRIPTION - PAIN TYPE: TYPE: SURGICAL PAIN

## 2025-06-26 ASSESSMENT — PAIN SCALES - GENERAL
PAINLEVEL_OUTOF10: 3
PAINLEVEL_OUTOF10: 6
PAINLEVEL_OUTOF10: 7

## 2025-06-26 ASSESSMENT — PAIN DESCRIPTION - ONSET: ONSET: ON-GOING

## 2025-06-26 NOTE — CARE COORDINATION
6/26/25, 8:52 AM EDT    Patient goals/plan/ treatment preferences discussed by  and .  Patient goals/plan/ treatment preferences reviewed with patient/ family.  Patient/ family verbalize understanding of discharge plan and are in agreement with goal/plan/treatment preferences.  Understanding was demonstrated using the teach back method.  AVS provided by RN at time of discharge, which includes all necessary medical information pertaining to the patients current course of illness, treatment, post-discharge goals of care, and treatment preferences.     Services At/After Discharge: Skilled Nursing Facility (SNF), Aide services, and Nursing service    Pt is being discharged today [facility] to  [facility].  SW left message with Cayla at  of [facility].    Blue Packet on chart.  RN is aware.

## 2025-06-26 NOTE — PLAN OF CARE
Problem: Discharge Planning  Goal: Discharge to home or other facility with appropriate resources  6/25/2025 2129 by Autumn Wood RN  Outcome: Progressing  Flowsheets (Taken 6/25/2025 2129)  Discharge to home or other facility with appropriate resources:   Identify barriers to discharge with patient and caregiver   Arrange for needed discharge resources and transportation as appropriate   Identify discharge learning needs (meds, wound care, etc)     Problem: Chronic Conditions and Co-morbidities  Goal: Patient's chronic conditions and co-morbidity symptoms are monitored and maintained or improved  6/25/2025 2129 by Autumn Wood RN  Outcome: Progressing  Flowsheets (Taken 6/25/2025 2129)  Care Plan - Patient's Chronic Conditions and Co-Morbidity Symptoms are Monitored and Maintained or Improved:   Monitor and assess patient's chronic conditions and comorbid symptoms for stability, deterioration, or improvement   Collaborate with multidisciplinary team to address chronic and comorbid conditions and prevent exacerbation or deterioration     Problem: Pain  Goal: Verbalizes/displays adequate comfort level or baseline comfort level  6/25/2025 2129 by Autumn Wood RN  Outcome: Progressing  Flowsheets (Taken 6/25/2025 2129)  Verbalizes/displays adequate comfort level or baseline comfort level:   Encourage patient to monitor pain and request assistance   Assess pain using appropriate pain scale   Administer analgesics based on type and severity of pain and evaluate response   Implement non-pharmacological measures as appropriate and evaluate response     Problem: Safety - Adult  Goal: Free from fall injury  6/25/2025 2129 by Autumn Wood, RN  Outcome: Progressing  Flowsheets (Taken 6/25/2025 2129)  Free From Fall Injury: Instruct family/caregiver on patient safety     Problem: ABCDS Injury Assessment  Goal: Absence of physical injury  6/25/2025 2129 by Autumn Wood,

## 2025-06-26 NOTE — PROGRESS NOTES
[facility]  [address]IS ALFR[address]O [address]  General Surgery Postoperative Progress Note    Pt Name: Adry Moura  Medical Record Number: [id number]  Date of Birth 1950   Today's Date: 6/26/2025    ASSESSMENT   POD # 7 Status post low anterior resection for adenocarcinoma  Acute blood loss anemia - stable   Intermittent confusion yesterday   Postoperative weakness  Diabetes Mellitus   History of DVT  History of gastric bypass then later total gastrectomy   History of subtotal esophagectomy with colonic interposition   has a past medical history of Arthritis, Bowel obstruction (HCC), Bronchitis, Diabetes mellitus (HCC), Difficult intubation, DVT, lower extremity (HCC), GERD (gastroesophageal reflux disease), History of blood transfusion, Hx of blood clots, Hypertension, Hypoglycemia, Kidney stone, Macular degeneration, Movement disorder, Multinodular goiter, Muscle strain of left lower extremity, Pinched nerve, Psychiatric problem, Urinary incontinence, and Vitamin D deficiency.  PLAN   Tolerating soft diet without issue. Ensure ordered TID.  Urinalysis today normal. No growth. No need for antibiotics.  PT/OT following. Recommending SNF  Continue Movantik   Schedule Tylenol. Avoid narcotics if possible. PRN Ultram added. Prescription written for discharge.  Chems ACHS w SS coverage only as needed  Resume Lovenox - monitor for signs of bleeding  Pain & nausea control  Prevena wound VAC removed today. Skin glue applied. Incision looks good.  Surgical pathology reviewed. Surgical pathology reviewed with patient and daughter at bedside. Will place outpatient oncology referral at discharge. Referral placed.  Clinically patient looks good. Prevena wound VAC removed today and skin glue applied. Incision looks good. Family at bedside. Discharging today to SNF. Follow-up appointment made. All questions answered. No new concerns.  SUBJECTIVE   Patient sitting up in bed. Daughter at bedside.

## 2025-06-26 NOTE — PLAN OF CARE
Problem: Discharge Planning  Goal: Discharge to home or other facility with appropriate resources  6/26/2025 0857 by Fozia Dias RN  Outcome: Progressing  Flowsheets (Taken 6/26/2025 0857)  Discharge to home or other facility with appropriate resources:   Identify barriers to discharge with patient and caregiver   Identify discharge learning needs (meds, wound care, etc)   Arrange for needed discharge resources and transportation as appropriate     Problem: Chronic Conditions and Co-morbidities  Goal: Patient's chronic conditions and co-morbidity symptoms are monitored and maintained or improved  6/26/2025 0857 by Fozia Dias RN  Outcome: Progressing  Flowsheets (Taken 6/26/2025 0857)  Care Plan - Patient's Chronic Conditions and Co-Morbidity Symptoms are Monitored and Maintained or Improved: Monitor and assess patient's chronic conditions and comorbid symptoms for stability, deterioration, or improvement     Problem: Pain  Goal: Verbalizes/displays adequate comfort level or baseline comfort level  6/26/2025 0857 by Fozia Dias RN  Outcome: Progressing  Flowsheets (Taken 6/26/2025 0857)  Verbalizes/displays adequate comfort level or baseline comfort level:   Encourage patient to monitor pain and request assistance   Administer analgesics based on type and severity of pain and evaluate response   Assess pain using appropriate pain scale   Implement non-pharmacological measures as appropriate and evaluate response     Problem: Safety - Adult  Goal: Free from fall injury  6/26/2025 0857 by Fozia Dias RN  Outcome: Progressing  Flowsheets (Taken 6/26/2025 0857)  Free From Fall Injury: Instruct family/caregiver on patient safety     Problem: ABCDS Injury Assessment  Goal: Absence of physical injury  6/26/2025 0857 by Fozia Dias RN  Outcome: Progressing  Flowsheets (Taken 6/26/2025 0857)  Absence of Physical Injury: Implement safety measures based on patient assessment     Problem: Respiratory -  Function:   Assess patient stability and activity tolerance for standing, transferring and ambulating with or without assistive devices   Assist with transfers and ambulation using safe patient handling equipment as needed   Care plan reviewed with patient.  Patient verbalizes understanding of the plan of care and contributes to goal setting.

## 2025-06-26 NOTE — PROGRESS NOTES
Discharge education and instructions provided. Patient and patient daughter verbalized understanding at this time. No questions asked. IV access removed. All personal belongings and AVS present with patient. Transport to [facility] via wheelchair. Patient transport to [facility] by private vehicle. AVS, last dose MAR, and LIA faxed to facility. Report called to [facility]. Izzy VILLASENOR took report with no further questions at this time. Chart contents placed in yellow bin.

## 2025-07-02 ENCOUNTER — OFFICE VISIT (OUTPATIENT)
Dept: SURGERY | Age: 75
End: 2025-07-02

## 2025-07-02 VITALS
TEMPERATURE: 97.1 F | RESPIRATION RATE: 18 BRPM | BODY MASS INDEX: 19.29 KG/M2 | HEIGHT: 66 IN | HEART RATE: 110 BPM | OXYGEN SATURATION: 98 % | SYSTOLIC BLOOD PRESSURE: 136 MMHG | WEIGHT: 120 LBS | DIASTOLIC BLOOD PRESSURE: 88 MMHG

## 2025-07-02 DIAGNOSIS — Z90.49 S/P PARTIAL COLECTOMY: Primary | ICD-10-CM

## 2025-07-02 PROCEDURE — 99024 POSTOP FOLLOW-UP VISIT: CPT | Performed by: NURSE PRACTITIONER

## 2025-07-02 NOTE — PROGRESS NOTES
[facility] [place] [facility]  [facility]  [address]  [facility] [address]  Dept: [phone]  Dept Fax: [phone]  Loc: [phone]    Visit Date: 7/2/2025    Adry Moura is a 74 y.o. female who presents today for:  Chief Complaint   Patient presents with    Post-Op Check     S/P open low anterior resection 6/19/25        HPI:     NANI Rider is a 74-year old female patient who presents for follow up status post open low anterior resection 2 weeks ago with Dr. Hale. Presents with son. Patient currently at [facility]. Pathology demonstrated invasive moderately differentiated adenocarcinoma (pT3, pN0) with negative lymph nodes. She is doing fair. Still feeling pretty weak. Has only worked with therapy a couple times since being discharged to nursing home. She is taking Ultram occasionally. Also utilizing bentyl and gas-x for cramping/intestinal discomfort. Appetite decreased. Some nausea but no vomiting. Abdominal pain is more right sided. Only usually after therapy. Abdominal incision healing well. No signs of infection. Patient is having frequent loose stools. Has slowed down. Not taking any stool softeners that she is aware of. No melena or hematochezia. No fevers or chills. No urinary complaints. No SOB or chest pain. No lightheadedness or dizziness. No calf pain or swelling. Just feels overall weak. Has not had any ensure since at [facility].     Past Medical History:   Diagnosis Date    Arthritis     generalized    Bowel obstruction (HCC) 2009    Bronchitis 06/2014    Diabetes mellitus (HCC)     Difficult intubation     DVT, lower extremity (HCC)     GERD (gastroesophageal reflux disease)     History of blood transfusion     Hx of blood clots     DVT    Hypertension     Hypoglycemia     Kidney stone 2008    Macular degeneration     Movement disorder     Multinodular goiter 02/26/2015    Muscle strain of left lower extremity 08/27/2014    Pinched nerve

## 2025-07-07 NOTE — DISCHARGE SUMMARY
relieved by the medications ordered   Persistent nausea and/or vomiting, unable to retain fluids.   Pain or swelling in your legs.   Shortness of breath.  Call the office if you develop any of the above symptoms.     FOLLOW-UP APPOINTMENT   [x]1 week   []2 weeks:    []Other    Call my office if you have any problem that concerns you [phone]. After hours, you can reach the answering service via the office phone number. IF YOU NEED IMMEDIATE ATTENTION, GO TO THE EMERGENCY ROOM AND YOUR DOCTOR WILL BE CONTACTED.      Prepared by Uriel Madera CNP for   JOE BOLES MD [facility]  [address]  Discharge Medications:        Medication List        CHANGE how you take these medications      dicyclomine 20 MG tablet  Commonly known as: BENTYL  What changed: Another medication with the same name was removed. Continue taking this medication, and follow the directions you see here.     lamoTRIgine 100 MG tablet  Commonly known as: LAMICTAL  TAKE 1 TABLET BY MOUTH IN THE MORNING AND 2 TABLETS IN THE EVENING AS DIRECTED  What changed: additional instructions            CONTINUE taking these medications      acarbose 50 MG tablet  Commonly known as: PRECOSE  Take 1 tablet by mouth 3 times daily (with meals)     acetaminophen 325 MG tablet  Commonly known as: TYLENOL     Shabnam Contour Monitor w/Device Kit  1 kit by Does not apply route once for 1 dose     Biofreeze Cool The Pain 4 % Gel  Generic drug: Menthol (Topical Analgesic)     blood glucose test strips strip  Commonly known as: Shabnam Contour Test  Pt testing blood sugar 4 times daily.  Dx: E16.2  Please add lancets     * busPIRone 10 MG tablet  Commonly known as: BUSPAR     * busPIRone 7.5 MG tablet  Commonly known as: BUSPAR     CHLORPHENIRAMINE-DM PO     docusate sodium 100 MG capsule  Commonly known as: COLACE     esomeprazole Magnesium 40 MG Pack  Commonly known as: NEXIUM     famotidine 40 MG tablet  Commonly known as: PEPCID     GAS-X PO     LINZESS PO

## 2025-07-08 ENCOUNTER — TELEPHONE (OUTPATIENT)
Dept: ONCOLOGY | Age: 75
End: 2025-07-08

## 2025-07-08 ENCOUNTER — APPOINTMENT (OUTPATIENT)
Dept: CT IMAGING | Age: 75
End: 2025-07-08
Payer: MEDICARE

## 2025-07-08 ENCOUNTER — APPOINTMENT (OUTPATIENT)
Dept: GENERAL RADIOLOGY | Age: 75
End: 2025-07-08
Payer: MEDICARE

## 2025-07-08 ENCOUNTER — HOSPITAL ENCOUNTER (INPATIENT)
Age: 75
LOS: 17 days | Discharge: NURSING, REHAB OR LONG-TERM CARE | End: 2025-07-25
Attending: EMERGENCY MEDICINE | Admitting: PHYSICIAN ASSISTANT
Payer: MEDICARE

## 2025-07-08 DIAGNOSIS — K65.1 INTRA-ABDOMINAL ABSCESS (HCC): Primary | ICD-10-CM

## 2025-07-08 PROBLEM — R18.8 INTRA-ABDOMINAL FLUID COLLECTION: Status: ACTIVE | Noted: 2025-07-08

## 2025-07-08 PROBLEM — L02.211 ABSCESS OF ABDOMINAL WALL: Status: ACTIVE | Noted: 2025-07-08

## 2025-07-08 LAB
ALBUMIN SERPL BCG-MCNC: 3 G/DL (ref 3.4–4.9)
ALP SERPL-CCNC: 123 U/L (ref 38–126)
ALT SERPL W/O P-5'-P-CCNC: 17 U/L (ref 10–35)
AMORPH SED URNS QL MICRO: ABNORMAL
ANION GAP SERPL CALC-SCNC: 14 MEQ/L (ref 8–16)
AST SERPL-CCNC: 26 U/L (ref 10–35)
BACTERIA URNS QL MICRO: ABNORMAL /HPF
BASOPHILS ABSOLUTE: 0.1 THOU/MM3 (ref 0–0.1)
BASOPHILS NFR BLD AUTO: 0.4 %
BILIRUB SERPL-MCNC: 0.5 MG/DL (ref 0.3–1.2)
BILIRUB UR QL STRIP.AUTO: NEGATIVE
BUN SERPL-MCNC: 29 MG/DL (ref 8–23)
CALCIUM SERPL-MCNC: 8.6 MG/DL (ref 8.8–10.2)
CASTS #/AREA URNS LPF: ABNORMAL /LPF
CASTS 2: ABNORMAL /LPF
CHARACTER UR: ABNORMAL
CHLORIDE SERPL-SCNC: 100 MEQ/L (ref 98–111)
CO2 SERPL-SCNC: 22 MEQ/L (ref 22–29)
COLOR, UA: YELLOW
CREAT SERPL-MCNC: 0.8 MG/DL (ref 0.5–0.9)
CRP SERPL-MCNC: 23.6 MG/DL (ref 0–0.5)
CRYSTALS URNS MICRO: ABNORMAL
DEPRECATED RDW RBC AUTO: 52.6 FL (ref 35–45)
EKG ATRIAL RATE: 105 BPM
EKG P AXIS: 72 DEGREES
EKG P-R INTERVAL: 158 MS
EKG Q-T INTERVAL: 330 MS
EKG QRS DURATION: 56 MS
EKG QTC CALCULATION (BAZETT): 436 MS
EKG R AXIS: 53 DEGREES
EKG T AXIS: 75 DEGREES
EKG VENTRICULAR RATE: 105 BPM
EOSINOPHIL NFR BLD AUTO: 0.2 %
EOSINOPHILS ABSOLUTE: 0 THOU/MM3 (ref 0–0.4)
EPITHELIAL CELLS, UA: ABNORMAL /HPF
ERYTHROCYTE [DISTWIDTH] IN BLOOD BY AUTOMATED COUNT: 14.3 % (ref 11.5–14.5)
GFR SERPL CREATININE-BSD FRML MDRD: 77 ML/MIN/1.73M2
GLUCOSE BLD STRIP.AUTO-MCNC: 61 MG/DL (ref 70–108)
GLUCOSE BLD STRIP.AUTO-MCNC: 91 MG/DL (ref 70–108)
GLUCOSE SERPL-MCNC: 99 MG/DL (ref 74–109)
GLUCOSE UR QL STRIP.AUTO: NEGATIVE MG/DL
HCT VFR BLD AUTO: 34.7 % (ref 37–47)
HGB BLD-MCNC: 10.7 GM/DL (ref 12–16)
HGB UR QL STRIP.AUTO: NEGATIVE
IMM GRANULOCYTES # BLD AUTO: 0.07 THOU/MM3 (ref 0–0.07)
IMM GRANULOCYTES NFR BLD AUTO: 0.5 %
KETONES UR QL STRIP.AUTO: 15
LACTATE SERPL-SCNC: 1.3 MMOL/L (ref 0.5–2.2)
LYMPHOCYTES ABSOLUTE: 0.8 THOU/MM3 (ref 1–4.8)
LYMPHOCYTES NFR BLD AUTO: 6.1 %
MAGNESIUM SERPL-MCNC: 2.1 MG/DL (ref 1.6–2.6)
MCH RBC QN AUTO: 31.2 PG (ref 26–33)
MCHC RBC AUTO-ENTMCNC: 30.8 GM/DL (ref 32.2–35.5)
MCV RBC AUTO: 101.2 FL (ref 81–99)
MISCELLANEOUS 2: ABNORMAL
MONOCYTES ABSOLUTE: 1.5 THOU/MM3 (ref 0.4–1.3)
MONOCYTES NFR BLD AUTO: 11.3 %
NEUTROPHILS ABSOLUTE: 11.1 THOU/MM3 (ref 1.8–7.7)
NEUTROPHILS NFR BLD AUTO: 81.5 %
NITRITE UR QL STRIP: NEGATIVE
NRBC BLD AUTO-RTO: 0 /100 WBC
NT-PROBNP SERPL IA-MCNC: 189 PG/ML (ref 0–124)
OSMOLALITY SERPL CALC.SUM OF ELEC: 277.8 MOSMOL/KG (ref 275–300)
PH UR STRIP.AUTO: 5.5 [PH] (ref 5–9)
PHOSPHATE SERPL-MCNC: 3.5 MG/DL (ref 2.5–4.5)
PLATELET # BLD AUTO: 402 THOU/MM3 (ref 130–400)
PMV BLD AUTO: 9 FL (ref 9.4–12.4)
POTASSIUM SERPL-SCNC: 3.7 MEQ/L (ref 3.5–5.2)
PROCALCITONIN SERPL IA-MCNC: 0.24 NG/ML (ref 0.01–0.09)
PROT SERPL-MCNC: 5.8 G/DL (ref 6.4–8.3)
PROT UR STRIP.AUTO-MCNC: ABNORMAL MG/DL
RBC # BLD AUTO: 3.43 MILL/MM3 (ref 4.2–5.4)
RBC URINE: ABNORMAL /HPF
RENAL EPI CELLS #/AREA URNS HPF: ABNORMAL /[HPF]
SODIUM SERPL-SCNC: 136 MEQ/L (ref 135–145)
SP GR UR REFRACT.AUTO: > 1.03 (ref 1–1.03)
TROPONIN, HIGH SENSITIVITY: 13 NG/L (ref 0–12)
UROBILINOGEN, URINE: 1 EU/DL (ref 0–1)
WBC # BLD AUTO: 13.6 THOU/MM3 (ref 4.8–10.8)
WBC #/AREA URNS HPF: ABNORMAL /HPF
WBC #/AREA URNS HPF: ABNORMAL /[HPF]
YEAST LIKE FUNGI URNS QL MICRO: ABNORMAL

## 2025-07-08 PROCEDURE — 87081 CULTURE SCREEN ONLY: CPT

## 2025-07-08 PROCEDURE — 93005 ELECTROCARDIOGRAM TRACING: CPT | Performed by: EMERGENCY MEDICINE

## 2025-07-08 PROCEDURE — 84145 PROCALCITONIN (PCT): CPT

## 2025-07-08 PROCEDURE — 83880 ASSAY OF NATRIURETIC PEPTIDE: CPT

## 2025-07-08 PROCEDURE — 2580000003 HC RX 258: Performed by: NURSE PRACTITIONER

## 2025-07-08 PROCEDURE — 99285 EMERGENCY DEPT VISIT HI MDM: CPT

## 2025-07-08 PROCEDURE — 82948 REAGENT STRIP/BLOOD GLUCOSE: CPT

## 2025-07-08 PROCEDURE — 74177 CT ABD & PELVIS W/CONTRAST: CPT

## 2025-07-08 PROCEDURE — 83735 ASSAY OF MAGNESIUM: CPT

## 2025-07-08 PROCEDURE — 87186 SC STD MICRODIL/AGAR DIL: CPT

## 2025-07-08 PROCEDURE — 2580000003 HC RX 258

## 2025-07-08 PROCEDURE — 87205 SMEAR GRAM STAIN: CPT

## 2025-07-08 PROCEDURE — 85025 COMPLETE CBC W/AUTO DIFF WBC: CPT

## 2025-07-08 PROCEDURE — 83605 ASSAY OF LACTIC ACID: CPT

## 2025-07-08 PROCEDURE — 84484 ASSAY OF TROPONIN QUANT: CPT

## 2025-07-08 PROCEDURE — 87070 CULTURE OTHR SPECIMN AEROBIC: CPT

## 2025-07-08 PROCEDURE — APPSS60 APP SPLIT SHARED TIME 46-60 MINUTES: Performed by: NURSE PRACTITIONER

## 2025-07-08 PROCEDURE — 6360000002 HC RX W HCPCS

## 2025-07-08 PROCEDURE — 1200000003 HC TELEMETRY R&B

## 2025-07-08 PROCEDURE — 71045 X-RAY EXAM CHEST 1 VIEW: CPT

## 2025-07-08 PROCEDURE — 6360000002 HC RX W HCPCS: Performed by: RADIOLOGY

## 2025-07-08 PROCEDURE — 96374 THER/PROPH/DIAG INJ IV PUSH: CPT

## 2025-07-08 PROCEDURE — 2500000003 HC RX 250 WO HCPCS

## 2025-07-08 PROCEDURE — 87075 CULTR BACTERIA EXCEPT BLOOD: CPT

## 2025-07-08 PROCEDURE — 6370000000 HC RX 637 (ALT 250 FOR IP): Performed by: STUDENT IN AN ORGANIZED HEALTH CARE EDUCATION/TRAINING PROGRAM

## 2025-07-08 PROCEDURE — 2500000003 HC RX 250 WO HCPCS: Performed by: STUDENT IN AN ORGANIZED HEALTH CARE EDUCATION/TRAINING PROGRAM

## 2025-07-08 PROCEDURE — 96361 HYDRATE IV INFUSION ADD-ON: CPT

## 2025-07-08 PROCEDURE — 6360000002 HC RX W HCPCS: Performed by: STUDENT IN AN ORGANIZED HEALTH CARE EDUCATION/TRAINING PROGRAM

## 2025-07-08 PROCEDURE — 84100 ASSAY OF PHOSPHORUS: CPT

## 2025-07-08 PROCEDURE — 81001 URINALYSIS AUTO W/SCOPE: CPT

## 2025-07-08 PROCEDURE — 2580000003 HC RX 258: Performed by: STUDENT IN AN ORGANIZED HEALTH CARE EDUCATION/TRAINING PROGRAM

## 2025-07-08 PROCEDURE — C1769 GUIDE WIRE: HCPCS

## 2025-07-08 PROCEDURE — 36415 COLL VENOUS BLD VENIPUNCTURE: CPT

## 2025-07-08 PROCEDURE — 96375 TX/PRO/DX INJ NEW DRUG ADDON: CPT

## 2025-07-08 PROCEDURE — 6370000000 HC RX 637 (ALT 250 FOR IP)

## 2025-07-08 PROCEDURE — 99222 1ST HOSP IP/OBS MODERATE 55: CPT | Performed by: INTERNAL MEDICINE

## 2025-07-08 PROCEDURE — 80053 COMPREHEN METABOLIC PANEL: CPT

## 2025-07-08 PROCEDURE — 6360000004 HC RX CONTRAST MEDICATION: Performed by: EMERGENCY MEDICINE

## 2025-07-08 PROCEDURE — 1200000000 HC SEMI PRIVATE

## 2025-07-08 PROCEDURE — 87040 BLOOD CULTURE FOR BACTERIA: CPT

## 2025-07-08 PROCEDURE — 87077 CULTURE AEROBIC IDENTIFY: CPT

## 2025-07-08 PROCEDURE — 86140 C-REACTIVE PROTEIN: CPT

## 2025-07-08 RX ORDER — POLYETHYLENE GLYCOL 3350 17 G/17G
17 POWDER, FOR SOLUTION ORAL PRN
Status: DISCONTINUED | OUTPATIENT
Start: 2025-07-08 | End: 2025-07-08

## 2025-07-08 RX ORDER — BUSPIRONE HYDROCHLORIDE 10 MG/1
10 TABLET ORAL NIGHTLY
Status: DISCONTINUED | OUTPATIENT
Start: 2025-07-08 | End: 2025-07-25 | Stop reason: HOSPADM

## 2025-07-08 RX ORDER — ONDANSETRON 4 MG/1
4 TABLET, ORALLY DISINTEGRATING ORAL EVERY 8 HOURS PRN
Status: DISCONTINUED | OUTPATIENT
Start: 2025-07-08 | End: 2025-07-25 | Stop reason: HOSPADM

## 2025-07-08 RX ORDER — OMEPRAZOLE 20 MG/1
40 CAPSULE, DELAYED RELEASE ORAL DAILY
Status: ON HOLD | COMMUNITY
End: 2025-07-08

## 2025-07-08 RX ORDER — MAGNESIUM SULFATE IN WATER 40 MG/ML
2000 INJECTION, SOLUTION INTRAVENOUS PRN
Status: DISCONTINUED | OUTPATIENT
Start: 2025-07-08 | End: 2025-07-25 | Stop reason: HOSPADM

## 2025-07-08 RX ORDER — TRAMADOL HYDROCHLORIDE 50 MG/1
50 TABLET ORAL EVERY 6 HOURS PRN
Status: DISCONTINUED | OUTPATIENT
Start: 2025-07-08 | End: 2025-07-25 | Stop reason: HOSPADM

## 2025-07-08 RX ORDER — INSULIN LISPRO 100 [IU]/ML
0-4 INJECTION, SOLUTION INTRAVENOUS; SUBCUTANEOUS
Status: DISCONTINUED | OUTPATIENT
Start: 2025-07-08 | End: 2025-07-25 | Stop reason: HOSPADM

## 2025-07-08 RX ORDER — ACETAMINOPHEN 650 MG/1
650 SUPPOSITORY RECTAL EVERY 6 HOURS PRN
Status: DISCONTINUED | OUTPATIENT
Start: 2025-07-08 | End: 2025-07-25 | Stop reason: HOSPADM

## 2025-07-08 RX ORDER — POTASSIUM CHLORIDE 7.45 MG/ML
10 INJECTION INTRAVENOUS PRN
Status: DISCONTINUED | OUTPATIENT
Start: 2025-07-08 | End: 2025-07-25 | Stop reason: HOSPADM

## 2025-07-08 RX ORDER — ONDANSETRON 2 MG/ML
4 INJECTION INTRAMUSCULAR; INTRAVENOUS ONCE
Status: COMPLETED | OUTPATIENT
Start: 2025-07-08 | End: 2025-07-08

## 2025-07-08 RX ORDER — POLYETHYLENE GLYCOL 3350 17 G/17G
17 POWDER, FOR SOLUTION ORAL DAILY
Status: DISCONTINUED | OUTPATIENT
Start: 2025-07-09 | End: 2025-07-13

## 2025-07-08 RX ORDER — PRUCALOPRIDE 1 MG/1
1 TABLET, FILM COATED ORAL DAILY
Status: DISCONTINUED | OUTPATIENT
Start: 2025-07-08 | End: 2025-07-08 | Stop reason: RX

## 2025-07-08 RX ORDER — ACARBOSE 50 MG/1
50 TABLET ORAL
Status: DISCONTINUED | OUTPATIENT
Start: 2025-07-08 | End: 2025-07-25 | Stop reason: HOSPADM

## 2025-07-08 RX ORDER — QUETIAPINE FUMARATE 25 MG/1
25 TABLET, FILM COATED ORAL NIGHTLY
Status: DISCONTINUED | OUTPATIENT
Start: 2025-07-08 | End: 2025-07-25 | Stop reason: HOSPADM

## 2025-07-08 RX ORDER — DOCUSATE SODIUM 100 MG/1
100 CAPSULE, LIQUID FILLED ORAL DAILY PRN
Status: DISCONTINUED | OUTPATIENT
Start: 2025-07-08 | End: 2025-07-25 | Stop reason: HOSPADM

## 2025-07-08 RX ORDER — LAMOTRIGINE 100 MG/1
100 TABLET ORAL EVERY MORNING
Status: DISCONTINUED | OUTPATIENT
Start: 2025-07-09 | End: 2025-07-25 | Stop reason: HOSPADM

## 2025-07-08 RX ORDER — VENLAFAXINE HYDROCHLORIDE 75 MG/1
75 CAPSULE, EXTENDED RELEASE ORAL
Status: DISCONTINUED | OUTPATIENT
Start: 2025-07-09 | End: 2025-07-25 | Stop reason: HOSPADM

## 2025-07-08 RX ORDER — GLUCAGON 1 MG/ML
1 KIT INJECTION PRN
Status: DISCONTINUED | OUTPATIENT
Start: 2025-07-08 | End: 2025-07-25 | Stop reason: HOSPADM

## 2025-07-08 RX ORDER — LOSARTAN POTASSIUM 50 MG/1
50 TABLET ORAL DAILY
Status: DISCONTINUED | OUTPATIENT
Start: 2025-07-08 | End: 2025-07-10

## 2025-07-08 RX ORDER — SODIUM CHLORIDE 9 MG/ML
INJECTION, SOLUTION INTRAVENOUS CONTINUOUS
Status: DISCONTINUED | OUTPATIENT
Start: 2025-07-08 | End: 2025-07-10

## 2025-07-08 RX ORDER — SODIUM CHLORIDE 0.9 % (FLUSH) 0.9 %
5-40 SYRINGE (ML) INJECTION PRN
Status: DISCONTINUED | OUTPATIENT
Start: 2025-07-08 | End: 2025-07-25 | Stop reason: HOSPADM

## 2025-07-08 RX ORDER — PANTOPRAZOLE SODIUM 40 MG/1
40 TABLET, DELAYED RELEASE ORAL
Status: DISCONTINUED | OUTPATIENT
Start: 2025-07-09 | End: 2025-07-25 | Stop reason: HOSPADM

## 2025-07-08 RX ORDER — MORPHINE SULFATE 2 MG/ML
2 INJECTION, SOLUTION INTRAMUSCULAR; INTRAVENOUS ONCE
Status: COMPLETED | OUTPATIENT
Start: 2025-07-08 | End: 2025-07-08

## 2025-07-08 RX ORDER — DEXTROSE MONOHYDRATE 100 MG/ML
INJECTION, SOLUTION INTRAVENOUS CONTINUOUS PRN
Status: DISCONTINUED | OUTPATIENT
Start: 2025-07-08 | End: 2025-07-25 | Stop reason: HOSPADM

## 2025-07-08 RX ORDER — METRONIDAZOLE 500 MG/100ML
500 INJECTION, SOLUTION INTRAVENOUS ONCE
Status: COMPLETED | OUTPATIENT
Start: 2025-07-08 | End: 2025-07-08

## 2025-07-08 RX ORDER — DICYCLOMINE HCL 20 MG
20 TABLET ORAL AS NEEDED
Status: DISCONTINUED | OUTPATIENT
Start: 2025-07-08 | End: 2025-07-25 | Stop reason: HOSPADM

## 2025-07-08 RX ORDER — FENTANYL CITRATE 50 UG/ML
50 INJECTION, SOLUTION INTRAMUSCULAR; INTRAVENOUS ONCE
Status: COMPLETED | OUTPATIENT
Start: 2025-07-08 | End: 2025-07-08

## 2025-07-08 RX ORDER — IOPAMIDOL 755 MG/ML
80 INJECTION, SOLUTION INTRAVASCULAR
Status: COMPLETED | OUTPATIENT
Start: 2025-07-08 | End: 2025-07-08

## 2025-07-08 RX ORDER — CETIRIZINE HYDROCHLORIDE 10 MG/1
10 TABLET ORAL DAILY
Status: DISCONTINUED | OUTPATIENT
Start: 2025-07-09 | End: 2025-07-25 | Stop reason: HOSPADM

## 2025-07-08 RX ORDER — ACETAMINOPHEN 325 MG/1
650 TABLET ORAL EVERY 6 HOURS PRN
Status: DISCONTINUED | OUTPATIENT
Start: 2025-07-08 | End: 2025-07-25 | Stop reason: HOSPADM

## 2025-07-08 RX ORDER — LAMOTRIGINE 100 MG/1
200 TABLET ORAL NIGHTLY
Status: DISCONTINUED | OUTPATIENT
Start: 2025-07-08 | End: 2025-07-25 | Stop reason: HOSPADM

## 2025-07-08 RX ORDER — VENLAFAXINE HYDROCHLORIDE 225 MG/1
225 TABLET, EXTENDED RELEASE ORAL
Status: DISCONTINUED | OUTPATIENT
Start: 2025-07-09 | End: 2025-07-08 | Stop reason: SDUPTHER

## 2025-07-08 RX ORDER — MIDAZOLAM HYDROCHLORIDE 1 MG/ML
INJECTION, SOLUTION INTRAMUSCULAR; INTRAVENOUS PRN
Status: COMPLETED | OUTPATIENT
Start: 2025-07-08 | End: 2025-07-08

## 2025-07-08 RX ORDER — VENLAFAXINE HYDROCHLORIDE 150 MG/1
150 CAPSULE, EXTENDED RELEASE ORAL
Status: DISCONTINUED | OUTPATIENT
Start: 2025-07-09 | End: 2025-07-25 | Stop reason: HOSPADM

## 2025-07-08 RX ORDER — FENTANYL CITRATE 50 UG/ML
INJECTION, SOLUTION INTRAMUSCULAR; INTRAVENOUS PRN
Status: COMPLETED | OUTPATIENT
Start: 2025-07-08 | End: 2025-07-08

## 2025-07-08 RX ORDER — SODIUM CHLORIDE 9 MG/ML
INJECTION, SOLUTION INTRAVENOUS PRN
Status: DISCONTINUED | OUTPATIENT
Start: 2025-07-08 | End: 2025-07-25 | Stop reason: HOSPADM

## 2025-07-08 RX ORDER — BUSPIRONE HYDROCHLORIDE 7.5 MG/1
7.5 TABLET ORAL DAILY PRN
Status: DISCONTINUED | OUTPATIENT
Start: 2025-07-08 | End: 2025-07-25 | Stop reason: HOSPADM

## 2025-07-08 RX ORDER — ESOMEPRAZOLE MAGNESIUM 40 MG/1
40 GRANULE, DELAYED RELEASE ORAL DAILY
Status: DISCONTINUED | OUTPATIENT
Start: 2025-07-08 | End: 2025-07-08 | Stop reason: SDUPTHER

## 2025-07-08 RX ORDER — ENOXAPARIN SODIUM 100 MG/ML
40 INJECTION SUBCUTANEOUS DAILY
Status: DISCONTINUED | OUTPATIENT
Start: 2025-07-08 | End: 2025-07-25 | Stop reason: HOSPADM

## 2025-07-08 RX ORDER — POLYETHYLENE GLYCOL 3350 17 G/17G
17 POWDER, FOR SOLUTION ORAL DAILY PRN
Status: ON HOLD | COMMUNITY

## 2025-07-08 RX ORDER — POLYETHYLENE GLYCOL 3350 17 G/17G
17 POWDER, FOR SOLUTION ORAL DAILY PRN
Status: DISCONTINUED | OUTPATIENT
Start: 2025-07-08 | End: 2025-07-25 | Stop reason: HOSPADM

## 2025-07-08 RX ORDER — 0.9 % SODIUM CHLORIDE 0.9 %
1000 INTRAVENOUS SOLUTION INTRAVENOUS ONCE
Status: COMPLETED | OUTPATIENT
Start: 2025-07-08 | End: 2025-07-08

## 2025-07-08 RX ORDER — MORPHINE SULFATE 2 MG/ML
1 INJECTION, SOLUTION INTRAMUSCULAR; INTRAVENOUS EVERY 4 HOURS PRN
Status: DISCONTINUED | OUTPATIENT
Start: 2025-07-08 | End: 2025-07-25 | Stop reason: HOSPADM

## 2025-07-08 RX ORDER — POTASSIUM CHLORIDE 1500 MG/1
40 TABLET, EXTENDED RELEASE ORAL PRN
Status: DISCONTINUED | OUTPATIENT
Start: 2025-07-08 | End: 2025-07-25 | Stop reason: HOSPADM

## 2025-07-08 RX ORDER — ONDANSETRON 2 MG/ML
4 INJECTION INTRAMUSCULAR; INTRAVENOUS EVERY 6 HOURS PRN
Status: DISCONTINUED | OUTPATIENT
Start: 2025-07-08 | End: 2025-07-25 | Stop reason: HOSPADM

## 2025-07-08 RX ORDER — SODIUM CHLORIDE 0.9 % (FLUSH) 0.9 %
5-40 SYRINGE (ML) INJECTION EVERY 12 HOURS SCHEDULED
Status: DISCONTINUED | OUTPATIENT
Start: 2025-07-08 | End: 2025-07-25 | Stop reason: HOSPADM

## 2025-07-08 RX ADMIN — MORPHINE SULFATE 2 MG: 2 INJECTION, SOLUTION INTRAMUSCULAR; INTRAVENOUS at 15:45

## 2025-07-08 RX ADMIN — IOPAMIDOL 80 ML: 755 INJECTION, SOLUTION INTRAVENOUS at 14:39

## 2025-07-08 RX ADMIN — ONDANSETRON 4 MG: 2 INJECTION, SOLUTION INTRAMUSCULAR; INTRAVENOUS at 15:45

## 2025-07-08 RX ADMIN — LAMOTRIGINE 200 MG: 100 TABLET ORAL at 20:12

## 2025-07-08 RX ADMIN — METRONIDAZOLE 500 MG: 500 INJECTION, SOLUTION INTRAVENOUS at 16:05

## 2025-07-08 RX ADMIN — LOSARTAN POTASSIUM 50 MG: 50 TABLET, FILM COATED ORAL at 20:12

## 2025-07-08 RX ADMIN — WATER 1000 MG: 1 INJECTION INTRAMUSCULAR; INTRAVENOUS; SUBCUTANEOUS at 15:57

## 2025-07-08 RX ADMIN — FENTANYL CITRATE 25 MCG: 50 INJECTION INTRAMUSCULAR; INTRAVENOUS at 16:59

## 2025-07-08 RX ADMIN — QUETIAPINE FUMARATE 25 MG: 25 TABLET ORAL at 20:12

## 2025-07-08 RX ADMIN — SODIUM CHLORIDE 1000 ML: 0.9 INJECTION, SOLUTION INTRAVENOUS at 12:22

## 2025-07-08 RX ADMIN — Medication 16 G: at 20:35

## 2025-07-08 RX ADMIN — BUSPIRONE HYDROCHLORIDE 10 MG: 10 TABLET ORAL at 20:12

## 2025-07-08 RX ADMIN — SODIUM CHLORIDE: 0.9 INJECTION, SOLUTION INTRAVENOUS at 23:36

## 2025-07-08 RX ADMIN — SODIUM CHLORIDE, PRESERVATIVE FREE 10 ML: 5 INJECTION INTRAVENOUS at 23:30

## 2025-07-08 RX ADMIN — MIDAZOLAM 0.5 MG: 1 INJECTION INTRAMUSCULAR; INTRAVENOUS at 16:59

## 2025-07-08 RX ADMIN — FENTANYL CITRATE 50 MCG: 50 INJECTION INTRAMUSCULAR; INTRAVENOUS at 12:22

## 2025-07-08 RX ADMIN — PIPERACILLIN AND TAZOBACTAM 4500 MG: 4; .5 INJECTION, POWDER, FOR SOLUTION INTRAVENOUS at 23:42

## 2025-07-08 ASSESSMENT — ENCOUNTER SYMPTOMS
DIARRHEA: 1
NAUSEA: 1
ABDOMINAL PAIN: 1

## 2025-07-08 ASSESSMENT — PAIN SCALES - GENERAL: PAINLEVEL_OUTOF10: 7

## 2025-07-08 ASSESSMENT — PAIN DESCRIPTION - LOCATION: LOCATION: ABDOMEN

## 2025-07-08 ASSESSMENT — PAIN - FUNCTIONAL ASSESSMENT
PAIN_FUNCTIONAL_ASSESSMENT: 0-10
PAIN_FUNCTIONAL_ASSESSMENT: ADULT NONVERBAL PAIN SCALE (NPVS)

## 2025-07-08 NOTE — TELEPHONE ENCOUNTER
DOUG CALLED IN STATING THE PATIENT WOULDN'T BE MAKING HER APPT TODAY SHE IS GOING TO THE ER. THEY STATED THEY WOULD CALL BACK TO R/S AT A LATER TIME WHEN THEY KNOW WHAT'S GOING ON.

## 2025-07-08 NOTE — PROGRESS NOTES
1640 Patient received in CT scanning for drain insertion assessment with family at bedside. Monitor applied.   1647 Dr. Foss here; spoke to patient. This procedure has been fully reviewed with the patient and written informed consent has been obtained.   1650 Patient assisted to CT table and pre scan started.   1701 Procedure started with Dr. Foss  1704 Samples collected and sent to lab for further review.  20ml of brown colored fluid.  1710 Line sutured in place  1713 100ml brown fluid removed.  Procedure completed; patient tolerated well. Post scan obtained.   1718 Patient on bed; comfort ensured.  1719 patient taken to ED via cart/nurse with family at bedside. Pt alert and oriented x3; follows commands. Skin pink, warm, and dry. Respirations easy, regular, and nonlabored.

## 2025-07-08 NOTE — ED NOTES
Pt to ED via [facility] from [facility] c/o AMS, SOB, and lower abdominal pain. EMS reports patient's oxygen saturation was in the 70's per nursing home staff. Pt oxygen saturation 98% on RA during triage. Respirations even and unlabored. Pt stated the SOB and abdominal pain started last night. Daughter states patient had colon cancer surgically removed 6/19 and is getting rehab in the nursing home. Pt states she worked really hard at rehab this morning and her sx worsened after. Pt Aox4. Daughter states patient seemed confused on the phone over the weekend. VSS. EKG completed

## 2025-07-08 NOTE — H&P
[facility]  Sedation/Analgesia History & Physical    Pt Name: Adry Moura  MRN: [id number]  YOB: 1950  Provider Performing Procedure: Cole Foss MD, MD  Primary Care Physician: Maraí Neil APRN - CNP    Formulation and discussion of sedation / procedure plans, risks, benefits, side effects and alternatives with patient and/or responsible adult completed.    PRE-PROCEDURE   DNR-CCA/DNR-CC []Yes [x]No DNR suspended for the periprocedure period  Brief History/Pre-Procedure Diagnosis: Abdominal fluid collection in need of drainage catheter placement.           MEDICAL HISTORY  []CAD/Valve  []Liver Disease  []Lung Disease []Diabetes  []Hypertension []Renal Disease  []Additional information:       has a past medical history of Arthritis, Bowel obstruction (HCC), Bronchitis, Diabetes mellitus (HCC), Difficult intubation, DVT, lower extremity (HCC), GERD (gastroesophageal reflux disease), History of blood transfusion, Hx of blood clots, Hypertension, Hypoglycemia, Kidney stone, Macular degeneration, Movement disorder, Multinodular goiter, Muscle strain of left lower extremity, Pinched nerve, Psychiatric problem, Urinary incontinence, and Vitamin D deficiency.    SURGICAL HISTORY   has a past surgical history that includes Hysterectomy (1992); Cholecystectomy (1994); Gastric bypass surgery (1999); Esophagectomy (2000); Colon surgery (2001); Abdomen surgery; Appendectomy; Dilatation, esophagus; Knee cartilage surgery (Left, 10/2014); Foot surgery (Right, 10/14/2015); Upper gastrointestinal endoscopy (06/2016); Colonoscopy (06/21/2016); Endoscopy, colon, diagnostic (06/03/2025); Hand tendon surgery (Right, 06/2021); Nerve Block; and colectomy (N/A, 6/19/2025).  Additional information:       ALLERGIES   Allergies as of 07/08/2025    (No Known Allergies)     Additional information:       MEDICATIONS   Coumadin Use Last 5 Days [x]No []Yes  Antiplatelet drug therapy use last 5 days

## 2025-07-08 NOTE — PROGRESS NOTES
[facility]   Pharmacy Pharmacokinetic Monitoring Service - Vancomycin     Adry Moura is a 74 y.o. female starting on vancomycin therapy for intra-abdominal infection. Pharmacy consulted by Dr Natarajan for monitoring and adjustment.    Target Concentration: Goal AUC/-600 mg*hr/L    Additional Antimicrobials: Zosyn    Pertinent Laboratory Values:   Wt Readings from Last 1 Encounters:   07/08/25 57.8 kg (127 lb 6.8 oz)     Estimated Creatinine Clearance: 56 mL/min (based on SCr of 0.8 mg/dL).  Recent Labs     07/08/25  1305   CREATININE 0.8     Pertinent Cultures:  Date Source Results   7/8/25 Blood x 2 pending   7/8/25 Body fluid pending   7/8/25 Abscess pending   MRSA Nasal Swab: N/A. Non-respiratory infection.    Plan:  Dosing recommendations based on Bayesian software  Start vancomycin 1500 mg loading dose, followed by 1000 mg IV q18h for anticipated AUC of 525 and trough concentration of 13.6 at steady state  Renal labs as indicated   Vancomycin concentration not ordered at this time  Pharmacy will monitor renal function and adjust therapy as indicated    Thank you for the consult,  Alicja Zepeda, PharmD, BCPS 7/8/2025 6:01 PM

## 2025-07-08 NOTE — PROGRESS NOTES
Formulation and discussion of sedation / procedure plans, risks, benefits, side effects and alternatives with patient and/or responsible adult completed.    History and Physical reviewed and unchanged.    Electronically signed by Cole Foss MD on 7/8/25 at 4:56 PM EDT

## 2025-07-08 NOTE — H&P
History & Physical  Internal Medicine Resident         Patient: Adry Moura 74 y.o. female      : 1950  Date of Admission: 2025  Date of Service: Pt seen/examined on 25 and Admitted to Inpatient with expected LOS greater than two midnights due to medical therapy.       ASSESSMENT AND PLAN  Intra-abdominal abscess in setting of history of Adenocarcinoma of Colon s/p low Anterior resection : Patient presented fron SNF with abdominal pain with nausea, decreased appetite associated with movement.   Underwent colonoscopy in May 2025 and then later Radha 3, 2025 and found to have 3 cm mass which was found to have moderately differentiated adenocarcinoma of Colon resulting in low anterior resection on 25 by Dr. Hale, discharged to SNF and presented with abdominal pain as documented above.  CT A/P in the ED with large intra-abdominal abscess within anterior aspect of abdomen and pelvis measuring 5.5 x 18.8 x 16.0 cm  General surgery consulted in the ED, Appreciate their recommendations  IR was consulted, plan to go for CT guided abscess drainage, Cultures of fluid afterwards.  Patient was given dose of rocephin and flagyl in the ED  Blood cultures x 2, ProCal, CRP, MRSA, lactic acid in addition to CBC and CMP  Will start on Vancomycin and Zosyn until rule out, Pharmacy to dose vancomycin.  NPO at midnight in case surgical intervention needed, general surgery to follow  History of gastric bypass/bariatric surgery: Noted in , complicated by mass in upper esophagus resulting in near-total esophagectomy then total gastrectomy in addition to subtotal esophagectomy, Currently with colo-jejunostomy  Essential hypertension: On Losartan 50, resumed  Insulin-dependent type 2 diabetes mellitus : Acarbose 50 TID, will hold, start on low dose insulin sliding scale, hypoglycemia protocol in place  Bipolar disorder: Continue home Lamictal 100 QAM, 200 QHS, Seroquel 50 QHS and Buspar  Diet: NPO  . They thought she had cancer, but it was a bad infection that had impacted the trachea and a portion of the right lung. She was on a feeding tube for 1 year and then had problems with her stomach so it was removed . Following that surgery she was able to eat by mouth but followed a very special diet. As a result of the surgeries she developed dumping syndrome.     : None    HOBBIES: gardening, reading    EMPLOYMENT: retired; prior to shelter she went on disability in  due to complications from the removal of her esophagus and subsequent removal of her stomach.    MARRIAGES: two. First marriage was in  and they  after 14 years (he then  in ). She  her second  around  and remained  until .    CHILDREN: two    SUBSTANCE USE: None     Social Drivers of Health     Food Insecurity: No Food Insecurity (2025)    Hunger Vital Sign     Worried About Running Out of Food in the Last Year: Never true     Ran Out of Food in the Last Year: Never true   Transportation Needs: No Transportation Needs (2025)    PRAPARE - Transportation     Lack of Transportation (Medical): No     Lack of Transportation (Non-Medical): No   Physical Activity: Insufficiently Active (2024)    Exercise Vital Sign     Days of Exercise per Week: 4 days     Minutes of Exercise per Session: 30 min   Housing Stability: Low Risk  (2025)    Housing Stability Vital Sign     Unable to Pay for Housing in the Last Year: No     Number of Times Moved in the Last Year: 0     Homeless in the Last Year: No        Social Determinants of Health:   Social Drivers of Health with Concerns     Financial Resource Strain: Not on file   Physical Activity: Insufficiently Active (2024)    Exercise Vital Sign     Days of Exercise per Week: 4 days     Minutes of Exercise per Session: 30 min   Stress: Not on file   Social Connections: Not on file   Intimate Partner Violence: Not on file

## 2025-07-08 NOTE — ED NOTES
Spoke to 6K charge. Bed is ready. Notifying nurse of pt transport now. Pt is stable at time of transport.

## 2025-07-08 NOTE — ED NOTES
ED to inpatient nurses report      Chief Complaint:  Chief Complaint   Patient presents with    Altered Mental Status    Abdominal Pain     Present to ED from: home    MOA:     LOC: alert and orientated to name, place, date  Mobility: Requires assistance * 2  Oxygen Baseline: ra    Current needs required: ra     Code Status:   Full Code    What abnormal results were found and what did you give/do to treat them? Intra-abdominal abscess  Any procedures or intervention occur? none    Mental Status:  Level of Consciousness: Alert (0)    Psych Assessment:        Vitals:  Patient Vitals for the past 24 hrs:   BP Temp Temp src Pulse Resp SpO2   07/08/25 1535 (!) 164/77 -- -- 98 13 99 %   07/08/25 1431 (!) 156/72 -- -- 98 20 100 %   07/08/25 1416 (!) 154/68 -- -- 99 16 94 %   07/08/25 1344 (!) 148/71 -- -- 97 19 94 %   07/08/25 1329 (!) 141/72 -- -- (!) 101 20 95 %   07/08/25 1302 135/66 -- -- 99 17 96 %   07/08/25 1240 133/61 -- -- 98 18 96 %   07/08/25 1129 (!) 138/59 98.4 °F (36.9 °C) Oral (!) 104 22 99 %        LDAs:   Peripheral IV 07/08/25 Left Antecubital (Active)   Site Assessment Clean, dry & intact 07/08/25 1421       Ambulatory Status:  Presents to emergency department  because of falls (Syncope, seizure, or loss of consciousness): No, Age > 70: Yes, Altered Mental Status, Intoxication with alcohol or substance confusion (Disorientation, impaired judgment, poor safety awaremess, or inability to follow instructions): No, Impaired Mobility: Ambulates or transfers with assistive devices or assistance; Unable to ambulate or transer.: Yes, Nursing Judgement: Yes    Diagnosis:  DISPOSITION Admitted 07/08/2025 04:11:48 PM   Final diagnoses:   Intra-abdominal abscess (HCC)        Consults:  IP CONSULT TO GENERAL SURGERY     Pain Score:  Pain Assessment  Pain Assessment: Adult Nonverbal Pain Scale (NPVS)  Pain Level: 7  Pain Location: Abdomen    C-SSRS:        Sepsis Screening:       [place] [employer] Fall Risk:  [place] [employer] 1 Fall  D deficiency            Electronically signed by Aziza Shukla RN on 7/8/2025 at 4:22 PM
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
## 2025-07-08 NOTE — CONSULTS
daughter noticed over the last 48 hours she was a little more confused over the phone.  Today she had worsening abdominal pain after therapy.  She also had hypoxia and hypotension which prompted ER evaluation.  Patient has had a decreased appetite since surgery.  Intermittent nausea but no vomiting.  She was having more right sided pain then generalized.  Post drain placement she states she is no longer having any abdominal pain.  Left-sided drain is in place draining old bloody fluid.  No purulence or abscess like fluid.  Patient is altered at this time but did get Versed and morphine prior to arrival.  She is alert to name and knows who her children are at bedside but unaware that she is at the hospital or current situation.  Patient very pleasant.  Denies any abdominal pain.  No fevers or chills.  Denies any shortness of breath or chest pain.  Patient is unable to tell me when her last bowel movement was.  Children are unaware.  She states she thinks she is passing gas.    Past Medical History:      Diagnosis Date    Arthritis     generalized    Bowel obstruction (HCC) 2009    Bronchitis 06/2014    Diabetes mellitus (HCC)     Difficult intubation     DVT, lower extremity (HCC)     GERD (gastroesophageal reflux disease)     History of blood transfusion     Hx of blood clots     DVT    Hypertension     Hypoglycemia     Kidney stone 2008    Macular degeneration     Movement disorder     Multinodular goiter 02/26/2015    Muscle strain of left lower extremity 08/27/2014    Pinched nerve     lowr back    Psychiatric problem     Depression    Urinary incontinence     Vitamin D deficiency      Past Surgical History:      Procedure Laterality Date    ABDOMEN SURGERY      APPENDECTOMY      CHOLECYSTECTOMY  1994    COLECTOMY N/A 6/19/2025    Open low anterior resection performed by Leoncio Hale MD at [facility] OR    COLON SURGERY  2001    substernal colon intra position    COLONOSCOPY  06/21/2016    DILATATION, ESOPHAGUS   murmur.  ABDOMEN: Flat. Midline scar(s) present. Incision still healing well with some surgical glue still intact. No signs of infection. Left sided percutaneous drain in place with old bloody drainage. Normal bowel sounds.  Soft, nondistended, no masses or organomegaly. no evidence of hernia.  Tenderness: Minimal generalized.  NEUROLOGIC: Generalized weakness.   EXTREMITIES: no cyanosis, no clubbing, and no edema.  LABS:     Recent Labs     07/08/25  1305   WBC 13.6*   HGB 10.7*   HCT 34.7*   *      K 3.7      CO2 22   BUN 29*   CREATININE 0.8   MG 2.1   CALCIUM 8.6*   AST 26   ALT 17   BILITOT 0.5   LACTA 1.3     RADIOLOGY:   I have personally reviewed the following films:    CT ABDOMEN PELVIS W IV CONTRAST Additional Contrast? None  Order: [id number]   Status: Final result       Next appt: 07/16/2025 at 09:30 AM in General Surgery (BRANT KO, APRN - CNP)    Test Result Released: Yes (not seen)    0 Result Notes  Details    Reading Physician Reading Date Result Priority   Saima Giordano MD  [phone] 7/8/2025      Narrative & Impression  PROCEDURE: CT ABDOMEN PELVIS W IV CONTRAST     CLINICAL INFORMATION: post surgical resection of colon cancer 6/19. hx of  removal of stomach and esophagus. pressenting with abdominal pain around  incision site     COMPARISON: CT abdomen and pelvis 6/9/2025.     TECHNIQUE: Axial 5 mm CT images were obtained through the abdomen and pelvis  after the administration of 80 cc Isovue 370 intravenous contrast. Coronal and  sagittal reconstructions were obtained.     All CT scans at this facility use dose modulation, iterative reconstruction,  and/or weight-based dosing when appropriate to reduce radiation dose to as low  as reasonably achievable.     FINDINGS:  Lung bases: Dependent atelectasis is present.     Liver/gallbladder/bilary tree: The gallbladder is absent. No biliary ductal  dilatation is identified. Hepatic steatosis is unchanged.     Pancreas:

## 2025-07-09 ENCOUNTER — APPOINTMENT (OUTPATIENT)
Dept: CT IMAGING | Age: 75
End: 2025-07-09
Payer: MEDICARE

## 2025-07-09 PROBLEM — G93.41 METABOLIC ENCEPHALOPATHY: Status: ACTIVE | Noted: 2025-07-09

## 2025-07-09 PROBLEM — K65.1 INTRA-ABDOMINAL ABSCESS (HCC): Status: ACTIVE | Noted: 2025-07-08

## 2025-07-09 LAB
ALBUMIN SERPL BCG-MCNC: 2.9 G/DL (ref 3.4–4.9)
ALP SERPL-CCNC: 118 U/L (ref 38–126)
ALT SERPL W/O P-5'-P-CCNC: 17 U/L (ref 10–35)
ANION GAP SERPL CALC-SCNC: 13 MEQ/L (ref 8–16)
AST SERPL-CCNC: 19 U/L (ref 10–35)
BASOPHILS ABSOLUTE: 0 THOU/MM3 (ref 0–0.1)
BASOPHILS NFR BLD AUTO: 0.2 %
BILIRUB CONJ SERPL-MCNC: 0.2 MG/DL (ref 0–0.2)
BILIRUB SERPL-MCNC: 0.3 MG/DL (ref 0.3–1.2)
BUN SERPL-MCNC: 23 MG/DL (ref 8–23)
CALCIUM SERPL-MCNC: 8.1 MG/DL (ref 8.8–10.2)
CHLORIDE SERPL-SCNC: 103 MEQ/L (ref 98–111)
CO2 SERPL-SCNC: 24 MEQ/L (ref 22–29)
CREAT SERPL-MCNC: 0.7 MG/DL (ref 0.5–0.9)
DEPRECATED RDW RBC AUTO: 50.4 FL (ref 35–45)
EOSINOPHIL NFR BLD AUTO: 1 %
EOSINOPHILS ABSOLUTE: 0.1 THOU/MM3 (ref 0–0.4)
ERYTHROCYTE [DISTWIDTH] IN BLOOD BY AUTOMATED COUNT: 14.4 % (ref 11.5–14.5)
GFR SERPL CREATININE-BSD FRML MDRD: 90 ML/MIN/1.73M2
GLUCOSE BLD STRIP.AUTO-MCNC: 114 MG/DL (ref 70–108)
GLUCOSE BLD STRIP.AUTO-MCNC: 88 MG/DL (ref 70–108)
GLUCOSE BLD STRIP.AUTO-MCNC: 89 MG/DL (ref 70–108)
GLUCOSE SERPL-MCNC: 112 MG/DL (ref 74–109)
HCT VFR BLD AUTO: 31.4 % (ref 37–47)
HGB BLD-MCNC: 10 GM/DL (ref 12–16)
IMM GRANULOCYTES # BLD AUTO: 0.05 THOU/MM3 (ref 0–0.07)
IMM GRANULOCYTES NFR BLD AUTO: 0.5 %
LYMPHOCYTES ABSOLUTE: 0.6 THOU/MM3 (ref 1–4.8)
LYMPHOCYTES NFR BLD AUTO: 5.9 %
MAGNESIUM SERPL-MCNC: 2 MG/DL (ref 1.6–2.6)
MCH RBC QN AUTO: 30.9 PG (ref 26–33)
MCHC RBC AUTO-ENTMCNC: 31.8 GM/DL (ref 32.2–35.5)
MCV RBC AUTO: 96.9 FL (ref 81–99)
MONOCYTES ABSOLUTE: 1.2 THOU/MM3 (ref 0.4–1.3)
MONOCYTES NFR BLD AUTO: 11.3 %
NEUTROPHILS ABSOLUTE: 8.7 THOU/MM3 (ref 1.8–7.7)
NEUTROPHILS NFR BLD AUTO: 81.1 %
NRBC BLD AUTO-RTO: 0 /100 WBC
PLATELET # BLD AUTO: 473 THOU/MM3 (ref 130–400)
PMV BLD AUTO: 8.9 FL (ref 9.4–12.4)
POTASSIUM SERPL-SCNC: 3.6 MEQ/L (ref 3.5–5.2)
PROT SERPL-MCNC: 5.5 G/DL (ref 6.4–8.3)
RBC # BLD AUTO: 3.24 MILL/MM3 (ref 4.2–5.4)
SODIUM SERPL-SCNC: 140 MEQ/L (ref 135–145)
WBC # BLD AUTO: 10.7 THOU/MM3 (ref 4.8–10.8)

## 2025-07-09 PROCEDURE — 82248 BILIRUBIN DIRECT: CPT

## 2025-07-09 PROCEDURE — 82948 REAGENT STRIP/BLOOD GLUCOSE: CPT

## 2025-07-09 PROCEDURE — 36415 COLL VENOUS BLD VENIPUNCTURE: CPT

## 2025-07-09 PROCEDURE — 80053 COMPREHEN METABOLIC PANEL: CPT

## 2025-07-09 PROCEDURE — 1200000000 HC SEMI PRIVATE

## 2025-07-09 PROCEDURE — 6370000000 HC RX 637 (ALT 250 FOR IP): Performed by: STUDENT IN AN ORGANIZED HEALTH CARE EDUCATION/TRAINING PROGRAM

## 2025-07-09 PROCEDURE — 2580000003 HC RX 258: Performed by: PHARMACIST

## 2025-07-09 PROCEDURE — 1200000003 HC TELEMETRY R&B

## 2025-07-09 PROCEDURE — 6360000002 HC RX W HCPCS: Performed by: STUDENT IN AN ORGANIZED HEALTH CARE EDUCATION/TRAINING PROGRAM

## 2025-07-09 PROCEDURE — APPNB30 APP NON BILLABLE TIME 0-30 MINS

## 2025-07-09 PROCEDURE — 2580000003 HC RX 258: Performed by: STUDENT IN AN ORGANIZED HEALTH CARE EDUCATION/TRAINING PROGRAM

## 2025-07-09 PROCEDURE — 83735 ASSAY OF MAGNESIUM: CPT

## 2025-07-09 PROCEDURE — 6360000002 HC RX W HCPCS: Performed by: PHARMACIST

## 2025-07-09 PROCEDURE — 97166 OT EVAL MOD COMPLEX 45 MIN: CPT

## 2025-07-09 PROCEDURE — 6370000000 HC RX 637 (ALT 250 FOR IP): Performed by: INTERNAL MEDICINE

## 2025-07-09 PROCEDURE — 97535 SELF CARE MNGMENT TRAINING: CPT

## 2025-07-09 PROCEDURE — 2500000003 HC RX 250 WO HCPCS: Performed by: STUDENT IN AN ORGANIZED HEALTH CARE EDUCATION/TRAINING PROGRAM

## 2025-07-09 PROCEDURE — 99024 POSTOP FOLLOW-UP VISIT: CPT

## 2025-07-09 PROCEDURE — 85025 COMPLETE CBC W/AUTO DIFF WBC: CPT

## 2025-07-09 PROCEDURE — 99233 SBSQ HOSP IP/OBS HIGH 50: CPT | Performed by: PHYSICIAN ASSISTANT

## 2025-07-09 PROCEDURE — 2580000003 HC RX 258: Performed by: NURSE PRACTITIONER

## 2025-07-09 RX ORDER — QUETIAPINE FUMARATE 100 MG/1
50 TABLET, FILM COATED ORAL EVERY 8 HOURS PRN
Status: DISCONTINUED | OUTPATIENT
Start: 2025-07-09 | End: 2025-07-25 | Stop reason: HOSPADM

## 2025-07-09 RX ADMIN — LAMOTRIGINE 200 MG: 100 TABLET ORAL at 20:22

## 2025-07-09 RX ADMIN — ENOXAPARIN SODIUM 40 MG: 100 INJECTION SUBCUTANEOUS at 08:47

## 2025-07-09 RX ADMIN — PIPERACILLIN AND TAZOBACTAM 3375 MG: 3; .375 INJECTION, POWDER, FOR SOLUTION INTRAVENOUS at 17:08

## 2025-07-09 RX ADMIN — ACETAMINOPHEN 650 MG: 325 TABLET ORAL at 23:22

## 2025-07-09 RX ADMIN — VANCOMYCIN HYDROCHLORIDE 1500 MG: 5 INJECTION, POWDER, LYOPHILIZED, FOR SOLUTION INTRAVENOUS at 00:47

## 2025-07-09 RX ADMIN — BUSPIRONE HYDROCHLORIDE 10 MG: 10 TABLET ORAL at 20:21

## 2025-07-09 RX ADMIN — PANTOPRAZOLE SODIUM 40 MG: 40 TABLET, DELAYED RELEASE ORAL at 05:01

## 2025-07-09 RX ADMIN — SODIUM CHLORIDE: 0.9 INJECTION, SOLUTION INTRAVENOUS at 23:19

## 2025-07-09 RX ADMIN — CETIRIZINE HYDROCHLORIDE 10 MG: 10 TABLET, FILM COATED ORAL at 08:44

## 2025-07-09 RX ADMIN — VENLAFAXINE HYDROCHLORIDE 75 MG: 75 CAPSULE, EXTENDED RELEASE ORAL at 08:44

## 2025-07-09 RX ADMIN — TRAMADOL HYDROCHLORIDE 50 MG: 50 TABLET, COATED ORAL at 15:37

## 2025-07-09 RX ADMIN — LOSARTAN POTASSIUM 50 MG: 50 TABLET, FILM COATED ORAL at 08:44

## 2025-07-09 RX ADMIN — SODIUM CHLORIDE, PRESERVATIVE FREE 10 ML: 5 INJECTION INTRAVENOUS at 20:19

## 2025-07-09 RX ADMIN — SODIUM CHLORIDE: 0.9 INJECTION, SOLUTION INTRAVENOUS at 12:13

## 2025-07-09 RX ADMIN — VENLAFAXINE HYDROCHLORIDE 150 MG: 150 CAPSULE, EXTENDED RELEASE ORAL at 08:44

## 2025-07-09 RX ADMIN — POLYETHYLENE GLYCOL 3350 17 G: 17 POWDER, FOR SOLUTION ORAL at 08:47

## 2025-07-09 RX ADMIN — LAMOTRIGINE 100 MG: 100 TABLET ORAL at 08:44

## 2025-07-09 RX ADMIN — VANCOMYCIN HYDROCHLORIDE 1000 MG: 1 INJECTION, POWDER, LYOPHILIZED, FOR SOLUTION INTRAVENOUS at 13:57

## 2025-07-09 RX ADMIN — PIPERACILLIN AND TAZOBACTAM 3375 MG: 3; .375 INJECTION, POWDER, FOR SOLUTION INTRAVENOUS at 08:50

## 2025-07-09 RX ADMIN — QUETIAPINE FUMARATE 25 MG: 25 TABLET ORAL at 20:22

## 2025-07-09 ASSESSMENT — PAIN SCALES - GENERAL: PAINLEVEL_OUTOF10: 8

## 2025-07-09 ASSESSMENT — PAIN - FUNCTIONAL ASSESSMENT: PAIN_FUNCTIONAL_ASSESSMENT: PREVENTS OR INTERFERES SOME ACTIVE ACTIVITIES AND ADLS

## 2025-07-09 ASSESSMENT — PAIN DESCRIPTION - LOCATION: LOCATION: ABDOMEN

## 2025-07-09 ASSESSMENT — PAIN SCALES - WONG BAKER: WONGBAKER_NUMERICALRESPONSE: NO HURT

## 2025-07-09 ASSESSMENT — PAIN DESCRIPTION - DESCRIPTORS: DESCRIPTORS: STABBING

## 2025-07-09 ASSESSMENT — PAIN DESCRIPTION - ORIENTATION: ORIENTATION: MID

## 2025-07-09 NOTE — CARE COORDINATION
7/9/25, 2:11 PM EDT  Discharge Planning Evaluation  Social work consult received, patient from [facility].    Patient/Family preference is to return to [facility], per pt and daughter.    The patient's current payor source at the facility is Medicare.   Medicare skilled days available: pt was skilled prior to admisison  Medicare does the patient have a three midnight qualifying stay? na  Insurance precert:  no  Spoke with Cayla at the facility.  Patient bed hold: yes  Anticipated transport plan: son Raf  Patient's Healthcare Decision Maker: Named in Scanned ACP Document  Readmission Risk Low 0-14, Mod 15-19), High > 20: Readmission Risk Score: 23.2    Current PCP: María Neil APRN - CNP  PCP verified by CM? Yes    Patient Orientation: Alert and Oriented    Patient Cognition: Alert  History Provided by: Patient, Child/Family    Advance Directives:      Code Status: Limited   Patient's Primary Decision Maker is:      Primary Decision Maker: Raf Kern - Child - [phone]    Secondary Decision Maker: Shayla Collins - Child - [phone]     Discharge Planning:    Patient lives with: Other (Comment) (ECF) Type of Home: Skilled Nursing Facility  Primary Care Giver: Self  Patient Support Systems include: Children   Current Financial resources: Medicare, Medicaid  Current community resources: ECF/Home Care  Current services prior to admission: Skilled Nursing Facility            Current DME:              Type of Home Care services:  None    ADLS  Prior functional level: Assistance with the following:, Bathing, Dressing, Cooking, Housework, Shopping  Current functional level: Assistance with the following:, Bathing, Dressing, Shopping, Housework, Cooking    Family can provide assistance at DC: No  Would you like Case Management to discuss the discharge plan with any other family members/significant others, and if so, who? Yes (joao)  Plans to Return to Present Housing: Yes  Other Identified Issues/Barriers to

## 2025-07-09 NOTE — CARE COORDINATION
07/09/25 1500   Readmission Assessment   Number of Days since last admission? 1-7 days   Previous Disposition SNF   Who is being Interviewed Patient   What was the patient's/caregiver's perception as to why they think they needed to return back to the hospital? Other (Comment)  (Post op symptoms abdominal pain)   Did you visit your Primary Care Physician after you left the hospital, before you returned this time? No   Why weren't you able to visit your PCP? Did not have an appointment   Did you see a specialist, such as Cardiac, Pulmonary, Orthopedic Physician, etc. after you left the hospital? Yes   Who advised the patient to return to the hospital? Skilled Unit   Does the patient report anything that got in the way of taking their medications? No   In our efforts to provide the best possible care to you and others like you, can you think of anything that we could have done to help you after you left the hospital the first time, so that you might not have needed to return so soon? Other (Comment)  (No)

## 2025-07-09 NOTE — PROGRESS NOTES
Patient received sacramental anointing by a . If you are in need of  support, please call [phone]. If you are in need of a  after 6:30pm, please call the house supervisor for the on-call .     Rev. Tenzin Payne MDiv, [facility]  Director of Spiritual Health  O: [phone]    To reach a  call [phone]

## 2025-07-09 NOTE — CARE COORDINATION
Case Management Assessment Initial Evaluation    Date/Time of Evaluation: 2025 8:45 AM  Assessment Completed by: Denisha Garsia    If patient is discharged prior to next notation, then this note serves as note for discharge by case management.    Patient Name: Adry Moura                   YOB: 1950  Diagnosis: Abscess of abdominal wall [L02.211]  Intra-abdominal abscess (HCC) [K65.1]                   Date / Time: 2025 11:22 AM  Location: [address] [place] [address]     Patient Admission Status: Inpatient   Readmission Risk Low 0-14, Mod 15-19), High > 20: Readmission Risk Score: 22.6    Current PCP: María Neil APRN - CNP  Health Care Decision Makers:   Primary Decision Maker: Raf Kern - Child - [phone]    Secondary Decision Maker: Shayla Collins - Child - [phone]    Additional Case Management Notes: Patient presents to ED from [facility] with abd pain , nausea, decreased appetite. Hx of adenocarcinoma of Colon Ca.  Recent partial resection of colon. Increased HR. IVF. IV ATB. Medications on hold.     Procedures:  Abscess Drained and Drain Cath Placement.     Imagin/8 CXR:   1. Postoperative changes.  2. Emphysematous changes throughout both lung fields.  3. Thoracic spondylosis. Postoperative changes involving the right humeral head  and neck..     CT ABD/PEL:     1. A large intra-abdominal abscess within the anterior aspect of the abdomen and pelvis measures at least 5.5 x 18.8 x 16.0 cm (series 604, image 33 and series 303, image 54). The dominant fluid collection appears contiguous with a loculated mixed attenuation collection of fluid in the right adnexa. (Series 604, image 35). No bowel obstruction is observed.     2. Chronic findings are discussed.       Patient Goals/Plan/Treatment Preferences:       From [facility] , SW consulted.

## 2025-07-09 NOTE — PROGRESS NOTES
[facility]  Lisset Johnson PA-C for Dr. Leoncio Hale  General Surgery Daily Progress Note    Pt Name: Adry Moura  Medical Record Number: [id number]  Date of Birth 1950   Today's Date: 7/9/2025    Hospital day # 1     ASSESSMENT   Large intra-abdominal fluid collection/possible abscess - Status post IR drain placement   Altered mental status   Status post low anterior resection 6/19/25 secondary to adenocarcinoma  Abdominal pain  Leukocytosis   Diabetes Mellitus  Bipolar disorder  History of DVT  History of gastric bypass then later total gastrectomy   History of subtotal esophagectomy with colonic interposition   has a past medical history of Arthritis, Bowel obstruction (HCC), Bronchitis, Diabetes mellitus (HCC), Difficult intubation, DVT, lower extremity (HCC), GERD (gastroesophageal reflux disease), History of blood transfusion, Hx of blood clots, Hypertension, Hypoglycemia, Kidney stone, Macular degeneration, Movement disorder, Multinodular goiter, Muscle strain of left lower extremity, Pinched nerve, Psychiatric problem, Urinary incontinence, and Vitamin D deficiency.  PLAN   May advance diet as tolerated  CT guided drain placement completed. Drainage old blood. Monitor outputs.  Antibiotics - on Zosyn, await final cultures. Culture showing enteric gram-negative bacilli so far  IV fluids per hospitalist  Pain & nausea control  DVT prophylaxis   PT/OT consultation   Resume home medications   Labs reviewed. WBC improved. Repeat in am.   Will need oncology appointment outpatient rescheduled  Case management for assistance with discharge planning, from [facility], social work consult  No surgical intervention needed at this time. Continue conservative management. IR drain in place. Following along for cultures.  SUBJECTIVE   Chief complaint: N/A    Patient was stable overnight. Chart reviewed. Daughter at bedside to help provide patient information. Patient sleepy but arousable.

## 2025-07-09 NOTE — PROGRESS NOTES
[facility]  INPATIENT OCCUPATIONAL THERAPY  STRZ RENAL TELEMETRY 6K  EVALUATION      Discharge Recommendations: Long Term Care with OT  Equipment Recommendations: No        Time In: 1447  Time Out: 1512  Timed Code Treatment Minutes: 16 Minutes  Minutes: 25          Date: 2025  Patient Name: Adry Moura,   Gender: female      MRN: [id number]  : 1950  (74 y.o.)  Referring Practitioner: Uriel Madera, APRN - CNP  Diagnosis: Abscess of abdominal wall  Additional Pertinent Hx: per chart review; \"Adry Moura is a 74 y.o. female with PMHx of Bariatric Surgery s/p total gastrectomy, subtotal esophagectomy with colo-jejunostomy, Adenocarcinoma of colon s/p low anterior resection, Bipolar disorder, HTN and NIDDm  who presents to [facility] from SNF/ [facility] with Generalized abdominal pain.    Patient states that she has been having abdominal pain associated with nausea, decreased appetite and  weakness since her discharge to SNF after surgery.  Patient states that the abdominal pain is generalized, sharp, exacerbated by PT/OT/exercise.  Per reports, patient was found to be hypoxic at nursing home but was saturating on room air in the ED.  \"    Restrictions/Precautions:  Restrictions/Precautions: Fall Risk, General Precautions  Position Activity Restriction  Other Position/Activity Restrictions: L drain    Subjective  Chart Reviewed: Yes, Orders, Progress Notes, History and Physical  Patient assessed for rehabilitation services?: Yes  Family / Caregiver Present: Yes (daughter and significant other)    Subjective: RN approve dsession, patient supine in bed sleeping upon OT arrival and agreeable to eval once roused. patient A & O x 2. patient's daughter and significant other present in room. patient's daughter states patient is more confused than baseline.    Pain: slight pain in abdominal region. Facial grimacing when completing bed mobility. Did not rate pain.  Patient's daughter reports patient has not wanted any pain meds today.     Vitals: Vitals not assessed per clinical judgement, see nursing flowsheet    Social/Functional History:  Lives With: Significant other  Type of Home: Assisted living  Home Layout: One level  Home Access: Level entry  Home Equipment: Rollator   Bathroom Shower/Tub: Walk-in shower  Bathroom Toilet: Handicap height  Bathroom Equipment: Grab bars in shower, Shower chair, Grab bars around toilet  Bathroom Accessibility: Walker accessible    Receives Help From: Other (Comment) (facility staff)  Prior Level of Assist for ADLs: Independent  Homemaking Responsibilities: No  Ambulation Assistance: Needs assistance  Prior Level of Assist for Transfers: Independent  Has the patient had two or more falls in the past year or any fall with injury in the past year?: No    Active : No  Patient's  Info: Children, [place]  Mode of Transportation: Car  Occupation: Retired       VISION:WFL    HEARING:  heard of hearing    COGNITION: Slow Processing, Decreased Recall, Decreased Insight, Impaired Memory, Decreased Problem Solving, Decreased Safety Awareness, Impaired Attention, and Difficulty Following Commands    RANGE OF MOTION:  Bilateral Upper Extremity:  WFL    STRENGTH:  Bilateral Upper Extremity:  shldr 4/5 elbow flex 4/5 ext 4-/5  (F)      SENSATION:   WFL    ADL:   Grooming: Contact Guard Assistance.  To stand at sink and wash hands with cues for 2 w/w placement  Lower Extremity Dressing: Moderate Assistance.  To change soiled depend; patient unaware she had BM  Footwear Management: Stand By Assistance.  To don L slipper sock seated EOB with cross over tech, MOD A to don R slipper sock after increased time  Toileting: Moderate Assistance.  For toilet hygiene for BM and clothing mgmt with MIN A  Toilet Transfer: Contact Guard Assistance. With use of 2 w/w for support.    IADL:   Not Tested    BALANCE:  Sitting Balance:  Stand By Assistance.

## 2025-07-09 NOTE — PROGRESS NOTES
2022--Perfectserve sent to Dr. Denny Benitez regarding BG of 61.  Orders put in    2045 Perfectserve sent to Dr. Denny Benitez to inform of pt pulling telemetry and attempting to pull drain. Telesitter will not work as she is not redirectable at this time.    Order put in for human sitter.

## 2025-07-09 NOTE — PROGRESS NOTES
Hospitalist Progress Note      Patient:  Adry Moura 74 y.o. female       : 1950  Unit/Bed:6K-17/017-A    Date of Admission: 2025      ASSESSMENT AND PLAN    Active Problems  Intra-abdominal abscess  Adenocarcinoma of colon s/p low anterior resection   Underwent colonoscopy in May 2025 and then later Radha 3, 2025 and found to have 3 cm mass which was found to have moderately differentiated adenocarcinoma of Colon resulting in low anterior resection on 25 by Dr. Hale   CT A/P in the ED with large intra-abdominal abscess within anterior aspect of abdomen and pelvis measuring 5.5 x 18.8 x 16.0 cm   General Surgery following  S/p CT-guided drain placement .  Vanc/Zosyn was started on admission.   Culture growing gram-negative bacilli, continue with Zosyn.  Discontinue vancomycin  Blood cultures NGTD  Will plan to contact ID in a.m.  Acute metabolic encephalopathy  Secondary to infection.  Delirium precautions; frequent re-orientation.  Encourage good sleep/wake cycle.  Avoid benzo, narcotics, Ach, antiemetics, antihistamines, antipsychotics as much as possible.    Resolved Problems  Leukocytosis    Chronic Conditions (reviewed, stable, and home medications resumed, unless otherwise stated)  History of gastric bypass/bariatric surgery: Noted in , complicated by mass in upper esophagus resulting in near-total esophagectomy then total gastrectomy in addition to subtotal esophagectomy, Currently with colo-jejunostomy  Primary hypertension  IDDMII  Bipolar disorder      LDA: []CVC / []PICC / []Midline / []Orellana / [x]Drains / []Mediport / []None  Antibiotics: yes  Steroids: no  Labs (still needed?): [x]Yes / []No  IVF (still needed?): []Yes / [x]No    Level of care: []Step Down / [x]Med-Surg  Bed Status: [x]Inpatient / []Observation  Telemetry: [x]Yes / []No  PT/OT: [x]Yes / []No    DVT Prophylaxis: [x] Lovenox / [] Heparin / [] SCDs / [] Already on Systemic Anticoagulation / [] None

## 2025-07-09 NOTE — PLAN OF CARE
Problem: Chronic Conditions and Co-morbidities  Goal: Patient's chronic conditions and co-morbidity symptoms are monitored and maintained or improved  7/9/2025 0314 by Kandice Boswell RN  Outcome: Progressing  Flowsheets (Taken 7/8/2025 2000)  Care Plan - Patient's Chronic Conditions and Co-Morbidity Symptoms are Monitored and Maintained or Improved:   Monitor and assess patient's chronic conditions and comorbid symptoms for stability, deterioration, or improvement   Collaborate with multidisciplinary team to address chronic and comorbid conditions and prevent exacerbation or deterioration   Update acute care plan with appropriate goals if chronic or comorbid symptoms are exacerbated and prevent overall improvement and discharge  7/8/2025 1844 by Arnold Felix RN  Outcome: Progressing     Problem: Discharge Planning  Goal: Discharge to home or other facility with appropriate resources  7/9/2025 0314 by Kandice Boswell RN  Outcome: Progressing  Flowsheets (Taken 7/8/2025 2000)  Discharge to home or other facility with appropriate resources: Identify barriers to discharge with patient and caregiver  7/8/2025 1844 by Arnold Felix RN  Outcome: Progressing     Problem: Safety - Adult  Goal: Free from fall injury  7/9/2025 0314 by Kandice Boswell RN  Outcome: Progressing  Flowsheets (Taken 7/9/2025 0314)  Free From Fall Injury: Instruct family/caregiver on patient safety  7/8/2025 1844 by Arnold Felix RN  Outcome: Progressing     Problem: Neurosensory - Adult  Goal: Achieves stable or improved neurological status  Outcome: Progressing  Flowsheets (Taken 7/9/2025 0314)  Achieves stable or improved neurological status:   Assess for and report changes in neurological status   Monitor temperature, glucose, and sodium. Initiate appropriate interventions as ordered     Problem: Respiratory - Adult  Goal: Achieves optimal ventilation and oxygenation  Outcome: Progressing  Flowsheets (Taken 7/8/2025 2000)  Achieves optimal  weight  Goal: Glucose maintained within prescribed range  Outcome: Progressing  Flowsheets (Taken 7/9/2025 0314)  Glucose maintained within prescribed range:   Monitor blood glucose as ordered   Assess for signs and symptoms of hyperglycemia and hypoglycemia   Administer ordered medications to maintain glucose within target range     Problem: Hematologic - Adult  Goal: Maintains hematologic stability  Outcome: Progressing  Flowsheets (Taken 7/9/2025 0314)  Maintains hematologic stability: Assess for signs and symptoms of bleeding or hemorrhage

## 2025-07-09 NOTE — DISCHARGE INSTR - COC
Continuity of Care Form    Patient Name: Adry Moura   :  1950  MRN:  [id number]    Admit date:  2025  Discharge date:  2025    Code Status Order: Limited   Advance Directives:     Admitting Physician:  No admitting provider for patient encounter.  PCP: María Neil APRN - CNP    Discharging Nurse: Xiang VILLASENOR  Discharging Hospital Unit/Room#: 6K-17/017-A  Discharging Unit Phone Number: [phone]    Emergency Contact:   Extended Emergency Contact Information  Primary Emergency Contact: Raf [facility]  Home Phone: [phone]  Relation: Child   needed? No  Secondary Emergency Contact: Shayla Collisn   [place]  Home Phone: [phone]  Relation: Child   needed? 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    GASTRIC BYPASS SURGERY      HAND TENDON SURGERY Right 2021    thumb    HYSTERECTOMY (CERVIX STATUS UNKNOWN)      KNEE CARTILAGE SURGERY Left 10/2014    NERVE BLOCK      lowr [facility]     UPPER GASTROINTESTINAL ENDOSCOPY  2016       Immunization History:   Immunization History   Administered Date(s) Administered    COVID-19, MODERNA BLUE border, Primary or Immunocompromised, (age 12y+), IM, 100 mcg/0.5mL 2021, 2021, 2021, 2021, 10/29/2021, 10/29/2021, 2022    COVID-19, PFIZER, , (age 12y+), IM, 30mcg/0.3mL 2024, 10/23/2024    Influenza Vaccine, unspecified formulation  abscess (HCC) K65.1    Intra-abdominal fluid collection R18.8    Metabolic encephalopathy G93.41    Severe malnutrition E43    Leukocytosis D72.829       Isolation/Infection:   Isolation            No Isolation          Patient Infection Status    None to display              Nurse Assessment:  Last Vital Signs: BP (!) 117/58   Pulse 71   Temp 97.6 °F (36.4 °C) (Oral)   Resp 18   Ht 1.676 m (5' 6\")   Wt 53.5 kg (117 lb 15.1 oz)   LMP 03/20/1985 Comment: hysterectomy in her 30s  SpO2 95%   BMI 19.04 kg/m²     Last documented pain score (0-10 scale): Pain Level: 2  Last Weight:   Wt Readings from Last 1 Encounters:   07/25/25 53.5 kg (117 lb 15.1 oz)     Mental Status:  oriented, alert, coherent, logical, thought processes intact, able to concentrate and follow conversation, and intermittent confusion. History of Dementia    IV Access:  - None    Nursing Mobility/ADLs:  Walking   Independent  Transfer  Independent  Bathing  Assisted  Dressing  Independent  Toileting  Independent  Feeding  Independent  Med Admin  Assisted  Med Delivery   whole    Wound Care Documentation and Therapy:  Incision 06/19/25 Abdomen Medial;Lower (Active)   Dressing Status Other (Comment) 07/23/25 0400   Incision Cleansed Not Cleansed 07/24/25 0841   Dressing/Treatment Open to air 07/24/25 0841   Closure [place] 07/13/25 0806   Margins Approximated 07/15/25 1941   Incision Assessment Dry 07/24/25 0841   Drainage Amount None (dry) 07/24/25 0841   Odor None 07/14/25 0908   Jessica-incision Assessment Intact 07/14/25 0908   Number of days: 36        Elimination:  Continence:   Bowel: Yes  Bladder: Yes  Urinary Catheter: None   Colostomy/Ileostomy/Ileal Conduit: No       Date of Last BM: 7/25/2025    Intake/Output Summary (Last 24 hours) at 7/25/2025 1149  Last data filed at 7/25/2025 0908  Gross per 24 hour   Intake 160 ml   Output --   Net 160 ml     No intake/output data recorded.    Safety Concerns:     At Risk for

## 2025-07-10 ENCOUNTER — APPOINTMENT (OUTPATIENT)
Dept: GENERAL RADIOLOGY | Age: 75
End: 2025-07-10
Payer: MEDICARE

## 2025-07-10 LAB
ALBUMIN SERPL BCG-MCNC: 2.7 G/DL (ref 3.4–4.9)
ALP SERPL-CCNC: 107 U/L (ref 38–126)
ALT SERPL W/O P-5'-P-CCNC: 14 U/L (ref 10–35)
ANION GAP SERPL CALC-SCNC: 15 MEQ/L (ref 8–16)
AST SERPL-CCNC: 19 U/L (ref 10–35)
BASOPHILS ABSOLUTE: 0 THOU/MM3 (ref 0–0.1)
BASOPHILS NFR BLD AUTO: 0.4 %
BILIRUB CONJ SERPL-MCNC: 0.2 MG/DL (ref 0–0.2)
BILIRUB SERPL-MCNC: 0.4 MG/DL (ref 0.3–1.2)
BUN SERPL-MCNC: 12 MG/DL (ref 8–23)
CALCIUM SERPL-MCNC: 8.7 MG/DL (ref 8.8–10.2)
CHLORIDE SERPL-SCNC: 104 MEQ/L (ref 98–111)
CO2 SERPL-SCNC: 23 MEQ/L (ref 22–29)
CREAT SERPL-MCNC: 0.5 MG/DL (ref 0.5–0.9)
DEPRECATED RDW RBC AUTO: 51.8 FL (ref 35–45)
EOSINOPHIL NFR BLD AUTO: 3.2 %
EOSINOPHILS ABSOLUTE: 0.3 THOU/MM3 (ref 0–0.4)
ERYTHROCYTE [DISTWIDTH] IN BLOOD BY AUTOMATED COUNT: 14.2 % (ref 11.5–14.5)
GFR SERPL CREATININE-BSD FRML MDRD: > 90 ML/MIN/1.73M2
GLUCOSE BLD STRIP.AUTO-MCNC: 100 MG/DL (ref 70–108)
GLUCOSE BLD STRIP.AUTO-MCNC: 61 MG/DL (ref 70–108)
GLUCOSE BLD STRIP.AUTO-MCNC: 69 MG/DL (ref 70–108)
GLUCOSE BLD STRIP.AUTO-MCNC: 72 MG/DL (ref 70–108)
GLUCOSE BLD STRIP.AUTO-MCNC: 76 MG/DL (ref 70–108)
GLUCOSE BLD STRIP.AUTO-MCNC: 76 MG/DL (ref 70–108)
GLUCOSE BLD STRIP.AUTO-MCNC: 92 MG/DL (ref 70–108)
GLUCOSE SERPL-MCNC: 72 MG/DL (ref 74–109)
HCT VFR BLD AUTO: 32 % (ref 37–47)
HGB BLD-MCNC: 9.8 GM/DL (ref 12–16)
IMM GRANULOCYTES # BLD AUTO: 0.04 THOU/MM3 (ref 0–0.07)
IMM GRANULOCYTES NFR BLD AUTO: 0.4 %
LYMPHOCYTES ABSOLUTE: 0.8 THOU/MM3 (ref 1–4.8)
LYMPHOCYTES NFR BLD AUTO: 8 %
MCH RBC QN AUTO: 30.2 PG (ref 26–33)
MCHC RBC AUTO-ENTMCNC: 30.6 GM/DL (ref 32.2–35.5)
MCV RBC AUTO: 98.8 FL (ref 81–99)
MONOCYTES ABSOLUTE: 0.9 THOU/MM3 (ref 0.4–1.3)
MONOCYTES NFR BLD AUTO: 10.1 %
MRSA SPEC QL CULT: NORMAL
NEUTROPHILS ABSOLUTE: 7.3 THOU/MM3 (ref 1.8–7.7)
NEUTROPHILS NFR BLD AUTO: 77.9 %
NRBC BLD AUTO-RTO: 0 /100 WBC
PLATELET # BLD AUTO: 463 THOU/MM3 (ref 130–400)
PMV BLD AUTO: 8.8 FL (ref 9.4–12.4)
POTASSIUM SERPL-SCNC: 3.1 MEQ/L (ref 3.5–5.2)
PROT SERPL-MCNC: 5.1 G/DL (ref 6.4–8.3)
RBC # BLD AUTO: 3.24 MILL/MM3 (ref 4.2–5.4)
SODIUM SERPL-SCNC: 142 MEQ/L (ref 135–145)
WBC # BLD AUTO: 9.4 THOU/MM3 (ref 4.8–10.8)

## 2025-07-10 PROCEDURE — 2500000003 HC RX 250 WO HCPCS: Performed by: STUDENT IN AN ORGANIZED HEALTH CARE EDUCATION/TRAINING PROGRAM

## 2025-07-10 PROCEDURE — 6370000000 HC RX 637 (ALT 250 FOR IP): Performed by: PHYSICIAN ASSISTANT

## 2025-07-10 PROCEDURE — 6370000000 HC RX 637 (ALT 250 FOR IP): Performed by: STUDENT IN AN ORGANIZED HEALTH CARE EDUCATION/TRAINING PROGRAM

## 2025-07-10 PROCEDURE — 74018 RADEX ABDOMEN 1 VIEW: CPT

## 2025-07-10 PROCEDURE — 99024 POSTOP FOLLOW-UP VISIT: CPT | Performed by: NURSE PRACTITIONER

## 2025-07-10 PROCEDURE — 2580000003 HC RX 258: Performed by: NURSE PRACTITIONER

## 2025-07-10 PROCEDURE — 2580000003 HC RX 258

## 2025-07-10 PROCEDURE — 1200000003 HC TELEMETRY R&B

## 2025-07-10 PROCEDURE — 2580000003 HC RX 258: Performed by: STUDENT IN AN ORGANIZED HEALTH CARE EDUCATION/TRAINING PROGRAM

## 2025-07-10 PROCEDURE — 82248 BILIRUBIN DIRECT: CPT

## 2025-07-10 PROCEDURE — 80053 COMPREHEN METABOLIC PANEL: CPT

## 2025-07-10 PROCEDURE — 85025 COMPLETE CBC W/AUTO DIFF WBC: CPT

## 2025-07-10 PROCEDURE — 99232 SBSQ HOSP IP/OBS MODERATE 35: CPT | Performed by: PHYSICIAN ASSISTANT

## 2025-07-10 PROCEDURE — 82948 REAGENT STRIP/BLOOD GLUCOSE: CPT

## 2025-07-10 PROCEDURE — 36415 COLL VENOUS BLD VENIPUNCTURE: CPT

## 2025-07-10 PROCEDURE — APPSS30 APP SPLIT SHARED TIME 16-30 MINUTES: Performed by: NURSE PRACTITIONER

## 2025-07-10 PROCEDURE — 6360000002 HC RX W HCPCS: Performed by: STUDENT IN AN ORGANIZED HEALTH CARE EDUCATION/TRAINING PROGRAM

## 2025-07-10 PROCEDURE — 1200000000 HC SEMI PRIVATE

## 2025-07-10 RX ORDER — LOSARTAN POTASSIUM 25 MG/1
25 TABLET ORAL EVERY EVENING
Status: DISCONTINUED | OUTPATIENT
Start: 2025-07-10 | End: 2025-07-25 | Stop reason: HOSPADM

## 2025-07-10 RX ORDER — LOSARTAN POTASSIUM 50 MG/1
50 TABLET ORAL DAILY
Status: DISCONTINUED | OUTPATIENT
Start: 2025-07-11 | End: 2025-07-25 | Stop reason: HOSPADM

## 2025-07-10 RX ADMIN — QUETIAPINE FUMARATE 25 MG: 25 TABLET ORAL at 19:52

## 2025-07-10 RX ADMIN — LOSARTAN POTASSIUM 25 MG: 25 TABLET, FILM COATED ORAL at 18:54

## 2025-07-10 RX ADMIN — DEXTROSE 125 ML: 10 SOLUTION INTRAVENOUS at 06:49

## 2025-07-10 RX ADMIN — POTASSIUM BICARBONATE 40 MEQ: 782 TABLET, EFFERVESCENT ORAL at 11:44

## 2025-07-10 RX ADMIN — SODIUM CHLORIDE, PRESERVATIVE FREE 10 ML: 5 INJECTION INTRAVENOUS at 08:34

## 2025-07-10 RX ADMIN — PIPERACILLIN AND TAZOBACTAM 3375 MG: 3; .375 INJECTION, POWDER, FOR SOLUTION INTRAVENOUS at 00:10

## 2025-07-10 RX ADMIN — PIPERACILLIN AND TAZOBACTAM 3375 MG: 3; .375 INJECTION, POWDER, FOR SOLUTION INTRAVENOUS at 15:48

## 2025-07-10 RX ADMIN — TRAMADOL HYDROCHLORIDE 50 MG: 50 TABLET, COATED ORAL at 18:54

## 2025-07-10 RX ADMIN — SODIUM CHLORIDE, PRESERVATIVE FREE 10 ML: 5 INJECTION INTRAVENOUS at 19:53

## 2025-07-10 RX ADMIN — LAMOTRIGINE 200 MG: 100 TABLET ORAL at 19:52

## 2025-07-10 RX ADMIN — PIPERACILLIN AND TAZOBACTAM 3375 MG: 3; .375 INJECTION, POWDER, FOR SOLUTION INTRAVENOUS at 08:41

## 2025-07-10 RX ADMIN — BUSPIRONE HYDROCHLORIDE 10 MG: 10 TABLET ORAL at 19:52

## 2025-07-10 RX ADMIN — SODIUM CHLORIDE: 0.9 INJECTION, SOLUTION INTRAVENOUS at 08:38

## 2025-07-10 ASSESSMENT — PAIN DESCRIPTION - LOCATION: LOCATION: ABDOMEN

## 2025-07-10 ASSESSMENT — PAIN DESCRIPTION - DESCRIPTORS: DESCRIPTORS: OTHER (COMMENT)

## 2025-07-10 ASSESSMENT — PAIN DESCRIPTION - ORIENTATION: ORIENTATION: UPPER

## 2025-07-10 ASSESSMENT — PAIN - FUNCTIONAL ASSESSMENT: PAIN_FUNCTIONAL_ASSESSMENT: PREVENTS OR INTERFERES SOME ACTIVE ACTIVITIES AND ADLS

## 2025-07-10 ASSESSMENT — PAIN SCALES - GENERAL: PAINLEVEL_OUTOF10: 8

## 2025-07-10 NOTE — PLAN OF CARE
Problem: Chronic Conditions and Co-morbidities  Goal: Patient's chronic conditions and co-morbidity symptoms are monitored and maintained or improved  Outcome: Progressing  Flowsheets (Taken 7/10/2025 0220)  Care Plan - Patient's Chronic Conditions and Co-Morbidity Symptoms are Monitored and Maintained or Improved:   Update acute care plan with appropriate goals if chronic or comorbid symptoms are exacerbated and prevent overall improvement and discharge   Collaborate with multidisciplinary team to address chronic and comorbid conditions and prevent exacerbation or deterioration   Monitor and assess patient's chronic conditions and comorbid symptoms for stability, deterioration, or improvement       Problem: Discharge Planning  Goal: Discharge to home or other facility with appropriate resources  7/10/2025 0220 by Rcahelle Gasca RN  Outcome: Progressing  Flowsheets (Taken 7/10/2025 0220)  Discharge to home or other facility with appropriate resources:   Identify barriers to discharge with patient and caregiver   Arrange for needed discharge resources and transportation as appropriate   Identify discharge learning needs (meds, wound care, etc)       Problem: Safety - Adult  Goal: Free from fall injury  Outcome: Progressing  Note: Patient remains free from fall this shift. Bed alarm activated . Bed locked and lowest position.  2/4 side rails raised for safety. Patient utilizes walker for ambulation. Patient has non-skid socks when ambulating. Pathway clear and possessions in reach. Call light within reach. Patient rounded on hourly.        Problem: Skin/Tissue Integrity - Adult  Goal: Incisions, wounds, or drain sites healing without S/S of infection  Outcome: Progressing  Flowsheets (Taken 7/10/2025 0220)  Incisions, Wounds, or Drain Sites Healing Without Sign and Symptoms of Infection:   ADMISSION and DAILY: Assess and document risk factors for pressure ulcer development   TWICE DAILY: Assess and document skin

## 2025-07-10 NOTE — CONSULTS
CONSULTATION NOTE :ID       Patient - Adry Moura,  Age - 74 y.o.    - 1950      Room Number - 6K-17/017-A   MRN -  [id number]   formerly [facility] # - [id number]  Date of Admission -  2025 11:22 AM  Patient's PCP: María Neil APRN - CNP     Requesting Physician: Cayla Carrasquillo PA-C    REASON FOR CONSULTATION   Intraabdominal abscess    CHIEF COMPLAINT   Abdominal pain    HISTORY OF PRESENT ILLNESS       This is a very pleasant 74 y.o. female who was admitted to the hospital with a chief complaints of abdominal pain. She had recent hx of  a low anterior resection for adenocarcinoma on 25. She was admitted to hospital from [facility] for abdominal pain and was noted to have a large abscess collection. A drain was placed by IR. The fluid was positive for E.coli. today , she is more confused from her base line and I was not able to get much information from the patient. A sitter was at bed side. Patient has been refusing to get her medication and not eating well. She is on iv zosyn.  Her medical record was reviewed.    PAST MEDICAL  HISTORY       Past Medical History:   Diagnosis Date    Arthritis     generalized    Bowel obstruction (HCC) 2009    Bronchitis 2014    Diabetes mellitus (HCC)     Difficult intubation     DVT, lower extremity (HCC)     GERD (gastroesophageal reflux disease)     History of blood transfusion     Hx of blood clots     DVT    Hypertension     Hypoglycemia     Kidney stone     Macular degeneration     Movement disorder     Multinodular goiter 2015    Muscle strain of left lower extremity 2014    Pinched nerve     lowr back    Psychiatric problem     Depression    Urinary incontinence     Vitamin D deficiency        PAST SURGICAL HISTORY     Past Surgical History:   Procedure Laterality Date    ABDOMEN SURGERY      APPENDECTOMY      CHOLECYSTECTOMY      COLECTOMY N/A 2025    Open low anterior resection performed by Leoncio Hlae

## 2025-07-10 NOTE — PROGRESS NOTES
Hospitalist Progress Note      Patient:  Adry Moura 74 y.o. female       : 1950  Unit/Bed:6-17/017-A    Date of Admission: 2025      ASSESSMENT AND PLAN    Active Problems  Intra-abdominal abscess  Adenocarcinoma of colon s/p low anterior resection   Underwent colonoscopy in May 2025 and then later Radha 3, 2025 and found to have 3 cm mass which was found to have moderately differentiated adenocarcinoma of Colon resulting in low anterior resection on 25 by Dr. Hale   CT A/P in the ED with large intra-abdominal abscess within anterior aspect of abdomen and pelvis measuring 5.5 x 18.8 x 16.0 cm   General Surgery following  S/p CT-guided drain placement .  Vanc/Zosyn was started on admission.   Culture growing gram-negative bacilli, continue with Zosyn.  Discontinue vancomycin  Blood cultures NGTD  ID consulted, discussed pt case.   Acute metabolic encephalopathy  Secondary to infection.  Delirium precautions; frequent re-orientation.  Encourage good sleep/wake cycle.  Avoid benzo, narcotics, Ach, antiemetics, antihistamines, antipsychotics as much as possible.  Hypokalemia  Replace per protocol    Resolved Problems  Leukocytosis    Chronic Conditions (reviewed, stable, and home medications resumed, unless otherwise stated)  History of gastric bypass/bariatric surgery: Noted in , complicated by mass in upper esophagus resulting in near-total esophagectomy then total gastrectomy in addition to subtotal esophagectomy, Currently with colo-jejunostomy  Primary hypertension.  Home medications were reviewed and resumed  NIDDMII  Bipolar disorder. On Seroquel, Lamictal, Buspar, Effexor.       LDA: []CVC / []PICC / []Midline / []Orellana / [x]Drains / []Mediport / []None  Antibiotics: yes  Steroids: no  Labs (still needed?): [x]Yes / []No  IVF (still needed?): []Yes / [x]No    Level of care: []Step Down / [x]Med-Surg  Bed Status: [x]Inpatient / []Observation  Telemetry: [x]Yes /

## 2025-07-10 NOTE — PROGRESS NOTES
[facility]  Uriel Madera CNP for Dr. Leoncio Hale  General Surgery Daily Progress Note    Pt Name: Adry Moura  Medical Record Number: [id number]  Date of Birth 1950   Today's Date: 7/10/2025    Hospital day # 2     ASSESSMENT   Large intra-abdominal fluid collection/possible abscess - Status post IR drain placement   Altered mental status   Status post low anterior resection 6/19/25 secondary to adenocarcinoma  Abdominal pain  Leukocytosis   Diabetes Mellitus  Bipolar disorder  History of DVT  History of gastric bypass then later total gastrectomy   History of subtotal esophagectomy with colonic interposition   has a past medical history of Arthritis, Bowel obstruction (HCC), Bronchitis, Diabetes mellitus (HCC), Difficult intubation, DVT, lower extremity (HCC), GERD (gastroesophageal reflux disease), History of blood transfusion, Hx of blood clots, Hypertension, Hypoglycemia, Kidney stone, Macular degeneration, Movement disorder, Multinodular goiter, Muscle strain of left lower extremity, Pinched nerve, Psychiatric problem, Urinary incontinence, and Vitamin D deficiency.  PLAN   Continue full liquids  KUB this morning  CT guided drain placement completed. Drainage serosanguineous. Cultures with heavy growth of gram (-) bacilli.   Antibiotics - on Zosyn, await final cultures. Would probably benefit from ID following.  IV fluids per hospitalist  Pain & nausea control  DVT prophylaxis   PT/OT consultation   Resume home medications   Labs reviewed. WBC wnl. Hemoglobin stable. Replace K+ per protocol.   Will need oncology appointment outpatient rescheduled  Case management for assistance with discharge planning, from [facility], social work consult  Patient remains altered this morning.  Alert to name but otherwise disoriented.  She had 2 bowel movements overnight but complains of abdominal pain and feeling about this morning.  Only 100 cc drainage overnight.  Will repeat KUB this  dextrose  OBJECTIVE   CURRENT VITALS:  height is 1.676 m (5' 6\") and weight is 56.2 kg (123 lb 14.4 oz). Her oral temperature is 98.1 °F (36.7 °C). Her blood pressure is 137/85 and her pulse is 82. Her respiration is 18 and oxygen saturation is 95%.   Temperature Range (24h):Temp: 98.1 °F (36.7 °C) Temp  Av.6 °F (37 °C)  Min: 98.1 °F (36.7 °C)  Max: 99.2 °F (37.3 °C)  BP Range (24h): Systolic (24hrs), Av , Min:133 , Max:151     Diastolic (24hrs), Av, Min:63, Max:85    Pulse Range (24h): Pulse  Av.7  Min: 82  Max: 103  Respiration Range (24h): Resp  Av.3  Min: 16  Max: 18  Current Pulse Ox (24h):  SpO2: 95 %  Pulse Ox Range (24h):  SpO2  Av.2 %  Min: 90 %  Max: 99 %  Oxygen Amount and Delivery:    Incentive Spirometry Tx:            GENERAL: Arousable, sleeping, no distress  SKIN: Skin color, texture, turgor normal. No rashes or lesions.  HEENT: Head is normocephalic, atraumatic.   NECK: Supple, symmetrical  LUNGS: clear to ausculation, left-sided air bulge due to colon interposition  HEART: normal rate and regular rhythm  ABDOMEN: soft, mild generalized tenderness, slightly distended, bowel sounds present, and no guarding or peritoneal signs, IR drain in place with serosanguineous fluid in tubing  NEUROLOGIC: There are no focalizing motor or sensory deficits.   EXTREMITIES: no cyanosis, no clubbing, and no edema.  In: 953 [P.O.:250; I.V.:282.9; Other:20]  Out: 100 [Urine:100]      LABS     Recent Labs     25  1305 25  0611   WBC 13.6* 10.7   HGB 10.7* 10.0*   HCT 34.7* 31.4*   * 473*    140   K 3.7 3.6    103   CO2 22 24   BUN 29* 23   CREATININE 0.8 0.7   MG 2.1 2.0   PHOS 3.5  --    CALCIUM 8.6* 8.1*      No results for input(s): \"INR\" in the last 72 hours.    Invalid input(s): \"PT\", \"PTT\"  Recent Labs     25  1305 25  0611   AST 26 19   ALT 17 17   BILITOT 0.5 0.3   BILIDIR  --  0.2   LACTA 1.3  --      No results for input(s): \"TROPONINT\" in

## 2025-07-11 LAB
ALBUMIN SERPL BCG-MCNC: 2.7 G/DL (ref 3.4–4.9)
ALP SERPL-CCNC: 108 U/L (ref 38–126)
ALT SERPL W/O P-5'-P-CCNC: 13 U/L (ref 10–35)
ANION GAP SERPL CALC-SCNC: 16 MEQ/L (ref 8–16)
AST SERPL-CCNC: 17 U/L (ref 10–35)
BASOPHILS ABSOLUTE: 0.1 THOU/MM3 (ref 0–0.1)
BASOPHILS NFR BLD AUTO: 0.6 %
BILIRUB CONJ SERPL-MCNC: 0.2 MG/DL (ref 0–0.2)
BILIRUB SERPL-MCNC: 0.4 MG/DL (ref 0.3–1.2)
BUN SERPL-MCNC: 5 MG/DL (ref 8–23)
CALCIUM SERPL-MCNC: 8.7 MG/DL (ref 8.8–10.2)
CHLORIDE SERPL-SCNC: 102 MEQ/L (ref 98–111)
CO2 SERPL-SCNC: 24 MEQ/L (ref 22–29)
CREAT SERPL-MCNC: 0.5 MG/DL (ref 0.5–0.9)
DEPRECATED RDW RBC AUTO: 50 FL (ref 35–45)
EOSINOPHIL NFR BLD AUTO: 3.7 %
EOSINOPHILS ABSOLUTE: 0.3 THOU/MM3 (ref 0–0.4)
ERYTHROCYTE [DISTWIDTH] IN BLOOD BY AUTOMATED COUNT: 14.2 % (ref 11.5–14.5)
GFR SERPL CREATININE-BSD FRML MDRD: > 90 ML/MIN/1.73M2
GLUCOSE BLD STRIP.AUTO-MCNC: 84 MG/DL (ref 70–108)
GLUCOSE BLD STRIP.AUTO-MCNC: 88 MG/DL (ref 70–108)
GLUCOSE BLD STRIP.AUTO-MCNC: 88 MG/DL (ref 70–108)
GLUCOSE BLD STRIP.AUTO-MCNC: 93 MG/DL (ref 70–108)
GLUCOSE SERPL-MCNC: 87 MG/DL (ref 74–109)
HCT VFR BLD AUTO: 30.9 % (ref 37–47)
HGB BLD-MCNC: 10 GM/DL (ref 12–16)
IMM GRANULOCYTES # BLD AUTO: 0.04 THOU/MM3 (ref 0–0.07)
IMM GRANULOCYTES NFR BLD AUTO: 0.5 %
LYMPHOCYTES ABSOLUTE: 0.7 THOU/MM3 (ref 1–4.8)
LYMPHOCYTES NFR BLD AUTO: 8.1 %
MAGNESIUM SERPL-MCNC: 1.7 MG/DL (ref 1.6–2.6)
MCH RBC QN AUTO: 31 PG (ref 26–33)
MCHC RBC AUTO-ENTMCNC: 32.4 GM/DL (ref 32.2–35.5)
MCV RBC AUTO: 95.7 FL (ref 81–99)
MONOCYTES ABSOLUTE: 1 THOU/MM3 (ref 0.4–1.3)
MONOCYTES NFR BLD AUTO: 11.3 %
NEUTROPHILS ABSOLUTE: 6.6 THOU/MM3 (ref 1.8–7.7)
NEUTROPHILS NFR BLD AUTO: 75.8 %
NRBC BLD AUTO-RTO: 0 /100 WBC
PLATELET # BLD AUTO: 509 THOU/MM3 (ref 130–400)
PMV BLD AUTO: 8.9 FL (ref 9.4–12.4)
POTASSIUM SERPL-SCNC: 3.3 MEQ/L (ref 3.5–5.2)
PROT SERPL-MCNC: 5.2 G/DL (ref 6.4–8.3)
RBC # BLD AUTO: 3.23 MILL/MM3 (ref 4.2–5.4)
SODIUM SERPL-SCNC: 142 MEQ/L (ref 135–145)
WBC # BLD AUTO: 8.7 THOU/MM3 (ref 4.8–10.8)

## 2025-07-11 PROCEDURE — 6370000000 HC RX 637 (ALT 250 FOR IP): Performed by: PHYSICIAN ASSISTANT

## 2025-07-11 PROCEDURE — APPNB30 APP NON BILLABLE TIME 0-30 MINS

## 2025-07-11 PROCEDURE — 97530 THERAPEUTIC ACTIVITIES: CPT

## 2025-07-11 PROCEDURE — 6370000000 HC RX 637 (ALT 250 FOR IP): Performed by: STUDENT IN AN ORGANIZED HEALTH CARE EDUCATION/TRAINING PROGRAM

## 2025-07-11 PROCEDURE — 83735 ASSAY OF MAGNESIUM: CPT

## 2025-07-11 PROCEDURE — 85025 COMPLETE CBC W/AUTO DIFF WBC: CPT

## 2025-07-11 PROCEDURE — 82248 BILIRUBIN DIRECT: CPT

## 2025-07-11 PROCEDURE — 6360000002 HC RX W HCPCS: Performed by: STUDENT IN AN ORGANIZED HEALTH CARE EDUCATION/TRAINING PROGRAM

## 2025-07-11 PROCEDURE — 36415 COLL VENOUS BLD VENIPUNCTURE: CPT

## 2025-07-11 PROCEDURE — 82948 REAGENT STRIP/BLOOD GLUCOSE: CPT

## 2025-07-11 PROCEDURE — 80053 COMPREHEN METABOLIC PANEL: CPT

## 2025-07-11 PROCEDURE — 99024 POSTOP FOLLOW-UP VISIT: CPT

## 2025-07-11 PROCEDURE — 6370000000 HC RX 637 (ALT 250 FOR IP): Performed by: INTERNAL MEDICINE

## 2025-07-11 PROCEDURE — 2580000003 HC RX 258: Performed by: STUDENT IN AN ORGANIZED HEALTH CARE EDUCATION/TRAINING PROGRAM

## 2025-07-11 PROCEDURE — 99232 SBSQ HOSP IP/OBS MODERATE 35: CPT | Performed by: PHYSICIAN ASSISTANT

## 2025-07-11 PROCEDURE — 2500000003 HC RX 250 WO HCPCS: Performed by: STUDENT IN AN ORGANIZED HEALTH CARE EDUCATION/TRAINING PROGRAM

## 2025-07-11 PROCEDURE — 1200000000 HC SEMI PRIVATE

## 2025-07-11 RX ADMIN — SODIUM CHLORIDE, PRESERVATIVE FREE 10 ML: 5 INJECTION INTRAVENOUS at 20:53

## 2025-07-11 RX ADMIN — TRAMADOL HYDROCHLORIDE 50 MG: 50 TABLET, COATED ORAL at 12:56

## 2025-07-11 RX ADMIN — VENLAFAXINE HYDROCHLORIDE 75 MG: 75 CAPSULE, EXTENDED RELEASE ORAL at 09:18

## 2025-07-11 RX ADMIN — PANTOPRAZOLE SODIUM 40 MG: 40 TABLET, DELAYED RELEASE ORAL at 05:20

## 2025-07-11 RX ADMIN — LAMOTRIGINE 200 MG: 100 TABLET ORAL at 20:53

## 2025-07-11 RX ADMIN — ENOXAPARIN SODIUM 40 MG: 100 INJECTION SUBCUTANEOUS at 09:20

## 2025-07-11 RX ADMIN — LOSARTAN POTASSIUM 50 MG: 25 TABLET, FILM COATED ORAL at 09:16

## 2025-07-11 RX ADMIN — TRAMADOL HYDROCHLORIDE 50 MG: 50 TABLET, COATED ORAL at 05:20

## 2025-07-11 RX ADMIN — LOSARTAN POTASSIUM 25 MG: 25 TABLET, FILM COATED ORAL at 17:24

## 2025-07-11 RX ADMIN — QUETIAPINE FUMARATE 25 MG: 25 TABLET ORAL at 20:53

## 2025-07-11 RX ADMIN — TRAMADOL HYDROCHLORIDE 50 MG: 50 TABLET, COATED ORAL at 20:55

## 2025-07-11 RX ADMIN — SODIUM CHLORIDE, PRESERVATIVE FREE 10 ML: 5 INJECTION INTRAVENOUS at 09:15

## 2025-07-11 RX ADMIN — PIPERACILLIN AND TAZOBACTAM 3375 MG: 3; .375 INJECTION, POWDER, FOR SOLUTION INTRAVENOUS at 10:35

## 2025-07-11 RX ADMIN — BUSPIRONE HYDROCHLORIDE 10 MG: 10 TABLET ORAL at 20:54

## 2025-07-11 RX ADMIN — PIPERACILLIN AND TAZOBACTAM 3375 MG: 3; .375 INJECTION, POWDER, FOR SOLUTION INTRAVENOUS at 14:05

## 2025-07-11 RX ADMIN — PIPERACILLIN AND TAZOBACTAM 3375 MG: 3; .375 INJECTION, POWDER, FOR SOLUTION INTRAVENOUS at 00:04

## 2025-07-11 RX ADMIN — POTASSIUM CHLORIDE 40 MEQ: 1500 TABLET, EXTENDED RELEASE ORAL at 10:34

## 2025-07-11 RX ADMIN — PIPERACILLIN AND TAZOBACTAM 3375 MG: 3; .375 INJECTION, POWDER, FOR SOLUTION INTRAVENOUS at 23:21

## 2025-07-11 RX ADMIN — VENLAFAXINE HYDROCHLORIDE 150 MG: 150 CAPSULE, EXTENDED RELEASE ORAL at 09:17

## 2025-07-11 RX ADMIN — POLYETHYLENE GLYCOL 3350 17 G: 17 POWDER, FOR SOLUTION ORAL at 09:20

## 2025-07-11 RX ADMIN — LAMOTRIGINE 100 MG: 100 TABLET ORAL at 09:16

## 2025-07-11 ASSESSMENT — PAIN DESCRIPTION - ORIENTATION
ORIENTATION: MID;LEFT
ORIENTATION: MID

## 2025-07-11 ASSESSMENT — PAIN DESCRIPTION - DESCRIPTORS
DESCRIPTORS: JABBING
DESCRIPTORS: ACHING;DISCOMFORT
DESCRIPTORS: ACHING

## 2025-07-11 ASSESSMENT — PAIN - FUNCTIONAL ASSESSMENT: PAIN_FUNCTIONAL_ASSESSMENT: ACTIVITIES ARE NOT PREVENTED

## 2025-07-11 ASSESSMENT — PAIN SCALES - GENERAL
PAINLEVEL_OUTOF10: 0
PAINLEVEL_OUTOF10: 8
PAINLEVEL_OUTOF10: 8
PAINLEVEL_OUTOF10: 4
PAINLEVEL_OUTOF10: 3

## 2025-07-11 ASSESSMENT — PAIN DESCRIPTION - LOCATION
LOCATION: ABDOMEN
LOCATION: ABDOMEN
LOCATION: BACK

## 2025-07-11 ASSESSMENT — PAIN DESCRIPTION - PAIN TYPE: TYPE: CHRONIC PAIN

## 2025-07-11 NOTE — PROGRESS NOTES
This RN went to patient's room to have initial set of vitals taken and to check blood sugar. Patient irritable and said her vitals were just taken so she refused to have it done again. Patient reoriented that the last VS were taken almost 4 hours ago. Patient insisted to have her room door closed and stated \"I don't want to talk to anyone right now.\" Informed patient that she will be checked in again in an hour and as needed. Bed alarm remained on. Tele sitter at bedside.

## 2025-07-11 NOTE — PLAN OF CARE
Problem: Chronic Conditions and Co-morbidities  Goal: Patient's chronic conditions and co-morbidity symptoms are monitored and maintained or improved  Outcome: Progressing  Flowsheets (Taken 7/11/2025 0350)  Care Plan - Patient's Chronic Conditions and Co-Morbidity Symptoms are Monitored and Maintained or Improved:   Update acute care plan with appropriate goals if chronic or comorbid symptoms are exacerbated and prevent overall improvement and discharge   Collaborate with multidisciplinary team to address chronic and comorbid conditions and prevent exacerbation or deterioration   Monitor and assess patient's chronic conditions and comorbid symptoms for stability, deterioration, or improvement     Problem: Discharge Planning  Goal: Discharge to home or other facility with appropriate resources  Outcome: Progressing  Flowsheets (Taken 7/11/2025 0350)  Discharge to home or other facility with appropriate resources:   Identify barriers to discharge with patient and caregiver   Arrange for needed discharge resources and transportation as appropriate   Identify discharge learning needs (meds, wound care, etc)     Problem: Safety - Adult  Goal: Free from fall injury  Outcome: Progressing  Note: Patient remains free from fall this shift. Bed alarm activated . Bed locked and lowest position.  2/4 side rails raised for safety. Patient utilizes walker for ambulation. Patient has non-skid socks when ambulating. Pathway clear and possessions in reach. Call light within reach. Patient rounded on hourly.      Problem: Skin/Tissue Integrity - Adult  Goal: Incisions, wounds, or drain sites healing without S/S of infection  Outcome: Progressing  Flowsheets (Taken 7/11/2025 0350)  Incisions, Wounds, or Drain Sites Healing Without Sign and Symptoms of Infection:   ADMISSION and DAILY: Assess and document risk factors for pressure ulcer development   TWICE DAILY: Assess and document dressing/incision, wound bed, drain sites and

## 2025-07-11 NOTE — PROGRESS NOTES
[facility]  Lisset Johnson PA-C for Dr. Leoncio Hale  General Surgery Daily Progress Note    Pt Name: Adry Moura  Medical Record Number: [id number]  Date of Birth 1950   Today's Date: 7/11/2025    Hospital day # 3     ASSESSMENT   Large intra-abdominal fluid collection/possible abscess - Status post IR drain placement   Altered mental status - improved  Status post low anterior resection 6/19/25 secondary to adenocarcinoma  Abdominal pain  Leukocytosis   Diabetes Mellitus  Bipolar disorder  History of DVT  History of gastric bypass then later total gastrectomy   History of subtotal esophagectomy with colonic interposition   has a past medical history of Arthritis, Bowel obstruction (HCC), Bronchitis, Diabetes mellitus (HCC), Difficult intubation, DVT, lower extremity (HCC), GERD (gastroesophageal reflux disease), History of blood transfusion, Hx of blood clots, Hypertension, Hypoglycemia, Kidney stone, Macular degeneration, Movement disorder, Multinodular goiter, Muscle strain of left lower extremity, Pinched nerve, Psychiatric problem, Urinary incontinence, and Vitamin D deficiency.  PLAN   Continue full liquids for now but may advance later  KUB reviewed and showed Moderate distention of the transverse colon. Drainage catheter pelvis left side with apparent kinking of the catheter. However, catheter still draining well  CT guided drain placement completed. Drainage serosanguineous. Cultures with heavy growth of gram (-) bacilli.   Antibiotics - infectious disease following  IV fluids per hospitalist  Pain & nausea control  DVT prophylaxis   PT/OT consultation   Resume home medications   Labs reviewed. WBC wnl. Hemoglobin stable. Replace K+ per protocol.   Will need oncology appointment outpatient rescheduled  Case management for assistance with discharge planning, from [facility], social work consult  Patient back to baseline this morning. Had a bowel movement yesterday. Drainage

## 2025-07-11 NOTE — PROGRESS NOTES
Progress note: Infectious diseases    Patient - Adry Morua,  Age - 74 y.o.    - 1950      Room Number - 6K-17/017-A   MRN -  [id number]   [facility] # - [id number]  Date of Admission -  2025 11:22 AM    SUBJECTIVE:   She is awake and back to her base line.  Has lower abdominal pain  OBJECTIVE   VITALS    height is 1.676 m (5' 6\") and weight is 57.1 kg (125 lb 14.1 oz). Her oral temperature is 98.3 °F (36.8 °C). Her blood pressure is 145/76 (abnormal) and her pulse is 91. Her respiration is 18 and oxygen saturation is 95%.       Wt Readings from Last 3 Encounters:   25 57.1 kg (125 lb 14.1 oz)   25 54.4 kg (120 lb)   25 59.5 kg (131 lb 2 oz)       I/O (24 Hours)    Intake/Output Summary (Last 24 hours) at 2025 0933  Last data filed at 2025 0654  Gross per 24 hour   Intake 20 ml   Output 50 ml   Net -30 ml       General Appearance  Awake, alert, oriented,  not  In acute distress  HEENT - normocephalic, atraumatic, slight pallourconjunctiva,  anicteric sclera  Neck - Supple, has visible swelling on the anterior neck(pulled bowel from previous surgery)  Lungs -  Bilateral   air entry, no rhonchi, no wheeze  Cardiovascular - Heart sounds are normal.    Abdomen - soft, not distended, drain in place, slight tender  Neurologic -awake and oriented  Skin - No bruising or bleeding  Extremities - No edema, no cyanosis, clubbing     MEDICATIONS:      losartan  25 mg Oral QPM    losartan  50 mg Oral Daily    sodium chloride flush  5-40 mL IntraVENous 2 times per day    enoxaparin  40 mg SubCUTAneous Daily    [Held by provider] acarbose  50 mg Oral TID WC    busPIRone  10 mg Oral Nightly    lamoTRIgine  100 mg Oral QAM    lamoTRIgine  200 mg Oral Nightly    [Held by provider] cetirizine  10 mg Oral Daily    QUEtiapine  25 mg Oral Nightly    insulin lispro  0-4 Units SubCUTAneous 4x Daily AC & HS

## 2025-07-11 NOTE — PROGRESS NOTES
Hospitalist Progress Note      Patient:  Adry Moura 74 y.o. female       : 1950  Unit/Bed:6K-17/017-A    Date of Admission: 2025      ASSESSMENT AND PLAN    Active Problems  Intra-abdominal abscess  Adenocarcinoma of colon s/p low anterior resection   Underwent colonoscopy in May 2025 and then later Radha 3, 2025 and found to have 3 cm mass which was found to have moderately differentiated adenocarcinoma of Colon resulting in low anterior resection on 25 by Dr. Hale   CT A/P in the ED with large intra-abdominal abscess within anterior aspect of abdomen and pelvis measuring 5.5 x 18.8 x 16.0 cm   General Surgery following  S/p CT-guided drain placement .  Vanc/Zosyn was started on admission.   Culture growing gram-negative bacilli, continue with Zosyn.  Discontinue vancomycin  Blood cultures NGTD  ID following, cont Zosyn  Cont to monitor output   Acute metabolic encephalopathy, improving.   Secondary to infection.  Delirium precautions; frequent re-orientation.  Encourage good sleep/wake cycle.  Avoid benzo, narcotics, Ach, antiemetics, antihistamines, antipsychotics as much as possible.  Hypokalemia  Replace per protocol    Resolved Problems  Leukocytosis    Chronic Conditions (reviewed, stable, and home medications resumed, unless otherwise stated)  History of gastric bypass/bariatric surgery: Noted in , complicated by mass in upper esophagus resulting in near-total esophagectomy then total gastrectomy in addition to subtotal esophagectomy, Currently with colo-jejunostomy  Primary hypertension.  Home medications were reviewed and resumed  NIDDMII  Bipolar disorder. On Seroquel, Lamictal, Buspar, Effexor.       LDA: []CVC / []PICC / []Midline / []Orellana / [x]Drains / []Mediport / []None  Antibiotics: yes  Steroids: no  Labs (still needed?): [x]Yes / []No  IVF (still needed?): []Yes / [x]No    Level of care: []Step Down / [x]Med-Surg  Bed Status: [x]Inpatient /  mg Oral TID WC    busPIRone  10 mg Oral Nightly    lamoTRIgine  100 mg Oral QAM    lamoTRIgine  200 mg Oral Nightly    [Held by provider] cetirizine  10 mg Oral Daily    QUEtiapine  25 mg Oral Nightly    insulin lispro  0-4 Units SubCUTAneous 4x Daily AC & HS    piperacillin-tazobactam  3,375 mg IntraVENous Q8H    pantoprazole  40 mg Oral QAM AC    venlafaxine  75 mg Oral Daily with breakfast    And    venlafaxine  150 mg Oral Daily with breakfast    polyethylene glycol  17 g Oral Daily    PRN Meds: QUEtiapine, sodium chloride flush, sodium chloride, potassium chloride **OR** potassium alternative oral replacement **OR** potassium chloride, magnesium sulfate, ondansetron **OR** ondansetron, polyethylene glycol, acetaminophen **OR** acetaminophen, busPIRone, dicyclomine, [Held by provider] docusate sodium, traMADol, morphine, glucose, dextrose bolus **OR** dextrose bolus, glucagon (rDNA), dextrose    Exam:  BP (!) 142/71   Pulse (!) 103   Temp 98.1 °F (36.7 °C) (Oral)   Resp 18   Ht 1.676 m (5' 6\")   Wt 57.1 kg (125 lb 14.1 oz)   LMP 03/20/1985 Comment: hysterectomy in her 30s  SpO2 99%   BMI 20.32 kg/m²   General: No distress, appears stated age.   Eyes:  PERRL. Conjunctivae/corneas clear.  HENT: Head normal appearing. Nares normal. Oral mucosa moist.  Hearing intact.   Neck: Supple, with full range of motion. Trachea midline.  No gross JVD appreciated.  Respiratory:  Normal effort. Clear to auscultation, without rales or wheezes or rhonchi.  Cardiovascular: Normal rate, regular rhythm with normal S1/S2 without murmurs.    No lower extremity edema.   Abdomen: Soft, non-tender, non-distended with normal bowel sounds. Drain in place left side.   Musculoskeletal: No joint swelling or tenderness. Normal tone. No abnormal movements.   Skin: Warm and dry. No rashes or lesions.  Neurologic:  No focal sensory/motor deficits in the upper or lower extremities. Cranial nerves:  grossly non-focal 2-12.     Psychiatric:

## 2025-07-11 NOTE — CARE COORDINATION
7/11/25, 11:22 AM EDT    Patient goals/plan/ treatment preferences discussed by  and .  Patient goals/plan/ treatment preferences reviewed with patient/ family.  Patient/ family verbalize understanding of discharge plan and are in agreement with goal/plan/treatment preferences.  Understanding was demonstrated using the teach back method.  AVS provided by RN at time of discharge, which includes all necessary medical information pertaining to the patients current course of illness, treatment, post-discharge goals of care, and treatment preferences.     Services At/After Discharge: Skilled Nursing Facility (SNF), Aide services, Nursing service, OT, and PT    Pt will return to [facility] when medically stable, [facility] [place] under medicare.  [employer] on chart with discharge instructions, son transport.

## 2025-07-11 NOTE — PLAN OF CARE
Problem: Chronic Conditions and Co-morbidities  Goal: Patient's chronic conditions and co-morbidity symptoms are monitored and maintained or improved  7/11/2025 1124 by Arnold Felix, RN  Outcome: Progressing  Flowsheets (Taken 7/11/2025 1124)  Care Plan - Patient's Chronic Conditions and Co-Morbidity Symptoms are Monitored and Maintained or Improved: Monitor and assess patient's chronic conditions and comorbid symptoms for stability, deterioration, or improvement     Problem: Discharge Planning  Goal: Discharge to home or other facility with appropriate resources  7/11/2025 1124 by Arnold Felix, RN  Outcome: Progressing     Problem: Safety - Adult  Goal: Free from fall injury  7/11/2025 1124 by Arnold Felix, RN  Outcome: Progressing

## 2025-07-11 NOTE — CARE COORDINATION
7/11/25, 11:25 AM EDT    DISCHARGE ON GOING EVALUATION    [facility] day: 3  Location: -17/017-A Reason for admit: Abscess of abdominal wall [L02.211]  Intra-abdominal abscess (HCC) [K65.1]     Procedures: 7/8 CT abscess drainage, attached to Deni-Close    Imaging since last note: 7/10 Moderate distention of the transverse colon. Drainage catheter pelvis left side with apparent kinking of the catheter.    Barriers to Discharge: K+3.3, IV zosyn, pain control, lyte replacement. Tolerating FL diet.     PCP: María Neil APRN - CNP  Readmission Risk Score: 23.5    Patient Goals/Plan/Treatment Preferences: return to [facility]

## 2025-07-11 NOTE — PROGRESS NOTES
[facility]  [facility] RENAL TELEMETRY 6K  Occupational Therapy  Daily Note\  Subacute/skilled nursing facility  Equipment Recommendations: No         Time In: 1045  Time Out: 1128  Timed Code Treatment Minutes: 38 Minutes  Minutes: 43     Variance: 5  Reason:  (pt was with hospitalist)    Date: 2025  Patient Name: Adry Moura,   Gender: female      Room: [address] [place] [address]  MRN: [id number]  : 1950  (74 y.o.)  Referring Practitioner: Uriel Madera, APRN - CNP  Diagnosis: Intra-abdominal abscess (HCC)  Additional Pertinent Hx: per chart review; \"Adry Moura is a 74 y.o. female with PMHx of Bariatric Surgery s/p total gastrectomy, subtotal esophagectomy with colo-jejunostomy, Adenocarcinoma of colon s/p low anterior resection, Bipolar disorder, HTN and NIDDm  who presents to [facility] from SNF/ [facility] with Generalized abdominal pain.    Patient states that she has been having abdominal pain associated with nausea, decreased appetite and  weakness since her discharge to SNF after surgery.  Patient states that the abdominal pain is generalized, sharp, exacerbated by PT/OT/exercise.  Per reports, patient was found to be hypoxic at nursing home but was saturating on room air in the ED.  \"    Restrictions/Precautions:  Restrictions/Precautions: Fall Risk, General Precautions  Position Activity Restriction  Other Position/Activity Restrictions: L upper abdominal drain     Social/Functional History:  Lives With: Significant other  Type of Home: Assisted living  Home Layout: One level  Home Access: Level entry  Home Equipment: Rollator   Bathroom Shower/Tub: Walk-in shower  Bathroom Toilet: Handicap height  Bathroom Equipment: Grab bars in shower, Shower chair, Grab bars around toilet  Bathroom Accessibility: Walker accessible    Receives Help From: Other (Comment) (facility staff)  Prior Level of Assist for ADLs: Independent  Homemaking Responsibilities: No  Ambulation

## 2025-07-12 ENCOUNTER — APPOINTMENT (OUTPATIENT)
Dept: CT IMAGING | Age: 75
End: 2025-07-12
Payer: MEDICARE

## 2025-07-12 LAB
ANION GAP SERPL CALC-SCNC: 13 MEQ/L (ref 8–16)
BUN SERPL-MCNC: 7 MG/DL (ref 8–23)
CALCIUM SERPL-MCNC: 8.7 MG/DL (ref 8.8–10.2)
CHLORIDE SERPL-SCNC: 100 MEQ/L (ref 98–111)
CO2 SERPL-SCNC: 27 MEQ/L (ref 22–29)
CREAT SERPL-MCNC: 0.6 MG/DL (ref 0.5–0.9)
DEPRECATED RDW RBC AUTO: 50.4 FL (ref 35–45)
ERYTHROCYTE [DISTWIDTH] IN BLOOD BY AUTOMATED COUNT: 14.2 % (ref 11.5–14.5)
GFR SERPL CREATININE-BSD FRML MDRD: > 90 ML/MIN/1.73M2
GLUCOSE BLD STRIP.AUTO-MCNC: 115 MG/DL (ref 70–108)
GLUCOSE BLD STRIP.AUTO-MCNC: 56 MG/DL (ref 70–108)
GLUCOSE BLD STRIP.AUTO-MCNC: 61 MG/DL (ref 70–108)
GLUCOSE BLD STRIP.AUTO-MCNC: 66 MG/DL (ref 70–108)
GLUCOSE BLD STRIP.AUTO-MCNC: 74 MG/DL (ref 70–108)
GLUCOSE BLD STRIP.AUTO-MCNC: 82 MG/DL (ref 70–108)
GLUCOSE SERPL-MCNC: 88 MG/DL (ref 74–109)
HCT VFR BLD AUTO: 33.3 % (ref 37–47)
HGB BLD-MCNC: 10.4 GM/DL (ref 12–16)
MAGNESIUM SERPL-MCNC: 1.6 MG/DL (ref 1.6–2.6)
MCH RBC QN AUTO: 30.2 PG (ref 26–33)
MCHC RBC AUTO-ENTMCNC: 31.2 GM/DL (ref 32.2–35.5)
MCV RBC AUTO: 96.8 FL (ref 81–99)
PLATELET # BLD AUTO: 529 THOU/MM3 (ref 130–400)
PMV BLD AUTO: 8.8 FL (ref 9.4–12.4)
POTASSIUM SERPL-SCNC: 3.4 MEQ/L (ref 3.5–5.2)
RBC # BLD AUTO: 3.44 MILL/MM3 (ref 4.2–5.4)
SODIUM SERPL-SCNC: 140 MEQ/L (ref 135–145)
WBC # BLD AUTO: 9.7 THOU/MM3 (ref 4.8–10.8)

## 2025-07-12 PROCEDURE — 80048 BASIC METABOLIC PNL TOTAL CA: CPT

## 2025-07-12 PROCEDURE — 74177 CT ABD & PELVIS W/CONTRAST: CPT

## 2025-07-12 PROCEDURE — 6360000002 HC RX W HCPCS: Performed by: STUDENT IN AN ORGANIZED HEALTH CARE EDUCATION/TRAINING PROGRAM

## 2025-07-12 PROCEDURE — 85027 COMPLETE CBC AUTOMATED: CPT

## 2025-07-12 PROCEDURE — 6370000000 HC RX 637 (ALT 250 FOR IP): Performed by: STUDENT IN AN ORGANIZED HEALTH CARE EDUCATION/TRAINING PROGRAM

## 2025-07-12 PROCEDURE — 6370000000 HC RX 637 (ALT 250 FOR IP): Performed by: PHYSICIAN ASSISTANT

## 2025-07-12 PROCEDURE — 83735 ASSAY OF MAGNESIUM: CPT

## 2025-07-12 PROCEDURE — 2500000003 HC RX 250 WO HCPCS: Performed by: STUDENT IN AN ORGANIZED HEALTH CARE EDUCATION/TRAINING PROGRAM

## 2025-07-12 PROCEDURE — 99233 SBSQ HOSP IP/OBS HIGH 50: CPT | Performed by: PHYSICIAN ASSISTANT

## 2025-07-12 PROCEDURE — 36415 COLL VENOUS BLD VENIPUNCTURE: CPT

## 2025-07-12 PROCEDURE — 6360000002 HC RX W HCPCS

## 2025-07-12 PROCEDURE — 2580000003 HC RX 258: Performed by: STUDENT IN AN ORGANIZED HEALTH CARE EDUCATION/TRAINING PROGRAM

## 2025-07-12 PROCEDURE — 6360000004 HC RX CONTRAST MEDICATION: Performed by: SURGERY

## 2025-07-12 PROCEDURE — 6370000000 HC RX 637 (ALT 250 FOR IP): Performed by: INTERNAL MEDICINE

## 2025-07-12 PROCEDURE — 82948 REAGENT STRIP/BLOOD GLUCOSE: CPT

## 2025-07-12 PROCEDURE — 1200000000 HC SEMI PRIVATE

## 2025-07-12 RX ORDER — IOPAMIDOL 755 MG/ML
80 INJECTION, SOLUTION INTRAVASCULAR
Status: COMPLETED | OUTPATIENT
Start: 2025-07-12 | End: 2025-07-12

## 2025-07-12 RX ADMIN — PIPERACILLIN AND TAZOBACTAM 3375 MG: 3; .375 INJECTION, POWDER, FOR SOLUTION INTRAVENOUS at 09:07

## 2025-07-12 RX ADMIN — IOPAMIDOL 80 ML: 755 INJECTION, SOLUTION INTRAVENOUS at 08:22

## 2025-07-12 RX ADMIN — POTASSIUM CHLORIDE 40 MEQ: 1500 TABLET, EXTENDED RELEASE ORAL at 09:01

## 2025-07-12 RX ADMIN — QUETIAPINE FUMARATE 50 MG: 100 TABLET ORAL at 14:25

## 2025-07-12 RX ADMIN — PIPERACILLIN AND TAZOBACTAM 3375 MG: 3; .375 INJECTION, POWDER, FOR SOLUTION INTRAVENOUS at 23:37

## 2025-07-12 RX ADMIN — BUSPIRONE HYDROCHLORIDE 10 MG: 10 TABLET ORAL at 21:44

## 2025-07-12 RX ADMIN — LOSARTAN POTASSIUM 25 MG: 25 TABLET, FILM COATED ORAL at 16:25

## 2025-07-12 RX ADMIN — VENLAFAXINE HYDROCHLORIDE 150 MG: 150 CAPSULE, EXTENDED RELEASE ORAL at 09:00

## 2025-07-12 RX ADMIN — TRAMADOL HYDROCHLORIDE 50 MG: 50 TABLET, COATED ORAL at 09:01

## 2025-07-12 RX ADMIN — SODIUM CHLORIDE, PRESERVATIVE FREE 10 ML: 5 INJECTION INTRAVENOUS at 21:49

## 2025-07-12 RX ADMIN — GLUCAGON 1 MG: 1 INJECTION, POWDER, LYOPHILIZED, FOR SOLUTION INTRAMUSCULAR; INTRAVENOUS at 16:21

## 2025-07-12 RX ADMIN — POLYETHYLENE GLYCOL 3350 17 G: 17 POWDER, FOR SOLUTION ORAL at 08:58

## 2025-07-12 RX ADMIN — LOSARTAN POTASSIUM 50 MG: 25 TABLET, FILM COATED ORAL at 09:00

## 2025-07-12 RX ADMIN — PIPERACILLIN AND TAZOBACTAM 3375 MG: 3; .375 INJECTION, POWDER, FOR SOLUTION INTRAVENOUS at 16:16

## 2025-07-12 RX ADMIN — QUETIAPINE FUMARATE 25 MG: 25 TABLET ORAL at 21:43

## 2025-07-12 RX ADMIN — LAMOTRIGINE 100 MG: 100 TABLET ORAL at 08:59

## 2025-07-12 RX ADMIN — PANTOPRAZOLE SODIUM 40 MG: 40 TABLET, DELAYED RELEASE ORAL at 05:54

## 2025-07-12 RX ADMIN — VENLAFAXINE HYDROCHLORIDE 75 MG: 75 CAPSULE, EXTENDED RELEASE ORAL at 09:00

## 2025-07-12 RX ADMIN — ENOXAPARIN SODIUM 40 MG: 100 INJECTION SUBCUTANEOUS at 08:58

## 2025-07-12 RX ADMIN — LAMOTRIGINE 200 MG: 100 TABLET ORAL at 21:45

## 2025-07-12 RX ADMIN — SODIUM CHLORIDE, PRESERVATIVE FREE 10 ML: 5 INJECTION INTRAVENOUS at 09:01

## 2025-07-12 NOTE — PLAN OF CARE
Problem: Chronic Conditions and Co-morbidities  Goal: Patient's chronic conditions and co-morbidity symptoms are monitored and maintained or improved  7/12/2025 1005 by Arnold Felix, RN  Outcome: Progressing  Flowsheets (Taken 7/12/2025 1005)  Care Plan - Patient's Chronic Conditions and Co-Morbidity Symptoms are Monitored and Maintained or Improved: Monitor and assess patient's chronic conditions and comorbid symptoms for stability, deterioration, or improvement     Problem: Discharge Planning  Goal: Discharge to home or other facility with appropriate resources  7/12/2025 1005 by Arnold Felix, RN  Outcome: Progressing  Flowsheets (Taken 7/12/2025 1005)  Discharge to home or other facility with appropriate resources: Identify barriers to discharge with patient and caregiver     Problem: Safety - Adult  Goal: Free from fall injury  7/12/2025 1005 by Arnold Felix, RN  Outcome: Progressing  Flowsheets (Taken 7/12/2025 1005)  Free From Fall Injury: Instruct family/caregiver on patient safety

## 2025-07-12 NOTE — PROGRESS NOTES
Hospitalist Progress Note      Patient:  Adry Moura 74 y.o. female       : 1950  Unit/Bed:-17/017-A    Date of Admission: 2025      ASSESSMENT AND PLAN    Active Problems  Intra-abdominal abscess  Adenocarcinoma of colon s/p low anterior resection   Underwent colonoscopy in May 2025 and then later Radha 3, 2025 and found to have 3 cm mass which was found to have moderately differentiated adenocarcinoma of Colon resulting in low anterior resection on 25 by Dr. Hale   CT A/P in the ED with large intra-abdominal abscess within anterior aspect of abdomen and pelvis measuring 16.0 x 5.5 x 18.8 cm   Repeat CT  abscess measures 10.2 x 3.1 x 15.6 cm.   General Surgery following  S/p CT-guided drain placement .  Culture growing gram-negative bacilli, continue with Zosyn (start )   Blood cultures NGTD  ID following, cont Zosyn  Cont to monitor output   Acute metabolic encephalopathy, improving.   Secondary to infection.  Delirium precautions; frequent re-orientation.  Encourage good sleep/wake cycle.  Avoid benzo, narcotics, Ach, antiemetics, antihistamines, antipsychotics as much as possible.  Hypokalemia  Replace per protocol    Resolved Problems  Leukocytosis    Chronic Conditions (reviewed, stable, and home medications resumed, unless otherwise stated)  History of gastric bypass/bariatric surgery: Noted in , complicated by mass in upper esophagus resulting in near-total esophagectomy then total gastrectomy in addition to subtotal esophagectomy, Currently with colo-jejunostomy  Primary hypertension.  Home medications were reviewed and resumed  NIDDMII  Bipolar disorder. On Seroquel, Lamictal, Buspar, Effexor.       LDA: []CVC / []PICC / []Midline / []Orellana / [x]Drains / []Mediport / []None  Antibiotics: yes  Steroids: no  Labs (still needed?): [x]Yes / []No  IVF (still needed?): []Yes / [x]No    Level of care: []Step Down / [x]Med-Surg  Bed Status: [x]Inpatient /

## 2025-07-12 NOTE — PROGRESS NOTES
Progress note: Infectious diseases    Patient - Adry Moura,  Age - 74 y.o.    - 1950      Room Number - 6K-17/017-A   MRN -  [id number]   [facility] # - [id number]  Date of Admission -  2025 11:22 AM    SUBJECTIVE:   She has no new complaints.  CT scan of the abdomen noted. Still has collection  OBJECTIVE   VITALS    height is 1.676 m (5' 6\") and weight is 56.7 kg (125 lb). Her oral temperature is 98 °F (36.7 °C). Her blood pressure is 148/72 (abnormal) and her pulse is 89. Her respiration is 16 and oxygen saturation is 98%.       Wt Readings from Last 3 Encounters:   25 56.7 kg (125 lb)   25 54.4 kg (120 lb)   25 59.5 kg (131 lb 2 oz)       I/O (24 Hours)    Intake/Output Summary (Last 24 hours) at 2025 1056  Last data filed at 2025 0958  Gross per 24 hour   Intake 280 ml   Output --   Net 280 ml       General Appearance  Awake, alert, oriented,  not  In acute distress  HEENT - normocephalic, atraumatic, pale conjunctiva  Neck - Supple, has visible swelling on the anterior neck(pulled bowel from previous surgery)  Lungs -  Bilateral   air entry, no rhonchi, no wheeze  Cardiovascular - Heart sounds are normal.    Abdomen - soft, not distended, drain in place. The bag was full  Neurologic -awake and oriented.  Skin - No bruising or bleeding  Extremities - No edema, no cyanosis, clubbing     MEDICATIONS:      losartan  25 mg Oral QPM    losartan  50 mg Oral Daily    sodium chloride flush  5-40 mL IntraVENous 2 times per day    enoxaparin  40 mg SubCUTAneous Daily    [Held by provider] acarbose  50 mg Oral TID WC    busPIRone  10 mg Oral Nightly    lamoTRIgine  100 mg Oral QAM    lamoTRIgine  200 mg Oral Nightly    [Held by provider] cetirizine  10 mg Oral Daily    QUEtiapine  25 mg Oral Nightly    insulin lispro  0-4 Units SubCUTAneous 4x Daily AC & HS    piperacillin-tazobactam  3,375 mg

## 2025-07-12 NOTE — PLAN OF CARE
Problem: Chronic Conditions and Co-morbidities  Goal: Patient's chronic conditions and co-morbidity symptoms are monitored and maintained or improved  7/11/2025 2137 by Guerda Mcknight RN  Outcome: Progressing  Flowsheets (Taken 7/11/2025 2137)  Care Plan - Patient's Chronic Conditions and Co-Morbidity Symptoms are Monitored and Maintained or Improved: Monitor and assess patient's chronic conditions and comorbid symptoms for stability, deterioration, or improvement     Problem: Discharge Planning  Goal: Discharge to home or other facility with appropriate resources  7/11/2025 2137 by Guerda Mcknight, RN  Outcome: Progressing  Flowsheets (Taken 7/11/2025 2137)  Discharge to home or other facility with appropriate resources:   Identify barriers to discharge with patient and caregiver   Identify discharge learning needs (meds, wound care, etc)     Problem: Safety - Adult  Goal: Free from fall injury  7/11/2025 2137 by Guerda Mcknight, RN  Outcome: Progressing  Flowsheets (Taken 7/11/2025 2137)  Free From Fall Injury: Instruct family/caregiver on patient safety  Note: Tele sitter at bedside.     Problem: Neurosensory - Adult  Goal: Achieves stable or improved neurological status  Outcome: Progressing  Flowsheets (Taken 7/11/2025 2137)  Achieves stable or improved neurological status:   Assess for and report changes in neurological status   Initiate measures to prevent increased intracranial pressure

## 2025-07-12 NOTE — PROGRESS NOTES
[facility]  Dr. Leoncio Hale  General Surgery Daily Progress Note    Pt Name: Adry Moura  Medical Record Number: [id number]  Date of Birth 1950   Today's Date: 7/12/2025    Hospital day # 4     ASSESSMENT   Large intra-abdominal fluid collection/possible abscess - Status post IR drain placement   Altered mental status - resolved  Status post low anterior resection 6/19/25 secondary to adenocarcinoma  Abdominal pain  Leukocytosis   Diabetes Mellitus  Bipolar disorder  History of DVT  History of gastric bypass then later total gastrectomy   History of subtotal esophagectomy with colonic interposition   has a past medical history of Arthritis, Bowel obstruction (HCC), Bronchitis, Diabetes mellitus (HCC), Difficult intubation, DVT, lower extremity (HCC), GERD (gastroesophageal reflux disease), History of blood transfusion, Hx of blood clots, Hypertension, Hypoglycemia, Kidney stone, Macular degeneration, Movement disorder, Multinodular goiter, Muscle strain of left lower extremity, Pinched nerve, Psychiatric problem, Urinary incontinence, and Vitamin D deficiency.  PLAN   Tolerating full liquid diet.  Advance diet as tolerated.  CT guided drain placement completed. Drainage serosanguineous. Cultures with heavy growth of gram (-) bacilli.   Repeat CT abdomen/pelvis today  Antibiotics - infectious disease following  IV fluids per hospitalist  Pain & nausea control  DVT prophylaxis   PT/OT consultation   Resume home medications   Labs reviewed. WBC wnl. Hemoglobin stable. Replace K+ per protocol.   Will need oncology appointment outpatient rescheduled  Case management for assistance with discharge planning, from [facility], social work consult  Patient back to baseline this morning.  Having bowel function.  Drainage still present and increasing in IR drainage bag. Patient only having minimal tenderness with deep palpation. Denies nausea or vomiting.  Repeating imaging today. Patient seems to

## 2025-07-13 LAB
ANION GAP SERPL CALC-SCNC: 16 MEQ/L (ref 8–16)
BACTERIA BLD AEROBE CULT: NORMAL
BACTERIA BLD AEROBE CULT: NORMAL
BACTERIA SPEC AEROBE CULT: ABNORMAL
BACTERIA SPEC AEROBE CULT: ABNORMAL
BACTERIA SPEC ANAEROBE CULT: ABNORMAL
BUN SERPL-MCNC: 8 MG/DL (ref 8–23)
CALCIUM SERPL-MCNC: 8.6 MG/DL (ref 8.8–10.2)
CHLORIDE SERPL-SCNC: 100 MEQ/L (ref 98–111)
CO2 SERPL-SCNC: 24 MEQ/L (ref 22–29)
CREAT SERPL-MCNC: 0.5 MG/DL (ref 0.5–0.9)
DEPRECATED RDW RBC AUTO: 50.3 FL (ref 35–45)
ERYTHROCYTE [DISTWIDTH] IN BLOOD BY AUTOMATED COUNT: 14.3 % (ref 11.5–14.5)
GFR SERPL CREATININE-BSD FRML MDRD: > 90 ML/MIN/1.73M2
GLUCOSE BLD STRIP.AUTO-MCNC: 105 MG/DL (ref 70–108)
GLUCOSE BLD STRIP.AUTO-MCNC: 51 MG/DL (ref 70–108)
GLUCOSE BLD STRIP.AUTO-MCNC: 61 MG/DL (ref 70–108)
GLUCOSE BLD STRIP.AUTO-MCNC: 70 MG/DL (ref 70–108)
GLUCOSE BLD STRIP.AUTO-MCNC: 73 MG/DL (ref 70–108)
GLUCOSE BLD STRIP.AUTO-MCNC: 73 MG/DL (ref 70–108)
GLUCOSE BLD STRIP.AUTO-MCNC: 89 MG/DL (ref 70–108)
GLUCOSE SERPL-MCNC: 62 MG/DL (ref 74–109)
GRAM STN SPEC: ABNORMAL
HCT VFR BLD AUTO: 30.1 % (ref 37–47)
HGB BLD-MCNC: 9.6 GM/DL (ref 12–16)
MCH RBC QN AUTO: 30.7 PG (ref 26–33)
MCHC RBC AUTO-ENTMCNC: 31.9 GM/DL (ref 32.2–35.5)
MCV RBC AUTO: 96.2 FL (ref 81–99)
ORGANISM: ABNORMAL
PLATELET # BLD AUTO: 500 THOU/MM3 (ref 130–400)
PMV BLD AUTO: 9.2 FL (ref 9.4–12.4)
POTASSIUM SERPL-SCNC: 3.7 MEQ/L (ref 3.5–5.2)
RBC # BLD AUTO: 3.13 MILL/MM3 (ref 4.2–5.4)
SODIUM SERPL-SCNC: 140 MEQ/L (ref 135–145)
WBC # BLD AUTO: 10.8 THOU/MM3 (ref 4.8–10.8)

## 2025-07-13 PROCEDURE — 6370000000 HC RX 637 (ALT 250 FOR IP): Performed by: STUDENT IN AN ORGANIZED HEALTH CARE EDUCATION/TRAINING PROGRAM

## 2025-07-13 PROCEDURE — 6370000000 HC RX 637 (ALT 250 FOR IP): Performed by: PHYSICIAN ASSISTANT

## 2025-07-13 PROCEDURE — 6360000002 HC RX W HCPCS

## 2025-07-13 PROCEDURE — 2500000003 HC RX 250 WO HCPCS: Performed by: STUDENT IN AN ORGANIZED HEALTH CARE EDUCATION/TRAINING PROGRAM

## 2025-07-13 PROCEDURE — 82948 REAGENT STRIP/BLOOD GLUCOSE: CPT

## 2025-07-13 PROCEDURE — 80048 BASIC METABOLIC PNL TOTAL CA: CPT

## 2025-07-13 PROCEDURE — 85027 COMPLETE CBC AUTOMATED: CPT

## 2025-07-13 PROCEDURE — 36415 COLL VENOUS BLD VENIPUNCTURE: CPT

## 2025-07-13 PROCEDURE — 6360000002 HC RX W HCPCS: Performed by: STUDENT IN AN ORGANIZED HEALTH CARE EDUCATION/TRAINING PROGRAM

## 2025-07-13 PROCEDURE — 6370000000 HC RX 637 (ALT 250 FOR IP): Performed by: INTERNAL MEDICINE

## 2025-07-13 PROCEDURE — 99232 SBSQ HOSP IP/OBS MODERATE 35: CPT | Performed by: PHYSICIAN ASSISTANT

## 2025-07-13 PROCEDURE — 1200000000 HC SEMI PRIVATE

## 2025-07-13 PROCEDURE — 2580000003 HC RX 258: Performed by: STUDENT IN AN ORGANIZED HEALTH CARE EDUCATION/TRAINING PROGRAM

## 2025-07-13 RX ORDER — SENNA AND DOCUSATE SODIUM 50; 8.6 MG/1; MG/1
2 TABLET, FILM COATED ORAL 2 TIMES DAILY PRN
Status: DISCONTINUED | OUTPATIENT
Start: 2025-07-13 | End: 2025-07-25 | Stop reason: HOSPADM

## 2025-07-13 RX ORDER — POLYETHYLENE GLYCOL 3350 17 G/17G
17 POWDER, FOR SOLUTION ORAL 2 TIMES DAILY
Status: DISCONTINUED | OUTPATIENT
Start: 2025-07-13 | End: 2025-07-25 | Stop reason: HOSPADM

## 2025-07-13 RX ADMIN — GLUCAGON 1 MG: 1 INJECTION, POWDER, LYOPHILIZED, FOR SOLUTION INTRAMUSCULAR; INTRAVENOUS at 08:13

## 2025-07-13 RX ADMIN — LAMOTRIGINE 100 MG: 100 TABLET ORAL at 08:12

## 2025-07-13 RX ADMIN — PIPERACILLIN AND TAZOBACTAM 3375 MG: 3; .375 INJECTION, POWDER, FOR SOLUTION INTRAVENOUS at 08:22

## 2025-07-13 RX ADMIN — LOSARTAN POTASSIUM 25 MG: 25 TABLET, FILM COATED ORAL at 20:58

## 2025-07-13 RX ADMIN — ENOXAPARIN SODIUM 40 MG: 100 INJECTION SUBCUTANEOUS at 08:13

## 2025-07-13 RX ADMIN — QUETIAPINE FUMARATE 25 MG: 25 TABLET ORAL at 20:58

## 2025-07-13 RX ADMIN — BUSPIRONE HYDROCHLORIDE 10 MG: 10 TABLET ORAL at 20:59

## 2025-07-13 RX ADMIN — VENLAFAXINE HYDROCHLORIDE 150 MG: 150 CAPSULE, EXTENDED RELEASE ORAL at 08:13

## 2025-07-13 RX ADMIN — LAMOTRIGINE 200 MG: 100 TABLET ORAL at 20:58

## 2025-07-13 RX ADMIN — LOSARTAN POTASSIUM 50 MG: 25 TABLET, FILM COATED ORAL at 08:12

## 2025-07-13 RX ADMIN — POLYETHYLENE GLYCOL 3350 17 G: 17 POWDER, FOR SOLUTION ORAL at 21:00

## 2025-07-13 RX ADMIN — TRAMADOL HYDROCHLORIDE 50 MG: 50 TABLET, COATED ORAL at 21:00

## 2025-07-13 RX ADMIN — GLUCAGON 1 MG: 1 INJECTION, POWDER, LYOPHILIZED, FOR SOLUTION INTRAMUSCULAR; INTRAVENOUS at 16:22

## 2025-07-13 RX ADMIN — POLYETHYLENE GLYCOL 3350 17 G: 17 POWDER, FOR SOLUTION ORAL at 08:13

## 2025-07-13 RX ADMIN — VENLAFAXINE HYDROCHLORIDE 75 MG: 75 CAPSULE, EXTENDED RELEASE ORAL at 08:12

## 2025-07-13 RX ADMIN — PANTOPRAZOLE SODIUM 40 MG: 40 TABLET, DELAYED RELEASE ORAL at 08:13

## 2025-07-13 RX ADMIN — PIPERACILLIN AND TAZOBACTAM 3375 MG: 3; .375 INJECTION, POWDER, FOR SOLUTION INTRAVENOUS at 18:24

## 2025-07-13 RX ADMIN — SODIUM CHLORIDE, PRESERVATIVE FREE 10 ML: 5 INJECTION INTRAVENOUS at 08:13

## 2025-07-13 ASSESSMENT — PAIN DESCRIPTION - DESCRIPTORS: DESCRIPTORS: DISCOMFORT

## 2025-07-13 ASSESSMENT — PAIN SCALES - GENERAL: PAINLEVEL_OUTOF10: 6

## 2025-07-13 ASSESSMENT — PAIN DESCRIPTION - LOCATION: LOCATION: ABDOMEN

## 2025-07-13 NOTE — PROGRESS NOTES
Hospitalist Progress Note      Patient:  Adry Moura 74 y.o. female       : 1950  Unit/Bed:6K-17/017-A    Date of Admission: 2025      ASSESSMENT AND PLAN    Active Problems  Intra-abdominal abscess  Adenocarcinoma of colon s/p low anterior resection   Underwent colonoscopy in May 2025 and then later Radha 3, 2025 and found to have 3 cm mass which was found to have moderately differentiated adenocarcinoma of Colon resulting in low anterior resection on 25 by Dr. Hale   CT A/P in the ED with large intra-abdominal abscess within anterior aspect of abdomen and pelvis measuring 16.0 x 5.5 x 18.8 cm   Repeat CT  abscess measures 10.2 x 3.1 x 15.6 cm.   General Surgery following  S/p CT-guided drain placement .  Culture growing gram-negative bacilli, continue with Zosyn (start )   Blood cultures NGTD  ID following, cont Zosyn  Cont to monitor output   Acute metabolic encephalopathy, resolving.   Secondary to infection.  Delirium precautions; frequent re-orientation.  Encourage good sleep/wake cycle.  Avoid benzo, narcotics, Ach, antiemetics, antihistamines, antipsychotics as much as possible.  Hypokalemia  Replace per protocol  Constipation   Cont Glycolax, increase to BID. Add Senna-S.     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  Home medications were reviewed and resumed  NIDDMII. Hypoglycemia episodes. Check A1c. Continue accu-checks, hypoglycemi protocol.   Bipolar disorder. On Seroquel, Lamictal, Buspar, Effexor.       LDA: []CVC / []PICC / []Midline / []Orellana / [x]Drains / []Mediport / []None  Antibiotics: yes  Steroids: no  Labs (still

## 2025-07-13 NOTE — PLAN OF CARE
Problem: Chronic Conditions and Co-morbidities  Goal: Patient's chronic conditions and co-morbidity symptoms are monitored and maintained or improved  Outcome: Progressing  Flowsheets  Taken 7/13/2025 1029 by Arnold Felix RN  Care Plan - Patient's Chronic Conditions and Co-Morbidity Symptoms are Monitored and Maintained or Improved: Monitor and assess patient's chronic conditions and comorbid symptoms for stability, deterioration, or improvement  Taken 7/12/2025 2130 by Pierre Craig RN  Care Plan - Patient's Chronic Conditions and Co-Morbidity Symptoms are Monitored and Maintained or Improved: Monitor and assess patient's chronic conditions and comorbid symptoms for stability, deterioration, or improvement     Problem: Discharge Planning  Goal: Discharge to home or other facility with appropriate resources  Outcome: Progressing  Flowsheets (Taken 7/13/2025 1029)  Discharge to home or other facility with appropriate resources: Identify barriers to discharge with patient and caregiver     Problem: Safety - Adult  Goal: Free from fall injury  7/13/2025 1029 by Arnold Felix RN  Outcome: Progressing  Flowsheets (Taken 7/13/2025 1029)  Free From Fall Injury: Instruct family/caregiver on patient safety

## 2025-07-13 NOTE — PLAN OF CARE
Problem: Safety - Adult  Goal: Free from fall injury  Outcome: Progressing  Flowsheets (Taken 7/12/2025 2100)  Free From Fall Injury: Based on caregiver fall risk screen, instruct family/caregiver to ask for assistance with transferring infant if caregiver noted to have fall risk factors     Problem: Respiratory - Adult  Goal: Achieves optimal ventilation and oxygenation  Outcome: Progressing     Problem: Cardiovascular - Adult  Goal: Maintains optimal cardiac output and hemodynamic stability  Outcome: Progressing  Goal: Absence of cardiac dysrhythmias or at baseline  Outcome: Progressing     Problem: Skin/Tissue Integrity - Adult  Goal: Skin integrity remains intact  Description: 1.  Monitor for areas of redness and/or skin breakdown  2.  Assess vascular access sites hourly  3.  Every 4-6 hours minimum:  Change oxygen saturation probe site  4.  Every 4-6 hours:  If on nasal continuous positive airway pressure, respiratory therapy assess nares and determine need for appliance change or resting period  Outcome: Progressing  Flowsheets (Taken 7/12/2025 2100)  Skin Integrity Remains Intact: Monitor for areas of redness and/or skin breakdown  Goal: Incisions, wounds, or drain sites healing without S/S of infection  Outcome: Progressing     Problem: Musculoskeletal - Adult  Goal: Return mobility to safest level of function  Outcome: Progressing  Flowsheets (Taken 7/12/2025 2130)  Return Mobility to Safest Level of Function: Assist with transfers and ambulation using safe patient handling equipment as needed     Problem: Infection - Adult  Goal: Absence of infection at discharge  Outcome: Progressing  Flowsheets (Taken 7/12/2025 2130)  Absence of infection at discharge: Assess and monitor for signs and symptoms of infection     Problem: Skin/Tissue Integrity  Goal: Skin integrity remains intact  Description: 1.  Monitor for areas of redness and/or skin breakdown  2.  Assess vascular access sites hourly  3.  Every 4-6

## 2025-07-13 NOTE — PROGRESS NOTES
Physician Progress Note      PATIENT:               MYRIAM AMBROCIO  CSN #:                  [id number]  :                       1950  ADMIT DATE:       2025 11:22 AM  DISCH DATE:  RESPONDING  PROVIDER #:        Cayla Brenner PA-C          QUERY TEXT:    Please clarify the following documentation:    The clinical indicators include:  73yo s/p lower anterior resection 25(H&P)    Documentation of intra abdominal abscess  ID consult note dated 7/10, \"She was admitted to hospital from [facility] for   abdominal pain and was noted to have a large abscess collection. A drain was   placed by IR. The fluid was positive for E.coli  today\"      Treatment: IR consult. Drain insertion. Zosyn  Options provided:  -- Intra abdominal abscess is related to the procedure  -- Intra abdominal abscess is not related to the procedure  -- Other - I will add my own diagnosis  -- Disagree - Not applicable / Not valid  -- Disagree - Clinically unable to determine / Unknown  -- Refer to Clinical Documentation Reviewer    PROVIDER RESPONSE TEXT:    Patient has Intra abdominal abscess that is related to the procedure.    Query created by: CAMILA LR on 7/10/2025 3:11 PM      Electronically signed by:  Cayla Brenner PA-C 2025 4:04 PM

## 2025-07-13 NOTE — PROGRESS NOTES
[facility]  Dr. Leoncio Hale  General Surgery Daily Progress Note    Pt Name: Adry Moura  Medical Record Number: [id number]  Date of Birth 1950   Today's Date: 7/13/2025    Hospital day # 5     ASSESSMENT   Large intra-abdominal fluid collection/possible abscess - Status post IR drain placement   Altered mental status - resolved  Status post low anterior resection 6/19/25 secondary to adenocarcinoma  Abdominal pain  Diabetes Mellitus  Bipolar disorder  History of DVT  History of gastric bypass then later total gastrectomy   History of subtotal esophagectomy with colonic interposition   has a past medical history of Arthritis, Bowel obstruction (HCC), Bronchitis, Diabetes mellitus (HCC), Difficult intubation, DVT, lower extremity (HCC), GERD (gastroesophageal reflux disease), History of blood transfusion, Hx of blood clots, Hypertension, Hypoglycemia, Kidney stone, Macular degeneration, Movement disorder, Multinodular goiter, Muscle strain of left lower extremity, Pinched nerve, Psychiatric problem, Urinary incontinence, and Vitamin D deficiency.  PLAN   Tolerating full liquid diet.  Advance diet as tolerated.  CT guided drain placement completed. Drainage serosanguineous. Cultures with E. coli.   Repeat CT abdomen/pelvis yesterday reviewed with patient.  Improved fluid collection but not resolved.  Continue drain.  Antibiotics - infectious disease following  IV fluids per hospitalist  Pain & nausea control  DVT prophylaxis   PT/OT   Resume home medications   Labs reviewed. WBC wnl. Hemoglobin stable. Replace K+ per protocol.   Will need oncology appointment outpatient rescheduled  Case management for assistance with discharge planning, from [facility], social work consult  Patient back to baseline this morning.  Having bowel function.  Drainage still present and new bag has been placed.  Monitoring output. Patient only having minimal tenderness with deep palpation. Denies nausea or

## 2025-07-14 LAB
ANION GAP SERPL CALC-SCNC: 15 MEQ/L (ref 8–16)
BUN SERPL-MCNC: 7 MG/DL (ref 8–23)
CALCIUM SERPL-MCNC: 8.6 MG/DL (ref 8.8–10.2)
CHLORIDE SERPL-SCNC: 98 MEQ/L (ref 98–111)
CO2 SERPL-SCNC: 24 MEQ/L (ref 22–29)
CREAT SERPL-MCNC: 0.6 MG/DL (ref 0.5–0.9)
DEPRECATED MEAN GLUCOSE BLD GHB EST-ACNC: 87 MG/DL (ref 70–126)
DEPRECATED RDW RBC AUTO: 49.9 FL (ref 35–45)
ERYTHROCYTE [DISTWIDTH] IN BLOOD BY AUTOMATED COUNT: 14.1 % (ref 11.5–14.5)
GFR SERPL CREATININE-BSD FRML MDRD: > 90 ML/MIN/1.73M2
GLUCOSE BLD STRIP.AUTO-MCNC: 108 MG/DL (ref 70–108)
GLUCOSE BLD STRIP.AUTO-MCNC: 115 MG/DL (ref 70–108)
GLUCOSE BLD STRIP.AUTO-MCNC: 130 MG/DL (ref 70–108)
GLUCOSE BLD STRIP.AUTO-MCNC: 61 MG/DL (ref 70–108)
GLUCOSE BLD STRIP.AUTO-MCNC: 63 MG/DL (ref 70–108)
GLUCOSE BLD STRIP.AUTO-MCNC: 67 MG/DL (ref 70–108)
GLUCOSE BLD STRIP.AUTO-MCNC: 80 MG/DL (ref 70–108)
GLUCOSE BLD STRIP.AUTO-MCNC: 84 MG/DL (ref 70–108)
GLUCOSE SERPL-MCNC: 108 MG/DL (ref 74–109)
HBA1C MFR BLD HPLC: 4.9 % (ref 4–6)
HCT VFR BLD AUTO: 31.2 % (ref 37–47)
HGB BLD-MCNC: 9.8 GM/DL (ref 12–16)
MCH RBC QN AUTO: 30.4 PG (ref 26–33)
MCHC RBC AUTO-ENTMCNC: 31.4 GM/DL (ref 32.2–35.5)
MCV RBC AUTO: 96.9 FL (ref 81–99)
PLATELET # BLD AUTO: 540 THOU/MM3 (ref 130–400)
PMV BLD AUTO: 8.7 FL (ref 9.4–12.4)
POTASSIUM SERPL-SCNC: 3.2 MEQ/L (ref 3.5–5.2)
RBC # BLD AUTO: 3.22 MILL/MM3 (ref 4.2–5.4)
SODIUM SERPL-SCNC: 137 MEQ/L (ref 135–145)
WBC # BLD AUTO: 13.2 THOU/MM3 (ref 4.8–10.8)

## 2025-07-14 PROCEDURE — 1200000000 HC SEMI PRIVATE

## 2025-07-14 PROCEDURE — 2580000003 HC RX 258

## 2025-07-14 PROCEDURE — 82948 REAGENT STRIP/BLOOD GLUCOSE: CPT

## 2025-07-14 PROCEDURE — 97116 GAIT TRAINING THERAPY: CPT

## 2025-07-14 PROCEDURE — 6370000000 HC RX 637 (ALT 250 FOR IP): Performed by: PHYSICIAN ASSISTANT

## 2025-07-14 PROCEDURE — 6370000000 HC RX 637 (ALT 250 FOR IP): Performed by: STUDENT IN AN ORGANIZED HEALTH CARE EDUCATION/TRAINING PROGRAM

## 2025-07-14 PROCEDURE — 2580000003 HC RX 258: Performed by: STUDENT IN AN ORGANIZED HEALTH CARE EDUCATION/TRAINING PROGRAM

## 2025-07-14 PROCEDURE — 36415 COLL VENOUS BLD VENIPUNCTURE: CPT

## 2025-07-14 PROCEDURE — 83036 HEMOGLOBIN GLYCOSYLATED A1C: CPT

## 2025-07-14 PROCEDURE — 80048 BASIC METABOLIC PNL TOTAL CA: CPT

## 2025-07-14 PROCEDURE — 6360000002 HC RX W HCPCS: Performed by: STUDENT IN AN ORGANIZED HEALTH CARE EDUCATION/TRAINING PROGRAM

## 2025-07-14 PROCEDURE — 97162 PT EVAL MOD COMPLEX 30 MIN: CPT

## 2025-07-14 PROCEDURE — 99232 SBSQ HOSP IP/OBS MODERATE 35: CPT | Performed by: PHYSICIAN ASSISTANT

## 2025-07-14 PROCEDURE — 85027 COMPLETE CBC AUTOMATED: CPT

## 2025-07-14 RX ADMIN — POLYETHYLENE GLYCOL 3350 17 G: 17 POWDER, FOR SOLUTION ORAL at 19:42

## 2025-07-14 RX ADMIN — LAMOTRIGINE 100 MG: 100 TABLET ORAL at 09:18

## 2025-07-14 RX ADMIN — LAMOTRIGINE 200 MG: 100 TABLET ORAL at 19:43

## 2025-07-14 RX ADMIN — QUETIAPINE FUMARATE 25 MG: 25 TABLET ORAL at 19:42

## 2025-07-14 RX ADMIN — PIPERACILLIN AND TAZOBACTAM 3375 MG: 3; .375 INJECTION, POWDER, FOR SOLUTION INTRAVENOUS at 02:30

## 2025-07-14 RX ADMIN — LOSARTAN POTASSIUM 25 MG: 25 TABLET, FILM COATED ORAL at 17:58

## 2025-07-14 RX ADMIN — LOSARTAN POTASSIUM 50 MG: 25 TABLET, FILM COATED ORAL at 09:19

## 2025-07-14 RX ADMIN — TRAMADOL HYDROCHLORIDE 50 MG: 50 TABLET, COATED ORAL at 09:31

## 2025-07-14 RX ADMIN — POLYETHYLENE GLYCOL 3350 17 G: 17 POWDER, FOR SOLUTION ORAL at 09:20

## 2025-07-14 RX ADMIN — DEXTROSE 125 ML: 10 SOLUTION INTRAVENOUS at 00:47

## 2025-07-14 RX ADMIN — VENLAFAXINE HYDROCHLORIDE 150 MG: 150 CAPSULE, EXTENDED RELEASE ORAL at 09:18

## 2025-07-14 RX ADMIN — PIPERACILLIN AND TAZOBACTAM 3375 MG: 3; .375 INJECTION, POWDER, FOR SOLUTION INTRAVENOUS at 09:22

## 2025-07-14 RX ADMIN — DEXTROSE 125 ML: 10 SOLUTION INTRAVENOUS at 06:31

## 2025-07-14 RX ADMIN — PIPERACILLIN AND TAZOBACTAM 3375 MG: 3; .375 INJECTION, POWDER, FOR SOLUTION INTRAVENOUS at 15:44

## 2025-07-14 RX ADMIN — POTASSIUM CHLORIDE 40 MEQ: 1500 TABLET, EXTENDED RELEASE ORAL at 09:19

## 2025-07-14 RX ADMIN — ENOXAPARIN SODIUM 40 MG: 100 INJECTION SUBCUTANEOUS at 09:19

## 2025-07-14 RX ADMIN — BUSPIRONE HYDROCHLORIDE 10 MG: 10 TABLET ORAL at 19:43

## 2025-07-14 RX ADMIN — VENLAFAXINE HYDROCHLORIDE 75 MG: 75 CAPSULE, EXTENDED RELEASE ORAL at 09:17

## 2025-07-14 RX ADMIN — TRAMADOL HYDROCHLORIDE 50 MG: 50 TABLET, COATED ORAL at 19:42

## 2025-07-14 ASSESSMENT — PAIN DESCRIPTION - ORIENTATION
ORIENTATION: LOWER
ORIENTATION: LOWER

## 2025-07-14 ASSESSMENT — PAIN DESCRIPTION - LOCATION
LOCATION: GENERALIZED
LOCATION: BACK

## 2025-07-14 ASSESSMENT — PAIN SCALES - GENERAL
PAINLEVEL_OUTOF10: 6
PAINLEVEL_OUTOF10: 6
PAINLEVEL_OUTOF10: 7

## 2025-07-14 ASSESSMENT — PAIN - FUNCTIONAL ASSESSMENT: PAIN_FUNCTIONAL_ASSESSMENT: PREVENTS OR INTERFERES SOME ACTIVE ACTIVITIES AND ADLS

## 2025-07-14 ASSESSMENT — PAIN DESCRIPTION - DESCRIPTORS: DESCRIPTORS: THROBBING

## 2025-07-14 ASSESSMENT — PAIN SCALES - WONG BAKER: WONGBAKER_NUMERICALRESPONSE: NO HURT

## 2025-07-14 NOTE — PROGRESS NOTES
Progress note: Infectious diseases    Patient - Adry Moura,  Age - 74 y.o.    - 1950      Room Number - 6K-17/017-A   MRN -  [id number]   [facility] # - [id number]  Date of Admission -  2025 11:22 AM    SUBJECTIVE:   No new issues. The drainage is slowing down  OBJECTIVE   VITALS    height is 1.676 m (5' 6\") and weight is 55.4 kg (122 lb 2.2 oz). Her oral temperature is 98.5 °F (36.9 °C). Her blood pressure is 133/66 and her pulse is 94. Her respiration is 20 and oxygen saturation is 100%.       Wt Readings from Last 3 Encounters:   25 55.4 kg (122 lb 2.2 oz)   25 54.4 kg (120 lb)   25 59.5 kg (131 lb 2 oz)       I/O (24 Hours)    Intake/Output Summary (Last 24 hours) at 2025 1332  Last data filed at 2025 1324  Gross per 24 hour   Intake 920 ml   Output 50 ml   Net 870 ml       General Appearance  Awake, alert, oriented,  not  In acute distress  HEENT - normocephalic, atraumatic, pale conjunctiva  Neck - Supple, has visible swelling on the anterior neck(pulled bowel from previous surgery)  Lungs -  Bilateral   air entry, no rhonchi, no wheeze  Cardiovascular - Heart sounds are normal.    Abdomen - soft, not distended, drain in place.  Blood fluid noted on the drainage bag  Neurologic -awake and oriented.  Skin - No bruising or bleeding  Extremities - No edema, no cyanosis, clubbing     MEDICATIONS:      polyethylene glycol  17 g Oral BID    losartan  25 mg Oral QPM    losartan  50 mg Oral Daily    sodium chloride flush  5-40 mL IntraVENous 2 times per day    enoxaparin  40 mg SubCUTAneous Daily    [Held by provider] acarbose  50 mg Oral TID WC    busPIRone  10 mg Oral Nightly    lamoTRIgine  100 mg Oral QAM    lamoTRIgine  200 mg Oral Nightly    [Held by provider] cetirizine  10 mg Oral Daily    QUEtiapine  25 mg Oral Nightly    insulin lispro  0-4 Units SubCUTAneous 4x Daily AC & HS

## 2025-07-14 NOTE — PROGRESS NOTES
Hospitalist Progress Note      Patient:  Adry Moura 74 y.o. female       : 1950  Unit/Bed:6K-17/017-A    Date of Admission: 2025      ASSESSMENT AND PLAN    Active Problems  Intra-abdominal abscess  Adenocarcinoma of colon s/p low anterior resection   Underwent colonoscopy in May 2025 and then later Radha 3, 2025 and found to have 3 cm mass which was found to have moderately differentiated adenocarcinoma of Colon resulting in low anterior resection on 25 by Dr. Hale   CT A/P in the ED with large intra-abdominal abscess within anterior aspect of abdomen and pelvis measuring 16.0 x 5.5 x 18.8 cm   Repeat CT  abscess measures 10.2 x 3.1 x 15.6 cm.   General Surgery following  S/p CT-guided drain placement .  Culture growing E coli  Blood cultures NGTD  ID following, cont Zosyn (start )  Cont to monitor output   Acute metabolic encephalopathy, resolving.   Secondary to infection.  Delirium precautions; frequent re-orientation.  Encourage good sleep/wake cycle.  Avoid benzo, narcotics, Ach, antiemetics, antihistamines, antipsychotics as much as possible.  Hypokalemia  Replace per protocol  Constipation   Cont Glycolax, increase to BID. Add Senna-S.     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  Home medications were reviewed and resumed  NIDDMII. Hypoglycemia episodes. Check A1c. Continue accu-checks, hypoglycemi protocol.   Bipolar disorder. On Seroquel, Lamictal, Buspar, Effexor.       LDA: []CVC / []PICC / []Midline / []Orellana / [x]Drains / []Mediport / []None  Antibiotics: yes  Steroids: no  Labs (still needed?): [x]Yes / []No  IVF (still

## 2025-07-14 NOTE — PROGRESS NOTES
Chart reviewed. VS stable. No fevers. On room air.  Mentation a lot better. No confusion today. Drain serosanguineous. New bag in place and has about 200 cc in bag.  Abdominal pain to right side of abdomen. Passing flatus and has had bowel movements.  Tolerating easy to chew diet but appetite decreased. Denies lightheadedness or dizziness.  Working with therapy.  CURRENT MEDICATIONS   Scheduled Meds:   polyethylene glycol  17 g Oral BID    losartan  25 mg Oral QPM    losartan  50 mg Oral Daily    sodium chloride flush  5-40 mL IntraVENous 2 times per day    enoxaparin  40 mg SubCUTAneous Daily    [Held by provider] acarbose  50 mg Oral TID WC    busPIRone  10 mg Oral Nightly    lamoTRIgine  100 mg Oral QAM    lamoTRIgine  200 mg Oral Nightly    [Held by provider] cetirizine  10 mg Oral Daily    QUEtiapine  25 mg Oral Nightly    insulin lispro  0-4 Units SubCUTAneous 4x Daily AC & HS    piperacillin-tazobactam  3,375 mg IntraVENous Q8H    pantoprazole  40 mg Oral QAM AC    venlafaxine  75 mg Oral Daily with breakfast    And    venlafaxine  150 mg Oral Daily with breakfast     Continuous Infusions:   sodium chloride      dextrose       PRN Meds:.sennosides-docusate sodium, QUEtiapine, sodium chloride flush, sodium chloride, potassium chloride **OR** potassium alternative oral replacement **OR** potassium chloride, magnesium sulfate, ondansetron **OR** ondansetron, polyethylene glycol, acetaminophen **OR** acetaminophen, busPIRone, dicyclomine, [Held by provider] docusate sodium, traMADol, morphine, glucose, dextrose bolus **OR** dextrose bolus, glucagon (rDNA), dextrose  OBJECTIVE   CURRENT VITALS:  height is 1.676 m (5' 6\") and weight is 55.4 kg (122 lb 2.2 oz). Her oral temperature is 98.2 °F (36.8 °C). Her blood pressure is 125/57 (abnormal) and her pulse is 76. Her respiration is 20 and oxygen saturation is 99%.   Temperature Range (24h):Temp: 98.2 °F (36.8 °C) Temp  Av.4 °F (36.9 °C)  Min: 98.2 °F (36.8 °C)   There has been interval placement  of a drainage catheter and the collection has decreased in size since the  previous examination.              **This report has been created using voice recognition software. It may contain  minor errors which are inherent in voice recognition technology.**           Electronically signed by Dr Bud Alba        Exam Ended: 07/12/25 08:21 EDT Last Resulted: 07/12/25 09:12 EDT       XR ABDOMEN (KUB) (SINGLE AP VIEW)  Order: [id number]   Status: Final result       Next appt: 07/16/2025 at 09:30 AM in General Surgery (EVA SORENSEN CNP)    Test Result Released: Yes (not seen)    0 Result Notes  Details    Reading Physician Reading Date Result Priority   Raudel Vences MD  [phone] 7/10/2025      Narrative & Impression  PROCEDURE: XR ABDOMEN (KUB) (SINGLE AP VIEW)     CLINICAL INFORMATION: abdominal pain, nausea     COMPARISON: 1/7/2013     TECHNIQUE: A single supine image of the abdomen was obtained.     FINDINGS:  There is moderate gas distention of the transverse colon. No small bowel  distention is seen. A drainage catheter is present in the pelvis left side.  There is suggestion of mild kinking of the catheter. Kidneys are somewhat  obscured.. No definite renal or ureteral calculi are seen.. Mild degenerative  changes both hips.     IMPRESSION:  Moderate distention of the transverse colon. Drainage catheter  pelvis left side with apparent kinking of the catheter.           **This report has been created using voice recognition software.  It may contain  minor errors which are inherent in voice recognition technology.**              Electronically signed by Dr. Raudel Vences        Exam Ended: 07/10/25 10:23 EDT Last Resulted: 07/10/25 11:01 EDT     Electronically signed by EVA SORENSEN CNP on 7/14/2025 at 4:40 PM

## 2025-07-14 NOTE — PROGRESS NOTES
[facility]  INPATIENT PHYSICAL THERAPY  EVALUATION  [facility] RENAL TELEMETRY 6K - 6K-17/017-A    Discharge Recommendations: Continue to assess pending progress, Subacute/Skilled Nursing Facility vs assisted with  services   Equipment Recommendations: No             Time In: 922  Time Out: 944  Timed Code Treatment Minutes: 13 Minutes  Minutes: 22          Date: 2025  Patient Name: Adry Moura,  Gender:  female        MRN: [id number]  : 1950  (74 y.o.)      Referring Practitioner: Uriel Madera, APRN - CNP  Diagnosis: Intra-abdominal abscess (HCC)  Additional Pertinent Hx: Per EMR \"Adry Moura is a 74 y.o. female with PMHx of Bariatric Surgery s/p total gastrectomy, subtotal esophagectomy with colo-jejunostomy, Adenocarcinoma of colon s/p low anterior resection, Bipolar disorder, HTN and NIDDm  who presents to [facility] from SNF/ [facility] with Generalized abdominal pain.    Patient states that she has been having abdominal pain associated with nausea, decreased appetite and  weakness since her discharge to SNF after surgery.  Patient states that the abdominal pain is generalized, sharp, exacerbated by PT/OT/exercise.  Per reports, patient was found to be hypoxic at nursing home but was saturating on room air in the ED.  \" Status post IR drain placement     Restrictions/Precautions:  Restrictions/Precautions: Fall Risk, General Precautions       Other Position/Activity Restrictions: L drain    Required Braces or Orthoses?: No      Subjective:  Chart Reviewed: Yes  Patient assessed for rehabilitation services?: Yes  Family/Caregiver Present: No  Subjective: Ok to see pt per nursing. Pt in bed when PT arrived, pleasant and agreeable to PT session. Nursing just finishing meds and vitals.    General:  Overall Orientation Status: Within Functional Limits  Orientation Level: Oriented X4  Vision: Impaired  Vision Exceptions: Wears glasses at all times  Hearing: Within

## 2025-07-14 NOTE — CARE COORDINATION
7/14/25, 4:22 PM EDT    DISCHARGE ON GOING EVALUATION    Adry LEIGH [facility] day: 3  Location: -17/017-A Reason for admit: Abscess of abdominal wall [L02.211]  Intra-abdominal abscess (HCC) [K65.1]      Procedures: 7/8 CT abscess drainage, attached to Deni-Close     Imaging since last note: 7/12 CT ABD/PEL:Redemonstration of an intra-abdominal abscess. There has been interval placement  of a drainage catheter and the collection has decreased in size since the  previous examination.     Barriers to Discharge: K+3.2, IV zosyn, pain control, lyte replacement. Advanced diet to Easy to Chew. Discuss drain with Ortho Surg, possible removal?      PCP: María Neil APRN - CNP  Readmission Risk Score: 23.5     Patient Goals/Plan/Treatment Preferences: return to [facility]

## 2025-07-15 LAB
ANION GAP SERPL CALC-SCNC: 12 MEQ/L (ref 8–16)
BUN SERPL-MCNC: 7 MG/DL (ref 8–23)
CALCIUM SERPL-MCNC: 9.2 MG/DL (ref 8.8–10.2)
CHLORIDE SERPL-SCNC: 100 MEQ/L (ref 98–111)
CO2 SERPL-SCNC: 26 MEQ/L (ref 22–29)
CREAT SERPL-MCNC: 0.6 MG/DL (ref 0.5–0.9)
DEPRECATED RDW RBC AUTO: 50.4 FL (ref 35–45)
ERYTHROCYTE [DISTWIDTH] IN BLOOD BY AUTOMATED COUNT: 14.3 % (ref 11.5–14.5)
GFR SERPL CREATININE-BSD FRML MDRD: > 90 ML/MIN/1.73M2
GLUCOSE BLD STRIP.AUTO-MCNC: 116 MG/DL (ref 70–108)
GLUCOSE BLD STRIP.AUTO-MCNC: 61 MG/DL (ref 70–108)
GLUCOSE BLD STRIP.AUTO-MCNC: 62 MG/DL (ref 70–108)
GLUCOSE BLD STRIP.AUTO-MCNC: 63 MG/DL (ref 70–108)
GLUCOSE BLD STRIP.AUTO-MCNC: 63 MG/DL (ref 70–108)
GLUCOSE BLD STRIP.AUTO-MCNC: 66 MG/DL (ref 70–108)
GLUCOSE BLD STRIP.AUTO-MCNC: 75 MG/DL (ref 70–108)
GLUCOSE BLD STRIP.AUTO-MCNC: 85 MG/DL (ref 70–108)
GLUCOSE BLD STRIP.AUTO-MCNC: 96 MG/DL (ref 70–108)
GLUCOSE SERPL-MCNC: 69 MG/DL (ref 74–109)
HCT VFR BLD AUTO: 32.3 % (ref 37–47)
HGB BLD-MCNC: 10 GM/DL (ref 12–16)
MCH RBC QN AUTO: 29.8 PG (ref 26–33)
MCHC RBC AUTO-ENTMCNC: 31 GM/DL (ref 32.2–35.5)
MCV RBC AUTO: 96.1 FL (ref 81–99)
PLATELET # BLD AUTO: 517 THOU/MM3 (ref 130–400)
PMV BLD AUTO: 9.1 FL (ref 9.4–12.4)
POTASSIUM SERPL-SCNC: 4.3 MEQ/L (ref 3.5–5.2)
RBC # BLD AUTO: 3.36 MILL/MM3 (ref 4.2–5.4)
SODIUM SERPL-SCNC: 138 MEQ/L (ref 135–145)
WBC # BLD AUTO: 13.5 THOU/MM3 (ref 4.8–10.8)

## 2025-07-15 PROCEDURE — 80048 BASIC METABOLIC PNL TOTAL CA: CPT

## 2025-07-15 PROCEDURE — 99232 SBSQ HOSP IP/OBS MODERATE 35: CPT | Performed by: PHYSICIAN ASSISTANT

## 2025-07-15 PROCEDURE — APPSS30 APP SPLIT SHARED TIME 16-30 MINUTES: Performed by: NURSE PRACTITIONER

## 2025-07-15 PROCEDURE — 2580000003 HC RX 258

## 2025-07-15 PROCEDURE — 6360000002 HC RX W HCPCS: Performed by: STUDENT IN AN ORGANIZED HEALTH CARE EDUCATION/TRAINING PROGRAM

## 2025-07-15 PROCEDURE — 2500000003 HC RX 250 WO HCPCS: Performed by: STUDENT IN AN ORGANIZED HEALTH CARE EDUCATION/TRAINING PROGRAM

## 2025-07-15 PROCEDURE — 6370000000 HC RX 637 (ALT 250 FOR IP): Performed by: STUDENT IN AN ORGANIZED HEALTH CARE EDUCATION/TRAINING PROGRAM

## 2025-07-15 PROCEDURE — 85027 COMPLETE CBC AUTOMATED: CPT

## 2025-07-15 PROCEDURE — 6360000002 HC RX W HCPCS: Performed by: NURSE PRACTITIONER

## 2025-07-15 PROCEDURE — 6370000000 HC RX 637 (ALT 250 FOR IP): Performed by: PHYSICIAN ASSISTANT

## 2025-07-15 PROCEDURE — 2580000003 HC RX 258: Performed by: STUDENT IN AN ORGANIZED HEALTH CARE EDUCATION/TRAINING PROGRAM

## 2025-07-15 PROCEDURE — 6370000000 HC RX 637 (ALT 250 FOR IP): Performed by: NURSE PRACTITIONER

## 2025-07-15 PROCEDURE — 82948 REAGENT STRIP/BLOOD GLUCOSE: CPT

## 2025-07-15 PROCEDURE — 1200000000 HC SEMI PRIVATE

## 2025-07-15 PROCEDURE — 36415 COLL VENOUS BLD VENIPUNCTURE: CPT

## 2025-07-15 PROCEDURE — 6370000000 HC RX 637 (ALT 250 FOR IP)

## 2025-07-15 RX ORDER — POLYETHYLENE GLYCOL 3350 17 G/17G
9 POWDER, FOR SOLUTION ORAL ONCE
Status: DISCONTINUED | OUTPATIENT
Start: 2025-07-15 | End: 2025-07-25 | Stop reason: HOSPADM

## 2025-07-15 RX ORDER — ACETAMINOPHEN 325 MG/1
650 TABLET ORAL EVERY 12 HOURS
Status: DISCONTINUED | OUTPATIENT
Start: 2025-07-15 | End: 2025-07-25 | Stop reason: HOSPADM

## 2025-07-15 RX ADMIN — DEXTROSE 125 ML: 10 SOLUTION INTRAVENOUS at 07:10

## 2025-07-15 RX ADMIN — LAMOTRIGINE 100 MG: 100 TABLET ORAL at 08:26

## 2025-07-15 RX ADMIN — Medication 16 G: at 16:22

## 2025-07-15 RX ADMIN — ENOXAPARIN SODIUM 40 MG: 100 INJECTION SUBCUTANEOUS at 08:28

## 2025-07-15 RX ADMIN — PIPERACILLIN AND TAZOBACTAM 3375 MG: 3; .375 INJECTION, POWDER, FOR SOLUTION INTRAVENOUS at 00:07

## 2025-07-15 RX ADMIN — LOSARTAN POTASSIUM 50 MG: 25 TABLET, FILM COATED ORAL at 08:26

## 2025-07-15 RX ADMIN — PIPERACILLIN AND TAZOBACTAM 3375 MG: 3; .375 INJECTION, POWDER, FOR SOLUTION INTRAVENOUS at 23:31

## 2025-07-15 RX ADMIN — PIPERACILLIN AND TAZOBACTAM 3375 MG: 3; .375 INJECTION, POWDER, FOR SOLUTION INTRAVENOUS at 15:07

## 2025-07-15 RX ADMIN — ACETAMINOPHEN 650 MG: 325 TABLET ORAL at 10:44

## 2025-07-15 RX ADMIN — MORPHINE SULFATE 1 MG: 2 INJECTION, SOLUTION INTRAMUSCULAR; INTRAVENOUS at 19:41

## 2025-07-15 RX ADMIN — Medication 16 G: at 06:29

## 2025-07-15 RX ADMIN — VENLAFAXINE HYDROCHLORIDE 150 MG: 150 CAPSULE, EXTENDED RELEASE ORAL at 08:27

## 2025-07-15 RX ADMIN — VENLAFAXINE HYDROCHLORIDE 75 MG: 75 CAPSULE, EXTENDED RELEASE ORAL at 08:27

## 2025-07-15 RX ADMIN — SODIUM CHLORIDE, PRESERVATIVE FREE 10 ML: 5 INJECTION INTRAVENOUS at 08:27

## 2025-07-15 RX ADMIN — PIPERACILLIN AND TAZOBACTAM 3375 MG: 3; .375 INJECTION, POWDER, FOR SOLUTION INTRAVENOUS at 08:49

## 2025-07-15 RX ADMIN — PANTOPRAZOLE SODIUM 40 MG: 40 TABLET, DELAYED RELEASE ORAL at 05:25

## 2025-07-15 ASSESSMENT — PAIN SCALES - WONG BAKER: WONGBAKER_NUMERICALRESPONSE: NO HURT

## 2025-07-15 ASSESSMENT — PAIN SCALES - GENERAL
PAINLEVEL_OUTOF10: 6
PAINLEVEL_OUTOF10: 6
PAINLEVEL_OUTOF10: 5

## 2025-07-15 ASSESSMENT — PAIN DESCRIPTION - LOCATION
LOCATION: ABDOMEN
LOCATION: GENERALIZED

## 2025-07-15 ASSESSMENT — PAIN DESCRIPTION - DESCRIPTORS
DESCRIPTORS: DISCOMFORT
DESCRIPTORS: PRESSURE

## 2025-07-15 ASSESSMENT — PAIN DESCRIPTION - ORIENTATION: ORIENTATION: LEFT;RIGHT

## 2025-07-15 ASSESSMENT — PAIN - FUNCTIONAL ASSESSMENT: PAIN_FUNCTIONAL_ASSESSMENT: ACTIVITIES ARE NOT PREVENTED

## 2025-07-15 NOTE — PLAN OF CARE
Problem: Chronic Conditions and Co-morbidities  Goal: Patient's chronic conditions and co-morbidity symptoms are monitored and maintained or improved  Outcome: Progressing  Flowsheets (Taken 7/15/2025 1228)  Care Plan - Patient's Chronic Conditions and Co-Morbidity Symptoms are Monitored and Maintained or Improved:   Monitor and assess patient's chronic conditions and comorbid symptoms for stability, deterioration, or improvement   Collaborate with multidisciplinary team to address chronic and comorbid conditions and prevent exacerbation or deterioration   Update acute care plan with appropriate goals if chronic or comorbid symptoms are exacerbated and prevent overall improvement and discharge     Problem: Discharge Planning  Goal: Discharge to home or other facility with appropriate resources  Outcome: Progressing  Flowsheets (Taken 7/15/2025 1228)  Discharge to home or other facility with appropriate resources:   Identify barriers to discharge with patient and caregiver   Arrange for needed discharge resources and transportation as appropriate   Identify discharge learning needs (meds, wound care, etc)     Problem: Safety - Adult  Goal: Free from fall injury  Outcome: Progressing  Flowsheets (Taken 7/15/2025 1228)  Free From Fall Injury:   Instruct family/caregiver on patient safety   Based on caregiver fall risk screen, instruct family/caregiver to ask for assistance with transferring infant if caregiver noted to have fall risk factors     Problem: Neurosensory - Adult  Goal: Achieves stable or improved neurological status  Outcome: Progressing  Flowsheets (Taken 7/15/2025 1228)  Achieves stable or improved neurological status:   Assess for and report changes in neurological status   Maintain blood pressure and fluid volume within ordered parameters to optimize cerebral perfusion and minimize risk of hemorrhage     Problem: Skin/Tissue Integrity - Adult  Goal: Skin integrity remains intact  Description: 1.

## 2025-07-15 NOTE — PROGRESS NOTES
[facility]  Uriel Madera CNP for Dr. Lenocio Hale  General Surgery Daily Progress Note    Pt Name: Adry Moura  Medical Record Number: [id number]  Date of Birth 1950   Today's Date: 7/15/2025    Hospital day # 7     ASSESSMENT   Large intra-abdominal fluid collection/possible abscess - Status post IR drain placement 7/8/25  Altered mental status - improved  Status post low anterior resection 6/19/25 secondary to adenocarcinoma  Abdominal pain  Diabetes Mellitus  Bipolar disorder  History of DVT  History of gastric bypass then later total gastrectomy   History of subtotal esophagectomy with colonic interposition   has a past medical history of Arthritis, Bowel obstruction (HCC), Bronchitis, Diabetes mellitus (HCC), Difficult intubation, DVT, lower extremity (HCC), GERD (gastroesophageal reflux disease), History of blood transfusion, Hx of blood clots, Hypertension, Hypoglycemia, Kidney stone, Macular degeneration, Movement disorder, Multinodular goiter, Muscle strain of left lower extremity, Pinched nerve, Psychiatric problem, Urinary incontinence, and Vitamin D deficiency.  PLAN   Easy to chew soft diet. Having some bowel function.  CT guided drain placement completed. Drainage serosanguineous. Cultures with E. coli.   Repeat CT abdomen/pelvis 7/12/25.  Improved fluid collection but not resolved.  Continue drain. Planning to repeat CT imaging again tomorrow.   Antibiotics - infectious disease following. Will transition to oral at discharge.   Pain & nausea control  DVT prophylaxis   PT/OT   Labs reviewed. WBC still 13.5. Hem stable. Platelets 517.   Will need oncology appointment outpatient rescheduled  Case management for assistance with discharge planning, from [facility], social work consult  Patient seems to be doing well.  Drain not having much out so we will repeat CT imaging and decide whether to pull drain or not upon discharge.  Discussed case with infectious  disease.  SUBJECTIVE   Chief complaint: Right sided abdominal pain    Stable overnight.  Chart reviewed. VS stable. No fevers. On room air.  Mentation better. Had a tearful moment today but in good spirits. No new drainage in bag today.  Abdominal pain to right side of abdomen. Passing flatus and had a bowel movement today. Feels backed up still though.  Tolerating easy to chew diet but appetite a little better today.  Denies lightheadedness or dizziness.  Working with therapy.  CURRENT MEDICATIONS   Scheduled Meds:   acetaminophen  650 mg Oral Q12H    polyethylene glycol  8.5 g Oral Once    polyethylene glycol  17 g Oral BID    losartan  25 mg Oral QPM    losartan  50 mg Oral Daily    sodium chloride flush  5-40 mL IntraVENous 2 times per day    enoxaparin  40 mg SubCUTAneous Daily    [Held by provider] acarbose  50 mg Oral TID WC    busPIRone  10 mg Oral Nightly    lamoTRIgine  100 mg Oral QAM    lamoTRIgine  200 mg Oral Nightly    [Held by provider] cetirizine  10 mg Oral Daily    QUEtiapine  25 mg Oral Nightly    insulin lispro  0-4 Units SubCUTAneous 4x Daily AC & HS    piperacillin-tazobactam  3,375 mg IntraVENous Q8H    pantoprazole  40 mg Oral QAM AC    venlafaxine  75 mg Oral Daily with breakfast    And    venlafaxine  150 mg Oral Daily with breakfast     Continuous Infusions:   sodium chloride      dextrose       PRN Meds:.sennosides-docusate sodium, QUEtiapine, sodium chloride flush, sodium chloride, potassium chloride **OR** potassium alternative oral replacement **OR** potassium chloride, magnesium sulfate, ondansetron **OR** ondansetron, polyethylene glycol, acetaminophen **OR** acetaminophen, busPIRone, dicyclomine, [Held by provider] docusate sodium, traMADol, morphine, glucose, dextrose bolus **OR** dextrose bolus, glucagon (rDNA), dextrose  OBJECTIVE   CURRENT VITALS:  height is 1.676 m (5' 6\") and weight is 56.3 kg (124 lb 1.9 oz). Her oral temperature is 98.1 °F (36.7 °C). Her blood pressure is

## 2025-07-15 NOTE — PROGRESS NOTES
Patient received sacramental anointing by a . If you are in need of  support, please call [phone]. If you are in need of a  after 6:30pm, please call the house supervisor for the on-call .     Rev. Tenzin Pyane MDiv, [facility]  Director of Spiritual Health  O: [phone]    To reach a  call [phone]

## 2025-07-15 NOTE — PROGRESS NOTES
Hospitalist Progress Note      Patient:  Adry Moura 74 y.o. female       : 1950  Unit/Bed:6K-17/017-A    Date of Admission: 2025      ASSESSMENT AND PLAN    Active Problems  Intra-abdominal abscess  Adenocarcinoma of colon s/p low anterior resection   Underwent colonoscopy in May 2025 and then later Radha 3, 2025 and found to have 3 cm mass which was found to have moderately differentiated adenocarcinoma of Colon resulting in low anterior resection on 25 by Dr. Hale   CT A/P in the ED with large intra-abdominal abscess within anterior aspect of abdomen and pelvis measuring 16.0 x 5.5 x 18.8 cm   Repeat CT  abscess measures 10.2 x 3.1 x 15.6 cm.   General Surgery and ID  following  S/p CT-guided drain placement .  Culture growing E coli  Blood cultures NGTD  ID following, cont Zosyn (start ). Plan PO at dc.   Cont to monitor output   Plan to repeat CT again tomorrow AM to decide on drain management per ID.   Acute metabolic encephalopathy, resolving.   Secondary to infection.  Delirium precautions; frequent re-orientation.  Encourage good sleep/wake cycle.  Avoid benzo, narcotics, Ach, antiemetics, antihistamines, antipsychotics as much as possible.  Hypokalemia  Replace per protocol  Constipation   Cont Glycolax, increase to BID. Add Senna-S.     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  Home medications were reviewed and resumed  NIDDMII. Hypoglycemia episodes. Check A1c. Continue accu-checks, hypoglycemi protocol.   Bipolar disorder. On Seroquel, Lamictal, Buspar, Effexor.       LDA: []CVC / []PICC / []Midline / []Orellana / [x]Drains /

## 2025-07-15 NOTE — PROGRESS NOTES
Progress note: Infectious diseases    Patient - Adry Moura,  Age - 74 y.o.    - 1950      Room Number - 6K-17/017-A   MRN -  [id number]   [facility] # - [id number]  Date of Admission -  2025 11:22 AM    SUBJECTIVE:   No new issues. The drainage is slowing down  OBJECTIVE   VITALS    height is 1.676 m (5' 6\") and weight is 56.3 kg (124 lb 1.9 oz). Her oral temperature is 98.2 °F (36.8 °C). Her blood pressure is 133/61 and her pulse is 89. Her respiration is 20 and oxygen saturation is 98%.       Wt Readings from Last 3 Encounters:   07/15/25 56.3 kg (124 lb 1.9 oz)   25 54.4 kg (120 lb)   25 59.5 kg (131 lb 2 oz)       I/O (24 Hours)    Intake/Output Summary (Last 24 hours) at 7/15/2025 1200  Last data filed at 7/15/2025 0855  Gross per 24 hour   Intake 634 ml   Output 50 ml   Net 584 ml       General Appearance  Awake, alert, oriented,  not  In acute distress  HEENT - normocephalic, atraumatic, pale conjunctiva  Neck - Supple, has visible swelling on the anterior neck(pulled bowel from previous surgery)  Lungs -  Bilateral   air entry, no rhonchi, no wheeze  Cardiovascular - Heart sounds are normal.    Abdomen - soft, not distended, drain in place.  Blood fluid noted on the drainage bag  Neurologic -awake and oriented.  Skin - No bruising or bleeding  Extremities - No edema, no cyanosis, clubbing     MEDICATIONS:      acetaminophen  650 mg Oral Q12H    polyethylene glycol  8.5 g Oral Once    polyethylene glycol  17 g Oral BID    losartan  25 mg Oral QPM    losartan  50 mg Oral Daily    sodium chloride flush  5-40 mL IntraVENous 2 times per day    enoxaparin  40 mg SubCUTAneous Daily    [Held by provider] acarbose  50 mg Oral TID WC    busPIRone  10 mg Oral Nightly    lamoTRIgine  100 mg Oral QAM    lamoTRIgine  200 mg Oral Nightly    [Held by provider] cetirizine  10 mg Oral Daily    QUEtiapine  25 mg

## 2025-07-15 NOTE — CARE COORDINATION
7/15/25, 9:53 AM EDT    DISCHARGE ON GOING EVALUATION    Adry LEIGH [facility] day: 7  Location: -17/017-A Reason for admit: Abscess of abdominal wall [L02.211]  Intra-abdominal abscess (HCC) [K65.1]     Procedures: 7/8 CT abscess drainage, attached to Deni-Close     Imaging since last note: n/a    Barriers to Discharge: WBC 13.5. Remains on IV zosyn. Per Dr. Ching, plan to repeat CT tomorrow to see if drain can be removed and patient discharged.     PCP: María Neil APRN - CNP  Readmission Risk Score: 21.4    Patient Goals/Plan/Treatment Preferences: return to [place] campus

## 2025-07-16 ENCOUNTER — APPOINTMENT (OUTPATIENT)
Dept: CT IMAGING | Age: 75
End: 2025-07-16
Payer: MEDICARE

## 2025-07-16 LAB
ANION GAP SERPL CALC-SCNC: 13 MEQ/L (ref 8–16)
BUN SERPL-MCNC: 9 MG/DL (ref 8–23)
CALCIUM SERPL-MCNC: 8.9 MG/DL (ref 8.8–10.2)
CHLORIDE SERPL-SCNC: 100 MEQ/L (ref 98–111)
CO2 SERPL-SCNC: 26 MEQ/L (ref 22–29)
CREAT SERPL-MCNC: 0.7 MG/DL (ref 0.5–0.9)
DEPRECATED RDW RBC AUTO: 50.2 FL (ref 35–45)
ERYTHROCYTE [DISTWIDTH] IN BLOOD BY AUTOMATED COUNT: 14.2 % (ref 11.5–14.5)
GFR SERPL CREATININE-BSD FRML MDRD: 90 ML/MIN/1.73M2
GLUCOSE BLD STRIP.AUTO-MCNC: 115 MG/DL (ref 70–108)
GLUCOSE BLD STRIP.AUTO-MCNC: 133 MG/DL (ref 70–108)
GLUCOSE BLD STRIP.AUTO-MCNC: 58 MG/DL (ref 70–108)
GLUCOSE BLD STRIP.AUTO-MCNC: 59 MG/DL (ref 70–108)
GLUCOSE BLD STRIP.AUTO-MCNC: 61 MG/DL (ref 70–108)
GLUCOSE BLD STRIP.AUTO-MCNC: 62 MG/DL (ref 70–108)
GLUCOSE BLD STRIP.AUTO-MCNC: 71 MG/DL (ref 70–108)
GLUCOSE BLD STRIP.AUTO-MCNC: 80 MG/DL (ref 70–108)
GLUCOSE SERPL-MCNC: 66 MG/DL (ref 74–109)
HCT VFR BLD AUTO: 31.3 % (ref 37–47)
HGB BLD-MCNC: 10 GM/DL (ref 12–16)
MAGNESIUM SERPL-MCNC: 1.8 MG/DL (ref 1.6–2.6)
MCH RBC QN AUTO: 30.8 PG (ref 26–33)
MCHC RBC AUTO-ENTMCNC: 31.9 GM/DL (ref 32.2–35.5)
MCV RBC AUTO: 96.3 FL (ref 81–99)
PLATELET # BLD AUTO: 415 THOU/MM3 (ref 130–400)
PMV BLD AUTO: 8.8 FL (ref 9.4–12.4)
POTASSIUM SERPL-SCNC: 4.2 MEQ/L (ref 3.5–5.2)
RBC # BLD AUTO: 3.25 MILL/MM3 (ref 4.2–5.4)
SODIUM SERPL-SCNC: 139 MEQ/L (ref 135–145)
WBC # BLD AUTO: 15.2 THOU/MM3 (ref 4.8–10.8)

## 2025-07-16 PROCEDURE — 97116 GAIT TRAINING THERAPY: CPT

## 2025-07-16 PROCEDURE — 6370000000 HC RX 637 (ALT 250 FOR IP): Performed by: STUDENT IN AN ORGANIZED HEALTH CARE EDUCATION/TRAINING PROGRAM

## 2025-07-16 PROCEDURE — 97110 THERAPEUTIC EXERCISES: CPT

## 2025-07-16 PROCEDURE — 85027 COMPLETE CBC AUTOMATED: CPT

## 2025-07-16 PROCEDURE — 99232 SBSQ HOSP IP/OBS MODERATE 35: CPT | Performed by: NURSE PRACTITIONER

## 2025-07-16 PROCEDURE — 82948 REAGENT STRIP/BLOOD GLUCOSE: CPT

## 2025-07-16 PROCEDURE — 2580000003 HC RX 258

## 2025-07-16 PROCEDURE — 1200000000 HC SEMI PRIVATE

## 2025-07-16 PROCEDURE — 80048 BASIC METABOLIC PNL TOTAL CA: CPT

## 2025-07-16 PROCEDURE — 6360000002 HC RX W HCPCS: Performed by: STUDENT IN AN ORGANIZED HEALTH CARE EDUCATION/TRAINING PROGRAM

## 2025-07-16 PROCEDURE — 36415 COLL VENOUS BLD VENIPUNCTURE: CPT

## 2025-07-16 PROCEDURE — 6370000000 HC RX 637 (ALT 250 FOR IP): Performed by: PHYSICIAN ASSISTANT

## 2025-07-16 PROCEDURE — 83735 ASSAY OF MAGNESIUM: CPT

## 2025-07-16 PROCEDURE — 6370000000 HC RX 637 (ALT 250 FOR IP): Performed by: NURSE PRACTITIONER

## 2025-07-16 PROCEDURE — 99024 POSTOP FOLLOW-UP VISIT: CPT

## 2025-07-16 PROCEDURE — 2500000003 HC RX 250 WO HCPCS: Performed by: STUDENT IN AN ORGANIZED HEALTH CARE EDUCATION/TRAINING PROGRAM

## 2025-07-16 PROCEDURE — 2580000003 HC RX 258: Performed by: STUDENT IN AN ORGANIZED HEALTH CARE EDUCATION/TRAINING PROGRAM

## 2025-07-16 PROCEDURE — APPNB30 APP NON BILLABLE TIME 0-30 MINS

## 2025-07-16 PROCEDURE — 74176 CT ABD & PELVIS W/O CONTRAST: CPT

## 2025-07-16 PROCEDURE — 97535 SELF CARE MNGMENT TRAINING: CPT

## 2025-07-16 RX ADMIN — PIPERACILLIN AND TAZOBACTAM 3375 MG: 3; .375 INJECTION, POWDER, FOR SOLUTION INTRAVENOUS at 15:35

## 2025-07-16 RX ADMIN — POLYETHYLENE GLYCOL 3350 17 G: 17 POWDER, FOR SOLUTION ORAL at 08:42

## 2025-07-16 RX ADMIN — VENLAFAXINE HYDROCHLORIDE 75 MG: 75 CAPSULE, EXTENDED RELEASE ORAL at 08:42

## 2025-07-16 RX ADMIN — QUETIAPINE FUMARATE 50 MG: 100 TABLET ORAL at 18:10

## 2025-07-16 RX ADMIN — ACETAMINOPHEN 650 MG: 325 TABLET ORAL at 21:06

## 2025-07-16 RX ADMIN — SODIUM CHLORIDE, PRESERVATIVE FREE 10 ML: 5 INJECTION INTRAVENOUS at 21:04

## 2025-07-16 RX ADMIN — DEXTROSE 125 ML: 10 SOLUTION INTRAVENOUS at 17:10

## 2025-07-16 RX ADMIN — VENLAFAXINE HYDROCHLORIDE 150 MG: 150 CAPSULE, EXTENDED RELEASE ORAL at 08:42

## 2025-07-16 RX ADMIN — BUSPIRONE HYDROCHLORIDE 10 MG: 10 TABLET ORAL at 21:04

## 2025-07-16 RX ADMIN — ACETAMINOPHEN 650 MG: 325 TABLET ORAL at 08:41

## 2025-07-16 RX ADMIN — LOSARTAN POTASSIUM 50 MG: 25 TABLET, FILM COATED ORAL at 08:42

## 2025-07-16 RX ADMIN — ENOXAPARIN SODIUM 40 MG: 100 INJECTION SUBCUTANEOUS at 08:41

## 2025-07-16 RX ADMIN — LAMOTRIGINE 100 MG: 100 TABLET ORAL at 08:42

## 2025-07-16 RX ADMIN — POLYETHYLENE GLYCOL 3350 17 G: 17 POWDER, FOR SOLUTION ORAL at 21:07

## 2025-07-16 RX ADMIN — LOSARTAN POTASSIUM 25 MG: 25 TABLET, FILM COATED ORAL at 18:10

## 2025-07-16 RX ADMIN — QUETIAPINE FUMARATE 25 MG: 25 TABLET ORAL at 21:04

## 2025-07-16 RX ADMIN — SODIUM CHLORIDE, PRESERVATIVE FREE 10 ML: 5 INJECTION INTRAVENOUS at 08:42

## 2025-07-16 RX ADMIN — LAMOTRIGINE 200 MG: 100 TABLET ORAL at 21:04

## 2025-07-16 RX ADMIN — PIPERACILLIN AND TAZOBACTAM 3375 MG: 3; .375 INJECTION, POWDER, FOR SOLUTION INTRAVENOUS at 08:50

## 2025-07-16 RX ADMIN — PIPERACILLIN AND TAZOBACTAM 3375 MG: 3; .375 INJECTION, POWDER, FOR SOLUTION INTRAVENOUS at 23:58

## 2025-07-16 ASSESSMENT — PAIN DESCRIPTION - ORIENTATION
ORIENTATION: LEFT
ORIENTATION: LOWER

## 2025-07-16 ASSESSMENT — PAIN - FUNCTIONAL ASSESSMENT: PAIN_FUNCTIONAL_ASSESSMENT: ACTIVITIES ARE NOT PREVENTED

## 2025-07-16 ASSESSMENT — PAIN DESCRIPTION - DESCRIPTORS
DESCRIPTORS: ACHING
DESCRIPTORS: ACHING;DISCOMFORT

## 2025-07-16 ASSESSMENT — PAIN DESCRIPTION - LOCATION
LOCATION: BACK
LOCATION: ABDOMEN

## 2025-07-16 ASSESSMENT — PAIN SCALES - GENERAL
PAINLEVEL_OUTOF10: 2
PAINLEVEL_OUTOF10: 7

## 2025-07-16 NOTE — PROGRESS NOTES
[facility]  [facility] RENAL TELEMETRY 6K  Occupational Therapy  Daily Note    Discharge Recommendations: Patient would benefit from continued OT at discharge, SNF vs. AL with OT   Equipment Recommendations: No        Time In: 1359  Time Out: 1425  Timed Code Treatment Minutes: 26 Minutes  Minutes: 26          Date: 2025  Patient Name: Adry Moura,   Gender: female      Room: [address] [place] [address]  MRN: [id number]  : 1950  (74 y.o.)  Referring Practitioner: Uriel Madera, APRN - CNP  Diagnosis: Intra-abdominal abscess (HCC)  Additional Pertinent Hx: per chart review; \"Adry Moura is a 74 y.o. female with PMHx of Bariatric Surgery s/p total gastrectomy, subtotal esophagectomy with colo-jejunostomy, Adenocarcinoma of colon s/p low anterior resection, Bipolar disorder, HTN and NIDDm  who presents to [facility] from SNF/ [place] [facility] with Generalized abdominal pain.    Patient states that she has been having abdominal pain associated with nausea, decreased appetite and  weakness since her discharge to SNF after surgery.  Patient states that the abdominal pain is generalized, sharp, exacerbated by PT/OT/exercise.  Per reports, patient was found to be hypoxic at nursing home but was saturating on room air in the ED.  \"    Restrictions/Precautions:  Restrictions/Precautions: Fall Risk, General Precautions  Position Activity Restriction  Other Position/Activity Restrictions: L drain     Social/Functional History:  Lives With: Significant other  Type of Home: Assisted living  Home Layout: One level  Home Access: Level entry  Home Equipment: Rollator   Bathroom Shower/Tub: Walk-in shower  Bathroom Toilet: Handicap height  Bathroom Equipment: Grab bars in shower, Shower chair, Grab bars around toilet  Bathroom Accessibility: Walker accessible    Receives Help From: Other (Comment) (facility staff)  Prior Level of Assist for ADLs: Independent  Prior Level of Assist for Homemaking:

## 2025-07-16 NOTE — PLAN OF CARE
Problem: Chronic Conditions and Co-morbidities  Goal: Patient's chronic conditions and co-morbidity symptoms are monitored and maintained or improved  Outcome: Progressing  Flowsheets (Taken 7/16/2025 1721)  Care Plan - Patient's Chronic Conditions and Co-Morbidity Symptoms are Monitored and Maintained or Improved:   Monitor and assess patient's chronic conditions and comorbid symptoms for stability, deterioration, or improvement   Collaborate with multidisciplinary team to address chronic and comorbid conditions and prevent exacerbation or deterioration  Note: Patient's chronic conditions at baseline. VSS.      Problem: Safety - Adult  Goal: Free from fall injury  Outcome: Progressing  Flowsheets (Taken 7/16/2025 1721)  Free From Fall Injury: Instruct family/caregiver on patient safety  Note: No falls noted this shift. Continue falling star program. Bed alarm on, bed in low position. Call light and personal belongings in reach.  Patient uses call light appropriately.      Problem: Skin/Tissue Integrity - Adult  Goal: Skin integrity remains intact  Description: 1.  Monitor for areas of redness and/or skin breakdown  2.  Assess vascular access sites hourly  3.  Every 4-6 hours minimum:  Change oxygen saturation probe site  4.  Every 4-6 hours:  If on nasal continuous positive airway pressure, respiratory therapy assess nares and determine need for appliance change or resting period  Outcome: Progressing  Flowsheets (Taken 7/16/2025 1721)  Skin Integrity Remains Intact:   Monitor for areas of redness and/or skin breakdown   Turn and reposition as indicated  Note: No new skin break down noted at this time. Encouraged patient to reposition self in bed.      Problem: Skin/Tissue Integrity  Goal: Skin integrity remains intact  Description: 1.  Monitor for areas of redness and/or skin breakdown  2.  Assess vascular access sites hourly  3.  Every 4-6 hours minimum:  Change oxygen saturation probe site  4.  Every 4-6

## 2025-07-16 NOTE — PROGRESS NOTES
Hospitalist Progress Note      Patient:  Adry Moura 74 y.o. female       : 1950  Unit/Bed:6K-17/017-A    Date of Admission: 2025      ASSESSMENT AND PLAN    Active Problems  Intra-abdominal abscess  Adenocarcinoma of colon s/p low anterior resection   Underwent colonoscopy in May 2025 and then later Radha 3, 2025 and found to have 3 cm mass which was found to have moderately differentiated adenocarcinoma of Colon resulting in low anterior resection on 25 by Dr. Hale   CT A/P in the ED with large intra-abdominal abscess within anterior aspect of abdomen and pelvis measuring 16.0 x 5.5 x 18.8 cm   Repeat CT  abscess measures 10.2 x 3.1 x 15.6 cm.   General Surgery and ID  following  S/p CT-guided drain placement .  Culture growing E coli  Blood cultures NGTD  ID following, cont Zosyn (start ). Plan PO at dc.   Cont to monitor output   Repeat CT A/P today demonstrates the residual moderate to large intra-abdominal abscess within the anterior aspect of the lower abdomen and pelvis is not significantly changed in size measuring up to 5.5 x 12.5 x cm in short axis.  Likely drain not functioning well due to lack of flushing since insertion.  ID plans to change drain to ROSSANA bulb and change antibiotics to Augmenting for 2 weeks.   Acute metabolic encephalopathy, resolving.   Secondary to infection.  Delirium precautions; frequent re-orientation.  Encourage good sleep/wake cycle.  Avoid benzo, narcotics, Ach, antiemetics, antihistamines, antipsychotics as much as possible.  Hypokalemia - resolved  Replace per protocol  Constipation-resolved  Cont Glycolax, increase to BID. Add Senna-S.     Resolved Problems  Hypokalemia  Constipation     Chronic Conditions (reviewed, stable, and home medications resumed, unless otherwise stated)  History of gastric bypass/bariatric surgery: Noted in , complicated by mass in upper esophagus resulting in near-total esophagectomy then total  °C) (Oral)   Resp 18   Ht 1.676 m (5' 6\")   Wt 54 kg (119 lb 0.8 oz)   LMP 03/20/1985 Comment: hysterectomy in her 30s  SpO2 95%   BMI 19.21 kg/m²   General: No distress, appears stated age.   Eyes:  PERRL. Conjunctivae/corneas clear.  HENT: Head normal appearing. Nares normal. Oral mucosa moist.  Hearing intact.   Neck: Supple, with full range of motion. Trachea midline.  No gross JVD appreciated.  Respiratory:  Normal effort. Clear to auscultation, without rales or wheezes or rhonchi.  Cardiovascular: Normal rate, regular rhythm with normal S1/S2 without murmurs.    No lower extremity edema.   Abdomen: Soft, non-tender, non-distended with normal bowel sounds. Drain in place left side.   Musculoskeletal: No joint swelling or tenderness. Normal tone. No abnormal movements.   Skin: Warm and dry. No rashes or lesions.  Neurologic:  No focal sensory/motor deficits in the upper or lower extremities. Cranial nerves:  grossly non-focal 2-12.     Psychiatric: Alert and oriented x3, abnormal thought content.   Capillary Refill: Brisk,< 3 seconds.      Labs/Radiology: See chart or assessment above.     Electronically signed by EVA Cabezas CNP on 7/16/2025 at 8:38 AM

## 2025-07-16 NOTE — PROGRESS NOTES
[facility]  INPATIENT PHYSICAL THERAPY  DAILY NOTE  [facility] RENAL TELEMETRY 6K - 6K-17/017-A      Discharge Recommendations: Patient would benefit from continued PT at discharge home with  services  Equipment Recommendations: No             Time In: 1005  Time Out: 1028  Timed Code Treatment Minutes: 23 Minutes  Minutes: 23          Date: 2025  Patient Name: Adry Moura,  Gender:  female        MRN: [id number]  : 1950  (74 y.o.)     Referring Practitioner: Uriel Madera, APRN - CNP  Diagnosis: Intra-abdominal abscess (HCC)  Additional Pertinent Hx: Per EMR \"Adry Moura is a 74 y.o. female with PMHx of Bariatric Surgery s/p total gastrectomy, subtotal esophagectomy with colo-jejunostomy, Adenocarcinoma of colon s/p low anterior resection, Bipolar disorder, HTN and NIDDm  who presents to [facility] from SNF/ [facility] with Generalized abdominal pain.    Patient states that she has been having abdominal pain associated with nausea, decreased appetite and  weakness since her discharge to SNF after surgery.  Patient states that the abdominal pain is generalized, sharp, exacerbated by PT/OT/exercise.  Per reports, patient was found to be hypoxic at nursing home but was saturating on room air in the ED.  \" Status post IR drain placement     Prior Level of Function:  Lives With: Significant other  Type of Home: Assisted living  Home Layout: One level  Home Access: Level entry  Home Equipment: Rollator   Bathroom Shower/Tub: Walk-in shower  Bathroom Toilet: Handicap height  Bathroom Equipment: Grab bars in shower, Shower chair, Grab bars around toilet  Bathroom Accessibility: Walker accessible    Receives Help From: Other (Comment) (facility staff)  Prior Level of Assist for ADLs: Independent  Prior Level of Assist for Homemaking: Independent  Homemaking Responsibilities: No  Ambulation Assistance: Needs assistance  Prior Level of Assist for Transfers:  Independent  Active : No  Additional Comments: IND with use of 4ww for support, able to amb to and from dining room  Prior Level of Assist for Ambulation: Independent household ambulator, with or without device  Has the patient had two or more falls in the past year or any fall with injury in the past year?: No    Restrictions/Precautions:  Restrictions/Precautions: Fall Risk, General Precautions  Position Activity Restriction  Other Position/Activity Restrictions: L drain     SUBJECTIVE: ok to see pt per nursing. Pt sitting in bedside chair when PT arrived, pleasant and agreeable to PT session.  Pt requesting to use restroom and then amb in hallway.     PAIN: denies during session    Vitals: Vitals not assessed per clinical judgement, see nursing flowsheet    OBJECTIVE:  Bed Mobility:  Not Tested    Transfers:  Sit to Stand: Stand By Assistance, X 1  Stand to Sit:Stand By Assistance, X 1, cues for alignment to surface and for safety with assistive device  RW for support, completed transfers from various surfaces and heights during session  Ambulation:  Stand By Assistance, X 1, with increased time for completion  Distance: 13 feet, 220 feet  Surface: Level Tile  Device: Rolling Walker  Gait Deviations: Forward Flexed Posture, Slow Dayana, Decreased Step Length Bilaterally, Decreased Gait Speed, Mild Path Deviations at times, and Increased reliance on assistive device   No LOB, cues for safety with walker management    Stairs:  Not Tested    Balance:  Static Sitting Balance:  Supervision  Dynamic Sitting Balance: Supervision  Static Standing Balance: Stand By Assistance  Dynamic Standing Balance: Stand By Assistance  Pt completed toileting tasks, able to complete self harlan care and flor/doff pants and undergarments with no PT assist. RW for support in standing, completed hand hygiene.   Exercise:  Patient was guided in 1 set(s) 12 reps of exercises to both lower extremities: Ankle pumps, Glut sets, Quad sets,

## 2025-07-16 NOTE — PROGRESS NOTES
Progress note: Infectious diseases    Patient - Adry Moura,  Age - 74 y.o.    - 1950      Room Number - 6K-17/017-A   MRN -  [id number]   [facility] # - [id number]  Date of Admission -  2025 11:22 AM    SUBJECTIVE:   No new issues.   The follow up CT was noted.still has residual fluid  OBJECTIVE   VITALS    height is 1.676 m (5' 6\") and weight is 54 kg (119 lb 0.8 oz). Her oral temperature is 98 °F (36.7 °C). Her blood pressure is 117/65 and her pulse is 83. Her respiration is 19 and oxygen saturation is 100%.       Wt Readings from Last 3 Encounters:   25 54 kg (119 lb 0.8 oz)   25 54.4 kg (120 lb)   25 59.5 kg (131 lb 2 oz)       I/O (24 Hours)    Intake/Output Summary (Last 24 hours) at 2025 1236  Last data filed at 2025 1200  Gross per 24 hour   Intake 250 ml   Output 0 ml   Net 250 ml       General Appearance  Awake, alert, oriented,  not  In acute distress  HEENT - normocephalic, atraumatic, pale conjunctiva  Neck - Supple, has visible swelling on the anterior neck(pulled bowel from previous surgery)  Lungs -  Bilateral   air entry, no rhonchi, no wheeze  Cardiovascular - Heart sounds are normal.    Abdomen - soft, not distended, drain in place.      Neurologic -awake and oriented.  Skin - No bruising or bleeding  Extremities - No edema, no cyanosis, clubbing     MEDICATIONS:      acetaminophen  650 mg Oral Q12H    polyethylene glycol  8.5 g Oral Once    polyethylene glycol  17 g Oral BID    losartan  25 mg Oral QPM    losartan  50 mg Oral Daily    sodium chloride flush  5-40 mL IntraVENous 2 times per day    enoxaparin  40 mg SubCUTAneous Daily    [Held by provider] acarbose  50 mg Oral TID WC    busPIRone  10 mg Oral Nightly    lamoTRIgine  100 mg Oral QAM    lamoTRIgine  200 mg Oral Nightly    [Held by provider] cetirizine  10 mg Oral Daily    QUEtiapine  25 mg Oral Nightly

## 2025-07-16 NOTE — PROGRESS NOTES
[facility]  Lisset Johnson PA-C for Dr. Leoncio Hale  General Surgery Daily Progress Note    Pt Name: Adry Moura  Medical Record Number: [id number]  Date of Birth 1950   Today's Date: 7/16/2025    Hospital day # 8     ASSESSMENT   Large intra-abdominal fluid collection/possible abscess - Status post IR drain placement 7/8/25  Altered mental status - improved  Status post low anterior resection 6/19/25 secondary to adenocarcinoma  Abdominal pain  Diabetes Mellitus  Bipolar disorder  History of DVT  History of gastric bypass then later total gastrectomy   History of subtotal esophagectomy with colonic interposition   has a past medical history of Arthritis, Bowel obstruction (HCC), Bronchitis, Diabetes mellitus (HCC), Difficult intubation, DVT, lower extremity (HCC), GERD (gastroesophageal reflux disease), History of blood transfusion, Hx of blood clots, Hypertension, Hypoglycemia, Kidney stone, Macular degeneration, Movement disorder, Multinodular goiter, Muscle strain of left lower extremity, Pinched nerve, Psychiatric problem, Urinary incontinence, and Vitamin D deficiency.  PLAN   Easy to chew soft diet. Having some bowel function.  CT guided drain placement completed. Drainage serosanguineous. Cultures with E. coli.   Repeat CT abdomen/pelvis 7/12/25.  Improved fluid collection but not resolved.    Repeat CT scan today with some residual fluid still. May change drain bag to bulb for ease of use if needed.  Antibiotics - infectious disease following. Will transition to oral at discharge.   Pain & nausea control  DVT prophylaxis   PT/OT   Labs reviewed. WBC slightly bumped to 15.2. Hem stable. Platelets 415  Will need oncology appointment outpatient rescheduled  Case management for assistance with discharge planning, from Tam [facility], social work consult  Patient seems to be doing well. Repeat CT scan today reviewed. Okay to leave drain in place for now.   SUBJECTIVE   Chief  \"TROPONINT\" in the last 72 hours.  CBC:   Lab Results   Component Value Date/Time    WBC 15.2 07/16/2025 06:52 AM    RBC 3.25 07/16/2025 06:52 AM    RBC 4.21 05/29/2012 04:31 AM    HGB 10.0 07/16/2025 06:52 AM    HGB 12.3 05/29/2012 04:31 AM    HCT 31.3 07/16/2025 06:52 AM    MCV 96.3 07/16/2025 06:52 AM    MCH 30.8 07/16/2025 06:52 AM    MCHC 31.9 07/16/2025 06:52 AM    RDW 15.3 06/10/2022 12:20 PM     07/16/2025 06:52 AM    MPV 8.8 07/16/2025 06:52 AM     BMP:    Lab Results   Component Value Date/Time     07/16/2025 06:52 AM    K 4.2 07/16/2025 06:52 AM    K 4.3 07/15/2025 06:16 AM     07/16/2025 06:52 AM    CO2 26 07/16/2025 06:52 AM    BUN 9 07/16/2025 06:52 AM    CREATININE 0.7 07/16/2025 06:52 AM    CALCIUM 8.9 07/16/2025 06:52 AM    LABGLOM 90 07/16/2025 06:52 AM    LABGLOM >60 10/18/2023 09:32 AM    LABGLOM > 60 05/05/2023 12:07 PM    GLUCOSE 66 07/16/2025 06:52 AM    GLUCOSE 117 05/29/2012 04:31 AM        Culture, Anaerobic and Aerobic  Order: [id number]   Status: Final result       Next appt: 07/28/2025 at 03:00 PM in General Surgery (BRANT KO, APRN - CNP)    Test Result Released: Yes (not seen)    Specimen Information: Drain   0 Result Notes      Component  Ref Range & Units (hover)    Aerobic Culture Current antibiotic therapy ineffective in vitro for isolate. Abnormal    Anaerobic Culture No anaerobes isolated- preliminary No anaerobes isolated   Gram Stain Result Few segmented neutrophils observed. No epithelial cells observed. Rare gram negative bacilli.   Organism Escherichia coli Abnormal    Aerobic Culture heavy growth   Resulting Agency [facility]        Susceptibility    Escherichia coli (1)    Antibiotic Interpretation BEN  Method Status    amoxicillin-clavulanate Sensitive <=2 mcg/mL BACTERIAL SUSCEPTIBILITY PANEL BY BEN Final    cefOXitin Sensitive <=4 mcg/mL BACTERIAL SUSCEPTIBILITY PANEL BY BEN Final    cefpodoxime proxetil Sensitive <=0.25

## 2025-07-17 ENCOUNTER — APPOINTMENT (OUTPATIENT)
Dept: CT IMAGING | Age: 75
End: 2025-07-17
Payer: MEDICARE

## 2025-07-17 PROBLEM — D72.829 LEUKOCYTOSIS: Status: ACTIVE | Noted: 2025-07-17

## 2025-07-17 PROBLEM — E43 SEVERE MALNUTRITION: Status: ACTIVE | Noted: 2025-07-17

## 2025-07-17 LAB
ANION GAP SERPL CALC-SCNC: 11 MEQ/L (ref 8–16)
BUN SERPL-MCNC: 8 MG/DL (ref 8–23)
CALCIUM SERPL-MCNC: 8.7 MG/DL (ref 8.8–10.2)
CHLORIDE SERPL-SCNC: 102 MEQ/L (ref 98–111)
CO2 SERPL-SCNC: 26 MEQ/L (ref 22–29)
CREAT SERPL-MCNC: 0.6 MG/DL (ref 0.5–0.9)
DEPRECATED RDW RBC AUTO: 51.3 FL (ref 35–45)
ERYTHROCYTE [DISTWIDTH] IN BLOOD BY AUTOMATED COUNT: 14.2 % (ref 11.5–14.5)
GFR SERPL CREATININE-BSD FRML MDRD: > 90 ML/MIN/1.73M2
GLUCOSE BLD STRIP.AUTO-MCNC: 66 MG/DL (ref 70–108)
GLUCOSE BLD STRIP.AUTO-MCNC: 74 MG/DL (ref 70–108)
GLUCOSE BLD STRIP.AUTO-MCNC: 74 MG/DL (ref 70–108)
GLUCOSE BLD STRIP.AUTO-MCNC: 78 MG/DL (ref 70–108)
GLUCOSE BLD STRIP.AUTO-MCNC: 81 MG/DL (ref 70–108)
GLUCOSE SERPL-MCNC: 91 MG/DL (ref 74–109)
HCT VFR BLD AUTO: 32.4 % (ref 37–47)
HGB BLD-MCNC: 10 GM/DL (ref 12–16)
MCH RBC QN AUTO: 30.5 PG (ref 26–33)
MCHC RBC AUTO-ENTMCNC: 30.9 GM/DL (ref 32.2–35.5)
MCV RBC AUTO: 98.8 FL (ref 81–99)
PLATELET # BLD AUTO: 419 THOU/MM3 (ref 130–400)
PMV BLD AUTO: 9 FL (ref 9.4–12.4)
POTASSIUM SERPL-SCNC: 4 MEQ/L (ref 3.5–5.2)
RBC # BLD AUTO: 3.28 MILL/MM3 (ref 4.2–5.4)
SODIUM SERPL-SCNC: 139 MEQ/L (ref 135–145)
WBC # BLD AUTO: 14.3 THOU/MM3 (ref 4.8–10.8)

## 2025-07-17 PROCEDURE — 85027 COMPLETE CBC AUTOMATED: CPT

## 2025-07-17 PROCEDURE — 99232 SBSQ HOSP IP/OBS MODERATE 35: CPT | Performed by: NURSE PRACTITIONER

## 2025-07-17 PROCEDURE — 36415 COLL VENOUS BLD VENIPUNCTURE: CPT

## 2025-07-17 PROCEDURE — APPSS45 APP SPLIT SHARED TIME 31-45 MINUTES: Performed by: NURSE PRACTITIONER

## 2025-07-17 PROCEDURE — 6360000002 HC RX W HCPCS: Performed by: STUDENT IN AN ORGANIZED HEALTH CARE EDUCATION/TRAINING PROGRAM

## 2025-07-17 PROCEDURE — 6360000002 HC RX W HCPCS: Performed by: RADIOLOGY

## 2025-07-17 PROCEDURE — 2709999900 CT ABSCESS DRAINAGE W CATH PLACEMENT S&I

## 2025-07-17 PROCEDURE — 0W9G30Z DRAINAGE OF PERITONEAL CAVITY WITH DRAINAGE DEVICE, PERCUTANEOUS APPROACH: ICD-10-PCS | Performed by: THORACIC SURGERY (CARDIOTHORACIC VASCULAR SURGERY)

## 2025-07-17 PROCEDURE — 80048 BASIC METABOLIC PNL TOTAL CA: CPT

## 2025-07-17 PROCEDURE — 6360000004 HC RX CONTRAST MEDICATION: Performed by: SURGERY

## 2025-07-17 PROCEDURE — 99024 POSTOP FOLLOW-UP VISIT: CPT | Performed by: NURSE PRACTITIONER

## 2025-07-17 PROCEDURE — 2580000003 HC RX 258: Performed by: STUDENT IN AN ORGANIZED HEALTH CARE EDUCATION/TRAINING PROGRAM

## 2025-07-17 PROCEDURE — 2500000003 HC RX 250 WO HCPCS: Performed by: STUDENT IN AN ORGANIZED HEALTH CARE EDUCATION/TRAINING PROGRAM

## 2025-07-17 PROCEDURE — 82948 REAGENT STRIP/BLOOD GLUCOSE: CPT

## 2025-07-17 PROCEDURE — 1200000000 HC SEMI PRIVATE

## 2025-07-17 PROCEDURE — 6370000000 HC RX 637 (ALT 250 FOR IP): Performed by: NURSE PRACTITIONER

## 2025-07-17 PROCEDURE — 6370000000 HC RX 637 (ALT 250 FOR IP): Performed by: PHYSICIAN ASSISTANT

## 2025-07-17 PROCEDURE — 74177 CT ABD & PELVIS W/CONTRAST: CPT

## 2025-07-17 PROCEDURE — 6370000000 HC RX 637 (ALT 250 FOR IP): Performed by: STUDENT IN AN ORGANIZED HEALTH CARE EDUCATION/TRAINING PROGRAM

## 2025-07-17 RX ORDER — IOPAMIDOL 755 MG/ML
80 INJECTION, SOLUTION INTRAVASCULAR
Status: COMPLETED | OUTPATIENT
Start: 2025-07-17 | End: 2025-07-17

## 2025-07-17 RX ORDER — MIDAZOLAM HYDROCHLORIDE 1 MG/ML
INJECTION, SOLUTION INTRAMUSCULAR; INTRAVENOUS PRN
Status: COMPLETED | OUTPATIENT
Start: 2025-07-17 | End: 2025-07-17

## 2025-07-17 RX ORDER — FENTANYL CITRATE 50 UG/ML
INJECTION, SOLUTION INTRAMUSCULAR; INTRAVENOUS PRN
Status: COMPLETED | OUTPATIENT
Start: 2025-07-17 | End: 2025-07-17

## 2025-07-17 RX ADMIN — SODIUM CHLORIDE, PRESERVATIVE FREE 10 ML: 5 INJECTION INTRAVENOUS at 08:15

## 2025-07-17 RX ADMIN — ACETAMINOPHEN 650 MG: 325 TABLET ORAL at 08:14

## 2025-07-17 RX ADMIN — PIPERACILLIN AND TAZOBACTAM 3375 MG: 3; .375 INJECTION, POWDER, FOR SOLUTION INTRAVENOUS at 15:09

## 2025-07-17 RX ADMIN — POLYETHYLENE GLYCOL 3350 17 G: 17 POWDER, FOR SOLUTION ORAL at 08:15

## 2025-07-17 RX ADMIN — VENLAFAXINE HYDROCHLORIDE 150 MG: 150 CAPSULE, EXTENDED RELEASE ORAL at 08:16

## 2025-07-17 RX ADMIN — FENTANYL CITRATE 25 MCG: 50 INJECTION, SOLUTION INTRAMUSCULAR; INTRAVENOUS at 12:40

## 2025-07-17 RX ADMIN — VENLAFAXINE HYDROCHLORIDE 75 MG: 75 CAPSULE, EXTENDED RELEASE ORAL at 08:16

## 2025-07-17 RX ADMIN — PIPERACILLIN AND TAZOBACTAM 3375 MG: 3; .375 INJECTION, POWDER, FOR SOLUTION INTRAVENOUS at 08:20

## 2025-07-17 RX ADMIN — FENTANYL CITRATE 50 MCG: 50 INJECTION, SOLUTION INTRAMUSCULAR; INTRAVENOUS at 12:29

## 2025-07-17 RX ADMIN — LOSARTAN POTASSIUM 25 MG: 25 TABLET, FILM COATED ORAL at 16:32

## 2025-07-17 RX ADMIN — MIDAZOLAM 0.5 MG: 1 INJECTION INTRAMUSCULAR; INTRAVENOUS at 12:41

## 2025-07-17 RX ADMIN — IOHEXOL 100 ML: 240 INJECTION, SOLUTION INTRATHECAL; INTRAVASCULAR; INTRAVENOUS; ORAL at 14:35

## 2025-07-17 RX ADMIN — MIDAZOLAM 1 MG: 1 INJECTION INTRAMUSCULAR; INTRAVENOUS at 12:30

## 2025-07-17 RX ADMIN — IOPAMIDOL 80 ML: 755 INJECTION, SOLUTION INTRAVENOUS at 14:36

## 2025-07-17 RX ADMIN — LOSARTAN POTASSIUM 50 MG: 25 TABLET, FILM COATED ORAL at 08:15

## 2025-07-17 RX ADMIN — LAMOTRIGINE 100 MG: 100 TABLET ORAL at 08:15

## 2025-07-17 RX ADMIN — ENOXAPARIN SODIUM 40 MG: 100 INJECTION SUBCUTANEOUS at 08:14

## 2025-07-17 RX ADMIN — PANTOPRAZOLE SODIUM 40 MG: 40 TABLET, DELAYED RELEASE ORAL at 08:15

## 2025-07-17 NOTE — PROGRESS NOTES
Progress note: Infectious diseases    Patient - Adry Moura,  Age - 74 y.o.    - 1950      Room Number - 6K-17/017-A   MRN -  [id number]   [facility] # - [id number]  Date of Admission -  2025 11:22 AM    SUBJECTIVE:   She has no new complaints  The drain is not working,   OBJECTIVE   VITALS    height is 1.676 m (5' 6\") and weight is 55.2 kg (121 lb 11.1 oz). Her oral temperature is 98.5 °F (36.9 °C). Her blood pressure is 118/58 (abnormal) and her pulse is 82. Her respiration is 17 and oxygen saturation is 96%.       Wt Readings from Last 3 Encounters:   25 55.2 kg (121 lb 11.1 oz)   25 54.4 kg (120 lb)   25 59.5 kg (131 lb 2 oz)       I/O (24 Hours)    Intake/Output Summary (Last 24 hours) at 2025 0851  Last data filed at 2025 0842  Gross per 24 hour   Intake 625 ml   Output --   Net 625 ml       General Appearance  Awake, alert, oriented,  not  In acute distress  HEENT - normocephalic, atraumatic, pale conjunctiva  Neck - Supple, has visible swelling on the anterior neck(pulled bowel from previous surgery)  Lungs -  Bilateral   air entry, no rhonchi, no wheeze  Cardiovascular - Heart sounds are normal.    Abdomen - soft, not distended, drain in place. Not functioning  Neurologic -awake and oriented.  Skin - No bruising or bleeding  Extremities - No edema, no cyanosis, clubbing     MEDICATIONS:      acetaminophen  650 mg Oral Q12H    polyethylene glycol  8.5 g Oral Once    polyethylene glycol  17 g Oral BID    losartan  25 mg Oral QPM    losartan  50 mg Oral Daily    sodium chloride flush  5-40 mL IntraVENous 2 times per day    enoxaparin  40 mg SubCUTAneous Daily    [Held by provider] acarbose  50 mg Oral TID WC    busPIRone  10 mg Oral Nightly    lamoTRIgine  100 mg Oral QAM    lamoTRIgine  200 mg Oral Nightly    [Held by provider] cetirizine  10 mg Oral Daily    QUEtiapine  25 mg Oral

## 2025-07-17 NOTE — PROGRESS NOTES
[facility]  BRANT KO, EVA - CNP for Dr. Leoncio Hale  General Surgery Daily Progress Note    Pt Name: Adry Moura  Medical Record Number: [id number]  Date of Birth 1950   Today's Date: 7/17/2025    Hospital day # 9     ASSESSMENT   Large intra-abdominal fluid collection/possible abscess - Status post IR drain placement 7/8/25  Altered mental status - improved  Status post low anterior resection 6/19/25 secondary to adenocarcinoma  Abdominal pain  Diabetes Mellitus  Bipolar disorder  History of DVT  History of gastric bypass then later total gastrectomy   History of subtotal esophagectomy with colonic interposition   has a past medical history of Arthritis, Bowel obstruction (HCC), Bronchitis, Diabetes mellitus (HCC), Difficult intubation, DVT, lower extremity (HCC), GERD (gastroesophageal reflux disease), History of blood transfusion, Hx of blood clots, Hypertension, Hypoglycemia, Kidney stone, Macular degeneration, Movement disorder, Multinodular goiter, Muscle strain of left lower extremity, Pinched nerve, Psychiatric problem, Urinary incontinence, and Vitamin D deficiency.  PLAN   Diet as tolerated. Having bowel function.   CT perc drain not draining. Will flush but unable to pull back. Will ask IR to evaluate and possible exchange for larger tube. Discussed this with patient and she is willing.   Antibiotics - infectious disease following. Will transition to oral at discharge.   Pain & nausea control  DVT prophylaxis   PT/OT   Labs reviewed. WBC continues to steadily rise. Unsure what to make of that.   Will need oncology appointment outpatient rescheduled  Case management for assistance with discharge planning, from Tam [facility], social work consult  Patient looks pretty good. Still residual fluid collection noted on yesterday's image. Will ask IR for further evaluation of replacement of drain or possible move drain. Patient does not want to go back to surgery for any type

## 2025-07-17 NOTE — PLAN OF CARE
Problem: Chronic Conditions and Co-morbidities  Goal: Patient's chronic conditions and co-morbidity symptoms are monitored and maintained or improved  7/16/2025 2151 by Beverley Wu RN  Outcome: Progressing  Flowsheets (Taken 7/16/2025 2151)  Care Plan - Patient's Chronic Conditions and Co-Morbidity Symptoms are Monitored and Maintained or Improved:   Monitor and assess patient's chronic conditions and comorbid symptoms for stability, deterioration, or improvement   Collaborate with multidisciplinary team to address chronic and comorbid conditions and prevent exacerbation or deterioration     Problem: Discharge Planning  Goal: Discharge to home or other facility with appropriate resources  7/16/2025 2151 by Beverley Wu RN  Outcome: Progressing  Flowsheets (Taken 7/16/2025 2151)  Discharge to home or other facility with appropriate resources:   Identify barriers to discharge with patient and caregiver   Arrange for needed discharge resources and transportation as appropriate     Problem: Safety - Adult  Goal: Free from fall injury  7/16/2025 2151 by Beverley Wu RN  Outcome: Progressing  Flowsheets (Taken 7/16/2025 2151)  Free From Fall Injury:   Instruct family/caregiver on patient safety   Based on caregiver fall risk screen, instruct family/caregiver to ask for assistance with transferring infant if caregiver noted to have fall risk factors     Problem: Neurosensory - Adult  Goal: Achieves stable or improved neurological status  Outcome: Progressing  Flowsheets (Taken 7/16/2025 2151)  Achieves stable or improved neurological status:   Assess for and report changes in neurological status   Initiate measures to prevent increased intracranial pressure   Maintain blood pressure and fluid volume within ordered parameters to optimize cerebral perfusion and minimize risk of hemorrhage     Problem: Respiratory - Adult  Goal: Achieves optimal ventilation and oxygenation  Outcome:

## 2025-07-17 NOTE — PROGRESS NOTES
[facility]  OCCUPATIONAL THERAPY MISSED TREATMENT NOTE  [facility] RENAL TELEMETRY 6K  6K-17/017-A      Date: 2025  Patient Name: Adry Moura        CSN: [id number]   : 1950  (74 y.o.)  Gender: female   Referring Practitioner: Uriel Madera APRN - CNP            REASON FOR MISSED TREATMENT: Patient Off Floor for Testing. Per RN Xiang pt off floor for a scan. Will try back as time allows. Attempted again at 1420 and pt with ancillary department. Will try back tomorrow as able.

## 2025-07-17 NOTE — POST SEDATION
[facility] [place] [facility]  Sedation/Analgesia Post Sedation Record    Pt Name: Adry Moura  MRN: [id number]  Date of Birth: @birthdate@  Procedure Performed By: Katie Devlin MD, MD  Primary Care Physician: María Neil APRN - CNP    POST-PROCEDURE    Physicians/Assistants: Katie Devlin MD, MD  Procedure Performed: CT guided LLQ 8 Fr tube placement in collection    Sedation/Anesthesia: Conscious Sedation with Fentanyl & Versed   Estimated Blood Loss:          None  Specimens Removed:  []None [x]Other: 2 ml bloody fluid     Disposition of Specimen:  [x]Pathology []Other      Complications:   [x]None Immediate []Other:       Post-procedure Diagnosis/Findings:      The tube is well postitioned in the LLQ.  The tube flushes with ease with relatively little return.  The saline returns with only a scant amount of bloody fluid returning.         Recommendations:        Follow post-procedure orders.       Electronically signed by Katie Devlin MD on 7/17/2025 at 1:10 PM

## 2025-07-17 NOTE — PROGRESS NOTES
Comprehensive Nutrition Assessment    Type and Reason for Visit:  Initial, LOS    Nutrition Recommendations/Plan:   Recommend MVI.  ONS initiated: Glucerna TID (strawberry or chocolate), encouraged patient to drink in between meals d/t patient's current GI anatomy/early satiety, to mimic 6 small feedings per day.   Will provide high protein extra with each meal, yogurt BID and cottage cheese daily.  Continue current diet.  Encouraged oral intake.      Malnutrition Assessment:  Malnutrition Status:  Severe malnutrition (07/17/25 1456)    Context:  Acute Illness     Findings of the 6 clinical characteristics of malnutrition:  Energy Intake:  50% or less of estimated energy requirements for 5 or more days  Weight Loss:  5% over 1 month (6.1% loss in the last 2.9 weeks)     Body Fat Loss:  Moderate body fat loss Orbital, Triceps, Fat Overlying Ribs, Buccal region   Muscle Mass Loss:  Moderate muscle mass loss Temples (temporalis), Clavicles (pectoralis & deltoids), Thigh (quadriceps), Calf (gastrocnemius)  Fluid Accumulation:  No fluid accumulation     Strength:  Not Performed    Nutrition Assessment:    Pt. severely malnourished AEB criteria listed above.  At risk for further nutritional compromise r/t continued poor oral intake/intake, altered GI anatomy/function, increased nutrient needs d/t known losses with hx gastric bypass and post-op healing, recent anterior resection on 6/19/25 d/t adenocarcinoma of colon, intraabdominal post-surgical fluid collection with drain placement 7/8/25, acute metabolic encephalopathy-resolving,  and underlying medical condition (hx:  has a past medical history of Arthritis, Bowel obstruction (HCC), Bronchitis, Diabetes mellitus (HCC), Difficult intubation, DVT, lower extremity (HCC), GERD (gastroesophageal reflux disease), History of blood transfusion, Hx of blood clots, Hypertension, Hypoglycemia, Kidney stone, Macular degeneration, Movement disorder, Multinodular goiter, Muscle  Requirements Based On: Kcal/kg  Weight Used for Energy Requirements:  (55 kg)  Energy (kcal/day): ~ 9308-6787 kcals (30-35 kcals/kg/day)  Weight Used for Protein Requirements:  (55 kg)  Protein (g/day): ~  grams (1.2-2 grams/kg/day)         Nutrition Diagnosis:   Severe malnutrition, in context of acute illness or injury related to acute injury/trauma, decreased appetite, Altered GI structure as evidenced by criteria as identified in malnutrition assessment    Nutrition Interventions:   Food and/or Nutrient Delivery: Continue Current Diet, Start Oral Nutrition Supplement  Nutrition Education/Counseling: Education/Counseling initiated          Goals:  Goals: PO intake 75% or greater, by next RD assessment  Type of Goal: New goal       Nutrition Monitoring and Evaluation:      Food/Nutrient Intake Outcomes: Diet Advancement/Tolerance, Food and Nutrient Intake, Supplement Intake  Physical Signs/Symptoms Outcomes: Biochemical Data, Chewing or Swallowing, GI Status, Fluid Status or Edema, Nutrition Focused Physical Findings, Skin, Weight    Discharge Planning:    Too soon to determine     Allison C Schwab, RD, LD  Contact: [phone]

## 2025-07-17 NOTE — PROGRESS NOTES
1200 Patient received in CT scanning for pre-procedure drain insertion assessment. Monitor applied.   1210 Dr. Devlin here; spoke to patient. This procedure has been fully reviewed with the patient and written informed consent has been obtained.  Pt's son, Stephane, was also called and updated on plan of care and obtained consent from his as well. Patient assisted to CT table and pre scan started.   1231 Procedure started with Dr. Devlin.  1245 Procedure completed; patient tolerated well. Post scan obtained.   1253 Patient cart; comfort ensured.  1255 Report called to 6K and patient taken to 6K17 via cart.. Pt drowsy but awakens easily and answers questions and follows directions. Skin pink, warm, and dry. Respirations easy, regular, and nonlabored.

## 2025-07-17 NOTE — PROGRESS NOTES
Hospitalist Progress Note      Patient:  Adry Moura 74 y.o. female       : 1950  Unit/Bed:6K-17/017-A    Date of Admission: 2025      ASSESSMENT AND PLAN    Active Problems  Post operative intra-abdominal abscess:  Underwent colonoscopy in May 2025 and then later Radha 3, 2025 and found to have 3 cm mass which was found to have moderately differentiated adenocarcinoma of colon resulting in low anterior resection on 25 by Dr. Hale.  CT A/P in the ED with large intra-abdominal abscess within anterior aspect of abdomen and pelvis measuring 16.0 x 5.5 x 18.8 cm   General Surgery consulted and following, recommends no surgical intervention at this time, to have IR place drain via CT guidance.   S/p CT-guided drain placement  with culture growing E coli.  Blood cultures NGTD.  Started on Zosyn on .    ID consulted and following, cont Zosyn (start ). Plan PO at dc.   Routine drain care.  Monitor outputs and record.  Flush drain as ordered.     Repeat CT  abscess measures 10.2 x 3.1 x 15.6 cm.   : Repeat CT A/P today demonstrates the residual moderate to large intra-abdominal abscess within the anterior aspect of the lower abdomen and pelvis is not significantly changed in size measuring up to 5.5 x 12.5 x cm in short axis.  Likely due to drain not functioning well due to lack of flushing since insertion. This is also likely the cause of increased WBC today up to 15.2.   : Drain unable to be flushed.  Returned to IR for CT guided drain insertion today.  ID recommends to continue IV antibiotics for now.    Acute metabolic encephalopathy, resolving.   Secondary to infection.  Delirium precautions; frequent re-orientation.  Encourage good sleep/wake cycle.  Avoid benzo, narcotics, Ach, antiemetics, antihistamines, antipsychotics as much as possible.  Leukocytosis: WBC on arrival 13.6 and had decreased to normal range from -.  Uptrended to 13.2 on , likely

## 2025-07-18 LAB
ANION GAP SERPL CALC-SCNC: 14 MEQ/L (ref 8–16)
BUN SERPL-MCNC: 8 MG/DL (ref 8–23)
CALCIUM SERPL-MCNC: 9.1 MG/DL (ref 8.8–10.2)
CHLORIDE SERPL-SCNC: 100 MEQ/L (ref 98–111)
CO2 SERPL-SCNC: 26 MEQ/L (ref 22–29)
CREAT SERPL-MCNC: 0.7 MG/DL (ref 0.5–0.9)
DEPRECATED RDW RBC AUTO: 50.5 FL (ref 35–45)
ERYTHROCYTE [DISTWIDTH] IN BLOOD BY AUTOMATED COUNT: 14.4 % (ref 11.5–14.5)
GFR SERPL CREATININE-BSD FRML MDRD: 90 ML/MIN/1.73M2
GLUCOSE BLD STRIP.AUTO-MCNC: 124 MG/DL (ref 70–108)
GLUCOSE BLD STRIP.AUTO-MCNC: 68 MG/DL (ref 70–108)
GLUCOSE BLD STRIP.AUTO-MCNC: 74 MG/DL (ref 70–108)
GLUCOSE BLD STRIP.AUTO-MCNC: 76 MG/DL (ref 70–108)
GLUCOSE BLD STRIP.AUTO-MCNC: 76 MG/DL (ref 70–108)
GLUCOSE BLD STRIP.AUTO-MCNC: 77 MG/DL (ref 70–108)
GLUCOSE SERPL-MCNC: 79 MG/DL (ref 74–109)
HCT VFR BLD AUTO: 34.3 % (ref 37–47)
HGB BLD-MCNC: 11.1 GM/DL (ref 12–16)
MCH RBC QN AUTO: 30.9 PG (ref 26–33)
MCHC RBC AUTO-ENTMCNC: 32.4 GM/DL (ref 32.2–35.5)
MCV RBC AUTO: 95.5 FL (ref 81–99)
PLATELET # BLD AUTO: 481 THOU/MM3 (ref 130–400)
PMV BLD AUTO: 8.7 FL (ref 9.4–12.4)
POTASSIUM SERPL-SCNC: 3.6 MEQ/L (ref 3.5–5.2)
RBC # BLD AUTO: 3.59 MILL/MM3 (ref 4.2–5.4)
SODIUM SERPL-SCNC: 140 MEQ/L (ref 135–145)
WBC # BLD AUTO: 16.4 THOU/MM3 (ref 4.8–10.8)

## 2025-07-18 PROCEDURE — 99232 SBSQ HOSP IP/OBS MODERATE 35: CPT | Performed by: NURSE PRACTITIONER

## 2025-07-18 PROCEDURE — 6370000000 HC RX 637 (ALT 250 FOR IP): Performed by: NURSE PRACTITIONER

## 2025-07-18 PROCEDURE — 6360000002 HC RX W HCPCS: Performed by: STUDENT IN AN ORGANIZED HEALTH CARE EDUCATION/TRAINING PROGRAM

## 2025-07-18 PROCEDURE — 1200000000 HC SEMI PRIVATE

## 2025-07-18 PROCEDURE — 6370000000 HC RX 637 (ALT 250 FOR IP): Performed by: STUDENT IN AN ORGANIZED HEALTH CARE EDUCATION/TRAINING PROGRAM

## 2025-07-18 PROCEDURE — 2500000003 HC RX 250 WO HCPCS: Performed by: STUDENT IN AN ORGANIZED HEALTH CARE EDUCATION/TRAINING PROGRAM

## 2025-07-18 PROCEDURE — 36415 COLL VENOUS BLD VENIPUNCTURE: CPT

## 2025-07-18 PROCEDURE — 85027 COMPLETE CBC AUTOMATED: CPT

## 2025-07-18 PROCEDURE — 6370000000 HC RX 637 (ALT 250 FOR IP): Performed by: PHYSICIAN ASSISTANT

## 2025-07-18 PROCEDURE — APPSS30 APP SPLIT SHARED TIME 16-30 MINUTES: Performed by: NURSE PRACTITIONER

## 2025-07-18 PROCEDURE — 80048 BASIC METABOLIC PNL TOTAL CA: CPT

## 2025-07-18 PROCEDURE — 82948 REAGENT STRIP/BLOOD GLUCOSE: CPT

## 2025-07-18 PROCEDURE — 2580000003 HC RX 258: Performed by: STUDENT IN AN ORGANIZED HEALTH CARE EDUCATION/TRAINING PROGRAM

## 2025-07-18 RX ADMIN — QUETIAPINE FUMARATE 25 MG: 25 TABLET ORAL at 19:36

## 2025-07-18 RX ADMIN — SODIUM CHLORIDE, PRESERVATIVE FREE 10 ML: 5 INJECTION INTRAVENOUS at 08:06

## 2025-07-18 RX ADMIN — PANTOPRAZOLE SODIUM 40 MG: 40 TABLET, DELAYED RELEASE ORAL at 08:05

## 2025-07-18 RX ADMIN — ACETAMINOPHEN 650 MG: 325 TABLET ORAL at 08:04

## 2025-07-18 RX ADMIN — BUSPIRONE HYDROCHLORIDE 10 MG: 10 TABLET ORAL at 19:36

## 2025-07-18 RX ADMIN — ACETAMINOPHEN 650 MG: 325 TABLET ORAL at 19:34

## 2025-07-18 RX ADMIN — SODIUM CHLORIDE, PRESERVATIVE FREE 10 ML: 5 INJECTION INTRAVENOUS at 19:36

## 2025-07-18 RX ADMIN — PIPERACILLIN AND TAZOBACTAM 3375 MG: 3; .375 INJECTION, POWDER, FOR SOLUTION INTRAVENOUS at 15:17

## 2025-07-18 RX ADMIN — ENOXAPARIN SODIUM 40 MG: 100 INJECTION SUBCUTANEOUS at 08:05

## 2025-07-18 RX ADMIN — VENLAFAXINE HYDROCHLORIDE 75 MG: 75 CAPSULE, EXTENDED RELEASE ORAL at 08:05

## 2025-07-18 RX ADMIN — LAMOTRIGINE 200 MG: 100 TABLET ORAL at 19:36

## 2025-07-18 RX ADMIN — POLYETHYLENE GLYCOL 3350 17 G: 17 POWDER, FOR SOLUTION ORAL at 08:05

## 2025-07-18 RX ADMIN — LOSARTAN POTASSIUM 50 MG: 25 TABLET, FILM COATED ORAL at 08:05

## 2025-07-18 RX ADMIN — QUETIAPINE FUMARATE 50 MG: 100 TABLET ORAL at 15:18

## 2025-07-18 RX ADMIN — PIPERACILLIN AND TAZOBACTAM 3375 MG: 3; .375 INJECTION, POWDER, FOR SOLUTION INTRAVENOUS at 00:18

## 2025-07-18 RX ADMIN — PIPERACILLIN AND TAZOBACTAM 3375 MG: 3; .375 INJECTION, POWDER, FOR SOLUTION INTRAVENOUS at 08:17

## 2025-07-18 RX ADMIN — LOSARTAN POTASSIUM 25 MG: 25 TABLET, FILM COATED ORAL at 17:58

## 2025-07-18 RX ADMIN — PIPERACILLIN AND TAZOBACTAM 3375 MG: 3; .375 INJECTION, POWDER, FOR SOLUTION INTRAVENOUS at 23:01

## 2025-07-18 RX ADMIN — LAMOTRIGINE 100 MG: 100 TABLET ORAL at 08:05

## 2025-07-18 RX ADMIN — POLYETHYLENE GLYCOL 3350 17 G: 17 POWDER, FOR SOLUTION ORAL at 19:34

## 2025-07-18 RX ADMIN — VENLAFAXINE HYDROCHLORIDE 150 MG: 150 CAPSULE, EXTENDED RELEASE ORAL at 08:05

## 2025-07-18 ASSESSMENT — PAIN DESCRIPTION - ORIENTATION
ORIENTATION: RIGHT
ORIENTATION: RIGHT;LOWER

## 2025-07-18 ASSESSMENT — PAIN DESCRIPTION - LOCATION
LOCATION: HIP
LOCATION: ABDOMEN

## 2025-07-18 ASSESSMENT — PAIN - FUNCTIONAL ASSESSMENT
PAIN_FUNCTIONAL_ASSESSMENT: ACTIVITIES ARE NOT PREVENTED
PAIN_FUNCTIONAL_ASSESSMENT: ACTIVITIES ARE NOT PREVENTED

## 2025-07-18 ASSESSMENT — PAIN SCALES - WONG BAKER: WONGBAKER_NUMERICALRESPONSE: NO HURT

## 2025-07-18 ASSESSMENT — PAIN SCALES - GENERAL
PAINLEVEL_OUTOF10: 6
PAINLEVEL_OUTOF10: 5

## 2025-07-18 ASSESSMENT — PAIN DESCRIPTION - DESCRIPTORS
DESCRIPTORS: ACHING
DESCRIPTORS: ACHING;DISCOMFORT

## 2025-07-18 ASSESSMENT — ENCOUNTER SYMPTOMS: NAUSEA: 1

## 2025-07-18 NOTE — PROGRESS NOTES
Hospitalist Progress Note      Patient:  Adry Moura 74 y.o. female       : 1950  Unit/Bed:6K-17/017-A    Date of Admission: 2025      ASSESSMENT AND PLAN    Active Problems  Post operative intra-abdominal abscess:  Underwent colonoscopy in May 2025 and then later Radha 3, 2025 and found to have 3 cm mass which was found to have moderately differentiated adenocarcinoma of colon resulting in low anterior resection on 25 by Dr. Hale.  CT A/P in the ED with large intra-abdominal abscess within anterior aspect of abdomen and pelvis measuring 16.0 x 5.5 x 18.8 cm   General Surgery consulted and following, recommends no surgical intervention at this time, to have IR place drain via CT guidance.   S/p CT-guided drain placement  with culture growing E coli.  Blood cultures NGTD.  Started on Zosyn on .    ID consulted and following, cont Zosyn (start ). Plan PO at dc.   Routine drain care.  Monitor outputs and record.  Flush drain as ordered.     Repeat CT  abscess measures 10.2 x 3.1 x 15.6 cm.   : Repeat CT A/P today demonstrates the residual moderate to large intra-abdominal abscess within the anterior aspect of the lower abdomen and pelvis is not significantly changed in size measuring up to 5.5 x 12.5 x cm in short axis.  Likely due to drain not functioning well due to lack of flushing since insertion. This is also likely the cause of increased WBC today up to 15.2.   : Drain unable to be flushed.  Returned to IR for CT guided drain insertion today.  ID recommends to continue IV antibiotics for now.    : Bilateral lower abdominal drains in place.  Flushing per surgery recommendations.  Left drain with 180 ml out today so far and right drain with 30 ml out.  Continue to monitor output and continue IV ATB.  If continues with minimal output on Monday, will as IR to exchange drain tubes for larger size as drainage was noted to be thick during yesterday's drain

## 2025-07-18 NOTE — PROGRESS NOTES
Attempted to administer PO meds x2, patient refused.  Patient refused initial assessment, but allowed this RN to complete an assessment later in the shift at 2243.

## 2025-07-18 NOTE — PROGRESS NOTES
Progress note: Infectious diseases    Patient - Adry Moura,  Age - 74 y.o.    - 1950      Room Number - 6K-17/017-A   MRN -  [id number]   [facility] # - [id number]  Date of Admission -  2025 11:22 AM    SUBJECTIVE:   A new drain was placed.still not actively draining  OBJECTIVE   VITALS    height is 1.676 m (5' 6\") and weight is 54.8 kg (120 lb 13 oz). Her oral temperature is 98.3 °F (36.8 °C). Her blood pressure is 119/58 (abnormal) and her pulse is 85. Her respiration is 16 and oxygen saturation is 95%.       Wt Readings from Last 3 Encounters:   25 54.8 kg (120 lb 13 oz)   25 54.4 kg (120 lb)   25 59.5 kg (131 lb 2 oz)       I/O (24 Hours)    Intake/Output Summary (Last 24 hours) at 2025 0906  Last data filed at 2025 0722  Gross per 24 hour   Intake 348.85 ml   Output 100 ml   Net 248.85 ml       General Appearance  Awake, alert, oriented,  not  In acute distress  HEENT - normocephalic, atraumatic, pale conjunctiva  Neck - Supple, has visible swelling on the anterior neck(pulled bowel from previous surgery)  Lungs -  Bilateral   air entry, no rhonchi, no wheeze  Cardiovascular - Heart sounds are normal.    Abdomen - soft, not distended, drains in place. Bloody fluid noted.  Neurologic -awake and oriented.  Skin - No bruising or bleeding  Extremities - No edema, no cyanosis, clubbing     MEDICATIONS:      acetaminophen  650 mg Oral Q12H    polyethylene glycol  8.5 g Oral Once    polyethylene glycol  17 g Oral BID    losartan  25 mg Oral QPM    losartan  50 mg Oral Daily    sodium chloride flush  5-40 mL IntraVENous 2 times per day    enoxaparin  40 mg SubCUTAneous Daily    [Held by provider] acarbose  50 mg Oral TID WC    busPIRone  10 mg Oral Nightly    lamoTRIgine  100 mg Oral QAM    lamoTRIgine  200 mg Oral Nightly    [Held by provider] cetirizine  10 mg Oral Daily    QUEtiapine  25

## 2025-07-18 NOTE — PROGRESS NOTES
[facility]  EVA SORENSEN - CNP for Dr. 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  PLAN   Diet as tolerated. Having bowel function.   IR placed new right sided perc drain. Still has left drain in place. Flush every 6 hours with 20 cc.   Antibiotics - infectious disease following. Will transition to oral at discharge.   Pain & nausea control  DVT prophylaxis   PT/OT   Labs reviewed. WBC continues to steadily rise.   Will need oncology appointment outpatient rescheduled  Case management for assistance with discharge planning, from Tam COTO, social work consult  Clinically, patient looks pretty good but still with another drain collection does not want to drain. If WBC continues to rise may have to consider abdominal wall washout which patient does not want to do. Will see how the weekend goes with new drain and outputs and if no better than repeat CT Monday

## 2025-07-18 NOTE — PLAN OF CARE
Problem: Chronic Conditions and Co-morbidities  Goal: Patient's chronic conditions and co-morbidity symptoms are monitored and maintained or improved  Outcome: Progressing  Flowsheets (Taken 7/18/2025 1448)  Care Plan - Patient's Chronic Conditions and Co-Morbidity Symptoms are Monitored and Maintained or Improved:   Monitor and assess patient's chronic conditions and comorbid symptoms for stability, deterioration, or improvement   Collaborate with multidisciplinary team to address chronic and comorbid conditions and prevent exacerbation or deterioration  Note: Patient's chronic conditions remain at baseline. VSS.      Problem: Discharge Planning  Goal: Discharge to home or other facility with appropriate resources  Outcome: Progressing  Flowsheets (Taken 7/18/2025 1448)  Discharge to home or other facility with appropriate resources:   Identify barriers to discharge with patient and caregiver   Arrange for needed discharge resources and transportation as appropriate   Identify discharge learning needs (meds, wound care, etc)  Note: Patient plans to discharge to SNF when medically stable.      Problem: Safety - Adult  Goal: Free from fall injury  Outcome: Progressing  Flowsheets (Taken 7/18/2025 1448)  Free From Fall Injury: Instruct family/caregiver on patient safety  Note: No falls noted this shift. Continue falling star program. Bed alarm on, bed in low position. Call light and personal belongings in reach.  Patient uses call light appropriately.      Problem: Skin/Tissue Integrity - Adult  Goal: Skin integrity remains intact  Description: 1.  Monitor for areas of redness and/or skin breakdown  2.  Assess vascular access sites hourly  3.  Every 4-6 hours minimum:  Change oxygen saturation probe site  4.  Every 4-6 hours:  If on nasal continuous positive airway pressure, respiratory therapy assess nares and determine need for appliance change or resting period  Outcome: Progressing  Flowsheets (Taken 7/18/2025  medications, impaired vision or hearing, underlying metabolic abnormalities, dehydration, psychiatric diagnoses, and notify attending LIP  2. [place] high risk fall precautions, as indicated  3. Provide frequent short contacts to provide reality reorientation, refocusing and direction  4. Decrease environmental stimuli, including noise as appropriate  5. Monitor and intervene to maintain adequate nutrition, hydration, elimination, sleep and activity  6. If unable to ensure safety without constant attention obtain sitter and review sitter guidelines with assigned personnel  7. Initiate Psychosocial CNS and Spiritual Care consult, as indicated  Outcome: Progressing  Flowsheets (Taken 7/18/2025 1887)  Effect of thought disturbance (confusion, delirium, dementia, or psychosis) are managed with adequate functional status:   Assess for contributors to thought disturbance, including medications, impaired vision or hearing, underlying metabolic abnormalities, dehydration, psychiatric diagnoses, notify LIP   [place] high risk fall precautions, as indicated   Decrease environmental stimuli, including noise as appropriate  Note: Patient A&O to self, location, and time. Intermittent confusion   Care plan reviewed with patient. Patient verbalizes understanding of plan of care and contributes to goal setting.

## 2025-07-18 NOTE — PLAN OF CARE
Problem: Chronic Conditions and Co-morbidities  Goal: Patient's chronic conditions and co-morbidity symptoms are monitored and maintained or improved  Outcome: Progressing  Flowsheets (Taken 7/16/2025 2151 by Beverley Wu, RN)  Care Plan - Patient's Chronic Conditions and Co-Morbidity Symptoms are Monitored and Maintained or Improved:   Monitor and assess patient's chronic conditions and comorbid symptoms for stability, deterioration, or improvement   Collaborate with multidisciplinary team to address chronic and comorbid conditions and prevent exacerbation or deterioration     Problem: Discharge Planning  Goal: Discharge to home or other facility with appropriate resources  Outcome: Progressing  Flowsheets (Taken 7/16/2025 2151 by Beverley Wu, RN)  Discharge to home or other facility with appropriate resources:   Identify barriers to discharge with patient and caregiver   Arrange for needed discharge resources and transportation as appropriate     Problem: Safety - Adult  Goal: Free from fall injury  Outcome: Progressing  Flowsheets (Taken 7/16/2025 2151 by Beverley Wu, RN)  Free From Fall Injury:   Instruct family/caregiver on patient safety   Based on caregiver fall risk screen, instruct family/caregiver to ask for assistance with transferring infant if caregiver noted to have fall risk factors  Note: No falls noted this shift. Continue falling star program. Bed alarm on, bed in low position. Call light and personal belongings in reach.  Patient uses call light appropriately.       Problem: Skin/Tissue Integrity - Adult  Goal: Skin integrity remains intact  Description: 1.  Monitor for areas of redness and/or skin breakdown  2.  Assess vascular access sites hourly  3.  Every 4-6 hours minimum:  Change oxygen saturation probe site  4.  Every 4-6 hours:  If on nasal continuous positive airway pressure, respiratory therapy assess nares and determine need for appliance change or resting

## 2025-07-19 LAB
ANION GAP SERPL CALC-SCNC: 11 MEQ/L (ref 8–16)
BUN SERPL-MCNC: 11 MG/DL (ref 8–23)
CALCIUM SERPL-MCNC: 8.7 MG/DL (ref 8.8–10.2)
CHLORIDE SERPL-SCNC: 103 MEQ/L (ref 98–111)
CO2 SERPL-SCNC: 26 MEQ/L (ref 22–29)
CREAT SERPL-MCNC: 0.6 MG/DL (ref 0.5–0.9)
DEPRECATED RDW RBC AUTO: 50.5 FL (ref 35–45)
ERYTHROCYTE [DISTWIDTH] IN BLOOD BY AUTOMATED COUNT: 14.5 % (ref 11.5–14.5)
GFR SERPL CREATININE-BSD FRML MDRD: > 90 ML/MIN/1.73M2
GLUCOSE BLD STRIP.AUTO-MCNC: 68 MG/DL (ref 70–108)
GLUCOSE BLD STRIP.AUTO-MCNC: 75 MG/DL (ref 70–108)
GLUCOSE BLD STRIP.AUTO-MCNC: 80 MG/DL (ref 70–108)
GLUCOSE BLD STRIP.AUTO-MCNC: 82 MG/DL (ref 70–108)
GLUCOSE BLD STRIP.AUTO-MCNC: 82 MG/DL (ref 70–108)
GLUCOSE SERPL-MCNC: 78 MG/DL (ref 74–109)
HCT VFR BLD AUTO: 34.3 % (ref 37–47)
HGB BLD-MCNC: 10.7 GM/DL (ref 12–16)
MCH RBC QN AUTO: 29.8 PG (ref 26–33)
MCHC RBC AUTO-ENTMCNC: 31.2 GM/DL (ref 32.2–35.5)
MCV RBC AUTO: 95.5 FL (ref 81–99)
PLATELET # BLD AUTO: 491 THOU/MM3 (ref 130–400)
PMV BLD AUTO: 8.9 FL (ref 9.4–12.4)
POTASSIUM SERPL-SCNC: 3.8 MEQ/L (ref 3.5–5.2)
RBC # BLD AUTO: 3.59 MILL/MM3 (ref 4.2–5.4)
SODIUM SERPL-SCNC: 140 MEQ/L (ref 135–145)
WBC # BLD AUTO: 13.2 THOU/MM3 (ref 4.8–10.8)

## 2025-07-19 PROCEDURE — 82948 REAGENT STRIP/BLOOD GLUCOSE: CPT

## 2025-07-19 PROCEDURE — 6370000000 HC RX 637 (ALT 250 FOR IP): Performed by: STUDENT IN AN ORGANIZED HEALTH CARE EDUCATION/TRAINING PROGRAM

## 2025-07-19 PROCEDURE — 99231 SBSQ HOSP IP/OBS SF/LOW 25: CPT | Performed by: NURSE PRACTITIONER

## 2025-07-19 PROCEDURE — 36415 COLL VENOUS BLD VENIPUNCTURE: CPT

## 2025-07-19 PROCEDURE — 6360000002 HC RX W HCPCS: Performed by: STUDENT IN AN ORGANIZED HEALTH CARE EDUCATION/TRAINING PROGRAM

## 2025-07-19 PROCEDURE — 6370000000 HC RX 637 (ALT 250 FOR IP): Performed by: PHYSICIAN ASSISTANT

## 2025-07-19 PROCEDURE — 2500000003 HC RX 250 WO HCPCS: Performed by: STUDENT IN AN ORGANIZED HEALTH CARE EDUCATION/TRAINING PROGRAM

## 2025-07-19 PROCEDURE — 85027 COMPLETE CBC AUTOMATED: CPT

## 2025-07-19 PROCEDURE — 1200000000 HC SEMI PRIVATE

## 2025-07-19 PROCEDURE — 2580000003 HC RX 258: Performed by: STUDENT IN AN ORGANIZED HEALTH CARE EDUCATION/TRAINING PROGRAM

## 2025-07-19 PROCEDURE — 6370000000 HC RX 637 (ALT 250 FOR IP): Performed by: NURSE PRACTITIONER

## 2025-07-19 PROCEDURE — 80048 BASIC METABOLIC PNL TOTAL CA: CPT

## 2025-07-19 RX ADMIN — PIPERACILLIN AND TAZOBACTAM 3375 MG: 3; .375 INJECTION, POWDER, FOR SOLUTION INTRAVENOUS at 08:21

## 2025-07-19 RX ADMIN — POLYETHYLENE GLYCOL 3350 17 G: 17 POWDER, FOR SOLUTION ORAL at 19:24

## 2025-07-19 RX ADMIN — QUETIAPINE FUMARATE 25 MG: 25 TABLET ORAL at 19:27

## 2025-07-19 RX ADMIN — VENLAFAXINE HYDROCHLORIDE 150 MG: 150 CAPSULE, EXTENDED RELEASE ORAL at 08:12

## 2025-07-19 RX ADMIN — LAMOTRIGINE 200 MG: 100 TABLET ORAL at 19:27

## 2025-07-19 RX ADMIN — LOSARTAN POTASSIUM 50 MG: 25 TABLET, FILM COATED ORAL at 08:11

## 2025-07-19 RX ADMIN — POLYETHYLENE GLYCOL 3350 17 G: 17 POWDER, FOR SOLUTION ORAL at 08:12

## 2025-07-19 RX ADMIN — SODIUM CHLORIDE, PRESERVATIVE FREE 10 ML: 5 INJECTION INTRAVENOUS at 08:12

## 2025-07-19 RX ADMIN — ENOXAPARIN SODIUM 40 MG: 100 INJECTION SUBCUTANEOUS at 08:11

## 2025-07-19 RX ADMIN — PIPERACILLIN AND TAZOBACTAM 3375 MG: 3; .375 INJECTION, POWDER, FOR SOLUTION INTRAVENOUS at 16:55

## 2025-07-19 RX ADMIN — PANTOPRAZOLE SODIUM 40 MG: 40 TABLET, DELAYED RELEASE ORAL at 06:13

## 2025-07-19 RX ADMIN — ACETAMINOPHEN 650 MG: 325 TABLET ORAL at 19:24

## 2025-07-19 RX ADMIN — LAMOTRIGINE 100 MG: 100 TABLET ORAL at 08:11

## 2025-07-19 RX ADMIN — QUETIAPINE FUMARATE 50 MG: 100 TABLET ORAL at 15:19

## 2025-07-19 RX ADMIN — BUSPIRONE HYDROCHLORIDE 10 MG: 10 TABLET ORAL at 19:27

## 2025-07-19 RX ADMIN — ACETAMINOPHEN 650 MG: 325 TABLET ORAL at 08:10

## 2025-07-19 RX ADMIN — VENLAFAXINE HYDROCHLORIDE 75 MG: 75 CAPSULE, EXTENDED RELEASE ORAL at 08:12

## 2025-07-19 ASSESSMENT — PAIN DESCRIPTION - DESCRIPTORS
DESCRIPTORS: ACHING
DESCRIPTORS: ACHING

## 2025-07-19 ASSESSMENT — PAIN DESCRIPTION - LOCATION
LOCATION: BACK
LOCATION: ABDOMEN

## 2025-07-19 ASSESSMENT — PAIN SCALES - GENERAL
PAINLEVEL_OUTOF10: 9
PAINLEVEL_OUTOF10: 6

## 2025-07-19 ASSESSMENT — PAIN DESCRIPTION - ORIENTATION
ORIENTATION: LOWER
ORIENTATION: RIGHT

## 2025-07-19 ASSESSMENT — PAIN - FUNCTIONAL ASSESSMENT
PAIN_FUNCTIONAL_ASSESSMENT: PREVENTS OR INTERFERES SOME ACTIVE ACTIVITIES AND ADLS
PAIN_FUNCTIONAL_ASSESSMENT: PREVENTS OR INTERFERES SOME ACTIVE ACTIVITIES AND ADLS

## 2025-07-19 NOTE — PROGRESS NOTES
Physician Progress Note      PATIENT:               MYRIAM AMBROCIO  CSN #:                  [id number]  :                       1950  ADMIT DATE:       2025 11:22 AM  DISCH DATE:  RESPONDING  PROVIDER #:        Romina GUO CNP          QUERY TEXT:    Please clarify the patient?s nutritional status:    The clinical indicators include:  74yoF,  colon cancer, intra abdominal abscess(H&P)    RD note dated -Malnutrition Status:  Severe malnutrition (25 3120)  Context:  Acute Illness  Findings of the 6 clinical characteristics of malnutrition:  Energy Intake:  50% or less of estimated energy requirements for 5 or more   days  Weight Loss:  5% over 1 month (6.1% loss in the last 2.9 weeks)  Body Fat Loss:  Moderate body fat loss Orbital, Triceps, Fat Overlying Ribs,   Buccal region  Muscle Mass Loss:  Moderate muscle mass loss Temples (temporalis), Clavicles   (pectoralis & deltoids), Thigh (quadriceps), Calf (gastrocnemius)  Fluid Accumulation:  No fluid accumulation   Strength:  Not Performed    Treatment: RD consult. Glucerna TID.  Options provided:  -- Protein calorie malnutrition severe, POA  -- Protein calorie malnutrition severe, Not POA  -- Other - I will add my own diagnosis  -- Disagree - Not applicable / Not valid  -- Disagree - Clinically unable to determine / Unknown  -- Refer to Clinical Documentation Reviewer    PROVIDER RESPONSE TEXT:    This patient has severe protein calorie malnutrition, POA    Query created by: CAMILA LR on 2025 9:28 AM      Electronically signed by:  Romina GUO CNP 2025 10:16 AM

## 2025-07-19 NOTE — PLAN OF CARE
Problem: Chronic Conditions and Co-morbidities  Goal: Patient's chronic conditions and co-morbidity symptoms are monitored and maintained or improved  7/18/2025 2112 by Kandice Boswell RN  Outcome: Progressing  Flowsheets (Taken 7/18/2025 1930)  Care Plan - Patient's Chronic Conditions and Co-Morbidity Symptoms are Monitored and Maintained or Improved: Monitor and assess patient's chronic conditions and comorbid symptoms for stability, deterioration, or improvement  7/18/2025 1448 by Katty Lam RN  Outcome: Progressing  Flowsheets (Taken 7/18/2025 1448)  Care Plan - Patient's Chronic Conditions and Co-Morbidity Symptoms are Monitored and Maintained or Improved:   Monitor and assess patient's chronic conditions and comorbid symptoms for stability, deterioration, or improvement   Collaborate with multidisciplinary team to address chronic and comorbid conditions and prevent exacerbation or deterioration  Note: Patient's chronic conditions remain at baseline. VSS.      Problem: Discharge Planning  Goal: Discharge to home or other facility with appropriate resources  7/18/2025 2112 by Kandice Boswell RN  Outcome: Progressing  Flowsheets (Taken 7/18/2025 1930)  Discharge to home or other facility with appropriate resources: Identify barriers to discharge with patient and caregiver  7/18/2025 1448 by Katty Lam RN  Outcome: Progressing  Flowsheets (Taken 7/18/2025 1448)  Discharge to home or other facility with appropriate resources:   Identify barriers to discharge with patient and caregiver   Arrange for needed discharge resources and transportation as appropriate   Identify discharge learning needs (meds, wound care, etc)  Note: Patient plans to discharge to SNF when medically stable.      Problem: Safety - Adult  Goal: Free from fall injury  7/18/2025 2112 by Kanidce Boswell RN  Outcome: Progressing  Flowsheets (Taken 7/18/2025 1448 by Katty Lam RN)  Free From Fall Injury: Instruct

## 2025-07-19 NOTE — PROGRESS NOTES
Hospitalist Progress Note      Patient:  Adry Moura 74 y.o. female       : 1950  Unit/Bed:6K-17/017-A    Date of Admission: 2025      ASSESSMENT AND PLAN    Active Problems  Post operative intra-abdominal abscess:  Underwent colonoscopy in May 2025 and then later Radha 3, 2025 and found to have 3 cm mass which was found to have moderately differentiated adenocarcinoma of colon resulting in low anterior resection on 25 by Dr. Hale.  CT A/P in the ED with large intra-abdominal abscess within anterior aspect of abdomen and pelvis measuring 16.0 x 5.5 x 18.8 cm   General Surgery consulted and following, recommends no surgical intervention at this time, to have IR place drain via CT guidance.   S/p CT-guided drain placement  with culture growing E coli.  Blood cultures NGTD.  Started on Zosyn on .    ID consulted and following, cont Zosyn (start ). Plan PO at dc.   Routine drain care.  Monitor outputs and record.  Flush drain as ordered.     Repeat CT  abscess measures 10.2 x 3.1 x 15.6 cm.   : Repeat CT A/P today demonstrates the residual moderate to large intra-abdominal abscess within the anterior aspect of the lower abdomen and pelvis is not significantly changed in size measuring up to 5.5 x 12.5 x cm in short axis.  Likely due to drain not functioning well due to lack of flushing since insertion. This is also likely the cause of increased WBC today up to 15.2.   : Drain unable to be flushed.  Returned to IR for CT guided drain insertion today.  ID recommends to continue IV antibiotics for now.    : Bilateral lower abdominal drains in place.  Flushing per surgery recommendations.  Left drain with 180 ml out today so far and right drain with 30 ml out.  Continue to monitor output and continue IV ATB.  If continues with minimal output on Monday, will as IR to exchange drain tubes for larger size as drainage was noted to be thick during yesterday's drain  lamoTRIgine  200 mg Oral Nightly    [Held by provider] cetirizine  10 mg Oral Daily    QUEtiapine  25 mg Oral Nightly    insulin lispro  0-4 Units SubCUTAneous 4x Daily AC & HS    piperacillin-tazobactam  3,375 mg IntraVENous Q8H    pantoprazole  40 mg Oral QAM AC    venlafaxine  75 mg Oral Daily with breakfast    And    venlafaxine  150 mg Oral Daily with breakfast    PRN Meds: sennosides-docusate sodium, QUEtiapine, sodium chloride flush, sodium chloride, potassium chloride **OR** potassium alternative oral replacement **OR** potassium chloride, magnesium sulfate, ondansetron **OR** ondansetron, polyethylene glycol, acetaminophen **OR** acetaminophen, busPIRone, dicyclomine, [Held by provider] docusate sodium, traMADol, morphine, glucose, dextrose bolus **OR** dextrose bolus, glucagon (rDNA), dextrose    Exam:  BP (!) 117/51   Pulse 93   Temp 98.7 °F (37.1 °C) (Oral)   Resp 16   Ht 1.676 m (5' 6\")   Wt 56.8 kg (125 lb 3.5 oz)   LMP 03/20/1985 Comment: hysterectomy in her 30s  SpO2 95%   BMI 20.21 kg/m²   General: No distress, appears stated age.   Eyes:  PERRL. Conjunctivae/corneas clear.  HENT: Head normal appearing. Nares normal. Oral mucosa moist.  Hearing intact.   Neck: Supple, with full range of motion. Trachea midline.  No gross JVD appreciated.  Respiratory:  Normal effort. Clear to auscultation, without rales or wheezes or rhonchi.  Cardiovascular: Normal rate, regular rhythm with normal S1/S2 without murmurs.    No lower extremity edema.   Abdomen: Soft, non-tender, non-distended with normal bowel sounds. Bilateral lower quadrant abdominal drains with brown/bloody drainage in bags.    Musculoskeletal: No joint swelling or tenderness. Normal tone. No abnormal movements.   Skin: Warm and dry. No rashes or lesions.  Neurologic:  No focal sensory/motor deficits in the upper or lower extremities. Cranial nerves:  grossly non-focal 2-12.     Psychiatric: Alert and oriented x3, abnormal thought content.

## 2025-07-19 NOTE — PROGRESS NOTES
Progress note: Infectious diseases    Patient - Adry Moura,  Age - 74 y.o.    - 1950      Room Number - 6K-17/017-A   MRN -  [id number]   [facility] # - [id number]  Date of Admission -  2025 11:22 AM    SUBJECTIVE:   She has no new complaints. Denies any fever, no abdominal pain  No issues per sitter  OBJECTIVE   VITALS    height is 1.676 m (5' 6\") and weight is 56.8 kg (125 lb 3.5 oz). Her oral temperature is 98.1 °F (36.7 °C). Her blood pressure is 122/58 (abnormal) and her pulse is 91. Her respiration is 16 and oxygen saturation is 100%.       Wt Readings from Last 3 Encounters:   25 56.8 kg (125 lb 3.5 oz)   25 54.4 kg (120 lb)   25 59.5 kg (131 lb 2 oz)       I/O (24 Hours)    Intake/Output Summary (Last 24 hours) at 2025 1217  Last data filed at 2025 0900  Gross per 24 hour   Intake 1018.64 ml   Output 125 ml   Net 893.64 ml       General Appearance  Awake, alert, oriented, chronically sick looking  HEENT - normocephalic, atraumatic, pale conjunctiva  Neck - Supple, has visible swelling on the anterior neck(pulled bowel from previous surgery)  Lungs -  Bilateral   air entry, no rhonchi, no wheeze  Cardiovascular - Heart sounds are normal.    Abdomen - soft, not distended, drains in place. Bloody fluid noted.  Neurologic -awake and oriented.  Skin - No bruising or bleeding  Extremities - No edema, no cyanosis, clubbing     MEDICATIONS:      acetaminophen  650 mg Oral Q12H    polyethylene glycol  8.5 g Oral Once    polyethylene glycol  17 g Oral BID    losartan  25 mg Oral QPM    losartan  50 mg Oral Daily    sodium chloride flush  5-40 mL IntraVENous 2 times per day    enoxaparin  40 mg SubCUTAneous Daily    [Held by provider] acarbose  50 mg Oral TID WC    busPIRone  10 mg Oral Nightly    lamoTRIgine  100 mg Oral QAM    lamoTRIgine  200 mg Oral Nightly    [Held by provider]  24 hours. No growth 48 hours. No growth at 5 days       Problem list of patient:     Patient Active Problem List   Diagnosis Code    Bipolar 1 disorder, depressed, severe (HCC) F31.4    Post traumatic stress disorder F43.10    Rectus sheath hematoma S30.1XXA    Abdominal pain, right lower quadrant R10.31    Anemia associated with acute blood loss D62    Gastric bypass status for obesity Z98.84    Hypoglycemia E16.2    Muscle strain of left lower extremity S86.912A    Multinodular goiter E04.2    Nausea R11.0    SOB (shortness of breath) R06.02    Hypovitaminosis D E55.9    Bipolar disorder, in partial remission, most recent episode depressed (HCC) F31.75    Essential hypertension I10    Goiter E04.9    Low vitamin D level R79.89    Acute non intractable tension-type headache G44.209    Nonintractable headache R51.9    Chest pain, atypical R07.89    SOB (shortness of breath) on exertion R06.02    Family history of premature CAD Z82.49    Gastroesophageal reflux disease K21.9    Dysphagia R13.10    S/P gastrectomy Z90.3    Intestinal malabsorption K90.9    History of chest pain Z87.898    Bipolar 1 disorder, depressed, moderate (HCC) F31.32    Cancer of rectosigmoid junction (HCC) C19    Adenocarcinoma of colon (HCC) C18.9    Intra-abdominal abscess (HCC) K65.1    Intra-abdominal fluid collection R18.8    Metabolic encephalopathy G93.41    Severe malnutrition E43    Leukocytosis D72.829         ASSESSMENT/PLAN   Adencarcinoma of the colon s/p anterior resection on 6/19/25  Intraabdominal post surgical fluid collection: + E.coli    Hx of Bipolar disorder  Two drains in place.  Will need follow up scan to evaluate the fluid collection.    Kranthi Ching MD, 7/19/2025 12:17 PM

## 2025-07-20 LAB
ANION GAP SERPL CALC-SCNC: 16 MEQ/L (ref 8–16)
BASOPHILS ABSOLUTE: 0 THOU/MM3 (ref 0–0.1)
BASOPHILS NFR BLD AUTO: 0.3 %
BUN SERPL-MCNC: 13 MG/DL (ref 8–23)
CALCIUM SERPL-MCNC: 9.1 MG/DL (ref 8.8–10.2)
CHLORIDE SERPL-SCNC: 103 MEQ/L (ref 98–111)
CO2 SERPL-SCNC: 23 MEQ/L (ref 22–29)
CREAT SERPL-MCNC: 0.6 MG/DL (ref 0.5–0.9)
DEPRECATED RDW RBC AUTO: 51 FL (ref 35–45)
EOSINOPHIL NFR BLD AUTO: 1 %
EOSINOPHILS ABSOLUTE: 0.1 THOU/MM3 (ref 0–0.4)
ERYTHROCYTE [DISTWIDTH] IN BLOOD BY AUTOMATED COUNT: 14.6 % (ref 11.5–14.5)
GFR SERPL CREATININE-BSD FRML MDRD: > 90 ML/MIN/1.73M2
GLUCOSE BLD STRIP.AUTO-MCNC: 64 MG/DL (ref 70–108)
GLUCOSE BLD STRIP.AUTO-MCNC: 67 MG/DL (ref 70–108)
GLUCOSE BLD STRIP.AUTO-MCNC: 71 MG/DL (ref 70–108)
GLUCOSE BLD STRIP.AUTO-MCNC: 72 MG/DL (ref 70–108)
GLUCOSE BLD STRIP.AUTO-MCNC: 76 MG/DL (ref 70–108)
GLUCOSE BLD STRIP.AUTO-MCNC: 92 MG/DL (ref 70–108)
GLUCOSE SERPL-MCNC: 73 MG/DL (ref 74–109)
HCT VFR BLD AUTO: 33.7 % (ref 37–47)
HGB BLD-MCNC: 10.6 GM/DL (ref 12–16)
IMM GRANULOCYTES # BLD AUTO: 0.18 THOU/MM3 (ref 0–0.07)
IMM GRANULOCYTES NFR BLD AUTO: 1.4 %
LYMPHOCYTES ABSOLUTE: 1.4 THOU/MM3 (ref 1–4.8)
LYMPHOCYTES NFR BLD AUTO: 11 %
MCH RBC QN AUTO: 30.1 PG (ref 26–33)
MCHC RBC AUTO-ENTMCNC: 31.5 GM/DL (ref 32.2–35.5)
MCV RBC AUTO: 95.7 FL (ref 81–99)
MONOCYTES ABSOLUTE: 0.7 THOU/MM3 (ref 0.4–1.3)
MONOCYTES NFR BLD AUTO: 5.3 %
NEUTROPHILS ABSOLUTE: 10 THOU/MM3 (ref 1.8–7.7)
NEUTROPHILS NFR BLD AUTO: 81 %
NRBC BLD AUTO-RTO: 0 /100 WBC
PLATELET # BLD AUTO: 495 THOU/MM3 (ref 130–400)
PMV BLD AUTO: 8.8 FL (ref 9.4–12.4)
POTASSIUM SERPL-SCNC: 4 MEQ/L (ref 3.5–5.2)
RBC # BLD AUTO: 3.52 MILL/MM3 (ref 4.2–5.4)
SODIUM SERPL-SCNC: 142 MEQ/L (ref 135–145)
WBC # BLD AUTO: 12.4 THOU/MM3 (ref 4.8–10.8)

## 2025-07-20 PROCEDURE — 1200000000 HC SEMI PRIVATE

## 2025-07-20 PROCEDURE — 36415 COLL VENOUS BLD VENIPUNCTURE: CPT

## 2025-07-20 PROCEDURE — 82948 REAGENT STRIP/BLOOD GLUCOSE: CPT

## 2025-07-20 PROCEDURE — 2580000003 HC RX 258: Performed by: STUDENT IN AN ORGANIZED HEALTH CARE EDUCATION/TRAINING PROGRAM

## 2025-07-20 PROCEDURE — 85025 COMPLETE CBC W/AUTO DIFF WBC: CPT

## 2025-07-20 PROCEDURE — 80048 BASIC METABOLIC PNL TOTAL CA: CPT

## 2025-07-20 PROCEDURE — 6370000000 HC RX 637 (ALT 250 FOR IP): Performed by: PHYSICIAN ASSISTANT

## 2025-07-20 PROCEDURE — 6370000000 HC RX 637 (ALT 250 FOR IP): Performed by: NURSE PRACTITIONER

## 2025-07-20 PROCEDURE — 6370000000 HC RX 637 (ALT 250 FOR IP): Performed by: STUDENT IN AN ORGANIZED HEALTH CARE EDUCATION/TRAINING PROGRAM

## 2025-07-20 PROCEDURE — 2500000003 HC RX 250 WO HCPCS: Performed by: STUDENT IN AN ORGANIZED HEALTH CARE EDUCATION/TRAINING PROGRAM

## 2025-07-20 PROCEDURE — 6360000002 HC RX W HCPCS: Performed by: STUDENT IN AN ORGANIZED HEALTH CARE EDUCATION/TRAINING PROGRAM

## 2025-07-20 PROCEDURE — 99232 SBSQ HOSP IP/OBS MODERATE 35: CPT | Performed by: NURSE PRACTITIONER

## 2025-07-20 RX ADMIN — POLYETHYLENE GLYCOL 3350 17 G: 17 POWDER, FOR SOLUTION ORAL at 21:37

## 2025-07-20 RX ADMIN — LOSARTAN POTASSIUM 25 MG: 25 TABLET, FILM COATED ORAL at 17:46

## 2025-07-20 RX ADMIN — ACETAMINOPHEN 650 MG: 325 TABLET ORAL at 08:18

## 2025-07-20 RX ADMIN — QUETIAPINE FUMARATE 25 MG: 25 TABLET ORAL at 21:37

## 2025-07-20 RX ADMIN — ACETAMINOPHEN 650 MG: 325 TABLET ORAL at 21:37

## 2025-07-20 RX ADMIN — VENLAFAXINE HYDROCHLORIDE 75 MG: 75 CAPSULE, EXTENDED RELEASE ORAL at 08:19

## 2025-07-20 RX ADMIN — PIPERACILLIN AND TAZOBACTAM 3375 MG: 3; .375 INJECTION, POWDER, FOR SOLUTION INTRAVENOUS at 08:27

## 2025-07-20 RX ADMIN — SODIUM CHLORIDE, PRESERVATIVE FREE 10 ML: 5 INJECTION INTRAVENOUS at 08:19

## 2025-07-20 RX ADMIN — PANTOPRAZOLE SODIUM 40 MG: 40 TABLET, DELAYED RELEASE ORAL at 06:00

## 2025-07-20 RX ADMIN — QUETIAPINE FUMARATE 50 MG: 100 TABLET ORAL at 15:00

## 2025-07-20 RX ADMIN — ENOXAPARIN SODIUM 40 MG: 100 INJECTION SUBCUTANEOUS at 08:18

## 2025-07-20 RX ADMIN — LOSARTAN POTASSIUM 50 MG: 25 TABLET, FILM COATED ORAL at 08:19

## 2025-07-20 RX ADMIN — PIPERACILLIN AND TAZOBACTAM 3375 MG: 3; .375 INJECTION, POWDER, FOR SOLUTION INTRAVENOUS at 15:54

## 2025-07-20 RX ADMIN — TRAMADOL HYDROCHLORIDE 50 MG: 50 TABLET, COATED ORAL at 17:46

## 2025-07-20 RX ADMIN — LAMOTRIGINE 200 MG: 100 TABLET ORAL at 21:36

## 2025-07-20 RX ADMIN — PIPERACILLIN AND TAZOBACTAM 3375 MG: 3; .375 INJECTION, POWDER, FOR SOLUTION INTRAVENOUS at 00:05

## 2025-07-20 RX ADMIN — BUSPIRONE HYDROCHLORIDE 10 MG: 10 TABLET ORAL at 21:37

## 2025-07-20 RX ADMIN — SODIUM CHLORIDE, PRESERVATIVE FREE 10 ML: 5 INJECTION INTRAVENOUS at 21:38

## 2025-07-20 RX ADMIN — POLYETHYLENE GLYCOL 3350 17 G: 17 POWDER, FOR SOLUTION ORAL at 08:19

## 2025-07-20 RX ADMIN — VENLAFAXINE HYDROCHLORIDE 150 MG: 150 CAPSULE, EXTENDED RELEASE ORAL at 08:19

## 2025-07-20 RX ADMIN — LAMOTRIGINE 100 MG: 100 TABLET ORAL at 08:19

## 2025-07-20 ASSESSMENT — PAIN SCALES - GENERAL
PAINLEVEL_OUTOF10: 9
PAINLEVEL_OUTOF10: 4
PAINLEVEL_OUTOF10: 3
PAINLEVEL_OUTOF10: 0

## 2025-07-20 ASSESSMENT — PAIN DESCRIPTION - ORIENTATION
ORIENTATION: RIGHT;LEFT
ORIENTATION: RIGHT;LEFT

## 2025-07-20 ASSESSMENT — PAIN DESCRIPTION - DESCRIPTORS
DESCRIPTORS: ACHING
DESCRIPTORS: ACHING

## 2025-07-20 ASSESSMENT — PAIN DESCRIPTION - LOCATION
LOCATION: ABDOMEN
LOCATION: ABDOMEN

## 2025-07-20 NOTE — PLAN OF CARE
Problem: Chronic Conditions and Co-morbidities  Goal: Patient's chronic conditions and co-morbidity symptoms are monitored and maintained or improved  Outcome: Progressing  Flowsheets (Taken 7/19/2025 1920 by Kandice Boswell RN)  Care Plan - Patient's Chronic Conditions and Co-Morbidity Symptoms are Monitored and Maintained or Improved: Monitor and assess patient's chronic conditions and comorbid symptoms for stability, deterioration, or improvement     Problem: Discharge Planning  Goal: Discharge to home or other facility with appropriate resources  Outcome: Progressing  Note: Discharge back to [facility] when medically stable,  following     Problem: Safety - Adult  Goal: Free from fall injury  Outcome: Progressing  Note: No falls noted this shift. Continue falling star program. Bed alarm on, bed in low position. Call light and personal belongings in reach.  Patient uses call light appropriately.       Problem: Neurosensory - Adult  Goal: Achieves stable or improved neurological status  Outcome: Progressing  Note: Seroquel PRN, Buspirone PRN, encourage open blinds, lights on, sitter at bedside     Problem: Skin/Tissue Integrity - Adult  Goal: Skin integrity remains intact  Description: 1.  Monitor for areas of redness and/or skin breakdown  2.  Assess vascular access sites hourly  3.  Every 4-6 hours minimum:  Change oxygen saturation probe site  4.  Every 4-6 hours:  If on nasal continuous positive airway pressure, respiratory therapy assess nares and determine need for appliance change or resting period  Outcome: Progressing  Note: No new signs or symptoms of skin breakdown noted this shift, encouraging patient to turn and reposition self in bed q2h       Problem: Musculoskeletal - Adult  Goal: Return mobility to safest level of function  Outcome: Progressing  Note: Ambulation with walker and staff stand by for medical devices, encourage walks in halls, PT/OT     Problem: Skin/Tissue

## 2025-07-20 NOTE — PLAN OF CARE
Problem: Chronic Conditions and Co-morbidities  Goal: Patient's chronic conditions and co-morbidity symptoms are monitored and maintained or improved  7/20/2025 1944 by Shayla Whelan RN  Outcome: Progressing  7/20/2025 1322 by Aldo Robledo  Outcome: Progressing  Flowsheets (Taken 7/19/2025 1920 by Kandice Boswell, RN)  Care Plan - Patient's Chronic Conditions and Co-Morbidity Symptoms are Monitored and Maintained or Improved: Monitor and assess patient's chronic conditions and comorbid symptoms for stability, deterioration, or improvement     Problem: Discharge Planning  Goal: Discharge to home or other facility with appropriate resources  7/20/2025 1944 by Shayla Whelan RN  Outcome: Progressing  7/20/2025 1322 by Aldo Robledo  Outcome: Progressing  Note: Discharge back to [facility] when medically stable,  following     Problem: Safety - Adult  Goal: Free from fall injury  7/20/2025 1944 by Shayla Whelan RN  Outcome: Progressing  7/20/2025 1322 by Aldo Robledo  Outcome: Progressing  Note: No falls noted this shift. Continue falling star program. Bed alarm on, bed in low position. Call light and personal belongings in reach.  Patient uses call light appropriately.       Problem: Neurosensory - Adult  Goal: Achieves stable or improved neurological status  7/20/2025 1944 by Shayla Whelan RN  Outcome: Progressing  7/20/2025 1322 by Aldo Robledo  Outcome: Progressing  Note: Seroquel PRN, Buspirone PRN, encourage open blinds, lights on, sitter at bedside     Problem: Respiratory - Adult  Goal: Achieves optimal ventilation and oxygenation  Outcome: Progressing     Problem: Cardiovascular - Adult  Goal: Maintains optimal cardiac output and hemodynamic stability  Outcome: Progressing  Goal: Absence of cardiac dysrhythmias or at baseline  Outcome: Progressing     Problem: Skin/Tissue Integrity - Adult  Goal: Skin integrity remains intact  Description: 1.  Monitor for areas of

## 2025-07-20 NOTE — PLAN OF CARE
Problem: Chronic Conditions and Co-morbidities  Goal: Patient's chronic conditions and co-morbidity symptoms are monitored and maintained or improved  Outcome: Progressing  Flowsheets (Taken 7/19/2025 1920)  Care Plan - Patient's Chronic Conditions and Co-Morbidity Symptoms are Monitored and Maintained or Improved: Monitor and assess patient's chronic conditions and comorbid symptoms for stability, deterioration, or improvement     Problem: Discharge Planning  Goal: Discharge to home or other facility with appropriate resources  Outcome: Progressing  Flowsheets (Taken 7/19/2025 1920)  Discharge to home or other facility with appropriate resources: Identify barriers to discharge with patient and caregiver     Problem: Safety - Adult  Goal: Free from fall injury  Outcome: Progressing  Flowsheets (Taken 7/18/2025 1448 by Katty Lam, RN)  Free From Fall Injury: Instruct family/caregiver on patient safety     Problem: Neurosensory - Adult  Goal: Achieves stable or improved neurological status  Outcome: Progressing  Flowsheets (Taken 7/19/2025 1920)  Achieves stable or improved neurological status:   Assess for and report changes in neurological status   Initiate measures to prevent increased intracranial pressure     Problem: Cardiovascular - Adult  Goal: Maintains optimal cardiac output and hemodynamic stability  Outcome: Progressing  Flowsheets (Taken 7/19/2025 1920)  Maintains optimal cardiac output and hemodynamic stability:   Monitor blood pressure and heart rate   Monitor urine output and notify Licensed Independent Practitioner for values outside of normal range   Assess for signs of decreased cardiac output  Goal: Absence of cardiac dysrhythmias or at baseline  Outcome: Progressing  Flowsheets (Taken 7/19/2025 1920)  Absence of cardiac dysrhythmias or at baseline: Monitor cardiac rate and rhythm     Problem: Skin/Tissue Integrity - Adult  Goal: Skin integrity remains intact  Description: 1.  Monitor for

## 2025-07-20 NOTE — PROGRESS NOTES
Physician Progress Note      PATIENT:               MYRIAM AMBROCIO  CSN #:                  [id number]  :                       1950  ADMIT DATE:       2025 11:22 AM  DISCH DATE:  RESPONDING  PROVIDER #:        Romina Trevino CNP          QUERY TEXT:    Intra abdominal abscess is documented in the medical record .  Please clarify   any associated diagnoses:    The clinical indicators include:  74yoF with post op intra abdominal abscess    Per lab on , WBC 13.6.  Procalcitonin 0.24. CRP 23.60  Initial VS on  at 119-Temperature of 98.4(oral), , RR 22, 138/59(VS   flowsheet)  VS on  at 1655-No temperature documented, , RR 23, 160/64(VS   flowsheet)  Per ID note on , \"Intraabdominal post surgical fluid collection: growing   E.coli on iv zosyn\"    Treatment per MAR: Rocephin. Flagyl. 1 liter IVF bolus. Zosyn  Options provided:  -- Sepsis, present on admission  -- Localized infection without sepsis  -- Other - I will add my own diagnosis  -- Disagree - Not applicable / Not valid  -- Disagree - Clinically unable to determine / Unknown  -- Refer to Clinical Documentation Reviewer    PROVIDER RESPONSE TEXT:    This patient has localized infection without sepsis.    Query created by: CAMILA LR on 2025 12:19 PM      Electronically signed by:  Romina Trevino CNP 2025 4:03 PM

## 2025-07-20 NOTE — PROGRESS NOTES
Spiritual Health History and Assessment/Progress Note  [facility]    Spiritual/Emotional Needs,  ,  ,      Name: Adry Moura MRN: [id number]    Age: 74 y.o.     Sex: female   Language: English   Catholic: Temple   Intra-abdominal abscess (HCC)     Date: 7/20/2025            Total Time Calculated: 15 min              Spiritual Assessment continued in [facility] RENAL TELEMETRY 6K        Referral/Consult From: Nurse   Encounter Overview/Reason: Spiritual/Emotional Needs  Service Provided For: Patient    Jolanta, Belief, Meaning:   Patient identifies as spiritual, is connected with a jolanta tradition or spiritual practice, and has beliefs or practices that help with coping during difficult times  Family/Friends No family/friends present      Importance and Influence:  Patient has spiritual/personal beliefs that influence decisions regarding their health  Family/Friends No family/friends present    Community:  Patient is connected with a spiritual community  Family/Friends No family/friends present    Assessment and Plan of Care:   PT is a 74 yr old single woman, pt nurse Aldo shared that she put the consult in last night because the pt was discouraged about quality of life. Pt welcomed a  visit. Through active listening the pt shared she \"was doing much better than she was yesterday, and the she hasn't been quite herself.\" Pt was open for the  to pray for her and requested to pray for her to get her strength back. This  prayed for the pt per her Yarsanism wishes.   Patient Interventions include: Provided sacramental/Yarsanism ritual  Family/Friends Interventions include: No family/friends present    Patient Plan of Care: Spiritual Care available upon further referral  Family/Friends Plan of Care: No family/friends present    Electronically signed by Chaplain Nicky on 7/20/2025 at 5:15 PM

## 2025-07-21 ENCOUNTER — APPOINTMENT (OUTPATIENT)
Dept: CT IMAGING | Age: 75
End: 2025-07-21
Payer: MEDICARE

## 2025-07-21 LAB
ANION GAP SERPL CALC-SCNC: 13 MEQ/L (ref 8–16)
BUN SERPL-MCNC: 11 MG/DL (ref 8–23)
CALCIUM SERPL-MCNC: 8.9 MG/DL (ref 8.8–10.2)
CHLORIDE SERPL-SCNC: 103 MEQ/L (ref 98–111)
CO2 SERPL-SCNC: 26 MEQ/L (ref 22–29)
CREAT SERPL-MCNC: 0.5 MG/DL (ref 0.5–0.9)
DEPRECATED RDW RBC AUTO: 52.2 FL (ref 35–45)
ERYTHROCYTE [DISTWIDTH] IN BLOOD BY AUTOMATED COUNT: 14.6 % (ref 11.5–14.5)
GFR SERPL CREATININE-BSD FRML MDRD: > 90 ML/MIN/1.73M2
GLUCOSE BLD STRIP.AUTO-MCNC: 72 MG/DL (ref 70–108)
GLUCOSE BLD STRIP.AUTO-MCNC: 79 MG/DL (ref 70–108)
GLUCOSE BLD STRIP.AUTO-MCNC: 80 MG/DL (ref 70–108)
GLUCOSE BLD STRIP.AUTO-MCNC: 83 MG/DL (ref 70–108)
GLUCOSE SERPL-MCNC: 82 MG/DL (ref 74–109)
HCT VFR BLD AUTO: 33.8 % (ref 37–47)
HGB BLD-MCNC: 10.4 GM/DL (ref 12–16)
MCH RBC QN AUTO: 29.8 PG (ref 26–33)
MCHC RBC AUTO-ENTMCNC: 30.8 GM/DL (ref 32.2–35.5)
MCV RBC AUTO: 96.8 FL (ref 81–99)
PLATELET # BLD AUTO: 509 THOU/MM3 (ref 130–400)
PMV BLD AUTO: 8.8 FL (ref 9.4–12.4)
POTASSIUM SERPL-SCNC: 4.1 MEQ/L (ref 3.5–5.2)
RBC # BLD AUTO: 3.49 MILL/MM3 (ref 4.2–5.4)
SODIUM SERPL-SCNC: 142 MEQ/L (ref 135–145)
WBC # BLD AUTO: 11.1 THOU/MM3 (ref 4.8–10.8)

## 2025-07-21 PROCEDURE — 2580000003 HC RX 258: Performed by: STUDENT IN AN ORGANIZED HEALTH CARE EDUCATION/TRAINING PROGRAM

## 2025-07-21 PROCEDURE — 1200000000 HC SEMI PRIVATE

## 2025-07-21 PROCEDURE — 2500000003 HC RX 250 WO HCPCS: Performed by: STUDENT IN AN ORGANIZED HEALTH CARE EDUCATION/TRAINING PROGRAM

## 2025-07-21 PROCEDURE — 6370000000 HC RX 637 (ALT 250 FOR IP): Performed by: PHYSICIAN ASSISTANT

## 2025-07-21 PROCEDURE — 6360000004 HC RX CONTRAST MEDICATION: Performed by: NURSE PRACTITIONER

## 2025-07-21 PROCEDURE — APPNB30 APP NON BILLABLE TIME 0-30 MINS

## 2025-07-21 PROCEDURE — 36415 COLL VENOUS BLD VENIPUNCTURE: CPT

## 2025-07-21 PROCEDURE — 82948 REAGENT STRIP/BLOOD GLUCOSE: CPT

## 2025-07-21 PROCEDURE — 99232 SBSQ HOSP IP/OBS MODERATE 35: CPT | Performed by: NURSE PRACTITIONER

## 2025-07-21 PROCEDURE — 74177 CT ABD & PELVIS W/CONTRAST: CPT

## 2025-07-21 PROCEDURE — 6360000002 HC RX W HCPCS: Performed by: STUDENT IN AN ORGANIZED HEALTH CARE EDUCATION/TRAINING PROGRAM

## 2025-07-21 PROCEDURE — 6360000002 HC RX W HCPCS: Performed by: NURSE PRACTITIONER

## 2025-07-21 PROCEDURE — 97535 SELF CARE MNGMENT TRAINING: CPT

## 2025-07-21 PROCEDURE — 97116 GAIT TRAINING THERAPY: CPT

## 2025-07-21 PROCEDURE — 99024 POSTOP FOLLOW-UP VISIT: CPT

## 2025-07-21 PROCEDURE — 97530 THERAPEUTIC ACTIVITIES: CPT

## 2025-07-21 PROCEDURE — 6370000000 HC RX 637 (ALT 250 FOR IP): Performed by: STUDENT IN AN ORGANIZED HEALTH CARE EDUCATION/TRAINING PROGRAM

## 2025-07-21 PROCEDURE — 80048 BASIC METABOLIC PNL TOTAL CA: CPT

## 2025-07-21 PROCEDURE — 6370000000 HC RX 637 (ALT 250 FOR IP): Performed by: NURSE PRACTITIONER

## 2025-07-21 PROCEDURE — 85027 COMPLETE CBC AUTOMATED: CPT

## 2025-07-21 PROCEDURE — 97110 THERAPEUTIC EXERCISES: CPT

## 2025-07-21 RX ORDER — IOPAMIDOL 755 MG/ML
80 INJECTION, SOLUTION INTRAVASCULAR
Status: COMPLETED | OUTPATIENT
Start: 2025-07-21 | End: 2025-07-21

## 2025-07-21 RX ADMIN — ACETAMINOPHEN 650 MG: 325 TABLET ORAL at 08:06

## 2025-07-21 RX ADMIN — LOSARTAN POTASSIUM 25 MG: 25 TABLET, FILM COATED ORAL at 17:24

## 2025-07-21 RX ADMIN — QUETIAPINE FUMARATE 25 MG: 25 TABLET ORAL at 20:50

## 2025-07-21 RX ADMIN — LAMOTRIGINE 200 MG: 100 TABLET ORAL at 20:50

## 2025-07-21 RX ADMIN — VENLAFAXINE HYDROCHLORIDE 150 MG: 150 CAPSULE, EXTENDED RELEASE ORAL at 08:06

## 2025-07-21 RX ADMIN — SODIUM CHLORIDE, PRESERVATIVE FREE 10 ML: 5 INJECTION INTRAVENOUS at 08:07

## 2025-07-21 RX ADMIN — QUETIAPINE FUMARATE 50 MG: 100 TABLET ORAL at 17:24

## 2025-07-21 RX ADMIN — PIPERACILLIN AND TAZOBACTAM 3375 MG: 3; .375 INJECTION, POWDER, FOR SOLUTION INTRAVENOUS at 15:35

## 2025-07-21 RX ADMIN — BUSPIRONE HYDROCHLORIDE 7.5 MG: 7.5 TABLET ORAL at 16:27

## 2025-07-21 RX ADMIN — VENLAFAXINE HYDROCHLORIDE 75 MG: 75 CAPSULE, EXTENDED RELEASE ORAL at 08:06

## 2025-07-21 RX ADMIN — PANTOPRAZOLE SODIUM 40 MG: 40 TABLET, DELAYED RELEASE ORAL at 08:06

## 2025-07-21 RX ADMIN — IOPAMIDOL 80 ML: 755 INJECTION, SOLUTION INTRAVENOUS at 16:37

## 2025-07-21 RX ADMIN — LAMOTRIGINE 100 MG: 100 TABLET ORAL at 08:06

## 2025-07-21 RX ADMIN — PIPERACILLIN AND TAZOBACTAM 3375 MG: 3; .375 INJECTION, POWDER, FOR SOLUTION INTRAVENOUS at 08:11

## 2025-07-21 RX ADMIN — PIPERACILLIN AND TAZOBACTAM 3375 MG: 3; .375 INJECTION, POWDER, FOR SOLUTION INTRAVENOUS at 22:30

## 2025-07-21 RX ADMIN — ACETAMINOPHEN 650 MG: 325 TABLET ORAL at 20:49

## 2025-07-21 RX ADMIN — LOSARTAN POTASSIUM 50 MG: 25 TABLET, FILM COATED ORAL at 08:06

## 2025-07-21 RX ADMIN — PIPERACILLIN AND TAZOBACTAM 3375 MG: 3; .375 INJECTION, POWDER, FOR SOLUTION INTRAVENOUS at 00:02

## 2025-07-21 RX ADMIN — ENOXAPARIN SODIUM 40 MG: 100 INJECTION SUBCUTANEOUS at 08:06

## 2025-07-21 RX ADMIN — MORPHINE SULFATE 1 MG: 2 INJECTION, SOLUTION INTRAMUSCULAR; INTRAVENOUS at 17:22

## 2025-07-21 RX ADMIN — SODIUM CHLORIDE, PRESERVATIVE FREE 10 ML: 5 INJECTION INTRAVENOUS at 20:51

## 2025-07-21 ASSESSMENT — PAIN DESCRIPTION - LOCATION: LOCATION: ARM

## 2025-07-21 ASSESSMENT — PAIN - FUNCTIONAL ASSESSMENT: PAIN_FUNCTIONAL_ASSESSMENT: ACTIVITIES ARE NOT PREVENTED

## 2025-07-21 ASSESSMENT — PAIN SCALES - GENERAL
PAINLEVEL_OUTOF10: 6
PAINLEVEL_OUTOF10: 5
PAINLEVEL_OUTOF10: 0

## 2025-07-21 ASSESSMENT — PAIN DESCRIPTION - ORIENTATION: ORIENTATION: RIGHT;LEFT

## 2025-07-21 ASSESSMENT — PAIN DESCRIPTION - DESCRIPTORS: DESCRIPTORS: BURNING

## 2025-07-21 ASSESSMENT — PAIN SCALES - WONG BAKER: WONGBAKER_NUMERICALRESPONSE: NO HURT

## 2025-07-21 NOTE — PROGRESS NOTES
[facility]  INPATIENT PHYSICAL THERAPY  DAILY NOTE  [facility] TELEMET[facility] 6K - 6K-17/017-A      Discharge Recommendations: Subacute/Skilled Nursing Facility and Patient would benefit from continued PT at discharge  Equipment Recommendations: No             Time In: 1007  Time Out: 1034  Timed Code Treatment Minutes: 27 Minutes  Minutes: 27          Date: 2025  Patient Name: Adry Moura,  Gender:  female        MRN: [id number]  : 1950  (74 y.o.)     Referring Practitioner: Uriel Madera, APRN - CNP  Diagnosis: Intra-abdominal abscess (HCC)  Additional Pertinent Hx: Per EMR \"Adry Moura is a 74 y.o. female with PMHx of Bariatric Surgery s/p total gastrectomy, subtotal esophagectomy with colo-jejunostomy, Adenocarcinoma of colon s/p low anterior resection, Bipolar disorder, HTN and NIDDm  who presents to [facility] from SNF/ [facility] with Generalized abdominal pain.    Patient states that she has been having abdominal pain associated with nausea, decreased appetite and  weakness since her discharge to SNF after surgery.  Patient states that the abdominal pain is generalized, sharp, exacerbated by PT/OT/exercise.  Per reports, patient was found to be hypoxic at nursing home but was saturating on room air in the ED.  \" Status post IR drain placement     Prior Level of Function:  Lives With: Significant other  Type of Home: Assisted living  Home Layout: One level  Home Access: Level entry  Home Equipment: Rollator   Bathroom Shower/Tub: Walk-in shower  Bathroom Toilet: Handicap height  Bathroom Equipment: Grab bars in shower, Shower chair, Grab bars around toilet  Bathroom Accessibility: Walker accessible    Receives Help From: Other (Comment) (facility staff)  Prior Level of Assist for ADLs: Independent  Prior Level of Assist for Homemaking: Independent  Homemaking Responsibilities: No  Ambulation Assistance: Needs assistance  Prior Level of Assist for

## 2025-07-21 NOTE — PROGRESS NOTES
[facility]  [facility] RENAL TELEMETRY 6K  Occupational Therapy  Daily Note    Discharge Recommendations: Home with Home Health OT AL  Equipment Recommendations: No         Time In: 1114  Time Out: 1137  Timed Code Treatment Minutes: 23 Minutes  Minutes: 23          Date: 2025  Patient Name: Adry Moura,   Gender: female      Room: [facility]17/017-A  MRN: [id number]  : 1950  (74 y.o.)  Referring Practitioner: Uriel Mdaera, EVA - CNP  Diagnosis: Intra-abdominal abscess (HCC)  Additional Pertinent Hx: per chart review; \"Adry Moura is a 74 y.o. female with PMHx of Bariatric Surgery s/p total gastrectomy, subtotal esophagectomy with colo-jejunostomy, Adenocarcinoma of colon s/p low anterior resection, Bipolar disorder, HTN and NIDDm  who presents to [facility] from SNF/ [facility] with Generalized abdominal pain.    Patient states that she has been having abdominal pain associated with nausea, decreased appetite and  weakness since her discharge to SNF after surgery.  Patient states that the abdominal pain is generalized, sharp, exacerbated by PT/OT/exercise.  Per reports, patient was found to be hypoxic at nursing home but was saturating on room air in the ED.  \"    Restrictions/Precautions:  Restrictions/Precautions: Fall Risk, General Precautions  Position Activity Restriction  Other Position/Activity Restrictions: L drain      Social/Functional History:  Lives With: Significant other  Type of Home: Assisted living  Home Layout: One level  Home Access: Level entry  Home Equipment: Rollator   Bathroom Shower/Tub: Walk-in shower  Bathroom Toilet: Handicap height  Bathroom Equipment: Grab bars in shower, Shower chair, Grab bars around toilet  Bathroom Accessibility: Walker accessible    Receives Help From: Other (Comment) (facility staff)  Prior Level of Assist for ADLs: Independent  Prior Level of Assist for Homemaking: Independent  Homemaking Responsibilities:

## 2025-07-21 NOTE — PROGRESS NOTES
Hospitalist Progress Note      Patient:  Adry Moura 74 y.o. female       : 1950  Unit/Bed:6K-17/017-A    Date of Admission: 2025      ASSESSMENT AND PLAN    Active Problems  Post operative intra-abdominal abscess:  Underwent colonoscopy in May 2025 and then later Radha 3, 2025 and found to have 3 cm mass which was found to have moderately differentiated adenocarcinoma of colon resulting in low anterior resection on 25 by Dr. Hale.  CT A/P in the ED with large intra-abdominal abscess within anterior aspect of abdomen and pelvis measuring 16.0 x 5.5 x 18.8 cm   General Surgery consulted and following, recommends no surgical intervention at this time, to have IR place drain via CT guidance.   S/p CT-guided drain placement  with culture growing E coli.  Blood cultures NGTD.  Started on Zosyn on .    ID consulted and following, cont Zosyn (start ). Plan PO at dc.   Routine drain care.  Monitor outputs and record.  Flush drain as ordered.     Repeat CT  abscess measures 10.2 x 3.1 x 15.6 cm.   : Repeat CT A/P today demonstrates the residual moderate to large intra-abdominal abscess within the anterior aspect of the lower abdomen and pelvis is not significantly changed in size measuring up to 5.5 x 12.5 x cm in short axis.  Likely due to drain not functioning well due to lack of flushing since insertion. This is also likely the cause of increased WBC today up to 15.2.   : Drain unable to be flushed.  Returned to IR for CT guided drain insertion today.  ID recommends to continue IV antibiotics for now.    : Bilateral lower abdominal drains in place.  Flushing per surgery recommendations.  Left drain with 180 ml out today so far and right drain with 30 ml out.  Continue to monitor output and continue IV ATB.  If continues with minimal output on Monday, will as IR to exchange drain tubes for larger size as drainage was noted to be thick during yesterday's drain  50 mg Oral Daily    sodium chloride flush  5-40 mL IntraVENous 2 times per day    enoxaparin  40 mg SubCUTAneous Daily    [Held by provider] acarbose  50 mg Oral TID WC    busPIRone  10 mg Oral Nightly    lamoTRIgine  100 mg Oral QAM    lamoTRIgine  200 mg Oral Nightly    [Held by provider] cetirizine  10 mg Oral Daily    QUEtiapine  25 mg Oral Nightly    insulin lispro  0-4 Units SubCUTAneous 4x Daily AC & HS    piperacillin-tazobactam  3,375 mg IntraVENous Q8H    pantoprazole  40 mg Oral QAM AC    venlafaxine  75 mg Oral Daily with breakfast    And    venlafaxine  150 mg Oral Daily with breakfast    PRN Meds: sennosides-docusate sodium, QUEtiapine, sodium chloride flush, sodium chloride, potassium chloride **OR** potassium alternative oral replacement **OR** potassium chloride, magnesium sulfate, ondansetron **OR** ondansetron, polyethylene glycol, acetaminophen **OR** acetaminophen, busPIRone, dicyclomine, [Held by provider] docusate sodium, traMADol, morphine, glucose, dextrose bolus **OR** dextrose bolus, glucagon (rDNA), dextrose    Exam:  /65   Pulse 90   Temp 98.5 °F (36.9 °C) (Oral)   Resp 16   Ht 1.676 m (5' 6\")   Wt 58.2 kg (128 lb 4.9 oz)   LMP 03/20/1985 Comment: hysterectomy in her 30s  SpO2 98%   BMI 20.71 kg/m²   General: No distress, appears stated age.   Eyes:  PERRL. Conjunctivae/corneas clear.  HENT: Head normal appearing. Nares normal. Oral mucosa moist.  Hearing intact.   Neck: Supple, with full range of motion. Trachea midline.  No gross JVD appreciated.  Respiratory:  Normal effort. Clear to auscultation, without rales or wheezes or rhonchi.  Cardiovascular: Normal rate, regular rhythm with normal S1/S2 without murmurs.    No lower extremity edema.   Abdomen: Soft, non-tender, non-distended with normal bowel sounds. Bilateral lower quadrant abdominal drains with brown/bloody drainage in bags.    Musculoskeletal: No joint swelling or tenderness. Normal tone. No abnormal

## 2025-07-21 NOTE — PROGRESS NOTES
Comprehensive Nutrition Assessment    Type and Reason for Visit:  Reassess (po/supplement)    Nutrition Recommendations/Plan:   Recommend MVI.  Continue ONS: Glucerna TID (strawberry or chocolate).  Previously encouraged patient to drink in between meals d/t patient's current GI anatomy/early satiety, to mimic 6 small feedings per day.   Continue high protein extra with each meal, yogurt BID and cottage cheese/peaches daily.  Continue current diet.  Continue to encouraged oral intake.         Malnutrition Assessment:  Malnutrition Status:  Severe malnutrition (07/17/25 1456)    Context:  Acute Illness     Findings of the 6 clinical characteristics of malnutrition:  Energy Intake:  50% or less of estimated energy requirements for 5 or more days  Weight Loss:  5% over 1 month (6.1% loss in the last 2.9 weeks)     Body Fat Loss:  Moderate body fat loss Orbital, Triceps, Fat Overlying Ribs, Buccal region   Muscle Mass Loss:  Moderate muscle mass loss Temples (temporalis), Clavicles (pectoralis & deltoids), Thigh (quadriceps), Calf (gastrocnemius)  Fluid Accumulation:  No fluid accumulation     Strength:  Not Performed    Nutrition Assessment:     Pt. severely malnourished AEB criteria listed above.  At risk for further nutritional compromise r/t continued poor oral intake/intake, altered GI anatomy/function, increased nutrient needs d/t known losses with hx gastric bypass and post-op healing, recent anterior resection on 6/19/25 d/t adenocarcinoma of colon, intraabdominal post-surgical fluid collection with drain placement 7/8/25, acute metabolic encephalopathy-resolving,  and underlying medical condition (hx:  has a past medical history of Arthritis, Bowel obstruction (HCC), Bronchitis, Diabetes mellitus (HCC), Difficult intubation, DVT, lower extremity (HCC), GERD (gastroesophageal reflux disease), History of blood transfusion, Hx of blood clots, Hypertension, Hypoglycemia, Kidney stone, Macular degeneration,  (55 kg)  Energy (kcal/day): ~ 6327-2090 kcals (30-35 kcals/kg/day)  Weight Used for Protein Requirements:  (55 kg)  Protein (g/day): ~  grams (1.2-2 grams/kg/day)         Nutrition Diagnosis:   Severe malnutrition, in context of acute illness or injury related to acute injury/trauma, decreased appetite, Altered GI structure as evidenced by criteria as identified in malnutrition assessment    Nutrition Interventions:   Food and/or Nutrient Delivery: Continue Current Diet, Continue Oral Nutrition Supplement  Nutrition Education/Counseling: Education/Counseling initiated  Coordination of Nutrition Care: Continue to monitor while inpatient, Coordination of Care       Goals:  Goals: PO intake 75% or greater, by next RD assessment  Type of Goal: Continue current goal       Nutrition Monitoring and Evaluation:      Food/Nutrient Intake Outcomes: Diet Advancement/Tolerance, Food and Nutrient Intake, Supplement Intake  Physical Signs/Symptoms Outcomes: Biochemical Data, Chewing or Swallowing, GI Status, Fluid Status or Edema, Nutrition Focused Physical Findings, Skin, Weight    Discharge Planning:    Too soon to determine     Allison C Schwab, RD, LD  Contact: [phone]

## 2025-07-21 NOTE — PLAN OF CARE
Problem: Chronic Conditions and Co-morbidities  Goal: Patient's chronic conditions and co-morbidity symptoms are monitored and maintained or improved  Outcome: Progressing  Flowsheets (Taken 7/21/2025 1316)  Care Plan - Patient's Chronic Conditions and Co-Morbidity Symptoms are Monitored and Maintained or Improved:   Monitor and assess patient's chronic conditions and comorbid symptoms for stability, deterioration, or improvement   Collaborate with multidisciplinary team to address chronic and comorbid conditions and prevent exacerbation or deterioration   Update acute care plan with appropriate goals if chronic or comorbid symptoms are exacerbated and prevent overall improvement and discharge     Problem: Discharge Planning  Goal: Discharge to home or other facility with appropriate resources  Outcome: Progressing  Flowsheets (Taken 7/21/2025 1316)  Discharge to home or other facility with appropriate resources:   Identify barriers to discharge with patient and caregiver   Arrange for needed discharge resources and transportation as appropriate   Identify discharge learning needs (meds, wound care, etc)   Refer to discharge planning if patient needs post-hospital services based on physician order or complex needs related to functional status, cognitive ability or social support system     Problem: Safety - Adult  Goal: Free from fall injury  Outcome: Progressing  Flowsheets (Taken 7/21/2025 1316)  Free From Fall Injury:   Instruct family/caregiver on patient safety   Based on caregiver fall risk screen, instruct family/caregiver to ask for assistance with transferring infant if caregiver noted to have fall risk factors     Problem: Neurosensory - Adult  Goal: Achieves stable or improved neurological status  Outcome: Progressing  Flowsheets (Taken 7/21/2025 1316)  Achieves stable or improved neurological status: Assess for and report changes in neurological status     Problem: Respiratory - Adult  Goal: Achieves

## 2025-07-21 NOTE — CARE COORDINATION
7/21/25, 3:23 PM EDT    DISCHARGE ON GOING EVALUATION    Adry Moura       [facility] day: 13  Location: -17/017-A Reason for admit: Abscess of abdominal wall [L02.211]  Intra-abdominal abscess (HCC) [K65.1]     Procedures:   7/8 CT abscess drainage, attached to Deni-Close   7/17 CT abscess drainage, Deni-Close drain placed    Imaging since last note:   7/17 CT Abd/pelvis: 1. The drainage catheter terminates within the left lower quadrant, unchanged.  The gas and fluid collection when measured at the level of the catheter is  slightly larger with representative measurements provided in the discussion.  There continues to be a peripherally enhancing fluid collection extending along  the right adnexa which is not significantly changed in size/slightly smaller.  The distention of the bowel loops in the left hemiabdomen has progressed.    Barriers to Discharge: A 2nd Deni-close drain placed in IR on 7/17 for abdominal abscess drainage. Plan for CT abd/pelvis to evaluate drains. Sitter at bedside. On room air. 's. Afebrile. Ox4, disoriented at times. Truclose drain x2. Abdominal abscess fluid +EColi. Telemetry, drain care. Limited Code - no x4. Hospitalist and ID following. Plan for oral ATB at discharge. Lovenox, SSI, IV zosyn Q8H, prn ultram, Electrolyte replacement protocols. WBC 11.1, hgb 10.4.     PCP: María Neil APRN - CNP  Readmission Risk Score: 22.6    Patient Goals/Plan/Treatment Preferences: Return to [facility]. No precert. Son to transport. SW on case.

## 2025-07-21 NOTE — PROGRESS NOTES
Progress note: Infectious diseases    Patient - Adry Moura,  Age - 74 y.o.    - 1950      Room Number - 6K-17/017-A   MRN -  [id number]   [facility] # - [id number]  Date of Admission -  2025 11:22 AM    SUBJECTIVE:   She has no new complaints.doing well with therapy  OBJECTIVE   VITALS    height is 1.676 m (5' 6\") and weight is 58.2 kg (128 lb 4.9 oz). Her oral temperature is 98.5 °F (36.9 °C). Her blood pressure is 134/65 and her pulse is 90. Her respiration is 16 and oxygen saturation is 98%.       Wt Readings from Last 3 Encounters:   25 58.2 kg (128 lb 4.9 oz)   25 54.4 kg (120 lb)   25 59.5 kg (131 lb 2 oz)       I/O (24 Hours)    Intake/Output Summary (Last 24 hours) at 2025 0850  Last data filed at 2025 0002  Gross per 24 hour   Intake 140 ml   Output --   Net 140 ml       General Appearance  Awake, alert, oriented, chronically sick looking  HEENT - normocephalic, atraumatic, pale conjunctiva  Neck - Supple, has visible swelling on the anterior neck(pulled bowel from previous surgery)  Lungs -  Bilateral   air entry, no rhonchi, no wheeze  Cardiovascular - Heart sounds are normal.    Abdomen - soft, not distended, drains in place   Neurologic -awake and oriented.  Skin - No bruising or bleeding  Extremities - No edema, no cyanosis, clubbing     MEDICATIONS:      acetaminophen  650 mg Oral Q12H    polyethylene glycol  8.5 g Oral Once    polyethylene glycol  17 g Oral BID    losartan  25 mg Oral QPM    losartan  50 mg Oral Daily    sodium chloride flush  5-40 mL IntraVENous 2 times per day    enoxaparin  40 mg SubCUTAneous Daily    [Held by provider] acarbose  50 mg Oral TID WC    busPIRone  10 mg Oral Nightly    lamoTRIgine  100 mg Oral QAM    lamoTRIgine  200 mg Oral Nightly    [Held by provider] cetirizine  10 mg Oral Daily    QUEtiapine  25 mg Oral Nightly    insulin lispro  0-4

## 2025-07-21 NOTE — PROGRESS NOTES
[facility]  Lisset Johnson PA-C for Dr. Leoncio Hale  General Surgery Daily Progress Note    Pt Name: Adry Moura  Medical Record Number: [id number]  Date of Birth 1950   Today's Date: 7/21/2025    Hospital day # 13     ASSESSMENT   Large intra-abdominal fluid collection/possible abscess - Status post IR drain placement 7/8/25. 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  PLAN   Diet as tolerated. Having bowel function. Not eating much. ONS.  Bilateral IR PERC drains in place. Serosanguineous.  Antibiotics - infectious disease following. Will transition to oral at discharge.   Planning repeat CT abdomen pelvis later today. Awaiting imaging.  Pain & nausea control  DVT prophylaxis   PT/OT   Labs reviewed. WBC stable and decreasing now. 11.1.  Will need oncology appointment outpatient rescheduled  Case management for assistance with discharge planning, from [facility], social work consult  Clinically patient doing well. Bilateral drains in place with serosanguineous fluid. Repeating CT scan today. Possible washout, however patient does not necessarily want this. May increase drain size if needed due to clogging. No surgical

## 2025-07-22 LAB
ANION GAP SERPL CALC-SCNC: 11 MEQ/L (ref 8–16)
BUN SERPL-MCNC: 11 MG/DL (ref 8–23)
CALCIUM SERPL-MCNC: 8.3 MG/DL (ref 8.8–10.2)
CHLORIDE SERPL-SCNC: 104 MEQ/L (ref 98–111)
CO2 SERPL-SCNC: 26 MEQ/L (ref 22–29)
CREAT SERPL-MCNC: 0.6 MG/DL (ref 0.5–0.9)
DEPRECATED RDW RBC AUTO: 51.5 FL (ref 35–45)
ERYTHROCYTE [DISTWIDTH] IN BLOOD BY AUTOMATED COUNT: 14.6 % (ref 11.5–14.5)
GFR SERPL CREATININE-BSD FRML MDRD: > 90 ML/MIN/1.73M2
GLUCOSE BLD STRIP.AUTO-MCNC: 156 MG/DL (ref 70–108)
GLUCOSE BLD STRIP.AUTO-MCNC: 66 MG/DL (ref 70–108)
GLUCOSE BLD STRIP.AUTO-MCNC: 70 MG/DL (ref 70–108)
GLUCOSE BLD STRIP.AUTO-MCNC: 74 MG/DL (ref 70–108)
GLUCOSE BLD STRIP.AUTO-MCNC: 97 MG/DL (ref 70–108)
GLUCOSE SERPL-MCNC: 75 MG/DL (ref 74–109)
HCT VFR BLD AUTO: 32.5 % (ref 37–47)
HGB BLD-MCNC: 10.3 GM/DL (ref 12–16)
MCH RBC QN AUTO: 30.1 PG (ref 26–33)
MCHC RBC AUTO-ENTMCNC: 31.7 GM/DL (ref 32.2–35.5)
MCV RBC AUTO: 95 FL (ref 81–99)
PLATELET # BLD AUTO: 473 THOU/MM3 (ref 130–400)
PMV BLD AUTO: 8.7 FL (ref 9.4–12.4)
POTASSIUM SERPL-SCNC: 3.6 MEQ/L (ref 3.5–5.2)
RBC # BLD AUTO: 3.42 MILL/MM3 (ref 4.2–5.4)
SODIUM SERPL-SCNC: 141 MEQ/L (ref 135–145)
WBC # BLD AUTO: 9.5 THOU/MM3 (ref 4.8–10.8)

## 2025-07-22 PROCEDURE — 36415 COLL VENOUS BLD VENIPUNCTURE: CPT

## 2025-07-22 PROCEDURE — 82948 REAGENT STRIP/BLOOD GLUCOSE: CPT

## 2025-07-22 PROCEDURE — 6370000000 HC RX 637 (ALT 250 FOR IP): Performed by: STUDENT IN AN ORGANIZED HEALTH CARE EDUCATION/TRAINING PROGRAM

## 2025-07-22 PROCEDURE — 6360000002 HC RX W HCPCS: Performed by: NURSE PRACTITIONER

## 2025-07-22 PROCEDURE — 6370000000 HC RX 637 (ALT 250 FOR IP): Performed by: NURSE PRACTITIONER

## 2025-07-22 PROCEDURE — 80048 BASIC METABOLIC PNL TOTAL CA: CPT

## 2025-07-22 PROCEDURE — 99024 POSTOP FOLLOW-UP VISIT: CPT

## 2025-07-22 PROCEDURE — 6370000000 HC RX 637 (ALT 250 FOR IP)

## 2025-07-22 PROCEDURE — 6360000002 HC RX W HCPCS: Performed by: STUDENT IN AN ORGANIZED HEALTH CARE EDUCATION/TRAINING PROGRAM

## 2025-07-22 PROCEDURE — 1200000000 HC SEMI PRIVATE

## 2025-07-22 PROCEDURE — 99232 SBSQ HOSP IP/OBS MODERATE 35: CPT | Performed by: NURSE PRACTITIONER

## 2025-07-22 PROCEDURE — APPSS30 APP SPLIT SHARED TIME 16-30 MINUTES

## 2025-07-22 PROCEDURE — 85027 COMPLETE CBC AUTOMATED: CPT

## 2025-07-22 PROCEDURE — 6370000000 HC RX 637 (ALT 250 FOR IP): Performed by: PHYSICIAN ASSISTANT

## 2025-07-22 PROCEDURE — 2580000003 HC RX 258: Performed by: STUDENT IN AN ORGANIZED HEALTH CARE EDUCATION/TRAINING PROGRAM

## 2025-07-22 RX ORDER — LIDOCAINE HYDROCHLORIDE 20 MG/ML
15 SOLUTION OROPHARYNGEAL
Status: DISCONTINUED | OUTPATIENT
Start: 2025-07-22 | End: 2025-07-25 | Stop reason: HOSPADM

## 2025-07-22 RX ADMIN — BUSPIRONE HYDROCHLORIDE 10 MG: 10 TABLET ORAL at 21:20

## 2025-07-22 RX ADMIN — LOSARTAN POTASSIUM 50 MG: 25 TABLET, FILM COATED ORAL at 08:55

## 2025-07-22 RX ADMIN — VENLAFAXINE HYDROCHLORIDE 75 MG: 75 CAPSULE, EXTENDED RELEASE ORAL at 08:54

## 2025-07-22 RX ADMIN — PANTOPRAZOLE SODIUM 40 MG: 40 TABLET, DELAYED RELEASE ORAL at 05:00

## 2025-07-22 RX ADMIN — MORPHINE SULFATE 1 MG: 2 INJECTION, SOLUTION INTRAMUSCULAR; INTRAVENOUS at 11:50

## 2025-07-22 RX ADMIN — VENLAFAXINE HYDROCHLORIDE 150 MG: 150 CAPSULE, EXTENDED RELEASE ORAL at 08:54

## 2025-07-22 RX ADMIN — PHENOL 1 SPRAY: 1.5 LIQUID ORAL at 11:51

## 2025-07-22 RX ADMIN — LAMOTRIGINE 200 MG: 100 TABLET ORAL at 21:21

## 2025-07-22 RX ADMIN — ACETAMINOPHEN 650 MG: 325 TABLET ORAL at 21:20

## 2025-07-22 RX ADMIN — ENOXAPARIN SODIUM 40 MG: 100 INJECTION SUBCUTANEOUS at 08:55

## 2025-07-22 RX ADMIN — LOSARTAN POTASSIUM 25 MG: 25 TABLET, FILM COATED ORAL at 16:40

## 2025-07-22 RX ADMIN — PIPERACILLIN AND TAZOBACTAM 3375 MG: 3; .375 INJECTION, POWDER, FOR SOLUTION INTRAVENOUS at 15:56

## 2025-07-22 RX ADMIN — ACETAMINOPHEN 650 MG: 325 TABLET ORAL at 08:54

## 2025-07-22 RX ADMIN — QUETIAPINE FUMARATE 25 MG: 25 TABLET ORAL at 21:21

## 2025-07-22 RX ADMIN — LIDOCAINE HYDROCHLORIDE 15 ML: 20 SOLUTION ORAL at 13:11

## 2025-07-22 RX ADMIN — LAMOTRIGINE 100 MG: 100 TABLET ORAL at 08:54

## 2025-07-22 RX ADMIN — PIPERACILLIN AND TAZOBACTAM 3375 MG: 3; .375 INJECTION, POWDER, FOR SOLUTION INTRAVENOUS at 08:32

## 2025-07-22 RX ADMIN — PIPERACILLIN AND TAZOBACTAM 3375 MG: 3; .375 INJECTION, POWDER, FOR SOLUTION INTRAVENOUS at 23:20

## 2025-07-22 RX ADMIN — POLYETHYLENE GLYCOL 3350 17 G: 17 POWDER, FOR SOLUTION ORAL at 21:20

## 2025-07-22 ASSESSMENT — PAIN SCALES - GENERAL
PAINLEVEL_OUTOF10: 6
PAINLEVEL_OUTOF10: 5
PAINLEVEL_OUTOF10: 7
PAINLEVEL_OUTOF10: 5
PAINLEVEL_OUTOF10: 0

## 2025-07-22 ASSESSMENT — PAIN DESCRIPTION - ORIENTATION
ORIENTATION: RIGHT;LOWER
ORIENTATION: MID

## 2025-07-22 ASSESSMENT — PAIN DESCRIPTION - LOCATION
LOCATION: ABDOMEN;THROAT
LOCATION: THROAT

## 2025-07-22 ASSESSMENT — PAIN DESCRIPTION - DESCRIPTORS
DESCRIPTORS: BURNING
DESCRIPTORS: ACHING

## 2025-07-22 NOTE — PROGRESS NOTES
Progress note: Infectious diseases    Patient - Adry Moura,  Age - 74 y.o.    - 1950      Room Number - 6K-17/017-A   MRN -  [id number]   [facility] # - [id number]  Date of Admission -  2025 11:22 AM    SUBJECTIVE:   No new issues  Discussed with nursing staff  OBJECTIVE   VITALS    height is 1.676 m (5' 6\") and weight is 55.7 kg (122 lb 12.7 oz). Her oral temperature is 97.9 °F (36.6 °C). Her blood pressure is 102/54 (abnormal) and her pulse is 74. Her respiration is 16 and oxygen saturation is 95%.       Wt Readings from Last 3 Encounters:   25 55.7 kg (122 lb 12.7 oz)   25 54.4 kg (120 lb)   25 59.5 kg (131 lb 2 oz)       I/O (24 Hours)    Intake/Output Summary (Last 24 hours) at 2025 0829  Last data filed at 2025 0242  Gross per 24 hour   Intake 417.5 ml   Output 0 ml   Net 417.5 ml       General Appearance  Awake, alert, oriented, chronically sick looking  HEENT - normocephalic, atraumatic, pale conjunctiva  Neck -visible bowel over the anterior neck  Lungs -  Bilateral   air entry, no rhonchi, no wheeze  Cardiovascular - Heart sounds are normal.    Abdomen - soft, not distended, drains in place   Neurologic -awake and oriented.  Skin - No bruising or bleeding  Extremities - No edema, no cyanosis, clubbing     MEDICATIONS:      acetaminophen  650 mg Oral Q12H    polyethylene glycol  8.5 g Oral Once    polyethylene glycol  17 g Oral BID    losartan  25 mg Oral QPM    losartan  50 mg Oral Daily    sodium chloride flush  5-40 mL IntraVENous 2 times per day    enoxaparin  40 mg SubCUTAneous Daily    [Held by provider] acarbose  50 mg Oral TID WC    busPIRone  10 mg Oral Nightly    lamoTRIgine  100 mg Oral QAM    lamoTRIgine  200 mg Oral Nightly    [Held by provider] cetirizine  10 mg Oral Daily    QUEtiapine  25 mg Oral Nightly    insulin lispro  0-4 Units SubCUTAneous 4x Daily AC & HS     piperacillin-tazobactam  3,375 mg IntraVENous Q8H    pantoprazole  40 mg Oral QAM AC    venlafaxine  75 mg Oral Daily with breakfast    And    venlafaxine  150 mg Oral Daily with breakfast      sodium chloride      dextrose       sennosides-docusate sodium, QUEtiapine, sodium chloride flush, sodium chloride, potassium chloride **OR** potassium alternative oral replacement **OR** potassium chloride, magnesium sulfate, ondansetron **OR** ondansetron, polyethylene glycol, acetaminophen **OR** acetaminophen, busPIRone, dicyclomine, [Held by provider] docusate sodium, traMADol, morphine, glucose, dextrose bolus **OR** dextrose bolus, glucagon (rDNA), dextrose      LABS:     CBC:   Recent Labs     07/20/25  1011 07/21/25  0748 07/22/25  0606   WBC 12.4* 11.1* 9.5   HGB 10.6* 10.4* 10.3*   * 509* 473*     BMP:    Recent Labs     07/20/25  1011 07/21/25  0748 07/22/25  0606    142 141   K 4.0 4.1 3.6    103 104   CO2 23 26 26   BUN 13 11 11   CREATININE 0.6 0.5 0.6   GLUCOSE 73* 82 75     Calcium:  Recent Labs     07/22/25  0606   CALCIUM 8.3*           Recent Labs     07/21/25  1600 07/21/25  1905 07/22/25  0613   POCGLU 79 83 74     HgbA1C:   No results for input(s): \"LABA1C\" in the last 72 hours.       CULTURES:   UA:   No results for input(s): \"SPECGRAV\", \"PHUR\", \"COLORU\", \"CLARITYU\", \"MUCUS\", \"PROTEINU\", \"BLOODU\", \"RBCUA\", \"WBCUA\", \"BACTERIA\", \"NITRU\", \"GLUCOSEU\", \"BILIRUBINUR\", \"UROBILINOGEN\", \"KETUA\", \"LABCAST\", \"LABCASTTY\", \"AMORPHOS\" in the last 72 hours.    Invalid input(s): \"CRYSTALS\"    Micro:   Lab Results   Component Value Date/Time    BC  07/08/2025 01:05 PM     No growth 24 hours. No growth 48 hours. No growth at 5 days    BC  07/08/2025 01:05 PM     No growth 24 hours. No growth 48 hours. No growth at 5 days       Problem list of patient:     Patient Active Problem List   Diagnosis Code    Bipolar 1 disorder, depressed, severe ([facility]) F31.4    Post traumatic stress disorder F43.10

## 2025-07-22 NOTE — PROGRESS NOTES
[facility]  PHYSICAL THERAPY MISSED TREATMENT NOTE  STRZ RENAL TELEMETRY 6K    Date: 2025  Patient Name: Adry Moura        MRN: [id number]   : 1950  (74 y.o.)  Gender: female   Referring Practitioner: Uriel Madera, EVA - [place]            REASON FOR MISSED TREATMENT:  Missed Treat.      RN approved session, however pt reports headache, nausea, and soreness and politely declines session at this time. Will re-attempt at next available date.

## 2025-07-22 NOTE — PLAN OF CARE
Problem: Chronic Conditions and Co-morbidities  Goal: Patient's chronic conditions and co-morbidity symptoms are monitored and maintained or improved  7/21/2025 2122 by Marilyn Jane RN  Outcome: Progressing  Flowsheets (Taken 7/21/2025 1316 by Haven Davis, RN)  Care Plan - Patient's Chronic Conditions and Co-Morbidity Symptoms are Monitored and Maintained or Improved:   Monitor and assess patient's chronic conditions and comorbid symptoms for stability, deterioration, or improvement   Collaborate with multidisciplinary team to address chronic and comorbid conditions and prevent exacerbation or deterioration   Update acute care plan with appropriate goals if chronic or comorbid symptoms are exacerbated and prevent overall improvement and discharge  7/21/2025 1316 by Haven Davis RN  Outcome: Progressing  Flowsheets (Taken 7/21/2025 1316)  Care Plan - Patient's Chronic Conditions and Co-Morbidity Symptoms are Monitored and Maintained or Improved:   Monitor and assess patient's chronic conditions and comorbid symptoms for stability, deterioration, or improvement   Collaborate with multidisciplinary team to address chronic and comorbid conditions and prevent exacerbation or deterioration   Update acute care plan with appropriate goals if chronic or comorbid symptoms are exacerbated and prevent overall improvement and discharge     Problem: Discharge Planning  Goal: Discharge to home or other facility with appropriate resources  7/21/2025 2122 by Marilyn Jane RN  Outcome: Progressing  Flowsheets (Taken 7/21/2025 1316 by Haven Davis, RN)  Discharge to home or other facility with appropriate resources:   Identify barriers to discharge with patient and caregiver   Arrange for needed discharge resources and transportation as appropriate   Identify discharge learning needs (meds, wound care, etc)   Refer to discharge planning if patient needs post-hospital services based on physician order or complex needs  ordered     Problem: Skin/Tissue Integrity - Adult  Goal: Skin integrity remains intact  Description: 1.  Monitor for areas of redness and/or skin breakdown  2.  Assess vascular access sites hourly  3.  Every 4-6 hours minimum:  Change oxygen saturation probe site  4.  Every 4-6 hours:  If on nasal continuous positive airway pressure, respiratory therapy assess nares and determine need for appliance change or resting period  7/21/2025 2122 by Marilyn Jane RN  Outcome: Progressing  Flowsheets (Taken 7/21/2025 1316 by Haven Davis RN)  Skin Integrity Remains Intact:   Monitor for areas of redness and/or skin breakdown   Turn and reposition as indicated  7/21/2025 1316 by Haven Davis RN  Outcome: Progressing  Flowsheets (Taken 7/21/2025 1316)  Skin Integrity Remains Intact:   Monitor for areas of redness and/or skin breakdown   Turn and reposition as indicated  Goal: Incisions, wounds, or drain sites healing without S/S of infection  7/21/2025 2122 by Marilyn Jane RN  Outcome: Progressing  7/21/2025 1316 by Haven Davis RN  Outcome: Progressing  Flowsheets (Taken 7/21/2025 1316)  Incisions, Wounds, or Drain Sites Healing Without Sign and Symptoms of Infection: ADMISSION and DAILY: Assess and document risk factors for pressure ulcer development     Problem: Musculoskeletal - Adult  Goal: Return mobility to safest level of function  7/21/2025 2122 by Marilyn Jane RN  Outcome: Progressing  7/21/2025 1316 by Haven Davis RN  Outcome: Progressing  Flowsheets (Taken 7/21/2025 1316)  Return Mobility to Safest Level of Function:   Assess patient stability and activity tolerance for standing, transferring and ambulating with or without assistive devices   Assist with transfers and ambulation using safe patient handling equipment as needed   Ensure adequate protection for wounds/incisions during mobilization   Obtain physical therapy/occupational therapy consults as needed     Problem: Gastrointestinal -

## 2025-07-22 NOTE — ED PROVIDER NOTES
ED Attending Physician Attestation     I performed a history and physical examination of the patient and discussed management with the Resident. I reviewed the Resident's note and agree with the documented findings and plan of care. Any areas of disagreement are noted on the chart. I was personally present for the key portions of any procedures and have documented in the chart those procedures where I was not present during the key portions. I have reviewed the emergency nurses triage note and agree with the chief complaint, past medical history, past surgical history, allergies, medications, social and family history as documented unless otherwise noted below.    THOMAS DE LEON DO, FACEP  Attending Emergency Physician        Thomas De Leon DO  07/22/25 0018

## 2025-07-22 NOTE — PROGRESS NOTES
Hospitalist Progress Note      Patient:  Adry Moura 74 y.o. female       : 1950  Unit/Bed:6K-17/017-A    Date of Admission: 2025      ASSESSMENT AND PLAN    Active Problems  Post operative intra-abdominal abscess:  Underwent colonoscopy in May 2025 and then later Radha 3, 2025 and found to have 3 cm mass which was found to have moderately differentiated adenocarcinoma of colon resulting in low anterior resection on 25 by Dr. Hale.  CT A/P in the ED with large intra-abdominal abscess within anterior aspect of abdomen and pelvis measuring 16.0 x 5.5 x 18.8 cm   General Surgery consulted and following, recommends no surgical intervention at this time, to have IR place drain via CT guidance.   S/p CT-guided drain placement  with culture growing E coli.  Blood cultures NGTD.  Started on Zosyn on .    ID consulted and following, cont Zosyn (start ). Plan PO at dc.   Routine drain care.  Monitor outputs and record.  Flush drain as ordered.     Repeat CT  abscess measures 10.2 x 3.1 x 15.6 cm.   : Repeat CT A/P today demonstrates the residual moderate to large intra-abdominal abscess within the anterior aspect of the lower abdomen and pelvis is not significantly changed in size measuring up to 5.5 x 12.5 x cm in short axis.  Likely due to drain not functioning well due to lack of flushing since insertion. This is also likely the cause of increased WBC today up to 15.2.   : Drain unable to be flushed.  Returned to IR for CT guided drain insertion today.  ID recommends to continue IV antibiotics for now.    : Bilateral lower abdominal drains in place.  Flushing per surgery recommendations.  Left drain with 180 ml out today so far and right drain with 30 ml out.  Continue to monitor output and continue IV ATB.  If continues with minimal output on Monday, will as IR to exchange drain tubes for larger size as drainage was noted to be thick during yesterday's drain  29, creatinine 0.8, magnesium 2.1, lactic acid 1.3, calcium 8.6, Pro-Casey 0.24, CRP 23.60, troponin 13, albumin 3.0, , ALT 17, AST 26, T. bili 0.5, WBC 13.6, hemoglobin 10.7, platelets 402.  Blood cultures x 2, MRSA, UA sent    Medications:    Infusion Medications    sodium chloride      dextrose      Scheduled Medications    acetaminophen  650 mg Oral Q12H    polyethylene glycol  8.5 g Oral Once    polyethylene glycol  17 g Oral BID    losartan  25 mg Oral QPM    losartan  50 mg Oral Daily    sodium chloride flush  5-40 mL IntraVENous 2 times per day    enoxaparin  40 mg SubCUTAneous Daily    [Held by provider] acarbose  50 mg Oral TID WC    busPIRone  10 mg Oral Nightly    lamoTRIgine  100 mg Oral QAM    lamoTRIgine  200 mg Oral Nightly    [Held by provider] cetirizine  10 mg Oral Daily    QUEtiapine  25 mg Oral Nightly    insulin lispro  0-4 Units SubCUTAneous 4x Daily AC & HS    piperacillin-tazobactam  3,375 mg IntraVENous Q8H    pantoprazole  40 mg Oral QAM AC    venlafaxine  75 mg Oral Daily with breakfast    And    venlafaxine  150 mg Oral Daily with breakfast    PRN Meds: phenol, lidocaine viscous hcl, sennosides-docusate sodium, QUEtiapine, sodium chloride flush, sodium chloride, potassium chloride **OR** potassium alternative oral replacement **OR** potassium chloride, magnesium sulfate, ondansetron **OR** ondansetron, polyethylene glycol, acetaminophen **OR** acetaminophen, busPIRone, dicyclomine, [Held by provider] docusate sodium, traMADol, morphine, glucose, dextrose bolus **OR** dextrose bolus, glucagon (rDNA), dextrose    Exam:  /63   Pulse (!) 103   Temp 98.2 °F (36.8 °C) (Oral)   Resp 16   Ht 1.676 m (5' 6\")   Wt 55.7 kg (122 lb 12.7 oz)   LMP 03/20/1985 Comment: hysterectomy in her 30s  SpO2 97%   BMI 19.82 kg/m²   General: No distress, appears stated age.   Eyes:  PERRL. Conjunctivae/corneas clear.  HENT: Head normal appearing. Nares normal. Oral mucosa moist.  Hearing

## 2025-07-22 NOTE — PLAN OF CARE
Problem: Chronic Conditions and Co-morbidities  Goal: Patient's chronic conditions and co-morbidity symptoms are monitored and maintained or improved  Outcome: Progressing  Flowsheets (Taken 7/22/2025 1845)  Care Plan - Patient's Chronic Conditions and Co-Morbidity Symptoms are Monitored and Maintained or Improved:   Monitor and assess patient's chronic conditions and comorbid symptoms for stability, deterioration, or improvement   Collaborate with multidisciplinary team to address chronic and comorbid conditions and prevent exacerbation or deterioration   Update acute care plan with appropriate goals if chronic or comorbid symptoms are exacerbated and prevent overall improvement and discharge     Problem: Discharge Planning  Goal: Discharge to home or other facility with appropriate resources  Outcome: Progressing  Flowsheets (Taken 7/22/2025 1845)  Discharge to home or other facility with appropriate resources:   Identify barriers to discharge with patient and caregiver   Arrange for needed discharge resources and transportation as appropriate   Identify discharge learning needs (meds, wound care, etc)   Refer to discharge planning if patient needs post-hospital services based on physician order or complex needs related to functional status, cognitive ability or social support system     Problem: Safety - Adult  Goal: Free from fall injury  Outcome: Progressing  Flowsheets (Taken 7/22/2025 1845)  Free From Fall Injury: Instruct family/caregiver on patient safety     Problem: Neurosensory - Adult  Goal: Achieves stable or improved neurological status  Outcome: Progressing  Flowsheets (Taken 7/22/2025 1845)  Achieves stable or improved neurological status:   Assess for and report changes in neurological status   Monitor temperature, glucose, and sodium. Initiate appropriate interventions as ordered   Maintain blood pressure and fluid volume within ordered parameters to optimize cerebral perfusion and minimize risk

## 2025-07-22 NOTE — PROGRESS NOTES
[facility]  OCCUPATIONAL THERAPY MISSED TREATMENT NOTE  STRZ RENAL TELEMETRY 6K  6K-17/017-A      Date: 2025  Patient Name: dAry Moura        CSN: [id number]   : 1950  (74 y.o.)  Gender: female   Referring Practitioner: Uriel Madera APRN - KANWAL            REASON FOR MISSED TREATMENT: Patient Refused.  Encouragement given with no success.  Patient also refusing to eat lunch

## 2025-07-22 NOTE — PROGRESS NOTES
[facility]  Lisset Johnson PA-C for Dr. Leoncio Hale  General Surgery Daily Progress Note    Pt Name: Adry Moura  Medical Record Number: [id number]  Date of Birth 1950   Today's Date: 7/22/2025    Hospital day # 14     ASSESSMENT   Large intra-abdominal fluid collection/possible abscess - Status post IR drain placement 7/8/25. 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  PLAN   Diet as tolerated. Having bowel function. Not eating much. ONS.  Bilateral IR PERC drains in place. Serosanguineous.  Antibiotics - infectious disease following. Will transition to oral at discharge.   Infectious disease is also following for assistance with drain management  CT abdomen pelvis reviewed. Fluid collection slightly smaller. Drains in the right place.  Pain & nausea control  DVT prophylaxis   PT/OT   Labs reviewed. WBC stable and decreasing and now WNL at 9.5.  Will need oncology appointment outpatient rescheduled  Case management for assistance with discharge planning, from Tam [facility], social work consult  Clinically patient doing well. Bilateral drains in place with serosanguineous fluid. CT scan reviewed. Drains in the appropriate place.

## 2025-07-22 NOTE — PROGRESS NOTES
Patient's blood sugar 66. Attempted glucose tabs, patient stated she couldn't tolerate oral so then attempted dextrose and patient and son asked this RN to try orange juice instead. This RN gave orange juice with 4 sugar packets and irving crackers with peanut butter. Patient's repeat blood sugar increased to 96.

## 2025-07-22 NOTE — PLAN OF CARE
Problem: Chronic Conditions and Co-morbidities  Goal: Patient's chronic conditions and co-morbidity symptoms are monitored and maintained or improved  7/22/2025 1942 by Shayla Whelan RN  Outcome: Progressing  7/22/2025 1845 by Stefani Resendez RN  Outcome: Progressing  Flowsheets (Taken 7/22/2025 1845)  Care Plan - Patient's Chronic Conditions and Co-Morbidity Symptoms are Monitored and Maintained or Improved:   Monitor and assess patient's chronic conditions and comorbid symptoms for stability, deterioration, or improvement   Collaborate with multidisciplinary team to address chronic and comorbid conditions and prevent exacerbation or deterioration   Update acute care plan with appropriate goals if chronic or comorbid symptoms are exacerbated and prevent overall improvement and discharge     Problem: Discharge Planning  Goal: Discharge to home or other facility with appropriate resources  7/22/2025 1942 by Shayla Whelan RN  Outcome: Progressing  7/22/2025 1845 by Stefani Resendez RN  Outcome: Progressing  Flowsheets (Taken 7/22/2025 1845)  Discharge to home or other facility with appropriate resources:   Identify barriers to discharge with patient and caregiver   Arrange for needed discharge resources and transportation as appropriate   Identify discharge learning needs (meds, wound care, etc)   Refer to discharge planning if patient needs post-hospital services based on physician order or complex needs related to functional status, cognitive ability or social support system     Problem: Safety - Adult  Goal: Free from fall injury  7/22/2025 1942 by Shayla Whelan RN  Outcome: Progressing  7/22/2025 1845 by Stefani Resendez RN  Outcome: Progressing  Flowsheets (Taken 7/22/2025 1845)  Free From Fall Injury: Instruct family/caregiver on patient safety     Problem: Neurosensory - Adult  Goal: Achieves stable or improved neurological status  7/22/2025 1942 by Shayla Whelan RN  Outcome:  function  7/22/2025 1942 by Shayla Whelan RN  Outcome: Progressing  7/22/2025 1845 by Stefani Resendez RN  Outcome: Progressing  Flowsheets (Taken 7/22/2025 1845)  Maintains or returns to baseline bowel function:   Assess bowel function   Encourage oral fluids to ensure adequate hydration   Administer ordered medications as needed   Encourage mobilization and activity  Goal: Maintains adequate nutritional intake  7/22/2025 1942 by Shayla Whelan RN  Outcome: Progressing  7/22/2025 1845 by Stefani Resendez RN  Outcome: Progressing  Flowsheets (Taken 7/22/2025 1845)  Maintains adequate nutritional intake:   Monitor percentage of each meal consumed   Identify factors contributing to decreased intake, treat as appropriate   Monitor intake and output, weight and lab values     Problem: Genitourinary - Adult  Goal: Absence of urinary retention  7/22/2025 1942 by Shayla Whelan RN  Outcome: Progressing  7/22/2025 1845 by Stefani Resendez RN  Outcome: Progressing  Flowsheets (Taken 7/22/2025 1845)  Absence of urinary retention:   Assess patient’s ability to void and empty bladder   Monitor intake/output and perform bladder scan as needed     Problem: Infection - Adult  Goal: Absence of infection at discharge  7/22/2025 1942 by Shayla Whelan RN  Outcome: Progressing  7/22/2025 1845 by Stefani Resendez RN  Outcome: Progressing  Flowsheets (Taken 7/22/2025 1845)  Absence of infection at discharge:   Assess and monitor for signs and symptoms of infection   Monitor lab/diagnostic results   Monitor all insertion sites i.e., indwelling lines, tubes and drains   Administer medications as ordered   Instruct and encourage patient and family to use good hand hygiene technique  Goal: Absence of infection during hospitalization  7/22/2025 1942 by Shayla Whelan RN  Outcome: Progressing  7/22/2025 1845 by Stefani Resendez RN  Outcome: Progressing  Flowsheets (Taken 7/22/2025 1845)  Absence of infection during

## 2025-07-23 LAB
ANION GAP SERPL CALC-SCNC: 12 MEQ/L (ref 8–16)
BUN SERPL-MCNC: 16 MG/DL (ref 8–23)
CALCIUM SERPL-MCNC: 8.9 MG/DL (ref 8.8–10.2)
CHLORIDE SERPL-SCNC: 103 MEQ/L (ref 98–111)
CO2 SERPL-SCNC: 27 MEQ/L (ref 22–29)
CREAT SERPL-MCNC: 0.7 MG/DL (ref 0.5–0.9)
DEPRECATED RDW RBC AUTO: 51.9 FL (ref 35–45)
ERYTHROCYTE [DISTWIDTH] IN BLOOD BY AUTOMATED COUNT: 14.8 % (ref 11.5–14.5)
GFR SERPL CREATININE-BSD FRML MDRD: 90 ML/MIN/1.73M2
GLUCOSE BLD STRIP.AUTO-MCNC: 76 MG/DL (ref 70–108)
GLUCOSE BLD STRIP.AUTO-MCNC: 76 MG/DL (ref 70–108)
GLUCOSE BLD STRIP.AUTO-MCNC: 83 MG/DL (ref 70–108)
GLUCOSE BLD STRIP.AUTO-MCNC: 97 MG/DL (ref 70–108)
GLUCOSE SERPL-MCNC: 86 MG/DL (ref 74–109)
HCT VFR BLD AUTO: 31.3 % (ref 37–47)
HGB BLD-MCNC: 10 GM/DL (ref 12–16)
MCH RBC QN AUTO: 30.5 PG (ref 26–33)
MCHC RBC AUTO-ENTMCNC: 31.9 GM/DL (ref 32.2–35.5)
MCV RBC AUTO: 95.4 FL (ref 81–99)
PLATELET # BLD AUTO: 511 THOU/MM3 (ref 130–400)
PMV BLD AUTO: 9.1 FL (ref 9.4–12.4)
POTASSIUM SERPL-SCNC: 4.2 MEQ/L (ref 3.5–5.2)
RBC # BLD AUTO: 3.28 MILL/MM3 (ref 4.2–5.4)
SODIUM SERPL-SCNC: 142 MEQ/L (ref 135–145)
WBC # BLD AUTO: 14.9 THOU/MM3 (ref 4.8–10.8)

## 2025-07-23 PROCEDURE — 6360000002 HC RX W HCPCS: Performed by: STUDENT IN AN ORGANIZED HEALTH CARE EDUCATION/TRAINING PROGRAM

## 2025-07-23 PROCEDURE — 2500000003 HC RX 250 WO HCPCS: Performed by: STUDENT IN AN ORGANIZED HEALTH CARE EDUCATION/TRAINING PROGRAM

## 2025-07-23 PROCEDURE — 85027 COMPLETE CBC AUTOMATED: CPT

## 2025-07-23 PROCEDURE — 6370000000 HC RX 637 (ALT 250 FOR IP): Performed by: PHYSICIAN ASSISTANT

## 2025-07-23 PROCEDURE — 6370000000 HC RX 637 (ALT 250 FOR IP): Performed by: STUDENT IN AN ORGANIZED HEALTH CARE EDUCATION/TRAINING PROGRAM

## 2025-07-23 PROCEDURE — 6370000000 HC RX 637 (ALT 250 FOR IP): Performed by: NURSE PRACTITIONER

## 2025-07-23 PROCEDURE — 82948 REAGENT STRIP/BLOOD GLUCOSE: CPT

## 2025-07-23 PROCEDURE — APPNB30 APP NON BILLABLE TIME 0-30 MINS

## 2025-07-23 PROCEDURE — 1200000000 HC SEMI PRIVATE

## 2025-07-23 PROCEDURE — 36415 COLL VENOUS BLD VENIPUNCTURE: CPT

## 2025-07-23 PROCEDURE — 99024 POSTOP FOLLOW-UP VISIT: CPT

## 2025-07-23 PROCEDURE — 2580000003 HC RX 258: Performed by: STUDENT IN AN ORGANIZED HEALTH CARE EDUCATION/TRAINING PROGRAM

## 2025-07-23 PROCEDURE — 80048 BASIC METABOLIC PNL TOTAL CA: CPT

## 2025-07-23 RX ORDER — LACTOBACILLUS RHAMNOSUS GG 10B CELL
1 CAPSULE ORAL
Status: DISCONTINUED | OUTPATIENT
Start: 2025-07-24 | End: 2025-07-25 | Stop reason: HOSPADM

## 2025-07-23 RX ADMIN — PIPERACILLIN AND TAZOBACTAM 3375 MG: 3; .375 INJECTION, POWDER, FOR SOLUTION INTRAVENOUS at 15:07

## 2025-07-23 RX ADMIN — ACETAMINOPHEN 650 MG: 325 TABLET ORAL at 08:54

## 2025-07-23 RX ADMIN — PIPERACILLIN AND TAZOBACTAM 3375 MG: 3; .375 INJECTION, POWDER, FOR SOLUTION INTRAVENOUS at 23:38

## 2025-07-23 RX ADMIN — LAMOTRIGINE 200 MG: 100 TABLET ORAL at 21:13

## 2025-07-23 RX ADMIN — PHENOL 1 SPRAY: 1.5 LIQUID ORAL at 08:56

## 2025-07-23 RX ADMIN — LAMOTRIGINE 100 MG: 100 TABLET ORAL at 08:54

## 2025-07-23 RX ADMIN — PANTOPRAZOLE SODIUM 40 MG: 40 TABLET, DELAYED RELEASE ORAL at 08:54

## 2025-07-23 RX ADMIN — PHENOL 1 SPRAY: 1.5 LIQUID ORAL at 19:20

## 2025-07-23 RX ADMIN — SODIUM CHLORIDE, PRESERVATIVE FREE 10 ML: 5 INJECTION INTRAVENOUS at 08:54

## 2025-07-23 RX ADMIN — VENLAFAXINE HYDROCHLORIDE 75 MG: 75 CAPSULE, EXTENDED RELEASE ORAL at 08:55

## 2025-07-23 RX ADMIN — LOSARTAN POTASSIUM 25 MG: 25 TABLET, FILM COATED ORAL at 16:52

## 2025-07-23 RX ADMIN — LOSARTAN POTASSIUM 50 MG: 25 TABLET, FILM COATED ORAL at 08:54

## 2025-07-23 RX ADMIN — PIPERACILLIN AND TAZOBACTAM 3375 MG: 3; .375 INJECTION, POWDER, FOR SOLUTION INTRAVENOUS at 09:02

## 2025-07-23 RX ADMIN — ENOXAPARIN SODIUM 40 MG: 100 INJECTION SUBCUTANEOUS at 08:54

## 2025-07-23 RX ADMIN — POLYETHYLENE GLYCOL 3350 17 G: 17 POWDER, FOR SOLUTION ORAL at 21:14

## 2025-07-23 RX ADMIN — VENLAFAXINE HYDROCHLORIDE 150 MG: 150 CAPSULE, EXTENDED RELEASE ORAL at 08:55

## 2025-07-23 RX ADMIN — QUETIAPINE FUMARATE 25 MG: 25 TABLET ORAL at 21:11

## 2025-07-23 RX ADMIN — ACETAMINOPHEN 650 MG: 325 TABLET ORAL at 21:10

## 2025-07-23 RX ADMIN — SODIUM CHLORIDE, PRESERVATIVE FREE 10 ML: 5 INJECTION INTRAVENOUS at 21:14

## 2025-07-23 RX ADMIN — BUSPIRONE HYDROCHLORIDE 10 MG: 10 TABLET ORAL at 21:12

## 2025-07-23 ASSESSMENT — PAIN DESCRIPTION - LOCATION: LOCATION: ABDOMEN

## 2025-07-23 ASSESSMENT — PAIN SCALES - GENERAL
PAINLEVEL_OUTOF10: 6
PAINLEVEL_OUTOF10: 7
PAINLEVEL_OUTOF10: 2
PAINLEVEL_OUTOF10: 4

## 2025-07-23 ASSESSMENT — PAIN SCALES - WONG BAKER: WONGBAKER_NUMERICALRESPONSE: NO HURT

## 2025-07-23 NOTE — PROGRESS NOTES
[facility]  OCCUPATIONAL THERAPY MISSED TREATMENT NOTE  STRZ RENAL TELEMETRY 6K  6K-17/017-A      Date: 2025  Patient Name: Adry Moura        CSN: [id number]   : 1950  (74 y.o.)  Gender: female   Referring Practitioner: Uriel Madera APRN - CNP            REASON FOR MISSED TREATMENT: Patient Refused.  Pt requesting to hold until after lunch. OT will check back as time allows.

## 2025-07-23 NOTE — PROGRESS NOTES
[facility]  Lisset Johnson PA-C for Dr. Leoncio Hale  General Surgery Daily Progress Note    Pt Name: Adry Moura  Medical Record Number: [id number]  Date of Birth 1950   Today's Date: 7/23/2025    Hospital day # 15     ASSESSMENT   Large intra-abdominal fluid collection/possible abscess - Status post IR drain placement 7/8/25. 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  PLAN   Diet as tolerated. Having bowel function. Not eating much. ONS.  Bilateral IR PERC drains in place. Serosanguineous. Continue to have output  Antibiotics - infectious disease following. Will transition to oral at discharge.   Infectious disease is also following for assistance with drain management  CT abdomen pelvis reviewed. Fluid collection slightly smaller. Drains in the right place.  Pain & nausea control  DVT prophylaxis   PT/OT   Will need oncology appointment outpatient rescheduled  Case management for assistance with discharge planning, from Tam [facility], social work consult  Dr. Hale discussed case with infectious disease. Will likely need IR to put in larger drain tubes. However, on rounds this morning patient did not want any further

## 2025-07-23 NOTE — PROGRESS NOTES
Progress note: Infectious diseases    Patient - Adry Moura,  Age - 74 y.o.    - 1950      Room Number - 6K-17/017-A   MRN -  [id number]   [facility] # - [id number]  Date of Admission -  2025 11:22 AM    SUBJECTIVE:   No new issues     OBJECTIVE   VITALS    height is 1.676 m (5' 6\") and weight is 54.8 kg (120 lb 13 oz). Her oral temperature is 98.2 °F (36.8 °C). Her blood pressure is 136/66 and her pulse is 107 (abnormal). Her respiration is 20 and oxygen saturation is 98%.       Wt Readings from Last 3 Encounters:   25 54.8 kg (120 lb 13 oz)   25 54.4 kg (120 lb)   25 59.5 kg (131 lb 2 oz)       I/O (24 Hours)    Intake/Output Summary (Last 24 hours) at 2025 1443  Last data filed at 2025 0910  Gross per 24 hour   Intake 620.69 ml   Output --   Net 620.69 ml       General Appearance  Awake, alert, oriented, chronically sick looking  HEENT - normocephalic, atraumatic, pale conjunctiva  Neck -visible bowel over the anterior neck  Lungs -  Bilateral   air entry, no rhonchi, no wheeze  Cardiovascular - Heart sounds are normal.    Abdomen - soft, not distended, drains in place   Neurologic -awake and oriented.  Skin - No bruising or bleeding  Extremities - No edema, no cyanosis, clubbing     MEDICATIONS:      acetaminophen  650 mg Oral Q12H    polyethylene glycol  8.5 g Oral Once    polyethylene glycol  17 g Oral BID    losartan  25 mg Oral QPM    losartan  50 mg Oral Daily    sodium chloride flush  5-40 mL IntraVENous 2 times per day    enoxaparin  40 mg SubCUTAneous Daily    [Held by provider] acarbose  50 mg Oral TID WC    busPIRone  10 mg Oral Nightly    lamoTRIgine  100 mg Oral QAM    lamoTRIgine  200 mg Oral Nightly    [Held by provider] cetirizine  10 mg Oral Daily    QUEtiapine  25 mg Oral Nightly    insulin lispro  0-4 Units SubCUTAneous 4x Daily AC & HS    piperacillin-tazobactam

## 2025-07-23 NOTE — PROGRESS NOTES
[facility]  PHYSICAL THERAPY MISSED TREATMENT NOTE  [facility] RENAL TELEMETRY 6K    Date: 2025  Patient Name: Adry Moura        MRN: [id number]   : 1950  (74 y.o.)  Gender: female   Referring Practitioner: Uriel Madera APRN - [place]AN[place]            REASON FOR MISSED TREATMENT:  Missed Treat.      RN approved session however, upon first attempt, pt eating breakfast. Upon second attempt, pt politely requesting to rest d/t pain and nausea. Upon third attempt, physician in room discussing POC with pt. Will re-attempt at next available date.

## 2025-07-24 ENCOUNTER — APPOINTMENT (OUTPATIENT)
Dept: CT IMAGING | Age: 75
End: 2025-07-24
Payer: MEDICARE

## 2025-07-24 LAB
ANION GAP SERPL CALC-SCNC: 12 MEQ/L (ref 8–16)
BUN SERPL-MCNC: 16 MG/DL (ref 8–23)
CALCIUM SERPL-MCNC: 8.8 MG/DL (ref 8.8–10.2)
CHLORIDE SERPL-SCNC: 104 MEQ/L (ref 98–111)
CO2 SERPL-SCNC: 26 MEQ/L (ref 22–29)
CREAT SERPL-MCNC: 0.6 MG/DL (ref 0.5–0.9)
DEPRECATED RDW RBC AUTO: 52.3 FL (ref 35–45)
ERYTHROCYTE [DISTWIDTH] IN BLOOD BY AUTOMATED COUNT: 15 % (ref 11.5–14.5)
GFR SERPL CREATININE-BSD FRML MDRD: > 90 ML/MIN/1.73M2
GLUCOSE BLD STRIP.AUTO-MCNC: 68 MG/DL (ref 70–108)
GLUCOSE BLD STRIP.AUTO-MCNC: 71 MG/DL (ref 70–108)
GLUCOSE BLD STRIP.AUTO-MCNC: 76 MG/DL (ref 70–108)
GLUCOSE BLD STRIP.AUTO-MCNC: 85 MG/DL (ref 70–108)
GLUCOSE BLD STRIP.AUTO-MCNC: 87 MG/DL (ref 70–108)
GLUCOSE SERPL-MCNC: 81 MG/DL (ref 74–109)
HCT VFR BLD AUTO: 31.5 % (ref 37–47)
HGB BLD-MCNC: 10.1 GM/DL (ref 12–16)
MCH RBC QN AUTO: 30.4 PG (ref 26–33)
MCHC RBC AUTO-ENTMCNC: 32.1 GM/DL (ref 32.2–35.5)
MCV RBC AUTO: 94.9 FL (ref 81–99)
PLATELET # BLD AUTO: 573 THOU/MM3 (ref 130–400)
PMV BLD AUTO: 9 FL (ref 9.4–12.4)
POTASSIUM SERPL-SCNC: 4 MEQ/L (ref 3.5–5.2)
RBC # BLD AUTO: 3.32 MILL/MM3 (ref 4.2–5.4)
SODIUM SERPL-SCNC: 142 MEQ/L (ref 135–145)
WBC # BLD AUTO: 11.1 THOU/MM3 (ref 4.8–10.8)

## 2025-07-24 PROCEDURE — 6360000002 HC RX W HCPCS: Performed by: STUDENT IN AN ORGANIZED HEALTH CARE EDUCATION/TRAINING PROGRAM

## 2025-07-24 PROCEDURE — 1200000000 HC SEMI PRIVATE

## 2025-07-24 PROCEDURE — 6370000000 HC RX 637 (ALT 250 FOR IP): Performed by: NURSE PRACTITIONER

## 2025-07-24 PROCEDURE — 6370000000 HC RX 637 (ALT 250 FOR IP): Performed by: PHYSICIAN ASSISTANT

## 2025-07-24 PROCEDURE — 82948 REAGENT STRIP/BLOOD GLUCOSE: CPT

## 2025-07-24 PROCEDURE — C1769 GUIDE WIRE: HCPCS

## 2025-07-24 PROCEDURE — 36415 COLL VENOUS BLD VENIPUNCTURE: CPT

## 2025-07-24 PROCEDURE — 6370000000 HC RX 637 (ALT 250 FOR IP): Performed by: STUDENT IN AN ORGANIZED HEALTH CARE EDUCATION/TRAINING PROGRAM

## 2025-07-24 PROCEDURE — 2580000003 HC RX 258: Performed by: STUDENT IN AN ORGANIZED HEALTH CARE EDUCATION/TRAINING PROGRAM

## 2025-07-24 PROCEDURE — 2500000003 HC RX 250 WO HCPCS: Performed by: STUDENT IN AN ORGANIZED HEALTH CARE EDUCATION/TRAINING PROGRAM

## 2025-07-24 PROCEDURE — 99233 SBSQ HOSP IP/OBS HIGH 50: CPT | Performed by: PHYSICIAN ASSISTANT

## 2025-07-24 PROCEDURE — 2580000003 HC RX 258

## 2025-07-24 PROCEDURE — 97116 GAIT TRAINING THERAPY: CPT

## 2025-07-24 PROCEDURE — 0W9G30Z DRAINAGE OF PERITONEAL CAVITY WITH DRAINAGE DEVICE, PERCUTANEOUS APPROACH: ICD-10-PCS | Performed by: PHYSICIAN ASSISTANT

## 2025-07-24 PROCEDURE — 75989 ABSCESS DRAINAGE UNDER X-RAY: CPT

## 2025-07-24 PROCEDURE — 80048 BASIC METABOLIC PNL TOTAL CA: CPT

## 2025-07-24 PROCEDURE — 6360000002 HC RX W HCPCS: Performed by: RADIOLOGY

## 2025-07-24 PROCEDURE — 97110 THERAPEUTIC EXERCISES: CPT

## 2025-07-24 PROCEDURE — 85027 COMPLETE CBC AUTOMATED: CPT

## 2025-07-24 PROCEDURE — 6360000002 HC RX W HCPCS: Performed by: NURSE PRACTITIONER

## 2025-07-24 RX ORDER — MIDAZOLAM HYDROCHLORIDE 1 MG/ML
INJECTION, SOLUTION INTRAMUSCULAR; INTRAVENOUS PRN
Status: COMPLETED | OUTPATIENT
Start: 2025-07-24 | End: 2025-07-24

## 2025-07-24 RX ORDER — FENTANYL CITRATE 50 UG/ML
INJECTION, SOLUTION INTRAMUSCULAR; INTRAVENOUS PRN
Status: COMPLETED | OUTPATIENT
Start: 2025-07-24 | End: 2025-07-24

## 2025-07-24 RX ADMIN — DEXTROSE 250 ML: 10 SOLUTION INTRAVENOUS at 13:35

## 2025-07-24 RX ADMIN — MORPHINE SULFATE 1 MG: 2 INJECTION, SOLUTION INTRAMUSCULAR; INTRAVENOUS at 20:16

## 2025-07-24 RX ADMIN — VENLAFAXINE HYDROCHLORIDE 150 MG: 150 CAPSULE, EXTENDED RELEASE ORAL at 08:41

## 2025-07-24 RX ADMIN — LAMOTRIGINE 200 MG: 100 TABLET ORAL at 20:16

## 2025-07-24 RX ADMIN — FENTANYL CITRATE 50 MCG: 50 INJECTION, SOLUTION INTRAMUSCULAR; INTRAVENOUS at 14:13

## 2025-07-24 RX ADMIN — MIDAZOLAM 1 MG: 1 INJECTION INTRAMUSCULAR; INTRAVENOUS at 14:13

## 2025-07-24 RX ADMIN — SODIUM CHLORIDE, PRESERVATIVE FREE 10 ML: 5 INJECTION INTRAVENOUS at 08:41

## 2025-07-24 RX ADMIN — QUETIAPINE FUMARATE 25 MG: 25 TABLET ORAL at 20:15

## 2025-07-24 RX ADMIN — PHENOL 1 SPRAY: 1.5 LIQUID ORAL at 20:17

## 2025-07-24 RX ADMIN — PANTOPRAZOLE SODIUM 40 MG: 40 TABLET, DELAYED RELEASE ORAL at 05:43

## 2025-07-24 RX ADMIN — POLYETHYLENE GLYCOL 3350 17 G: 17 POWDER, FOR SOLUTION ORAL at 20:15

## 2025-07-24 RX ADMIN — PIPERACILLIN AND TAZOBACTAM 3375 MG: 3; .375 INJECTION, POWDER, FOR SOLUTION INTRAVENOUS at 16:36

## 2025-07-24 RX ADMIN — PIPERACILLIN AND TAZOBACTAM 3375 MG: 3; .375 INJECTION, POWDER, FOR SOLUTION INTRAVENOUS at 08:40

## 2025-07-24 RX ADMIN — PIPERACILLIN AND TAZOBACTAM 3375 MG: 3; .375 INJECTION, POWDER, FOR SOLUTION INTRAVENOUS at 23:39

## 2025-07-24 RX ADMIN — LAMOTRIGINE 100 MG: 100 TABLET ORAL at 08:41

## 2025-07-24 RX ADMIN — VENLAFAXINE HYDROCHLORIDE 75 MG: 75 CAPSULE, EXTENDED RELEASE ORAL at 08:41

## 2025-07-24 RX ADMIN — ACETAMINOPHEN 650 MG: 325 TABLET ORAL at 20:16

## 2025-07-24 RX ADMIN — LOSARTAN POTASSIUM 50 MG: 25 TABLET, FILM COATED ORAL at 08:41

## 2025-07-24 RX ADMIN — BUSPIRONE HYDROCHLORIDE 10 MG: 10 TABLET ORAL at 20:16

## 2025-07-24 RX ADMIN — LOSARTAN POTASSIUM 25 MG: 25 TABLET, FILM COATED ORAL at 16:36

## 2025-07-24 RX ADMIN — MIDAZOLAM 1 MG: 1 INJECTION INTRAMUSCULAR; INTRAVENOUS at 14:25

## 2025-07-24 RX ADMIN — ACETAMINOPHEN 650 MG: 325 TABLET ORAL at 08:41

## 2025-07-24 RX ADMIN — Medication 1 CAPSULE: at 08:41

## 2025-07-24 RX ADMIN — SODIUM CHLORIDE, PRESERVATIVE FREE 10 ML: 5 INJECTION INTRAVENOUS at 20:17

## 2025-07-24 RX ADMIN — FENTANYL CITRATE 50 MCG: 50 INJECTION, SOLUTION INTRAMUSCULAR; INTRAVENOUS at 14:25

## 2025-07-24 ASSESSMENT — PAIN SCALES - GENERAL
PAINLEVEL_OUTOF10: 4
PAINLEVEL_OUTOF10: 6
PAINLEVEL_OUTOF10: 4
PAINLEVEL_OUTOF10: 9
PAINLEVEL_OUTOF10: 2
PAINLEVEL_OUTOF10: 4
PAINLEVEL_OUTOF10: 3
PAINLEVEL_OUTOF10: 2
PAINLEVEL_OUTOF10: 2

## 2025-07-24 ASSESSMENT — PAIN SCALES - WONG BAKER
WONGBAKER_NUMERICALRESPONSE: NO HURT

## 2025-07-24 ASSESSMENT — PAIN DESCRIPTION - DESCRIPTORS
DESCRIPTORS: ACHING
DESCRIPTORS: ACHING

## 2025-07-24 ASSESSMENT — PAIN DESCRIPTION - ORIENTATION
ORIENTATION: LOWER;RIGHT
ORIENTATION: LOWER;MID;ANTERIOR

## 2025-07-24 ASSESSMENT — PAIN DESCRIPTION - LOCATION
LOCATION: ABDOMEN
LOCATION: ABDOMEN

## 2025-07-24 NOTE — PLAN OF CARE
Problem: Chronic Conditions and Co-morbidities  Goal: Patient's chronic conditions and co-morbidity symptoms are monitored and maintained or improved  Outcome: Progressing  Flowsheets (Taken 7/22/2025 1845 by Stefani Resendez RN)  Care Plan - Patient's Chronic Conditions and Co-Morbidity Symptoms are Monitored and Maintained or Improved:   Monitor and assess patient's chronic conditions and comorbid symptoms for stability, deterioration, or improvement   Collaborate with multidisciplinary team to address chronic and comorbid conditions and prevent exacerbation or deterioration   Update acute care plan with appropriate goals if chronic or comorbid symptoms are exacerbated and prevent overall improvement and discharge     Problem: Discharge Planning  Goal: Discharge to home or other facility with appropriate resources  Outcome: Progressing     Problem: Safety - Adult  Goal: Free from fall injury  Outcome: Progressing     Problem: Neurosensory - Adult  Goal: Achieves stable or improved neurological status  Outcome: Progressing     Problem: Respiratory - Adult  Goal: Achieves optimal ventilation and oxygenation  Outcome: Progressing     Problem: Cardiovascular - Adult  Goal: Maintains optimal cardiac output and hemodynamic stability  Outcome: Progressing  Goal: Absence of cardiac dysrhythmias or at baseline  Outcome: Progressing     Problem: Skin/Tissue Integrity - Adult  Goal: Skin integrity remains intact  Description: 1.  Monitor for areas of redness and/or skin breakdown  2.  Assess vascular access sites hourly  3.  Every 4-6 hours minimum:  Change oxygen saturation probe site  4.  Every 4-6 hours:  If on nasal continuous positive airway pressure, respiratory therapy assess nares and determine need for appliance change or resting period  Outcome: Progressing  Goal: Incisions, wounds, or drain sites healing without S/S of infection  Outcome: Progressing     Problem: Musculoskeletal - Adult  Goal: Return mobility to  safest level of function  Outcome: Progressing     Problem: Gastrointestinal - Adult  Goal: Minimal or absence of nausea and vomiting  Outcome: Progressing  Goal: Maintains or returns to baseline bowel function  Outcome: Progressing  Goal: Maintains adequate nutritional intake  Outcome: Progressing     Problem: Genitourinary - Adult  Goal: Absence of urinary retention  Outcome: Progressing     Problem: Infection - Adult  Goal: Absence of infection at discharge  Outcome: Progressing  Goal: Absence of infection during hospitalization  Outcome: Progressing     Problem: Metabolic/Fluid and Electrolytes - Adult  Goal: Electrolytes maintained within normal limits  Outcome: Progressing  Goal: Hemodynamic stability and optimal renal function maintained  Outcome: Progressing  Goal: Glucose maintained within prescribed range  Outcome: Progressing     Problem: Hematologic - Adult  Goal: Maintains hematologic stability  Outcome: Progressing     Problem: Skin/Tissue Integrity  Goal: Skin integrity remains intact  Description: 1.  Monitor for areas of redness and/or skin breakdown  2.  Assess vascular access sites hourly  3.  Every 4-6 hours minimum:  Change oxygen saturation probe site  4.  Every 4-6 hours:  If on nasal continuous positive airway pressure, respiratory therapy assess nares and determine need for appliance change or resting period  Outcome: Progressing     Problem: Pain  Goal: Verbalizes/displays adequate comfort level or baseline comfort level  Outcome: Progressing     Problem: Confusion  Goal: Confusion, delirium, dementia, or psychosis is improved or at baseline  Description: INTERVENTIONS:  1. Assess for possible contributors to thought disturbance, including medications, impaired vision or hearing, underlying metabolic abnormalities, dehydration, psychiatric diagnoses, and notify attending LIP  2. [place] high risk fall precautions, as indicated  3. Provide frequent short contacts to provide reality

## 2025-07-24 NOTE — PROGRESS NOTES
Hospitalist Progress Note      Patient:  Adry Moura 74 y.o. female       : 1950  Unit/Bed:6K-17/017-A    Date of Admission: 2025      ASSESSMENT AND PLAN    Active Problems  Post operative intra-abdominal abscess:  Underwent colonoscopy in May 2025 and then later Radha 3, 2025 and found to have 3 cm mass which was found to have moderately differentiated adenocarcinoma of colon resulting in low anterior resection on 25 by Dr. Hale.  CT A/P in the ED with large intra-abdominal abscess within anterior aspect of abdomen and pelvis measuring 16.0 x 5.5 x 18.8 cm   General Surgery consulted and following, recommends no surgical intervention at this time, to have IR place drain via CT guidance.   S/p CT-guided drain placement .   Culture growing E coli.  Blood cultures NGTD.  Started on Zosyn on .  ID following.     Repeat CT  abscess measures 10.2 x 3.1 x 15.6 cm.   Repeat CT  abscess measures 5.5 x 12.5 x cm; minimal improvement. Likely due to drain not functioning well due to lack of flushing since insertion.  Returned to IR  for a second drain insertion and the previous unable to be flushed.   Repeat CT  abscess 5.0 x 9.5cm - slightly smaller in size but still significant.   Discussed with ID, general surgery, and IR. Plan to place large bore drains to allow for better drainage of abscess in hope to avoid surgical intervention. Currently has 8F and 10F, will exchange for 14F on both sides today, . Discussed at length with pt and son.   Routine drain care.  Monitor outputs and record.  Flush drain as ordered.     Leukocytosis: D/t above. Improving.   Diarrhea, improving.   Probiotic added.     Resolved Problems  Acute metabolic encephalopathy, resolved. 2/2 infection. Resolved shortly after starting ABX.   Hypokalemia  Constipation     Chronic Conditions (reviewed, stable, and home medications resumed, unless otherwise stated)  History of gastric

## 2025-07-24 NOTE — PROGRESS NOTES
[facility]  INPATIENT PHYSICAL THERAPY  DAILY NOTE  STR[facility] RENAL TELEMETRY 6K - 6K-17/017-A      Discharge Recommendations: Subacute/Skilled Nursing Facility and Patient would benefit from continued PT at discharge  Equipment Recommendations: No             Time In: 1105  Time Out: 1128  Timed Code Treatment Minutes: 23 Minutes  Minutes: 23          Date: 2025  Patient Name: Adry Moura,  Gender:  female        MRN: [id number]  : 1950  (74 y.o.)     Referring Practitioner: Uriel Madera, APRN - CNP  Diagnosis: Intra-abdominal abscess (HCC)  Additional Pertinent Hx: Per EMR \"Adry Moura is a 74 y.o. female with PMHx of Bariatric Surgery s/p total gastrectomy, subtotal esophagectomy with colo-jejunostomy, Adenocarcinoma of colon s/p low anterior resection, Bipolar disorder, HTN and NIDDm  who presents to [facility] from SNF/ [facility] with Generalized abdominal pain.    Patient states that she has been having abdominal pain associated with nausea, decreased appetite and  weakness since her discharge to SNF after surgery.  Patient states that the abdominal pain is generalized, sharp, exacerbated by PT/OT/exercise.  Per reports, patient was found to be hypoxic at nursing home but was saturating on room air in the ED.  \" Status post IR drain placement     Prior Level of Function:  Lives With: Significant other  Type of Home: Assisted living  Home Layout: One level  Home Access: Level entry  Home Equipment: Rollator   Bathroom Shower/Tub: Walk-in shower  Bathroom Toilet: Handicap height  Bathroom Equipment: Grab bars in shower, Shower chair, Grab bars around toilet  Bathroom Accessibility: Walker accessible    Receives Help From: Other (Comment) (facility staff)  Prior Level of Assist for ADLs: Independent  Prior Level of Assist for Homemaking: Independent  Homemaking Responsibilities: No  Ambulation Assistance: Needs assistance  Prior Level of Assist for

## 2025-07-24 NOTE — PROGRESS NOTES
Progress note: Infectious diseases    Patient - Adry Moura,  Age - 74 y.o.    - 1950      Room Number - 6K-17/017-A   MRN -  [id number]   [facility] # - [id number]  Date of Admission -  2025 11:22 AM    SUBJECTIVE:   No new complaints     OBJECTIVE   VITALS    height is 1.676 m (5' 6\") and weight is 54.4 kg (119 lb 14.9 oz). Her oral temperature is 97.9 °F (36.6 °C). Her blood pressure is 137/61 and her pulse is 80. Her respiration is 18 and oxygen saturation is 97%.       Wt Readings from Last 3 Encounters:   25 54.4 kg (119 lb 14.9 oz)   25 54.4 kg (120 lb)   25 59.5 kg (131 lb 2 oz)       I/O (24 Hours)    Intake/Output Summary (Last 24 hours) at 2025 0949  Last data filed at 2025 1111  Gross per 24 hour   Intake 20 ml   Output --   Net 20 ml       General Appearance  Awake, alert, oriented, chronically sick looking  HEENT - normocephalic, atraumatic, pale conjunctiva  Neck -visible bowel over the anterior neck  Lungs -  Bilateral   air entry, no rhonchi, no wheeze  Cardiovascular - Heart sounds are normal.    Abdomen - soft, not distended, drains in place   Neurologic -awake and oriented.  Skin - No bruising or bleeding  Extremities - No edema, no cyanosis, clubbing     MEDICATIONS:      lactobacillus  1 capsule Oral Daily with breakfast    acetaminophen  650 mg Oral Q12H    polyethylene glycol  8.5 g Oral Once    polyethylene glycol  17 g Oral BID    losartan  25 mg Oral QPM    losartan  50 mg Oral Daily    sodium chloride flush  5-40 mL IntraVENous 2 times per day    [Held by provider] enoxaparin  40 mg SubCUTAneous Daily    [Held by provider] acarbose  50 mg Oral TID WC    busPIRone  10 mg Oral Nightly    lamoTRIgine  100 mg Oral QAM    lamoTRIgine  200 mg Oral Nightly    [Held by provider] cetirizine  10 mg Oral Daily    QUEtiapine  25 mg Oral Nightly    insulin lispro  0-4 Units  (HCC) F31.4    Post traumatic stress disorder F43.10    Rectus sheath hematoma S30.1XXA    Abdominal pain, right lower quadrant R10.31    Anemia associated with acute blood loss D62    Gastric bypass status for obesity Z98.84    Hypoglycemia E16.2    Muscle strain of left lower extremity S86.912A    Multinodular goiter E04.2    Nausea R11.0    SOB (shortness of breath) R06.02    Hypovitaminosis D E55.9    Bipolar disorder, in partial remission, most recent episode depressed (HCC) F31.75    Essential hypertension I10    Goiter E04.9    Low vitamin D level R79.89    Acute non intractable tension-type headache G44.209    Nonintractable headache R51.9    Chest pain, atypical R07.89    SOB (shortness of breath) on exertion R06.02    Family history of premature CAD Z82.49    Gastroesophageal reflux disease K21.9    Dysphagia R13.10    S/P gastrectomy Z90.3    Intestinal malabsorption K90.9    History of chest pain Z87.898    Bipolar 1 disorder, depressed, moderate (HCC) F31.32    Cancer of rectosigmoid junction (HCC) C19    Adenocarcinoma of colon (HCC) C18.9    Intra-abdominal abscess (HCC) K65.1    Intra-abdominal fluid collection R18.8    Metabolic encephalopathy G93.41    Severe malnutrition E43    Leukocytosis D72.829         ASSESSMENT/PLAN   Adencarcinoma of the colon s/p anterior resection on 6/19/25  Intraabdominal post surgical fluid collection: + E.coli  and other organisms, on iv zosyn  Hx of Bipolar disorder  Will have a large bore drain by KIRK Ching MD, 7/24/2025 9:49 AM

## 2025-07-24 NOTE — H&P
[facility]  Sedation/Analgesia History & Physical    Pt Name: Adry Moura  MRN: [id number]  YOB: 1950  Provider Performing Procedure: Bud Alba MD, MD  Primary Care Physician: María Neil APRN - CNP    Formulation and discussion of sedation / procedure plans, risks, benefits, side effects and alternatives with patient and/or responsible adult completed.    PRE-PROCEDURE   DNR-CCA/DNR-CC []Yes [x]No  Brief History/Pre-Procedure Diagnosis: Intra-abdominal fluid collection          MEDICAL HISTORY  []CAD/Valve  []Liver Disease  []Lung Disease []Diabetes  []Hypertension []Renal Disease  [x]Additional information:       has a past medical history of Arthritis, Bowel obstruction (HCC), Bronchitis, Diabetes mellitus (HCC), Difficult intubation, DVT, lower extremity (HCC), GERD (gastroesophageal reflux disease), History of blood transfusion, Hx of blood clots, Hypertension, Hypoglycemia, Kidney stone, Macular degeneration, Movement disorder, Multinodular goiter, Muscle strain of left lower extremity, Pinched nerve, Psychiatric problem, Urinary incontinence, and Vitamin D deficiency.    SURGICAL HISTORY   has a past surgical history that includes Hysterectomy (1992); Cholecystectomy (1994); Gastric bypass surgery (1999); Esophagectomy (2000); Colon surgery (2001); Abdomen surgery; Appendectomy; Dilatation, esophagus; Knee cartilage surgery (Left, 10/2014); Foot surgery (Right, 10/14/2015); Upper gastrointestinal endoscopy (06/2016); Colonoscopy (06/21/2016); Endoscopy, colon, diagnostic (06/03/2025); Hand tendon surgery (Right, 06/2021); Nerve Block; and colectomy (N/A, 6/19/2025).  Additional information:       ALLERGIES   Allergies as of 07/08/2025    (No Known Allergies)     Additional information:       MEDICATIONS   Coumadin Use Last 5 Days [x]No []Yes  Antiplatelet drug therapy use last 5 days  [x]No []Yes  Other anticoagulant use last 5 days  [x]No []Yes    Current  bedtime   Yes Darnell Reyna MD   acetaminophen (TYLENOL) 325 MG tablet Take 2 tablets by mouth every 6 hours as needed for Pain   Yes Darnell Reyna MD   busPIRone (BUSPAR) 10 MG tablet Take 1 tablet by mouth at bedtime 6/1/25  Yes Darnell Reyna MD   busPIRone (BUSPAR) 7.5 MG tablet Take 1 tablet by mouth daily as needed 6/1/25  Yes Darnell Reyna MD   loratadine (CLARITIN) 10 MG tablet Take 2 tablets by mouth daily 6/6/25  Yes ProviderDarnell MD   CHLORPHENIRAMINE-DM PO Take 1 tablet by mouth as needed (coughing) As needed for coughing  4-30 mg   Yes ProviderDarnell MD   dicyclomine (BENTYL) 20 MG tablet Take 1 tablet by mouth as needed   Yes ProviderDarnell MD   esomeprazole Magnesium (NEXIUM) 40 MG PACK Take 1 packet by mouth daily   Yes Darnell Reyna MD   linaCLOtide (LINZESS PO) Take 290 mcg by mouth daily   Yes ProviderDarnell MD   losartan (COZAAR) 50 MG tablet Take 1 tablet by mouth daily   Yes ProviderDarnell MD   Prucalopride Succinate (MOTEGRITY) 1 MG tablet Take by mouth daily   Yes ProviderDarnell MD   Multiple Vitamins-Minerals (THERAPEUTIC MULTIVITAMIN-MINERALS) tablet Take 1 tablet by mouth daily   Yes ProviderDarnell MD   famotidine (PEPCID) 40 MG tablet Take 1 tablet by mouth daily   Yes ProviderDarnell MD   Carboxymethylcellul-Glycerin (REFRESH TEARS PF OP) Apply to eye   Yes ProviderDarnell MD   venlafaxine 225 MG extended release tablet Take 1 tablet by mouth daily (with breakfast)   Yes ProviderDarnell MD   lamoTRIgine (LAMICTAL) 100 MG tablet TAKE 1 TABLET BY MOUTH IN THE MORNING AND 2 TABLETS IN THE EVENING AS DIRECTED 9/1/22  Yes Sabi Alejandra APRN - CNP   acarbose (PRECOSE) 50 MG tablet Take 1 tablet by mouth 3 times daily (with meals) 3/6/18  Yes Sosa Young APRN - CNS   ondansetron (ZOFRAN ODT) 4 MG disintegrating tablet Take 1 tablet by mouth every 4 hours as needed for Nausea or

## 2025-07-24 NOTE — PLAN OF CARE
Problem: Chronic Conditions and Co-morbidities  Goal: Patient's chronic conditions and co-morbidity symptoms are monitored and maintained or improved  Outcome: Progressing  Note: Patient's chronic conditions remain at baseline. VSS.     Problem: Discharge Planning  Goal: Discharge to home or other facility with appropriate resources  Outcome: Progressing  Discharge to home or other facility with appropriate resources: Identify barriers to discharge with patient and caregiver  Note: Patient plans to discharge to a facility.      Problem: Safety - Adult  Goal: Free from fall injury  Outcome: Progressing  Note: No falls noted this shift. Continue falling star program. Bed alarm on, bed in low position. Call light and personal belongings in reach.  Patient uses call light appropriately.      Problem: Neurosensory - Adult  Goal: Achieves stable or improved neurological status  Outcome: Progressing  Achieves stable or improved neurological status: Assess for and report changes in neurological status     Problem: Respiratory - Adult  Goal: Achieves optimal ventilation and oxygenation  Outcome: Progressing  Achieves optimal ventilation and oxygenation: Assess for changes in respiratory status     Problem: Cardiovascular - Adult  Goal: Maintains optimal cardiac output and hemodynamic stability  Outcome: Progressing  Maintains optimal cardiac output and hemodynamic stability: Monitor blood pressure and heart rate  Goal: Absence of cardiac dysrhythmias or at baseline  Outcome: Progressing     Problem: Skin/Tissue Integrity - Adult  Goal: Skin integrity remains intact  Description: 1.  Monitor for areas of redness and/or skin breakdown  2.  Assess vascular access sites hourly  3.  Every 4-6 hours minimum:  Change oxygen saturation probe site  4.  Every 4-6 hours:  If on nasal continuous positive airway pressure, respiratory therapy assess nares and determine need for appliance change or resting period  Outcome:

## 2025-07-24 NOTE — CARE COORDINATION
7/24/25, 12:07 PM EDT    DISCHARGE ON GOING EVALUATION    Adry LEIGH [facility] day: 16  Location: -17/017-A Reason for admit: Abscess of abdominal wall [L02.211]  Intra-abdominal abscess (HCC) [K65.1]     Procedures: 7/8 CT abscess drainage, attached to Deni-Close   7/17 CT abscess drainage, Deni-Close drain placed    Imaging since last note: 7/22/25 CT Abd/Pelvis    IMPRESSION:  1. A right-sided drainage catheter and a left-sided drainage catheter terminate  within the abscess collection in the lower abdomen/pelvis. The gas and fluid  collection when measured at the level of the catheters is slightly smaller with  representative measurements provided in the discussion. The abscess collection  extending along the right adnexa is also slightly smaller with representative  measurements discussed. The distention of the bowel loops in the left  hemiabdomen is less evident.     2. Chronic findings are discussed.    Barriers to Discharge: Gen Surg and ID cont to follow. Bilat perc drains remain with continued output. Gen Surg considering larger drainage tube. However, pt is declining any further new intervention or changed intervention with the tubes at this time. She would like to return to the nursing home. Gen surgery plans to sign off.  No sitter. PT/OT followings. Remains on IV Zosyn.     PCP: María Neil APRN - CNP  Readmission Risk Score: 21.3    Patient Goals/Plan/Treatment Preferences: Return to [facility]. No precert. Son to transport. SW on case.

## 2025-07-24 NOTE — PROGRESS NOTES
1350 Patient received in CT scanning for pre-procedure drain insertion assessment. Monitor applied.   1405 Dr. Alba here; spoke to patient. This procedure has been fully reviewed with the patient and written informed consent has been obtained.   1410 Patient assisted to CT table and pre scan started.   1415 Procedure started with Dr. Alba.  1430 Procedure completed; patient tolerated well. Post scan obtained.   1435 Patient on bed; comfort ensured.  1438 Report called to 6K and patient taken to 6K via bed. Pt drowsy from sedation but awakens and answers questions appropriately; follows commands. Skin pink, warm, and dry. Respirations easy, regular, and nonlabored.

## 2025-07-25 VITALS
WEIGHT: 117.95 LBS | HEIGHT: 66 IN | OXYGEN SATURATION: 95 % | BODY MASS INDEX: 18.96 KG/M2 | SYSTOLIC BLOOD PRESSURE: 117 MMHG | DIASTOLIC BLOOD PRESSURE: 58 MMHG | RESPIRATION RATE: 18 BRPM | TEMPERATURE: 97.6 F | HEART RATE: 71 BPM

## 2025-07-25 LAB
ANION GAP SERPL CALC-SCNC: 12 MEQ/L (ref 8–16)
BUN SERPL-MCNC: 17 MG/DL (ref 8–23)
CALCIUM SERPL-MCNC: 8.8 MG/DL (ref 8.8–10.2)
CHLORIDE SERPL-SCNC: 103 MEQ/L (ref 98–111)
CO2 SERPL-SCNC: 26 MEQ/L (ref 22–29)
CREAT SERPL-MCNC: 0.6 MG/DL (ref 0.5–0.9)
DEPRECATED RDW RBC AUTO: 51.7 FL (ref 35–45)
ERYTHROCYTE [DISTWIDTH] IN BLOOD BY AUTOMATED COUNT: 14.9 % (ref 11.5–14.5)
GFR SERPL CREATININE-BSD FRML MDRD: > 90 ML/MIN/1.73M2
GLUCOSE BLD STRIP.AUTO-MCNC: 79 MG/DL (ref 70–108)
GLUCOSE BLD STRIP.AUTO-MCNC: 85 MG/DL (ref 70–108)
GLUCOSE BLD STRIP.AUTO-MCNC: 99 MG/DL (ref 70–108)
GLUCOSE SERPL-MCNC: 84 MG/DL (ref 74–109)
HCT VFR BLD AUTO: 31.5 % (ref 37–47)
HGB BLD-MCNC: 10 GM/DL (ref 12–16)
MCH RBC QN AUTO: 30 PG (ref 26–33)
MCHC RBC AUTO-ENTMCNC: 31.7 GM/DL (ref 32.2–35.5)
MCV RBC AUTO: 94.6 FL (ref 81–99)
PLATELET # BLD AUTO: 572 THOU/MM3 (ref 130–400)
PMV BLD AUTO: 8.9 FL (ref 9.4–12.4)
POTASSIUM SERPL-SCNC: 3.8 MEQ/L (ref 3.5–5.2)
RBC # BLD AUTO: 3.33 MILL/MM3 (ref 4.2–5.4)
SODIUM SERPL-SCNC: 141 MEQ/L (ref 135–145)
WBC # BLD AUTO: 10.5 THOU/MM3 (ref 4.8–10.8)

## 2025-07-25 PROCEDURE — 2580000003 HC RX 258: Performed by: STUDENT IN AN ORGANIZED HEALTH CARE EDUCATION/TRAINING PROGRAM

## 2025-07-25 PROCEDURE — 82948 REAGENT STRIP/BLOOD GLUCOSE: CPT

## 2025-07-25 PROCEDURE — 99024 POSTOP FOLLOW-UP VISIT: CPT

## 2025-07-25 PROCEDURE — 80048 BASIC METABOLIC PNL TOTAL CA: CPT

## 2025-07-25 PROCEDURE — APPNB30 APP NON BILLABLE TIME 0-30 MINS

## 2025-07-25 PROCEDURE — 36415 COLL VENOUS BLD VENIPUNCTURE: CPT

## 2025-07-25 PROCEDURE — 6370000000 HC RX 637 (ALT 250 FOR IP): Performed by: STUDENT IN AN ORGANIZED HEALTH CARE EDUCATION/TRAINING PROGRAM

## 2025-07-25 PROCEDURE — 6370000000 HC RX 637 (ALT 250 FOR IP): Performed by: NURSE PRACTITIONER

## 2025-07-25 PROCEDURE — 6370000000 HC RX 637 (ALT 250 FOR IP): Performed by: PHYSICIAN ASSISTANT

## 2025-07-25 PROCEDURE — 85027 COMPLETE CBC AUTOMATED: CPT

## 2025-07-25 PROCEDURE — 99239 HOSP IP/OBS DSCHRG MGMT >30: CPT | Performed by: PHYSICIAN ASSISTANT

## 2025-07-25 PROCEDURE — 2500000003 HC RX 250 WO HCPCS: Performed by: STUDENT IN AN ORGANIZED HEALTH CARE EDUCATION/TRAINING PROGRAM

## 2025-07-25 PROCEDURE — 6360000002 HC RX W HCPCS: Performed by: STUDENT IN AN ORGANIZED HEALTH CARE EDUCATION/TRAINING PROGRAM

## 2025-07-25 RX ADMIN — PIPERACILLIN AND TAZOBACTAM 3375 MG: 3; .375 INJECTION, POWDER, FOR SOLUTION INTRAVENOUS at 08:33

## 2025-07-25 RX ADMIN — ACETAMINOPHEN 650 MG: 325 TABLET ORAL at 08:24

## 2025-07-25 RX ADMIN — PANTOPRAZOLE SODIUM 40 MG: 40 TABLET, DELAYED RELEASE ORAL at 06:28

## 2025-07-25 RX ADMIN — SODIUM CHLORIDE, PRESERVATIVE FREE 10 ML: 5 INJECTION INTRAVENOUS at 08:25

## 2025-07-25 RX ADMIN — Medication 1 CAPSULE: at 08:24

## 2025-07-25 RX ADMIN — LAMOTRIGINE 100 MG: 100 TABLET ORAL at 08:24

## 2025-07-25 RX ADMIN — LOSARTAN POTASSIUM 50 MG: 25 TABLET, FILM COATED ORAL at 08:25

## 2025-07-25 RX ADMIN — VENLAFAXINE HYDROCHLORIDE 75 MG: 75 CAPSULE, EXTENDED RELEASE ORAL at 08:26

## 2025-07-25 RX ADMIN — POLYETHYLENE GLYCOL 3350 17 G: 17 POWDER, FOR SOLUTION ORAL at 08:24

## 2025-07-25 RX ADMIN — VENLAFAXINE HYDROCHLORIDE 150 MG: 150 CAPSULE, EXTENDED RELEASE ORAL at 08:25

## 2025-07-25 ASSESSMENT — PAIN SCALES - GENERAL: PAINLEVEL_OUTOF10: 2

## 2025-07-25 ASSESSMENT — PAIN SCALES - WONG BAKER: WONGBAKER_NUMERICALRESPONSE: NO HURT

## 2025-07-25 NOTE — CARE COORDINATION
7/25/25, 8:24 AM EDT    DISCHARGE ON GOING EVALUATION    Adry [facility] day: 17  Location: -17/017-A Reason for admit: Abscess of abdominal wall [L02.211]  Intra-abdominal abscess (HCC) [K65.1]     Procedures:   7/8 CT abscess drainage, attached to Deni-Close   7/17 CT abscess drainage, Deni-Close drain placed  7/24 CT Abscess drainage with catheter placement    Imaging since last note: none    Barriers to Discharge: Gen Surg and ID cont to follow. Bilat perc drains remain with continued output. PT/OT, diabetes management, pain and nausea control, drainage tube maintenance, IV Zosyn.    PCP: María Neil APRN - CNP  Readmission Risk Score: 22.1    Patient Goals/Plan/Treatment Preferences: Plan is to return to  [facility]. SW following.

## 2025-07-25 NOTE — PROGRESS NOTES
[facility]  Lisset Johnson PA-C for Dr. Leoncio Hale  General Surgery Daily Progress Note    Pt Name: Adry Moura  Medical Record Number: [id number]  Date of Birth 1950   Today's Date: 7/25/2025    Hospital day # 17     ASSESSMENT   Large intra-abdominal fluid collection/possible abscess - Status post IR drain placement 7/8/25. Now with bilateral drains. Larger bore drain tubes placed 7/24/2025.  Altered mental status - improved  Status post low anterior resection 6/19/25 secondary to adenocarcinoma  Leukocytosis - improved  Abdominal pain - improved  Diabetes Mellitus  Bipolar disorder  History of DVT  History of gastric bypass then later total gastrectomy   History of subtotal esophagectomy with colonic interposition   has a past medical history of Arthritis, Bowel obstruction (HCC), Bronchitis, Diabetes mellitus (HCC), Difficult intubation, DVT, lower extremity (HCC), GERD (gastroesophageal reflux disease), History of blood transfusion, Hx of blood clots, Hypertension, Hypoglycemia, Kidney stone, Macular degeneration, Movement disorder, Multinodular goiter, Muscle strain of left lower extremity, Pinched nerve, Psychiatric problem, Urinary incontinence, and Vitamin D deficiency.  PLAN   Diet as tolerated. Having bowel function. Not eating much. ONS.  Bilateral IR PERC drains in place. Now large bore. Serosanguineous.   Antibiotics - infectious disease following. Will transition to oral at discharge.  Pain & nausea control  DVT prophylaxis   PT/OT   Will need oncology appointment outpatient rescheduled  Case management for assistance with discharge planning, from [facility], social work consult. Awaiting ride today for transfer back. Discharge later.  Patient now has large bore drains in place. Unhappy she was not able to be sedated for this procedure but glad that it is over. Bilateral drains serosanguineous with not a lot of output but drainage still occurring. Follow-up outpatient  bilateral lung bases.     Postsurgical changes related to esophagectomy and colonic interposition appear  stable since the previous examination.     There is hypoattenuation of the liver which is likely related to fatty  infiltration.     The spleen and adrenal glands appear within normal limits.     The pancreas appears atrophic.     There is no hydronephrosis.     There is no small bowel obstruction.     There is redemonstration of an intra-abdominal abscess in the anterior aspect of  the abdomen and pelvis. There has been placement of a pigtail drainage catheter  in this collection since the previous study. Currently this measures  approximately 10.2 x 3.1 x 15.6 cm, down from 16.0 x 5.5 x 18.8 cm on the  previous study. This collection appears to communicate with fluid in the pelvis.     There is no free intraperitoneal air.     There are surgical changes in the distal colon.     There is atherosclerosis in the abdominal aorta. There is no aneurysmal  dilatation.     There are degenerative changes in the spine. No acute fractures.     IMPRESSION:  Redemonstration of an intra-abdominal abscess. There has been interval placement  of a drainage catheter and the collection has decreased in size since the  previous examination.              **This report has been created using voice recognition software. It may contain  minor errors which are inherent in voice recognition technology.**           Electronically signed by Dr Bud Alba        Exam Ended: 07/12/25 08:21 EDT Last Resulted: 07/12/25 09:12 EDT       XR ABDOMEN (KUB) (SINGLE AP VIEW)  Order: [id number]   Status: Final result       Next appt: 07/16/2025 at 09:30 AM in General Surgery (BRANT KO, EVA - CNP)    Test Result Released: Yes (not seen)    0 Result Notes  Details    Reading Physician Reading Date Result Priority   Raudel Vences MD  [phone] 7/10/2025      Narrative & Impression  PROCEDURE: XR ABDOMEN (KUB) (SINGLE AP VIEW)

## 2025-07-25 NOTE — PROGRESS NOTES
Xiang VILLASENOR called and gave report to Callie VILLASENOR at [facility]. Awaiting patient's ride.

## 2025-07-25 NOTE — CARE COORDINATION
7/25/25, 10:10 AM EDT    Patient goals/plan/ treatment preferences discussed by  and .  Patient goals/plan/ treatment preferences reviewed with patient/ family.  Patient/ family verbalize understanding of discharge plan and are in agreement with goal/plan/treatment preferences.  Understanding was demonstrated using the teach back method.  AVS provided by RN at time of discharge, which includes all necessary medical information pertaining to the patients current course of illness, treatment, post-discharge goals of care, and treatment preferences.     Services At/After Discharge: Skilled Nursing Facility (SNF), Aide services, Nursing service, OT, and PT    Discussed during rounds anticipated discharge for today to [facility].  Pt is aware.  Friend will be transporting pt.  SW notified Cayla at [facility].    Blue Packet on chart with discharge instructions.  HAMILTON Otoole is aware

## 2025-07-25 NOTE — PLAN OF CARE
Problem: Discharge Planning  Goal: Discharge to home or other facility with appropriate resources  7/25/2025 1142 by Xiang Hannah, RN  Outcome: Progressing  Flowsheets (Taken 7/24/2025 1553 by Saima Shukla, RN)  Discharge to home or other facility with appropriate resources: Identify barriers to discharge with patient and caregiver  Note: Patient plans to be discharged to [facility] when medically stable.      Problem: Safety - Adult  Goal: Free from fall injury  7/25/2025 1142 by Xiang Hannah, RN  Outcome: Progressing  Flowsheets (Taken 7/22/2025 1845 by Stefani Resendez, RN)  Free From Fall Injury: Instruct family/caregiver on patient safety  Note: No falls noted this shift. Patient ambulates with x1 staff assistance without difficulty. Son at bedside. Bed kept in low position. Safe environment maintained. Bedside table & call light in reach. Uses call light appropriately when needing assistance.      Care plan reviewed with patient and son at the bedside. Patient verbalizes understanding of plan of care and contributes to goal setting.

## 2025-07-25 NOTE — PROGRESS NOTES
Comprehensive Nutrition Assessment    Type and Reason for Visit:  Reassess (po/ONS/malnutrition)    Nutrition Recommendations/Plan:   Recommend MVI.  Recommend continue probiotic with antibiotic use.   Continue ONS, recommend at discharge as well: Glucerna TID (strawberry or chocolate).  Encouraged patient to drink in between meals d/t patient's current GI anatomy/early satiety, to mimic 6 small feedings per day.   Continue high protein extra with each meal, yogurt BID and cottage cheese/peaches daily.  Continue current diet.  Continue to encourage oral intake.      Malnutrition Assessment:  Malnutrition Status:  Severe malnutrition (07/17/25 1456)    Context:  Acute Illness     Findings of the 6 clinical characteristics of malnutrition:  Energy Intake:  50% or less of estimated energy requirements for 5 or more days  Weight Loss:  5% over 1 month (6.1% loss in the last 2.9 weeks)     Body Fat Loss:  Moderate body fat loss Orbital, Triceps, Fat Overlying Ribs, Buccal region   Muscle Mass Loss:  Moderate muscle mass loss Temples (temporalis), Clavicles (pectoralis & deltoids), Thigh (quadriceps), Calf (gastrocnemius)  Fluid Accumulation:  No fluid accumulation     Strength:  Not Performed    Nutrition Assessment:     Pt. severely malnourished AEB criteria listed above.  At risk for further nutritional compromise r/t continued poor oral intake/intake, altered GI anatomy/function, increased nutrient needs d/t known losses with hx gastric bypass and post-op healing, recent anterior resection on 6/19/25 d/t adenocarcinoma of colon, intraabdominal post-surgical fluid collection with drain placement 7/8/25, acute metabolic encephalopathy-resolving,  and underlying medical condition (hx:  has a past medical history of Arthritis, Bowel obstruction (HCC), Bronchitis, Diabetes mellitus (HCC), Difficult intubation, DVT, lower extremity (HCC), GERD (gastroesophageal reflux disease), History of blood transfusion, Hx of blood  clots, Hypertension, Hypoglycemia, Kidney stone, Macular degeneration, Movement disorder, Multinodular goiter, Muscle strain of left lower extremity, Pinched nerve, Psychiatric problem, Urinary incontinence, and Vitamin D deficiency., gastric bypass, esophageal mass-s/p subtotal esophagectomy with colonic interposition, total gastrectomy).       Nutrition Related Findings:    Pt. Report/Treatments/Miscellaneous:   7/25: Patient seen, up in chair, reports she is feeling so much better today. Has larger abdomen drains placed in IR yesterday.  Appetite is doing better as well.  Consuming some of glucerna and high protein extras. Has to do smaller amounts d/t GI anatomy and early satiety.   7/21: Patient seen, sleeping, just had a shower and is worn out per live sitter and spouse. Appetite still not the best per spouse and sitter. Drank some of glucerna at breakfast, none at lunch. Likes yogurt and cottage cheese/peaches per spouse.   7/17: Patient seen with multiple family members present and live sitter. Patient reports she has been eating well and maintaining her weight until recently with bowel surgery and post-surgical fluid collection. Has used ONS in past but not recently, agreeable to now as well as high protein extras. Chronic issues with swallowing d/t sub-total esophagectomy and gastrectomy but knows what she can eat. Agreeable to high protein extras.   GI Status: BM 7/23-8 stools  Pertinent Labs: 7/25/25: BMP wnls  Pertinent Meds: humalog, culturelle, protonix, zosyn, glycolax, seroquel, effexor     Wound Type: Surgical Incision (6/19/25 s/p anterior colon resection, s/p drain placement 7/8/25 for intraabdominal post-surgical fluid collection)       Current Nutrition Intake & Therapies:    Average Meal Intake: %, 51-75%, 26-50%, 1-25%  Average Supplements Intake:  (likes and is drinking some of Glucerna and  consuming high protein extras)  ADULT DIET; Regular  ADULT ORAL NUTRITION SUPPLEMENT;

## 2025-07-25 NOTE — PROGRESS NOTES
[facility]  OCCUPATIONAL THERAPY MISSED TREATMENT NOTE  STRZ RENAL TELEMETRY 6K  6K-17/017-A      Date: 2025  Patient Name: Adry Moura        CSN: [id number]   : 1950  (74 y.o.)  Gender: female   Referring Practitioner: Uriel Madera APRN - [place]            REASON FOR MISSED TREATMENT: Patient Refused.  Pt reports she is discharging today and requested to rest.

## 2025-07-25 NOTE — PROGRESS NOTES
Progress note: Infectious diseases    Patient - Adry Moura,  Age - 74 y.o.    - 1950      Room Number - 6K-17/017-A   MRN -  [id number]   [facility] # - [id number]  Date of Admission -  2025 11:22 AM    SUBJECTIVE:   No new complaints. She had a wide bore catheter placed     OBJECTIVE   VITALS    height is 1.676 m (5' 6\") and weight is 53.5 kg (117 lb 15.1 oz). Her oral temperature is 98.2 °F (36.8 °C). Her blood pressure is 116/61 and her pulse is 98. Her respiration is 17 and oxygen saturation is 95%.       Wt Readings from Last 3 Encounters:   25 53.5 kg (117 lb 15.1 oz)   25 54.4 kg (120 lb)   25 59.5 kg (131 lb 2 oz)       I/O (24 Hours)    Intake/Output Summary (Last 24 hours) at 2025 0913  Last data filed at 2025 0908  Gross per 24 hour   Intake 120 ml   Output --   Net 120 ml       General Appearance  Awake, alert, oriented, chronically sick looking  HEENT - normocephalic, atraumatic, pale conjunctiva  Neck -visible bowel over the anterior neck  Lungs -  Bilateral   air entry, no rhonchi, no wheeze  Cardiovascular - Heart sounds are normal.    Abdomen - soft, not distended, drains in place (large bore)  Neurologic -awake and oriented.  Skin - No bruising or bleeding  Extremities - No edema, no cyanosis, clubbing     MEDICATIONS:      lactobacillus  1 capsule Oral Daily with breakfast    acetaminophen  650 mg Oral Q12H    polyethylene glycol  8.5 g Oral Once    polyethylene glycol  17 g Oral BID    losartan  25 mg Oral QPM    losartan  50 mg Oral Daily    sodium chloride flush  5-40 mL IntraVENous 2 times per day    [Held by provider] enoxaparin  40 mg SubCUTAneous Daily    [Held by provider] acarbose  50 mg Oral TID WC    busPIRone  10 mg Oral Nightly    lamoTRIgine  100 mg Oral QAM    lamoTRIgine  200 mg Oral Nightly    [Held by provider] cetirizine  10 mg Oral Daily     Hello,  The checked boxes below are your results of your COVID Test and Return to work guidelines.  Please call the [facility] with any questions- [phone]    [x] Negative   [x] Return to work immediately, continue to monitor for symptoms  [

## 2025-07-25 NOTE — PROGRESS NOTES
Discharge teaching and instructions for diagnosis/procedure of intra-abdominal abscess completed with patient using teachback method. AVS reviewed.  Patient voiced understanding regarding prescriptions, follow up appointments, and care of self at home. Discharged in a wheelchair to  skilled nursing per family.

## 2025-07-25 NOTE — PLAN OF CARE
Problem: Chronic Conditions and Co-morbidities  Goal: Patient's chronic conditions and co-morbidity symptoms are monitored and maintained or improved  7/25/2025 0049 by Zack Brumfield RN  Outcome: Progressing  7/24/2025 1553 by Saima Shukla RN  Outcome: Progressing     Problem: Discharge Planning  Goal: Discharge to home or other facility with appropriate resources  7/25/2025 0049 by Zack Brumfield RN  Outcome: Progressing  7/24/2025 1553 by Saima Shukla RN  Outcome: Progressing     Problem: Safety - Adult  Goal: Free from fall injury  7/25/2025 0049 by Zack Brumfield RN  Outcome: Progressing  7/24/2025 1553 by Saima Shukla RN  Outcome: Progressing     Problem: Neurosensory - Adult  Goal: Achieves stable or improved neurological status  7/25/2025 0049 by Zack Brumfiled RN  Outcome: Progressing  7/24/2025 1553 by Saima Shukla RN  Outcome: Progressing     Problem: Respiratory - Adult  Goal: Achieves optimal ventilation and oxygenation  7/25/2025 0049 by Zack Brumfield RN  Outcome: Progressing  7/24/2025 1553 by Saima Shukla RN  Outcome: Progressing     Problem: Cardiovascular - Adult  Goal: Maintains optimal cardiac output and hemodynamic stability  7/25/2025 0049 by Zack Brumfield RN  Outcome: Progressing  7/24/2025 1553 by Saima Shukla RN  Outcome: Progressing  Goal: Absence of cardiac dysrhythmias or at baseline  7/25/2025 0049 by Zack Brumfield RN  Outcome: Progressing  7/24/2025 1553 by Saima Shukla RN  Outcome: Progressing     Problem: Skin/Tissue Integrity - Adult  Goal: Skin integrity remains intact  7/24/2025 1553 by Saima Shukla RN  Outcome: Progressing  Goal: Incisions, wounds, or drain sites healing without S/S of infection  7/24/2025 1553 by Saima Shukla RN  Outcome: Progressing     Problem: Skin/Tissue Integrity  Goal: Skin integrity remains intact  7/24/2025 1553 by Saima Shukla RN  Outcome: Progressing     Problem:

## 2025-07-26 NOTE — DISCHARGE SUMMARY
Hospitalist Discharge Summary    Patient: Adry Moura  YOB: 1950  MRN: [id number]   Acct: [id number]    Primary Care Physician: María Neil APRN - CNP    Admit date  7/8/2025    Discharge date: 7/25/2025     Chief Complaint on presentation: abdominal pain     Summarized Hospital course:    74-year-old female who was admitted to hospitalist service for postoperative intra-abdominal abscess.  General surgery and ID were consulted.  Drain was placed, culture grew E. coli.  Patient was treated with IV Zosyn.  Large bore drains were placed for abscess.  Patient will be discharged with these drains, continue Augmentin at discharge.  She will follow-up with general surgery in 1 week.  Discharged to SNF in stable condition, continue drain care.    Discharge Assessment & Plan    Post operative intra-abdominal abscess:  Underwent colonoscopy in May 2025 and then later Radha 3, 2025 and found to have 3 cm mass which was found to have moderately differentiated adenocarcinoma of colon resulting in low anterior resection on 06/19/25 by Dr. Hale.  CT A/P in the ED with large intra-abdominal abscess within anterior aspect of abdomen and pelvis measuring 16.0 x 5.5 x 18.8 cm   General Surgery consulted and following, recommends no surgical intervention at this time, to have IR place drain via CT guidance.   S/p CT-guided drain placement 7/8.   Culture growing E coli.  Blood cultures NGTD.  Started on Zosyn on 07/09.  ID following.   Repeat CT 7/12 abscess measures 10.2 x 3.1 x 15.6 cm.   Repeat CT 7/16 abscess measures 5.5 x 12.5 x cm; minimal improvement. Likely due to drain not functioning well due to lack of flushing since insertion.  Returned to IR 07/17 for a second drain insertion and the previous unable to be flushed.   Repeat CT 7/22 abscess 5.0 x 9.5cm - slightly smaller in size but still significant.   Discussed with ID, general surgery, and IR. Plan to place large bore drains to allow for

## 2025-07-30 ENCOUNTER — APPOINTMENT (OUTPATIENT)
Dept: GENERAL RADIOLOGY | Age: 75
DRG: 492 | End: 2025-07-30
Payer: MEDICARE

## 2025-07-30 ENCOUNTER — APPOINTMENT (OUTPATIENT)
Dept: CT IMAGING | Age: 75
DRG: 492 | End: 2025-07-30
Payer: MEDICARE

## 2025-07-30 ENCOUNTER — HOSPITAL ENCOUNTER (INPATIENT)
Age: 75
LOS: 7 days | Discharge: NURSING, REHAB OR LONG-TERM CARE | DRG: 492 | End: 2025-08-07
Attending: EMERGENCY MEDICINE
Payer: MEDICARE

## 2025-07-30 DIAGNOSIS — S42.202A CLOSED FRACTURE OF PROXIMAL END OF LEFT HUMERUS, UNSPECIFIED FRACTURE MORPHOLOGY, INITIAL ENCOUNTER: Primary | ICD-10-CM
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
## 2025-07-30 PROCEDURE — 85025 COMPLETE CBC W/AUTO DIFF WBC: CPT

## 2025-07-30 PROCEDURE — 83735 ASSAY OF MAGNESIUM: CPT

## 2025-07-30 PROCEDURE — 80048 BASIC METABOLIC PNL TOTAL CA: CPT

## 2025-07-30 PROCEDURE — 36415 COLL VENOUS BLD VENIPUNCTURE: CPT

## 2025-07-30 PROCEDURE — 2580000003 HC RX 258

## 2025-07-30 PROCEDURE — 73030 X-RAY EXAM OF SHOULDER: CPT

## 2025-07-30 PROCEDURE — 99285 EMERGENCY DEPT VISIT HI MDM: CPT

## 2025-07-30 PROCEDURE — 6820000001 HC L2 TRAUMA SURGERY EVALUATION: Performed by: ORTHOPAEDIC SURGERY

## 2025-07-30 PROCEDURE — 84484 ASSAY OF TROPONIN QUANT: CPT

## 2025-07-30 PROCEDURE — 71045 X-RAY EXAM CHEST 1 VIEW: CPT

## 2025-07-30 PROCEDURE — 6360000002 HC RX W HCPCS

## 2025-07-30 PROCEDURE — 96374 THER/PROPH/DIAG INJ IV PUSH: CPT

## 2025-07-30 PROCEDURE — 72170 X-RAY EXAM OF PELVIS: CPT

## 2025-07-30 RX ORDER — PROPOFOL 10 MG/ML
1 INJECTION, EMULSION INTRAVENOUS ONCE
Status: COMPLETED | OUTPATIENT
Start: 2025-07-31 | End: 2025-07-31

## 2025-07-30 RX ORDER — 0.9 % SODIUM CHLORIDE 0.9 %
500 INTRAVENOUS SOLUTION INTRAVENOUS ONCE
Status: COMPLETED | OUTPATIENT
Start: 2025-07-31 | End: 2025-07-31

## 2025-07-30 RX ORDER — FENTANYL CITRATE 50 UG/ML
25 INJECTION, SOLUTION INTRAMUSCULAR; INTRAVENOUS ONCE
Refills: 0 | Status: COMPLETED | OUTPATIENT
Start: 2025-07-30 | End: 2025-07-30

## 2025-07-30 RX ORDER — FENTANYL CITRATE 50 UG/ML
INJECTION, SOLUTION INTRAMUSCULAR; INTRAVENOUS
Status: COMPLETED
Start: 2025-07-30 | End: 2025-07-30

## 2025-07-30 RX ORDER — FENTANYL CITRATE 50 UG/ML
100 INJECTION, SOLUTION INTRAMUSCULAR; INTRAVENOUS ONCE
Status: COMPLETED | OUTPATIENT
Start: 2025-07-31 | End: 2025-07-31

## 2025-07-30 RX ADMIN — FENTANYL CITRATE 25 MCG: 50 INJECTION, SOLUTION INTRAMUSCULAR; INTRAVENOUS at 23:15

## 2025-07-30 RX ADMIN — SODIUM CHLORIDE 500 ML: 9 INJECTION, SOLUTION INTRAVENOUS at 23:59

## 2025-07-30 ASSESSMENT — PAIN SCALES - GENERAL
PAINLEVEL_OUTOF10: 10
PAINLEVEL_OUTOF10: 10

## 2025-07-30 ASSESSMENT — PAIN - FUNCTIONAL ASSESSMENT: PAIN_FUNCTIONAL_ASSESSMENT: 0-10

## 2025-07-30 ASSESSMENT — PAIN DESCRIPTION - ORIENTATION: ORIENTATION: LEFT

## 2025-07-30 ASSESSMENT — PAIN DESCRIPTION - LOCATION: LOCATION: ARM;SHOULDER

## 2025-07-31 ENCOUNTER — APPOINTMENT (OUTPATIENT)
Dept: CT IMAGING | Age: 75
DRG: 492 | End: 2025-07-31
Payer: MEDICARE

## 2025-07-31 ENCOUNTER — APPOINTMENT (OUTPATIENT)
Dept: MRI IMAGING | Age: 75
DRG: 492 | End: 2025-07-31
Payer: MEDICARE

## 2025-07-31 ENCOUNTER — APPOINTMENT (OUTPATIENT)
Dept: GENERAL RADIOLOGY | Age: 75
DRG: 492 | End: 2025-07-31
Payer: MEDICARE

## 2025-07-31 PROBLEM — M79.602 LEFT ARM PAIN: Status: ACTIVE | Noted: 2025-07-31

## 2025-07-31 PROBLEM — E87.29 HIGH ANION GAP METABOLIC ACIDOSIS: Status: ACTIVE | Noted: 2025-07-31

## 2025-07-31 PROBLEM — S42.202A CLOSED FRACTURE OF LEFT PROXIMAL HUMERUS: Status: ACTIVE | Noted: 2025-07-31
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
## 2025-07-31 LAB
ABO GROUP BLD: NORMAL
AMORPH SED URNS QL MICRO: ABNORMAL
ANION GAP SERPL CALC-SCNC: 16 MEQ/L (ref 8–16)
ANION GAP SERPL CALC-SCNC: 19 MEQ/L (ref 8–16)
BACTERIA URNS QL MICRO: ABNORMAL /HPF
BASOPHILS ABSOLUTE: 0 THOU/MM3 (ref 0–0.1)
BASOPHILS NFR BLD AUTO: 0.3 %
BILIRUB UR QL STRIP.AUTO: NEGATIVE
BUN SERPL-MCNC: 21 MG/DL (ref 8–23)
BUN SERPL-MCNC: 21 MG/DL (ref 8–23)
CALCIUM SERPL-MCNC: 8.4 MG/DL (ref 8.8–10.2)
CALCIUM SERPL-MCNC: 9.1 MG/DL (ref 8.8–10.2)
CASTS #/AREA URNS LPF: ABNORMAL /LPF
CASTS 2: ABNORMAL /LPF
CHARACTER UR: CLEAR
CHLORIDE SERPL-SCNC: 102 MEQ/L (ref 98–111)
CHLORIDE SERPL-SCNC: 102 MEQ/L (ref 98–111)
CO2 SERPL-SCNC: 18 MEQ/L (ref 22–29)
CO2 SERPL-SCNC: 22 MEQ/L (ref 22–29)
COLOR, UA: YELLOW
CREAT SERPL-MCNC: 0.6 MG/DL (ref 0.5–0.9)
CREAT SERPL-MCNC: 0.6 MG/DL (ref 0.5–0.9)
CRP SERPL-MCNC: 6.82 MG/DL (ref 0–0.5)
CRYSTALS URNS MICRO: ABNORMAL
CRYSTALS URNS MICRO: ABNORMAL
DEPRECATED RDW RBC AUTO: 52.8 FL (ref 35–45)
EKG ATRIAL RATE: 88 BPM
EKG P AXIS: 46 DEGREES
EKG P-R INTERVAL: 136 MS
EKG Q-T INTERVAL: 396 MS
EKG QRS DURATION: 62 MS
EKG QTC CALCULATION (BAZETT): 479 MS
EKG R AXIS: 46 DEGREES
EKG T AXIS: 100 DEGREES
EKG VENTRICULAR RATE: 88 BPM
EOSINOPHIL NFR BLD AUTO: 0.4 %
EOSINOPHILS ABSOLUTE: 0.1 THOU/MM3 (ref 0–0.4)
EPITHELIAL CELLS, UA: ABNORMAL /HPF
ERYTHROCYTE [DISTWIDTH] IN BLOOD BY AUTOMATED COUNT: 15.5 % (ref 11.5–14.5)
ERYTHROCYTE [SEDIMENTATION RATE] IN BLOOD BY WESTERGREN METHOD: 68 MM/HR (ref 0–20)
FLUAV RNA RESP QL NAA+PROBE: NOT DETECTED
FLUBV RNA RESP QL NAA+PROBE: NOT DETECTED
GFR SERPL CREATININE-BSD FRML MDRD: > 90 ML/MIN/1.73M2
GFR SERPL CREATININE-BSD FRML MDRD: > 90 ML/MIN/1.73M2
GLUCOSE BLD STRIP.AUTO-MCNC: 87 MG/DL (ref 70–108)
GLUCOSE BLD STRIP.AUTO-MCNC: 94 MG/DL (ref 70–108)
GLUCOSE BLD STRIP.AUTO-MCNC: 96 MG/DL (ref 70–108)
GLUCOSE SERPL-MCNC: 118 MG/DL (ref 74–109)
GLUCOSE SERPL-MCNC: 126 MG/DL (ref 74–109)
GLUCOSE UR QL STRIP.AUTO: NEGATIVE MG/DL
HCT VFR BLD AUTO: 30.5 % (ref 37–47)
HGB BLD-MCNC: 9.7 GM/DL (ref 12–16)
HGB UR QL STRIP.AUTO: ABNORMAL
IAT IGG-SP REAG SERPL QL: NORMAL
IMM GRANULOCYTES # BLD AUTO: 0.17 THOU/MM3 (ref 0–0.07)
IMM GRANULOCYTES NFR BLD AUTO: 1.1 %
KETONES UR QL STRIP.AUTO: 15
LACTATE SERPL-SCNC: 0.7 MMOL/L (ref 0.5–2.2)
LYMPHOCYTES ABSOLUTE: 1.2 THOU/MM3 (ref 1–4.8)
LYMPHOCYTES NFR BLD AUTO: 7.8 %
MAGNESIUM SERPL-MCNC: 1.9 MG/DL (ref 1.6–2.6)
MAGNESIUM SERPL-MCNC: 2 MG/DL (ref 1.6–2.6)
MCH RBC QN AUTO: 29.8 PG (ref 26–33)
MCHC RBC AUTO-ENTMCNC: 31.8 GM/DL (ref 32.2–35.5)
MCV RBC AUTO: 93.6 FL (ref 81–99)
MISCELLANEOUS 2: ABNORMAL
MISCELLANEOUS LAB TEST RESULT: ABNORMAL
MONOCYTES ABSOLUTE: 0.7 THOU/MM3 (ref 0.4–1.3)
MONOCYTES NFR BLD AUTO: 5 %
MUCOUS THREADS URNS QL MICRO: ABNORMAL
NEUTROPHILS ABSOLUTE: 12.7 THOU/MM3 (ref 1.8–7.7)
NEUTROPHILS NFR BLD AUTO: 85.4 %
NITRITE UR QL STRIP: NEGATIVE
NRBC BLD AUTO-RTO: 0 /100 WBC
OSMOLALITY SERPL CALC.SUM OF ELEC: 282 MOSMOL/KG (ref 275–300)
PH UR STRIP.AUTO: 5.5 [PH] (ref 5–9)
PLATELET # BLD AUTO: 641 THOU/MM3 (ref 130–400)
PMV BLD AUTO: 9.1 FL (ref 9.4–12.4)
POTASSIUM SERPL-SCNC: 3.5 MEQ/L (ref 3.5–5.2)
POTASSIUM SERPL-SCNC: 3.7 MEQ/L (ref 3.5–5.2)
PROCALCITONIN SERPL IA-MCNC: 0.05 NG/ML (ref 0.01–0.09)
PROT UR STRIP.AUTO-MCNC: ABNORMAL MG/DL
RBC # BLD AUTO: 3.26 MILL/MM3 (ref 4.2–5.4)
RBC URINE: ABNORMAL /HPF
RENAL EPI CELLS #/AREA URNS HPF: ABNORMAL /[HPF]
RH BLD: NORMAL
SARS-COV-2 RNA RESP QL NAA+PROBE: NOT DETECTED
SODIUM SERPL-SCNC: 139 MEQ/L (ref 135–145)
SODIUM SERPL-SCNC: 140 MEQ/L (ref 135–145)
SP GR UR REFRACT.AUTO: 1.02 (ref 1–1.03)
TROPONIN, HIGH SENSITIVITY: 16 NG/L (ref 0–12)
TROPONIN, HIGH SENSITIVITY: 17 NG/L (ref 0–12)
UROBILINOGEN, URINE: 0.2 EU/DL (ref 0–1)
WBC # BLD AUTO: 14.9 THOU/MM3 (ref 4.8–10.8)
WBC #/AREA URNS HPF: ABNORMAL /HPF
WBC #/AREA URNS HPF: ABNORMAL /[HPF]
YEAST LIKE FUNGI URNS QL MICRO: ABNORMAL

## 2025-07-31 PROCEDURE — 80048 BASIC METABOLIC PNL TOTAL CA: CPT

## 2025-07-31 PROCEDURE — 2580000003 HC RX 258

## 2025-07-31 PROCEDURE — 84145 PROCALCITONIN (PCT): CPT

## 2025-07-31 PROCEDURE — 84484 ASSAY OF TROPONIN QUANT: CPT

## 2025-07-31 PROCEDURE — 36415 COLL VENOUS BLD VENIPUNCTURE: CPT

## 2025-07-31 PROCEDURE — 93005 ELECTROCARDIOGRAM TRACING: CPT

## 2025-07-31 PROCEDURE — 93010 ELECTROCARDIOGRAM REPORT: CPT | Performed by: INTERNAL MEDICINE

## 2025-07-31 PROCEDURE — 2500000003 HC RX 250 WO HCPCS

## 2025-07-31 PROCEDURE — 82948 REAGENT STRIP/BLOOD GLUCOSE: CPT

## 2025-07-31 PROCEDURE — 87636 SARSCOV2 & INF A&B AMP PRB: CPT

## 2025-07-31 PROCEDURE — 87070 CULTURE OTHR SPECIMN AEROBIC: CPT

## 2025-07-31 PROCEDURE — 81001 URINALYSIS AUTO W/SCOPE: CPT

## 2025-07-31 PROCEDURE — 87040 BLOOD CULTURE FOR BACTERIA: CPT

## 2025-07-31 PROCEDURE — 72148 MRI LUMBAR SPINE W/O DYE: CPT

## 2025-07-31 PROCEDURE — 86140 C-REACTIVE PROTEIN: CPT

## 2025-07-31 PROCEDURE — 85651 RBC SED RATE NONAUTOMATED: CPT

## 2025-07-31 PROCEDURE — 83605 ASSAY OF LACTIC ACID: CPT

## 2025-07-31 PROCEDURE — 83735 ASSAY OF MAGNESIUM: CPT

## 2025-07-31 PROCEDURE — 6360000002 HC RX W HCPCS

## 2025-07-31 PROCEDURE — 6370000000 HC RX 637 (ALT 250 FOR IP)

## 2025-07-31 PROCEDURE — 86900 BLOOD TYPING SEROLOGIC ABO: CPT

## 2025-07-31 PROCEDURE — 99223 1ST HOSP IP/OBS HIGH 75: CPT

## 2025-07-31 PROCEDURE — 70450 CT HEAD/BRAIN W/O DYE: CPT

## 2025-07-31 PROCEDURE — 86885 COOMBS TEST INDIRECT QUAL: CPT

## 2025-07-31 PROCEDURE — 72125 CT NECK SPINE W/O DYE: CPT

## 2025-07-31 PROCEDURE — 2W3BX1Z IMMOBILIZATION OF LEFT UPPER ARM USING SPLINT: ICD-10-PCS

## 2025-07-31 PROCEDURE — 76376 3D RENDER W/INTRP POSTPROCES: CPT

## 2025-07-31 PROCEDURE — 73060 X-RAY EXAM OF HUMERUS: CPT

## 2025-07-31 PROCEDURE — 96375 TX/PRO/DX INJ NEW DRUG ADDON: CPT

## 2025-07-31 PROCEDURE — 1200000003 HC TELEMETRY R&B

## 2025-07-31 PROCEDURE — 6370000000 HC RX 637 (ALT 250 FOR IP): Performed by: INTERNAL MEDICINE

## 2025-07-31 PROCEDURE — 51798 US URINE CAPACITY MEASURE: CPT

## 2025-07-31 PROCEDURE — 86901 BLOOD TYPING SEROLOGIC RH(D): CPT

## 2025-07-31 PROCEDURE — 71250 CT THORAX DX C-: CPT

## 2025-07-31 PROCEDURE — 96376 TX/PRO/DX INJ SAME DRUG ADON: CPT

## 2025-07-31 RX ORDER — MORPHINE SULFATE 2 MG/ML
2 INJECTION, SOLUTION INTRAMUSCULAR; INTRAVENOUS EVERY 4 HOURS PRN
Refills: 0 | Status: DISCONTINUED | OUTPATIENT
Start: 2025-07-31 | End: 2025-07-31

## 2025-07-31 RX ORDER — PANTOPRAZOLE SODIUM 40 MG/1
40 TABLET, DELAYED RELEASE ORAL
Status: DISCONTINUED | OUTPATIENT
Start: 2025-07-31 | End: 2025-08-07 | Stop reason: HOSPADM

## 2025-07-31 RX ORDER — ONDANSETRON 4 MG/1
4 TABLET, ORALLY DISINTEGRATING ORAL EVERY 8 HOURS PRN
Status: DISCONTINUED | OUTPATIENT
Start: 2025-07-31 | End: 2025-08-07 | Stop reason: HOSPADM

## 2025-07-31 RX ORDER — OXYCODONE HYDROCHLORIDE 5 MG/1
5 TABLET ORAL EVERY 6 HOURS PRN
Refills: 0 | Status: DISCONTINUED | OUTPATIENT
Start: 2025-07-31 | End: 2025-08-01

## 2025-07-31 RX ORDER — QUETIAPINE FUMARATE 25 MG/1
50 TABLET, FILM COATED ORAL NIGHTLY
Status: DISCONTINUED | OUTPATIENT
Start: 2025-07-31 | End: 2025-08-07 | Stop reason: HOSPADM

## 2025-07-31 RX ORDER — LAMOTRIGINE 100 MG/1
100 TABLET ORAL DAILY
Status: DISCONTINUED | OUTPATIENT
Start: 2025-07-31 | End: 2025-08-07 | Stop reason: HOSPADM

## 2025-07-31 RX ORDER — ONDANSETRON 2 MG/ML
4 INJECTION INTRAMUSCULAR; INTRAVENOUS EVERY 6 HOURS PRN
Status: DISCONTINUED | OUTPATIENT
Start: 2025-07-31 | End: 2025-08-07 | Stop reason: HOSPADM

## 2025-07-31 RX ORDER — SODIUM CHLORIDE 9 MG/ML
INJECTION, SOLUTION INTRAVENOUS PRN
Status: DISCONTINUED | OUTPATIENT
Start: 2025-07-31 | End: 2025-08-07 | Stop reason: HOSPADM

## 2025-07-31 RX ORDER — TRAMADOL HYDROCHLORIDE 50 MG/1
50 TABLET ORAL EVERY 6 HOURS PRN
COMMUNITY

## 2025-07-31 RX ORDER — SODIUM CHLORIDE 0.9 % (FLUSH) 0.9 %
5-40 SYRINGE (ML) INJECTION EVERY 12 HOURS SCHEDULED
Status: DISCONTINUED | OUTPATIENT
Start: 2025-07-31 | End: 2025-08-07 | Stop reason: HOSPADM

## 2025-07-31 RX ORDER — SODIUM CHLORIDE 0.9 % (FLUSH) 0.9 %
5-40 SYRINGE (ML) INJECTION PRN
Status: DISCONTINUED | OUTPATIENT
Start: 2025-07-31 | End: 2025-08-07 | Stop reason: HOSPADM

## 2025-07-31 RX ORDER — POTASSIUM CHLORIDE 1500 MG/1
40 TABLET, EXTENDED RELEASE ORAL PRN
Status: ACTIVE | OUTPATIENT
Start: 2025-07-31 | End: 2025-08-03

## 2025-07-31 RX ORDER — QUETIAPINE FUMARATE 25 MG/1
25 TABLET, FILM COATED ORAL NIGHTLY
Status: DISCONTINUED | OUTPATIENT
Start: 2025-07-31 | End: 2025-08-07 | Stop reason: HOSPADM

## 2025-07-31 RX ORDER — DICYCLOMINE HCL 20 MG
20 TABLET ORAL 4 TIMES DAILY PRN
Status: DISCONTINUED | OUTPATIENT
Start: 2025-07-31 | End: 2025-07-31

## 2025-07-31 RX ORDER — ACETAMINOPHEN 650 MG/1
650 SUPPOSITORY RECTAL EVERY 6 HOURS PRN
Status: DISCONTINUED | OUTPATIENT
Start: 2025-07-31 | End: 2025-08-07 | Stop reason: HOSPADM

## 2025-07-31 RX ORDER — OXYCODONE HYDROCHLORIDE 5 MG/1
2.5 TABLET ORAL EVERY 6 HOURS PRN
Refills: 0 | Status: DISCONTINUED | OUTPATIENT
Start: 2025-07-31 | End: 2025-08-01

## 2025-07-31 RX ORDER — MAGNESIUM SULFATE IN WATER 40 MG/ML
2000 INJECTION, SOLUTION INTRAVENOUS PRN
Status: DISCONTINUED | OUTPATIENT
Start: 2025-07-31 | End: 2025-08-07 | Stop reason: HOSPADM

## 2025-07-31 RX ORDER — LOSARTAN POTASSIUM 25 MG/1
25 TABLET ORAL EVERY EVENING
Status: DISCONTINUED | OUTPATIENT
Start: 2025-07-31 | End: 2025-08-07 | Stop reason: HOSPADM

## 2025-07-31 RX ORDER — ACETAMINOPHEN 325 MG/1
650 TABLET ORAL EVERY 6 HOURS PRN
Status: DISCONTINUED | OUTPATIENT
Start: 2025-07-31 | End: 2025-08-07 | Stop reason: HOSPADM

## 2025-07-31 RX ORDER — LOSARTAN POTASSIUM 50 MG/1
50 TABLET ORAL DAILY
Status: DISCONTINUED | OUTPATIENT
Start: 2025-07-31 | End: 2025-08-07 | Stop reason: HOSPADM

## 2025-07-31 RX ORDER — POLYETHYLENE GLYCOL 3350 17 G/17G
17 POWDER, FOR SOLUTION ORAL DAILY PRN
Status: DISCONTINUED | OUTPATIENT
Start: 2025-07-31 | End: 2025-08-07 | Stop reason: HOSPADM

## 2025-07-31 RX ORDER — ACARBOSE 50 MG/1
50 TABLET ORAL
Status: DISCONTINUED | OUTPATIENT
Start: 2025-07-31 | End: 2025-08-07 | Stop reason: HOSPADM

## 2025-07-31 RX ORDER — POTASSIUM CHLORIDE 7.45 MG/ML
10 INJECTION INTRAVENOUS PRN
Status: ACTIVE | OUTPATIENT
Start: 2025-07-31 | End: 2025-08-03

## 2025-07-31 RX ORDER — OMEPRAZOLE 20 MG/1
40 CAPSULE, DELAYED RELEASE ORAL DAILY
COMMUNITY

## 2025-07-31 RX ORDER — VENLAFAXINE HYDROCHLORIDE 75 MG/1
225 CAPSULE, EXTENDED RELEASE ORAL
Status: DISCONTINUED | OUTPATIENT
Start: 2025-07-31 | End: 2025-08-07 | Stop reason: HOSPADM

## 2025-07-31 RX ORDER — LAMOTRIGINE 100 MG/1
200 TABLET ORAL NIGHTLY
Status: DISCONTINUED | OUTPATIENT
Start: 2025-07-31 | End: 2025-08-07 | Stop reason: HOSPADM

## 2025-07-31 RX ADMIN — LAMOTRIGINE 200 MG: 100 TABLET ORAL at 21:50

## 2025-07-31 RX ADMIN — SODIUM CHLORIDE, PRESERVATIVE FREE 10 ML: 5 INJECTION INTRAVENOUS at 09:15

## 2025-07-31 RX ADMIN — FENTANYL CITRATE 25 MCG: 50 INJECTION, SOLUTION INTRAMUSCULAR; INTRAVENOUS at 00:12

## 2025-07-31 RX ADMIN — LOSARTAN POTASSIUM 25 MG: 25 TABLET, FILM COATED ORAL at 18:23

## 2025-07-31 RX ADMIN — PANTOPRAZOLE SODIUM 40 MG: 40 TABLET, DELAYED RELEASE ORAL at 06:15

## 2025-07-31 RX ADMIN — QUETIAPINE FUMARATE 25 MG: 25 TABLET ORAL at 21:50

## 2025-07-31 RX ADMIN — SODIUM CHLORIDE, PRESERVATIVE FREE 10 ML: 5 INJECTION INTRAVENOUS at 21:49

## 2025-07-31 RX ADMIN — PIPERACILLIN AND TAZOBACTAM 4500 MG: 4; .5 INJECTION, POWDER, LYOPHILIZED, FOR SOLUTION INTRAVENOUS at 09:02

## 2025-07-31 RX ADMIN — MORPHINE SULFATE 2 MG: 2 INJECTION, SOLUTION INTRAMUSCULAR; INTRAVENOUS at 10:31

## 2025-07-31 RX ADMIN — SODIUM CHLORIDE: 0.9 INJECTION, SOLUTION INTRAVENOUS at 09:01

## 2025-07-31 RX ADMIN — OXYCODONE HYDROCHLORIDE 5 MG: 5 TABLET ORAL at 21:56

## 2025-07-31 RX ADMIN — ACARBOSE 50 MG: 50 TABLET ORAL at 18:23

## 2025-07-31 RX ADMIN — AMOXICILLIN AND CLAVULANATE POTASSIUM 1 TABLET: 875; 125 TABLET, FILM COATED ORAL at 21:50

## 2025-07-31 RX ADMIN — PROPOFOL 20 MG: 10 INJECTION, EMULSION INTRAVENOUS at 00:13

## 2025-07-31 RX ADMIN — OXYCODONE HYDROCHLORIDE 5 MG: 5 TABLET ORAL at 15:54

## 2025-07-31 RX ADMIN — QUETIAPINE FUMARATE 50 MG: 25 TABLET ORAL at 21:50

## 2025-07-31 ASSESSMENT — PAIN DESCRIPTION - LOCATION
LOCATION: ARM

## 2025-07-31 ASSESSMENT — PAIN - FUNCTIONAL ASSESSMENT
PAIN_FUNCTIONAL_ASSESSMENT: PREVENTS OR INTERFERES WITH MANY ACTIVE NOT PASSIVE ACTIVITIES
PAIN_FUNCTIONAL_ASSESSMENT: PREVENTS OR INTERFERES WITH MANY ACTIVE NOT PASSIVE ACTIVITIES
PAIN_FUNCTIONAL_ASSESSMENT: ACTIVITIES ARE NOT PREVENTED

## 2025-07-31 ASSESSMENT — PAIN DESCRIPTION - FREQUENCY: FREQUENCY: CONTINUOUS

## 2025-07-31 ASSESSMENT — PAIN SCALES - GENERAL
PAINLEVEL_OUTOF10: 8
PAINLEVEL_OUTOF10: 10
PAINLEVEL_OUTOF10: 8
PAINLEVEL_OUTOF10: 5
PAINLEVEL_OUTOF10: 5
PAINLEVEL_OUTOF10: 8
PAINLEVEL_OUTOF10: 10
PAINLEVEL_OUTOF10: 7
PAINLEVEL_OUTOF10: 5

## 2025-07-31 ASSESSMENT — PAIN DESCRIPTION - ORIENTATION
ORIENTATION: LEFT

## 2025-07-31 ASSESSMENT — PAIN DESCRIPTION - DIRECTION: RADIATING_TOWARDS: L SHOULDER

## 2025-07-31 ASSESSMENT — PAIN DESCRIPTION - PAIN TYPE
TYPE: ACUTE PAIN
TYPE: ACUTE PAIN

## 2025-07-31 ASSESSMENT — PAIN DESCRIPTION - DESCRIPTORS
DESCRIPTORS: ACHING
DESCRIPTORS: ACHING
DESCRIPTORS: STABBING

## 2025-07-31 ASSESSMENT — PAIN DESCRIPTION - ONSET: ONSET: ON-GOING

## 2025-08-01 ENCOUNTER — ANESTHESIA EVENT (OUTPATIENT)
Dept: OPERATING ROOM | Age: 75
End: 2025-08-01
Payer: MEDICARE

## 2025-08-01 ENCOUNTER — ANESTHESIA (OUTPATIENT)
Dept: OPERATING ROOM | Age: 75
End: 2025-08-01
Payer: MEDICARE

## 2025-08-01 PROBLEM — Z01.810 PREOP CARDIOVASCULAR EXAM: Status: ACTIVE | Noted: 2025-08-01

## 2025-08-01 PROBLEM — D64.9 ANEMIA: Status: ACTIVE | Noted: 2025-08-01

## 2025-08-01 LAB
ANION GAP SERPL CALC-SCNC: 16 MEQ/L (ref 8–16)
BASOPHILS ABSOLUTE: 0.1 THOU/MM3 (ref 0–0.1)
BASOPHILS NFR BLD AUTO: 0.5 %
BUN SERPL-MCNC: 10 MG/DL (ref 8–23)
CALCIUM SERPL-MCNC: 8.2 MG/DL (ref 8.8–10.2)
CHLORIDE SERPL-SCNC: 105 MEQ/L (ref 98–111)
CO2 SERPL-SCNC: 25 MEQ/L (ref 22–29)
CREAT SERPL-MCNC: 0.5 MG/DL (ref 0.5–0.9)
DEPRECATED RDW RBC AUTO: 53.3 FL (ref 35–45)
EOSINOPHIL NFR BLD AUTO: 1.4 %
EOSINOPHILS ABSOLUTE: 0.1 THOU/MM3 (ref 0–0.4)
ERYTHROCYTE [DISTWIDTH] IN BLOOD BY AUTOMATED COUNT: 15.6 % (ref 11.5–14.5)
GFR SERPL CREATININE-BSD FRML MDRD: > 90 ML/MIN/1.73M2
GLUCOSE SERPL-MCNC: 95 MG/DL (ref 74–109)
HCT VFR BLD AUTO: 30.6 % (ref 37–47)
HGB BLD-MCNC: 9.5 GM/DL (ref 12–16)
IMM GRANULOCYTES # BLD AUTO: 0.08 THOU/MM3 (ref 0–0.07)
IMM GRANULOCYTES NFR BLD AUTO: 0.8 %
LYMPHOCYTES ABSOLUTE: 1 THOU/MM3 (ref 1–4.8)
LYMPHOCYTES NFR BLD AUTO: 9.6 %
MAGNESIUM SERPL-MCNC: 1.8 MG/DL (ref 1.6–2.6)
MCH RBC QN AUTO: 29.3 PG (ref 26–33)
MCHC RBC AUTO-ENTMCNC: 31 GM/DL (ref 32.2–35.5)
MCV RBC AUTO: 94.4 FL (ref 81–99)
MONOCYTES ABSOLUTE: 0.7 THOU/MM3 (ref 0.4–1.3)
MONOCYTES NFR BLD AUTO: 6.6 %
NEUTROPHILS ABSOLUTE: 8.5 THOU/MM3 (ref 1.8–7.7)
NEUTROPHILS NFR BLD AUTO: 81.1 %
NRBC BLD AUTO-RTO: 0 /100 WBC
PLATELET # BLD AUTO: 620 THOU/MM3 (ref 130–400)
PMV BLD AUTO: 8.6 FL (ref 9.4–12.4)
POTASSIUM SERPL-SCNC: 3.4 MEQ/L (ref 3.5–5.2)
RBC # BLD AUTO: 3.24 MILL/MM3 (ref 4.2–5.4)
SODIUM SERPL-SCNC: 146 MEQ/L (ref 135–145)
WBC # BLD AUTO: 10.5 THOU/MM3 (ref 4.8–10.8)

## 2025-08-01 PROCEDURE — 36415 COLL VENOUS BLD VENIPUNCTURE: CPT

## 2025-08-01 PROCEDURE — 6370000000 HC RX 637 (ALT 250 FOR IP)

## 2025-08-01 PROCEDURE — 85025 COMPLETE CBC W/AUTO DIFF WBC: CPT

## 2025-08-01 PROCEDURE — 83735 ASSAY OF MAGNESIUM: CPT

## 2025-08-01 PROCEDURE — 80048 BASIC METABOLIC PNL TOTAL CA: CPT

## 2025-08-01 PROCEDURE — 51798 US URINE CAPACITY MEASURE: CPT

## 2025-08-01 PROCEDURE — 6360000002 HC RX W HCPCS

## 2025-08-01 PROCEDURE — 1200000003 HC TELEMETRY R&B

## 2025-08-01 PROCEDURE — 2580000003 HC RX 258: Performed by: INTERNAL MEDICINE

## 2025-08-01 PROCEDURE — 99233 SBSQ HOSP IP/OBS HIGH 50: CPT | Performed by: INTERNAL MEDICINE

## 2025-08-01 PROCEDURE — 6370000000 HC RX 637 (ALT 250 FOR IP): Performed by: INTERNAL MEDICINE

## 2025-08-01 RX ORDER — POTASSIUM CHLORIDE 7.45 MG/ML
10 INJECTION INTRAVENOUS
Status: DISPENSED | OUTPATIENT
Start: 2025-08-01 | End: 2025-08-01

## 2025-08-01 RX ORDER — OXYCODONE HYDROCHLORIDE 5 MG/1
5 TABLET ORAL EVERY 6 HOURS PRN
Refills: 0 | Status: DISCONTINUED | OUTPATIENT
Start: 2025-08-01 | End: 2025-08-07 | Stop reason: HOSPADM

## 2025-08-01 RX ORDER — SODIUM CHLORIDE 450 MG/100ML
INJECTION, SOLUTION INTRAVENOUS CONTINUOUS
Status: DISCONTINUED | OUTPATIENT
Start: 2025-08-01 | End: 2025-08-03

## 2025-08-01 RX ORDER — OXYCODONE HYDROCHLORIDE 5 MG/1
2.5 TABLET ORAL EVERY 4 HOURS PRN
Refills: 0 | Status: DISCONTINUED | OUTPATIENT
Start: 2025-08-01 | End: 2025-08-07 | Stop reason: HOSPADM

## 2025-08-01 RX ADMIN — LOSARTAN POTASSIUM 25 MG: 25 TABLET, FILM COATED ORAL at 17:35

## 2025-08-01 RX ADMIN — QUETIAPINE FUMARATE 50 MG: 25 TABLET ORAL at 21:15

## 2025-08-01 RX ADMIN — SODIUM CHLORIDE: 0.45 INJECTION, SOLUTION INTRAVENOUS at 09:13

## 2025-08-01 RX ADMIN — QUETIAPINE FUMARATE 25 MG: 25 TABLET ORAL at 21:15

## 2025-08-01 RX ADMIN — PANTOPRAZOLE SODIUM 40 MG: 40 TABLET, DELAYED RELEASE ORAL at 05:13

## 2025-08-01 RX ADMIN — ACARBOSE 50 MG: 50 TABLET ORAL at 13:12

## 2025-08-01 RX ADMIN — OXYCODONE HYDROCHLORIDE 5 MG: 5 TABLET ORAL at 17:36

## 2025-08-01 RX ADMIN — ACETAMINOPHEN 650 MG: 325 TABLET ORAL at 15:49

## 2025-08-01 RX ADMIN — OXYCODONE HYDROCHLORIDE 5 MG: 5 TABLET ORAL at 23:44

## 2025-08-01 RX ADMIN — ACARBOSE 50 MG: 50 TABLET ORAL at 17:33

## 2025-08-01 RX ADMIN — POTASSIUM CHLORIDE 10 MEQ: 7.46 INJECTION, SOLUTION INTRAVENOUS at 12:09

## 2025-08-01 RX ADMIN — OXYCODONE HYDROCHLORIDE 5 MG: 5 TABLET ORAL at 11:13

## 2025-08-01 RX ADMIN — POTASSIUM CHLORIDE 10 MEQ: 7.46 INJECTION, SOLUTION INTRAVENOUS at 10:51

## 2025-08-01 RX ADMIN — OXYCODONE HYDROCHLORIDE 5 MG: 5 TABLET ORAL at 05:13

## 2025-08-01 RX ADMIN — ACETAMINOPHEN 650 MG: 325 TABLET ORAL at 21:15

## 2025-08-01 RX ADMIN — POTASSIUM CHLORIDE 10 MEQ: 7.46 INJECTION, SOLUTION INTRAVENOUS at 09:14

## 2025-08-01 RX ADMIN — LAMOTRIGINE 200 MG: 100 TABLET ORAL at 21:15

## 2025-08-01 RX ADMIN — AMOXICILLIN AND CLAVULANATE POTASSIUM 1 TABLET: 875; 125 TABLET, FILM COATED ORAL at 21:15

## 2025-08-01 ASSESSMENT — PAIN DESCRIPTION - LOCATION
LOCATION: ARM

## 2025-08-01 ASSESSMENT — PAIN - FUNCTIONAL ASSESSMENT
PAIN_FUNCTIONAL_ASSESSMENT: ACTIVITIES ARE NOT PREVENTED
PAIN_FUNCTIONAL_ASSESSMENT: ACTIVITIES ARE NOT PREVENTED
PAIN_FUNCTIONAL_ASSESSMENT: PREVENTS OR INTERFERES WITH MANY ACTIVE NOT PASSIVE ACTIVITIES
PAIN_FUNCTIONAL_ASSESSMENT: PREVENTS OR INTERFERES WITH MANY ACTIVE NOT PASSIVE ACTIVITIES
PAIN_FUNCTIONAL_ASSESSMENT: ACTIVITIES ARE NOT PREVENTED
PAIN_FUNCTIONAL_ASSESSMENT: ACTIVITIES ARE NOT PREVENTED
PAIN_FUNCTIONAL_ASSESSMENT: PREVENTS OR INTERFERES SOME ACTIVE ACTIVITIES AND ADLS
PAIN_FUNCTIONAL_ASSESSMENT: PREVENTS OR INTERFERES WITH MANY ACTIVE NOT PASSIVE ACTIVITIES

## 2025-08-01 ASSESSMENT — PAIN DESCRIPTION - DESCRIPTORS
DESCRIPTORS: ACHING
DESCRIPTORS: ACHING;SORE
DESCRIPTORS: STABBING
DESCRIPTORS: ACHING
DESCRIPTORS: STABBING
DESCRIPTORS: STABBING

## 2025-08-01 ASSESSMENT — PAIN SCALES - GENERAL
PAINLEVEL_OUTOF10: 5
PAINLEVEL_OUTOF10: 8
PAINLEVEL_OUTOF10: 5
PAINLEVEL_OUTOF10: 5
PAINLEVEL_OUTOF10: 3
PAINLEVEL_OUTOF10: 7
PAINLEVEL_OUTOF10: 8
PAINLEVEL_OUTOF10: 5
PAINLEVEL_OUTOF10: 5
PAINLEVEL_OUTOF10: 8
PAINLEVEL_OUTOF10: 5
PAINLEVEL_OUTOF10: 9

## 2025-08-01 ASSESSMENT — PATIENT HEALTH QUESTIONNAIRE - PHQ9
1. LITTLE INTEREST OR PLEASURE IN DOING THINGS: NOT AT ALL
SUM OF ALL RESPONSES TO PHQ QUESTIONS 1-9: 0
2. FEELING DOWN, DEPRESSED OR HOPELESS: NOT AT ALL
SUM OF ALL RESPONSES TO PHQ QUESTIONS 1-9: 0

## 2025-08-01 ASSESSMENT — PAIN DESCRIPTION - ORIENTATION
ORIENTATION: LEFT

## 2025-08-01 ASSESSMENT — PAIN DESCRIPTION - PAIN TYPE
TYPE: ACUTE PAIN

## 2025-08-01 ASSESSMENT — PAIN DESCRIPTION - ONSET
ONSET: ON-GOING

## 2025-08-01 ASSESSMENT — PAIN DESCRIPTION - FREQUENCY
FREQUENCY: CONTINUOUS

## 2025-08-01 ASSESSMENT — LIFESTYLE VARIABLES
HOW OFTEN DO YOU HAVE A DRINK CONTAINING ALCOHOL: MONTHLY OR LESS
HOW MANY STANDARD DRINKS CONTAINING ALCOHOL DO YOU HAVE ON A TYPICAL DAY: 1 OR 2

## 2025-08-01 ASSESSMENT — PAIN DESCRIPTION - DIRECTION
RADIATING_TOWARDS: L SHOULDER

## 2025-08-02 ENCOUNTER — APPOINTMENT (OUTPATIENT)
Dept: GENERAL RADIOLOGY | Age: 75
DRG: 492 | End: 2025-08-02
Payer: MEDICARE

## 2025-08-02 LAB
ANION GAP SERPL CALC-SCNC: 14 MEQ/L (ref 8–16)
BACTERIA THROAT AEROBE CULT: NORMAL
BASOPHILS ABSOLUTE: 0.1 THOU/MM3 (ref 0–0.1)
BASOPHILS NFR BLD AUTO: 0.7 %
BUN SERPL-MCNC: 7 MG/DL (ref 8–23)
CALCIUM SERPL-MCNC: 8.6 MG/DL (ref 8.8–10.2)
CHLORIDE SERPL-SCNC: 104 MEQ/L (ref 98–111)
CO2 SERPL-SCNC: 26 MEQ/L (ref 22–29)
CREAT SERPL-MCNC: 0.5 MG/DL (ref 0.5–0.9)
DEPRECATED RDW RBC AUTO: 54.4 FL (ref 35–45)
EOSINOPHIL NFR BLD AUTO: 3.5 %
EOSINOPHILS ABSOLUTE: 0.4 THOU/MM3 (ref 0–0.4)
ERYTHROCYTE [DISTWIDTH] IN BLOOD BY AUTOMATED COUNT: 15.7 % (ref 11.5–14.5)
FERRITIN SERPL IA-MCNC: 532 NG/ML (ref 13–150)
FOLATE SERPL-MCNC: 15.1 NG/ML (ref 4.6–34.8)
GFR SERPL CREATININE-BSD FRML MDRD: > 90 ML/MIN/1.73M2
GLUCOSE SERPL-MCNC: 90 MG/DL (ref 74–109)
HCT VFR BLD AUTO: 30.5 % (ref 37–47)
HGB BLD-MCNC: 9.7 GM/DL (ref 12–16)
HGB RETIC QN AUTO: 29.7 PG (ref 28.2–35.7)
IMM GRANULOCYTES # BLD AUTO: 0.1 THOU/MM3 (ref 0–0.07)
IMM GRANULOCYTES NFR BLD AUTO: 0.9 %
IMM RETICS NFR: 24.9 % (ref 3–15.9)
IRON SATN MFR SERPL: 16 % (ref 20–50)
IRON SERPL-MCNC: 18 UG/DL (ref 37–145)
LYMPHOCYTES ABSOLUTE: 2.1 THOU/MM3 (ref 1–4.8)
LYMPHOCYTES NFR BLD AUTO: 19.6 %
MAGNESIUM SERPL-MCNC: 1.9 MG/DL (ref 1.6–2.6)
MCH RBC QN AUTO: 29.9 PG (ref 26–33)
MCHC RBC AUTO-ENTMCNC: 31.8 GM/DL (ref 32.2–35.5)
MCV RBC AUTO: 94.1 FL (ref 81–99)
MONOCYTES ABSOLUTE: 0.9 THOU/MM3 (ref 0.4–1.3)
MONOCYTES NFR BLD AUTO: 8.4 %
NEUTROPHILS ABSOLUTE: 7.2 THOU/MM3 (ref 1.8–7.7)
NEUTROPHILS NFR BLD AUTO: 66.9 %
NRBC BLD AUTO-RTO: 0 /100 WBC
PLATELET # BLD AUTO: 566 THOU/MM3 (ref 130–400)
PMV BLD AUTO: 8.9 FL (ref 9.4–12.4)
POTASSIUM SERPL-SCNC: 3.6 MEQ/L (ref 3.5–5.2)
RBC # BLD AUTO: 3.24 MILL/MM3 (ref 4.2–5.4)
RETICS # AUTO: 62 THOU/MM3 (ref 20–115)
RETICS/RBC NFR AUTO: 1.9 % (ref 0.5–2)
SODIUM SERPL-SCNC: 144 MEQ/L (ref 135–145)
TIBC SERPL-MCNC: 111 UG/DL (ref 171–450)
VIT B12 SERPL-MCNC: 716 PG/ML (ref 232–1245)
WBC # BLD AUTO: 10.7 THOU/MM3 (ref 4.8–10.8)

## 2025-08-02 PROCEDURE — 6370000000 HC RX 637 (ALT 250 FOR IP)

## 2025-08-02 PROCEDURE — C1713 ANCHOR/SCREW BN/BN,TIS/BN: HCPCS | Performed by: STUDENT IN AN ORGANIZED HEALTH CARE EDUCATION/TRAINING PROGRAM

## 2025-08-02 PROCEDURE — 3700000001 HC ADD 15 MINUTES (ANESTHESIA): Performed by: STUDENT IN AN ORGANIZED HEALTH CARE EDUCATION/TRAINING PROGRAM

## 2025-08-02 PROCEDURE — 83550 IRON BINDING TEST: CPT

## 2025-08-02 PROCEDURE — 36415 COLL VENOUS BLD VENIPUNCTURE: CPT

## 2025-08-02 PROCEDURE — 2500000003 HC RX 250 WO HCPCS: Performed by: STUDENT IN AN ORGANIZED HEALTH CARE EDUCATION/TRAINING PROGRAM

## 2025-08-02 PROCEDURE — 80048 BASIC METABOLIC PNL TOTAL CA: CPT

## 2025-08-02 PROCEDURE — 85025 COMPLETE CBC W/AUTO DIFF WBC: CPT

## 2025-08-02 PROCEDURE — 3600000014 HC SURGERY LEVEL 4 ADDTL 15MIN: Performed by: STUDENT IN AN ORGANIZED HEALTH CARE EDUCATION/TRAINING PROGRAM

## 2025-08-02 PROCEDURE — 2500000003 HC RX 250 WO HCPCS: Performed by: NURSE ANESTHETIST, CERTIFIED REGISTERED

## 2025-08-02 PROCEDURE — 1200000003 HC TELEMETRY R&B

## 2025-08-02 PROCEDURE — 99233 SBSQ HOSP IP/OBS HIGH 50: CPT | Performed by: INTERNAL MEDICINE

## 2025-08-02 PROCEDURE — 3600000004 HC SURGERY LEVEL 4 BASE: Performed by: STUDENT IN AN ORGANIZED HEALTH CARE EDUCATION/TRAINING PROGRAM

## 2025-08-02 PROCEDURE — 6370000000 HC RX 637 (ALT 250 FOR IP): Performed by: INTERNAL MEDICINE

## 2025-08-02 PROCEDURE — 83735 ASSAY OF MAGNESIUM: CPT

## 2025-08-02 PROCEDURE — 85046 RETICYTE/HGB CONCENTRATE: CPT

## 2025-08-02 PROCEDURE — L3660 SO 8 AB RSTR CAN/WEB PRE OTS: HCPCS | Performed by: STUDENT IN AN ORGANIZED HEALTH CARE EDUCATION/TRAINING PROGRAM

## 2025-08-02 PROCEDURE — 7100000001 HC PACU RECOVERY - ADDTL 15 MIN: Performed by: STUDENT IN AN ORGANIZED HEALTH CARE EDUCATION/TRAINING PROGRAM

## 2025-08-02 PROCEDURE — 7100000000 HC PACU RECOVERY - FIRST 15 MIN: Performed by: STUDENT IN AN ORGANIZED HEALTH CARE EDUCATION/TRAINING PROGRAM

## 2025-08-02 PROCEDURE — 82746 ASSAY OF FOLIC ACID SERUM: CPT

## 2025-08-02 PROCEDURE — 6360000002 HC RX W HCPCS: Performed by: STUDENT IN AN ORGANIZED HEALTH CARE EDUCATION/TRAINING PROGRAM

## 2025-08-02 PROCEDURE — 82607 VITAMIN B-12: CPT

## 2025-08-02 PROCEDURE — 82728 ASSAY OF FERRITIN: CPT

## 2025-08-02 PROCEDURE — 2580000003 HC RX 258: Performed by: INTERNAL MEDICINE

## 2025-08-02 PROCEDURE — 83540 ASSAY OF IRON: CPT

## 2025-08-02 PROCEDURE — 0PSG04Z REPOSITION LEFT HUMERAL SHAFT WITH INTERNAL FIXATION DEVICE, OPEN APPROACH: ICD-10-PCS | Performed by: STUDENT IN AN ORGANIZED HEALTH CARE EDUCATION/TRAINING PROGRAM

## 2025-08-02 PROCEDURE — 2580000003 HC RX 258: Performed by: NURSE ANESTHETIST, CERTIFIED REGISTERED

## 2025-08-02 PROCEDURE — 6360000002 HC RX W HCPCS: Performed by: NURSE ANESTHETIST, CERTIFIED REGISTERED

## 2025-08-02 PROCEDURE — 2709999900 HC NON-CHARGEABLE SUPPLY: Performed by: STUDENT IN AN ORGANIZED HEALTH CARE EDUCATION/TRAINING PROGRAM

## 2025-08-02 PROCEDURE — 2720000010 HC SURG SUPPLY STERILE: Performed by: STUDENT IN AN ORGANIZED HEALTH CARE EDUCATION/TRAINING PROGRAM

## 2025-08-02 PROCEDURE — 73060 X-RAY EXAM OF HUMERUS: CPT

## 2025-08-02 PROCEDURE — 3700000000 HC ANESTHESIA ATTENDED CARE: Performed by: STUDENT IN AN ORGANIZED HEALTH CARE EDUCATION/TRAINING PROGRAM

## 2025-08-02 DEVICE — IMPLANTABLE DEVICE: Type: IMPLANTABLE DEVICE | Site: ARM | Status: FUNCTIONAL

## 2025-08-02 RX ORDER — FENTANYL CITRATE 50 UG/ML
INJECTION, SOLUTION INTRAMUSCULAR; INTRAVENOUS
Status: DISCONTINUED | OUTPATIENT
Start: 2025-08-02 | End: 2025-08-02 | Stop reason: SDUPTHER

## 2025-08-02 RX ORDER — ONDANSETRON 2 MG/ML
INJECTION INTRAMUSCULAR; INTRAVENOUS
Status: DISCONTINUED | OUTPATIENT
Start: 2025-08-02 | End: 2025-08-02 | Stop reason: SDUPTHER

## 2025-08-02 RX ORDER — DEXAMETHASONE SODIUM PHOSPHATE 10 MG/ML
INJECTION, EMULSION INTRAMUSCULAR; INTRAVENOUS
Status: DISCONTINUED | OUTPATIENT
Start: 2025-08-02 | End: 2025-08-02 | Stop reason: SDUPTHER

## 2025-08-02 RX ORDER — LIDOCAINE HCL/PF 100 MG/5ML
SYRINGE (ML) INJECTION
Status: DISCONTINUED | OUTPATIENT
Start: 2025-08-02 | End: 2025-08-02 | Stop reason: SDUPTHER

## 2025-08-02 RX ORDER — TRANEXAMIC ACID 100 MG/ML
INJECTION, SOLUTION INTRAVENOUS
Status: DISCONTINUED | OUTPATIENT
Start: 2025-08-02 | End: 2025-08-02 | Stop reason: SDUPTHER

## 2025-08-02 RX ORDER — PROPOFOL 10 MG/ML
INJECTION, EMULSION INTRAVENOUS
Status: DISCONTINUED | OUTPATIENT
Start: 2025-08-02 | End: 2025-08-02 | Stop reason: SDUPTHER

## 2025-08-02 RX ORDER — CEFAZOLIN SODIUM 1 G/3ML
INJECTION, POWDER, FOR SOLUTION INTRAMUSCULAR; INTRAVENOUS
Status: DISCONTINUED | OUTPATIENT
Start: 2025-08-02 | End: 2025-08-02 | Stop reason: SDUPTHER

## 2025-08-02 RX ORDER — ROCURONIUM BROMIDE 10 MG/ML
INJECTION, SOLUTION INTRAVENOUS
Status: DISCONTINUED | OUTPATIENT
Start: 2025-08-02 | End: 2025-08-02 | Stop reason: SDUPTHER

## 2025-08-02 RX ORDER — SODIUM CHLORIDE 9 MG/ML
INJECTION, SOLUTION INTRAVENOUS
Status: DISCONTINUED | OUTPATIENT
Start: 2025-08-02 | End: 2025-08-02 | Stop reason: SDUPTHER

## 2025-08-02 RX ORDER — SUCCINYLCHOLINE/SOD CL,ISO/PF 200MG/10ML
SYRINGE (ML) INTRAVENOUS
Status: DISCONTINUED | OUTPATIENT
Start: 2025-08-02 | End: 2025-08-02 | Stop reason: SDUPTHER

## 2025-08-02 RX ORDER — PHENYLEPHRINE HCL IN 0.9% NACL 1 MG/10 ML
SYRINGE (ML) INTRAVENOUS
Status: DISCONTINUED | OUTPATIENT
Start: 2025-08-02 | End: 2025-08-02 | Stop reason: SDUPTHER

## 2025-08-02 RX ADMIN — Medication 200 MCG: at 12:24

## 2025-08-02 RX ADMIN — QUETIAPINE FUMARATE 25 MG: 25 TABLET ORAL at 21:33

## 2025-08-02 RX ADMIN — OXYCODONE HYDROCHLORIDE 5 MG: 5 TABLET ORAL at 16:59

## 2025-08-02 RX ADMIN — TRANEXAMIC ACID 1000 MG: 100 INJECTION, SOLUTION INTRAVENOUS at 12:36

## 2025-08-02 RX ADMIN — PROPOFOL 50 MG: 10 INJECTION, EMULSION INTRAVENOUS at 13:00

## 2025-08-02 RX ADMIN — Medication 60 MG: at 12:19

## 2025-08-02 RX ADMIN — PROPOFOL 140 MG: 10 INJECTION, EMULSION INTRAVENOUS at 12:19

## 2025-08-02 RX ADMIN — CEFAZOLIN 2 G: 1 INJECTION, POWDER, FOR SOLUTION INTRAMUSCULAR; INTRAVENOUS at 12:35

## 2025-08-02 RX ADMIN — SUGAMMADEX 200 MG: 100 INJECTION, SOLUTION INTRAVENOUS at 14:14

## 2025-08-02 RX ADMIN — SODIUM CHLORIDE: 9 INJECTION, SOLUTION INTRAVENOUS at 12:15

## 2025-08-02 RX ADMIN — LOSARTAN POTASSIUM 50 MG: 25 TABLET, FILM COATED ORAL at 08:45

## 2025-08-02 RX ADMIN — OXYCODONE HYDROCHLORIDE 5 MG: 5 TABLET ORAL at 08:46

## 2025-08-02 RX ADMIN — ROCURONIUM BROMIDE 40 MG: 10 INJECTION, SOLUTION INTRAVENOUS at 12:40

## 2025-08-02 RX ADMIN — AMOXICILLIN AND CLAVULANATE POTASSIUM 1 TABLET: 875; 125 TABLET, FILM COATED ORAL at 21:34

## 2025-08-02 RX ADMIN — DEXAMETHASONE SODIUM PHOSPHATE 10 MG: 10 INJECTION, EMULSION INTRAMUSCULAR; INTRAVENOUS at 12:35

## 2025-08-02 RX ADMIN — VENLAFAXINE HYDROCHLORIDE 225 MG: 75 CAPSULE, EXTENDED RELEASE ORAL at 21:34

## 2025-08-02 RX ADMIN — Medication 160 MG: at 12:19

## 2025-08-02 RX ADMIN — ONDANSETRON 4 MG: 2 INJECTION, SOLUTION INTRAMUSCULAR; INTRAVENOUS at 14:14

## 2025-08-02 RX ADMIN — LOSARTAN POTASSIUM 25 MG: 25 TABLET, FILM COATED ORAL at 17:00

## 2025-08-02 RX ADMIN — ACARBOSE 50 MG: 50 TABLET ORAL at 17:01

## 2025-08-02 RX ADMIN — ACETAMINOPHEN 650 MG: 325 TABLET ORAL at 21:33

## 2025-08-02 RX ADMIN — LAMOTRIGINE 200 MG: 100 TABLET ORAL at 21:34

## 2025-08-02 RX ADMIN — ROCURONIUM BROMIDE 10 MG: 10 INJECTION, SOLUTION INTRAVENOUS at 12:19

## 2025-08-02 RX ADMIN — SODIUM CHLORIDE: 0.45 INJECTION, SOLUTION INTRAVENOUS at 10:24

## 2025-08-02 RX ADMIN — QUETIAPINE FUMARATE 50 MG: 25 TABLET ORAL at 21:33

## 2025-08-02 RX ADMIN — FENTANYL CITRATE 100 MCG: 50 INJECTION, SOLUTION INTRAMUSCULAR; INTRAVENOUS at 14:27

## 2025-08-02 RX ADMIN — Medication 100 MCG: at 13:09

## 2025-08-02 RX ADMIN — FENTANYL CITRATE 100 MCG: 50 INJECTION, SOLUTION INTRAMUSCULAR; INTRAVENOUS at 12:19

## 2025-08-02 ASSESSMENT — PAIN SCALES - GENERAL
PAINLEVEL_OUTOF10: 7
PAINLEVEL_OUTOF10: 2
PAINLEVEL_OUTOF10: 7
PAINLEVEL_OUTOF10: 3
PAINLEVEL_OUTOF10: 7
PAINLEVEL_OUTOF10: 5
PAINLEVEL_OUTOF10: 7
PAINLEVEL_OUTOF10: 8
PAINLEVEL_OUTOF10: 7
PAINLEVEL_OUTOF10: 5
PAINLEVEL_OUTOF10: 4

## 2025-08-02 ASSESSMENT — PAIN DESCRIPTION - LOCATION
LOCATION: ARM

## 2025-08-02 ASSESSMENT — PAIN DESCRIPTION - ONSET: ONSET: ON-GOING

## 2025-08-02 ASSESSMENT — PAIN DESCRIPTION - FREQUENCY: FREQUENCY: CONTINUOUS

## 2025-08-02 ASSESSMENT — PAIN DESCRIPTION - DESCRIPTORS
DESCRIPTORS: ACHING

## 2025-08-02 ASSESSMENT — PAIN DESCRIPTION - ORIENTATION
ORIENTATION: LEFT

## 2025-08-02 ASSESSMENT — PAIN - FUNCTIONAL ASSESSMENT: PAIN_FUNCTIONAL_ASSESSMENT: PREVENTS OR INTERFERES SOME ACTIVE ACTIVITIES AND ADLS

## 2025-08-02 ASSESSMENT — PAIN DESCRIPTION - PAIN TYPE
TYPE: SURGICAL PAIN
TYPE: SURGICAL PAIN

## 2025-08-02 NOTE — ANESTHESIA POSTPROCEDURE EVALUATION
Department of Anesthesiology  Postprocedure Note    Patient: Adry Moura  MRN: [id number]  YOB: 1950  Date of evaluation: 8/2/2025    Procedure Summary       Date: 08/02/25 Room / Location: [facility] OR [address] [place] [address] OR    Anesthesia Start: 1215 Anesthesia Stop: 1433    Procedure: Left HUMERUS ORIF (Left: Arm Upper) Diagnosis:       Left upper arm pain      (Left upper arm pain [M79.622])    Surgeons: Eron Magana DO Responsible Provider: Viktor Aguilar MD    Anesthesia Type: general ASA Status: 3            Anesthesia Type: No value filed.    Amadeo Phase I: Amadeo Score: 9    Amadeo Phase II: Amadeo Score: 10    Anesthesia Post Evaluation    Patient location during evaluation: PACU  Patient participation: complete - patient participated  Level of consciousness: awake  Airway patency: patent  Nausea & Vomiting: no vomiting and no nausea  Cardiovascular status: hemodynamically stable  Respiratory status: acceptable and nasal cannula  Hydration status: stable  Pain management: adequate    No notable events documented.

## 2025-08-02 NOTE — ANESTHESIA PRE PROCEDURE
Department of Anesthesiology  Preprocedure Note       Name:  Adry Moura   Age:  74 y.o.  :  1950                                          MRN:  [id number]         Date:  2025      Surgeon: Surgeon(s):  Eron Magana DO    Procedure: Procedure(s):  RIGHT HUMERUS ORIF    Medications prior to admission:   Prior to Admission medications    Medication Sig Start Date End Date Taking? Authorizing Provider   omeprazole (PRILOSEC) 20 MG delayed release capsule Take 2 capsules by mouth daily   Yes ProviderDarnell MD   traMADol (ULTRAM) 50 MG tablet Take 1 tablet by mouth every 6 hours as needed for Pain. Max Daily Amount: 200 mg   Yes ProviderDarnell MD   amoxicillin-clavulanate (AUGMENTIN) 875-125 MG per tablet Take 1 tablet by mouth 2 times daily for 10 days 25 Yes Cayla Carrasquillo PA-C   polyethylene glycol (GLYCOLAX) 17 g packet Take 1 packet by mouth daily as needed for Constipation   Yes ProviderDarnell MD   QUEtiapine (SEROQUEL) 50 MG tablet Take 1 tablet by mouth nightly List from nursing home reads differently 25  Yes Uriel Madera, APRN - CNP   Menthol, Topical Analgesic, (BIOFREEZE COOL THE PAIN) 4 % GEL Apply 1 application  topically as needed (pain) Neck arms and shoulder   Yes ProviderDarnell MD   losartan (COZAAR) 25 MG tablet Take 1 tablet by mouth every evening   Yes ProviderDarnell MD   QUEtiapine (SEROQUEL) 25 MG tablet Take 1 tablet by mouth at bedtime   Yes ProviderDarnell MD   acetaminophen (TYLENOL) 325 MG tablet Take 2 tablets by mouth every 6 hours as needed for Pain   Yes ProviderDarnell MD   busPIRone (BUSPAR) 10 MG tablet Take 1 tablet by mouth at bedtime 25  Yes Darnell Reyna MD   busPIRone (BUSPAR) 7.5 MG tablet Take 1 tablet by mouth daily as needed 25  Yes Darnell Reyna MD   CHLORPHENIRAMINE-DM PO Take 1 tablet by mouth as needed (coughing) As needed for coughing  4-30 mg   Yes Provider

## 2025-08-03 ENCOUNTER — APPOINTMENT (OUTPATIENT)
Dept: GENERAL RADIOLOGY | Age: 75
DRG: 492 | End: 2025-08-03
Payer: MEDICARE

## 2025-08-03 ENCOUNTER — APPOINTMENT (OUTPATIENT)
Dept: CT IMAGING | Age: 75
DRG: 492 | End: 2025-08-03
Payer: MEDICARE

## 2025-08-03 PROBLEM — K56.41 FECAL IMPACTION OF COLON (HCC): Status: ACTIVE | Noted: 2025-08-03

## 2025-08-03 PROBLEM — K59.03 DRUG-INDUCED CONSTIPATION: Status: ACTIVE | Noted: 2025-08-03

## 2025-08-03 LAB
ANION GAP SERPL CALC-SCNC: 15 MEQ/L (ref 8–16)
BASOPHILS ABSOLUTE: 0 THOU/MM3 (ref 0–0.1)
BASOPHILS NFR BLD AUTO: 0.2 %
BUN SERPL-MCNC: 10 MG/DL (ref 8–23)
CALCIUM SERPL-MCNC: 8.5 MG/DL (ref 8.8–10.2)
CHLORIDE SERPL-SCNC: 103 MEQ/L (ref 98–111)
CO2 SERPL-SCNC: 24 MEQ/L (ref 22–29)
CREAT SERPL-MCNC: 0.6 MG/DL (ref 0.5–0.9)
DEPRECATED RDW RBC AUTO: 55.1 FL (ref 35–45)
EKG ATRIAL RATE: 112 BPM
EKG P AXIS: 67 DEGREES
EKG P-R INTERVAL: 124 MS
EKG Q-T INTERVAL: 292 MS
EKG QRS DURATION: 64 MS
EKG QTC CALCULATION (BAZETT): 398 MS
EKG R AXIS: 29 DEGREES
EKG T AXIS: 93 DEGREES
EKG VENTRICULAR RATE: 112 BPM
EOSINOPHIL NFR BLD AUTO: 0.1 %
EOSINOPHILS ABSOLUTE: 0 THOU/MM3 (ref 0–0.4)
ERYTHROCYTE [DISTWIDTH] IN BLOOD BY AUTOMATED COUNT: 15.9 % (ref 11.5–14.5)
GFR SERPL CREATININE-BSD FRML MDRD: > 90 ML/MIN/1.73M2
GLUCOSE BLD STRIP.AUTO-MCNC: 107 MG/DL (ref 70–108)
GLUCOSE BLD STRIP.AUTO-MCNC: 94 MG/DL (ref 70–108)
GLUCOSE SERPL-MCNC: 108 MG/DL (ref 74–109)
HCT VFR BLD AUTO: 28.6 % (ref 37–47)
HGB BLD-MCNC: 8.8 GM/DL (ref 12–16)
IMM GRANULOCYTES # BLD AUTO: 0.07 THOU/MM3 (ref 0–0.07)
IMM GRANULOCYTES NFR BLD AUTO: 0.6 %
LACTATE SERPL-SCNC: 1 MMOL/L (ref 0.5–2.2)
LACTATE SERPL-SCNC: 3.6 MMOL/L (ref 0.5–2.2)
LYMPHOCYTES ABSOLUTE: 1.6 THOU/MM3 (ref 1–4.8)
LYMPHOCYTES NFR BLD AUTO: 13.9 %
MAGNESIUM SERPL-MCNC: 1.8 MG/DL (ref 1.6–2.6)
MCH RBC QN AUTO: 29.5 PG (ref 26–33)
MCHC RBC AUTO-ENTMCNC: 30.8 GM/DL (ref 32.2–35.5)
MCV RBC AUTO: 96 FL (ref 81–99)
MONOCYTES ABSOLUTE: 0.9 THOU/MM3 (ref 0.4–1.3)
MONOCYTES NFR BLD AUTO: 8 %
NEUTROPHILS ABSOLUTE: 8.6 THOU/MM3 (ref 1.8–7.7)
NEUTROPHILS NFR BLD AUTO: 77.2 %
NRBC BLD AUTO-RTO: 0 /100 WBC
PLATELET # BLD AUTO: 525 THOU/MM3 (ref 130–400)
PMV BLD AUTO: 8.8 FL (ref 9.4–12.4)
POTASSIUM SERPL-SCNC: 4.7 MEQ/L (ref 3.5–5.2)
RBC # BLD AUTO: 2.98 MILL/MM3 (ref 4.2–5.4)
SODIUM SERPL-SCNC: 142 MEQ/L (ref 135–145)
TROPONIN, HIGH SENSITIVITY: 18 NG/L (ref 0–12)
TROPONIN, HIGH SENSITIVITY: 22 NG/L (ref 0–12)
WBC # BLD AUTO: 11.2 THOU/MM3 (ref 4.8–10.8)

## 2025-08-03 PROCEDURE — 6370000000 HC RX 637 (ALT 250 FOR IP)

## 2025-08-03 PROCEDURE — 83735 ASSAY OF MAGNESIUM: CPT

## 2025-08-03 PROCEDURE — 6360000004 HC RX CONTRAST MEDICATION: Performed by: PHYSICIAN ASSISTANT

## 2025-08-03 PROCEDURE — 74018 RADEX ABDOMEN 1 VIEW: CPT

## 2025-08-03 PROCEDURE — 6370000000 HC RX 637 (ALT 250 FOR IP): Performed by: INTERNAL MEDICINE

## 2025-08-03 PROCEDURE — 84484 ASSAY OF TROPONIN QUANT: CPT

## 2025-08-03 PROCEDURE — 83605 ASSAY OF LACTIC ACID: CPT

## 2025-08-03 PROCEDURE — 36415 COLL VENOUS BLD VENIPUNCTURE: CPT

## 2025-08-03 PROCEDURE — 2580000003 HC RX 258

## 2025-08-03 PROCEDURE — 71275 CT ANGIOGRAPHY CHEST: CPT

## 2025-08-03 PROCEDURE — 99233 SBSQ HOSP IP/OBS HIGH 50: CPT | Performed by: INTERNAL MEDICINE

## 2025-08-03 PROCEDURE — 80048 BASIC METABOLIC PNL TOTAL CA: CPT

## 2025-08-03 PROCEDURE — 93010 ELECTROCARDIOGRAM REPORT: CPT | Performed by: INTERNAL MEDICINE

## 2025-08-03 PROCEDURE — 85025 COMPLETE CBC W/AUTO DIFF WBC: CPT

## 2025-08-03 PROCEDURE — 82948 REAGENT STRIP/BLOOD GLUCOSE: CPT

## 2025-08-03 PROCEDURE — 97162 PT EVAL MOD COMPLEX 30 MIN: CPT

## 2025-08-03 PROCEDURE — 97530 THERAPEUTIC ACTIVITIES: CPT

## 2025-08-03 PROCEDURE — 2060000000 HC ICU INTERMEDIATE R&B

## 2025-08-03 PROCEDURE — 71045 X-RAY EXAM CHEST 1 VIEW: CPT

## 2025-08-03 PROCEDURE — 93005 ELECTROCARDIOGRAM TRACING: CPT

## 2025-08-03 PROCEDURE — 74177 CT ABD & PELVIS W/CONTRAST: CPT

## 2025-08-03 RX ORDER — IOPAMIDOL 755 MG/ML
80 INJECTION, SOLUTION INTRAVASCULAR
Status: COMPLETED | OUTPATIENT
Start: 2025-08-03 | End: 2025-08-03

## 2025-08-03 RX ORDER — 0.9 % SODIUM CHLORIDE 0.9 %
500 INTRAVENOUS SOLUTION INTRAVENOUS ONCE
Status: COMPLETED | OUTPATIENT
Start: 2025-08-03 | End: 2025-08-03

## 2025-08-03 RX ORDER — SENNOSIDES 8.6 MG/1
1 TABLET ORAL NIGHTLY
Status: DISCONTINUED | OUTPATIENT
Start: 2025-08-03 | End: 2025-08-03

## 2025-08-03 RX ORDER — POLYETHYLENE GLYCOL 3350 17 G/17G
17 POWDER, FOR SOLUTION ORAL DAILY
Status: DISCONTINUED | OUTPATIENT
Start: 2025-08-03 | End: 2025-08-03

## 2025-08-03 RX ORDER — SODIUM CHLORIDE 9 MG/ML
INJECTION, SOLUTION INTRAVENOUS CONTINUOUS
Status: DISCONTINUED | OUTPATIENT
Start: 2025-08-03 | End: 2025-08-04

## 2025-08-03 RX ORDER — SENNA AND DOCUSATE SODIUM 50; 8.6 MG/1; MG/1
2 TABLET, FILM COATED ORAL 2 TIMES DAILY
Status: DISCONTINUED | OUTPATIENT
Start: 2025-08-03 | End: 2025-08-07 | Stop reason: HOSPADM

## 2025-08-03 RX ADMIN — LOSARTAN POTASSIUM 50 MG: 25 TABLET, FILM COATED ORAL at 08:46

## 2025-08-03 RX ADMIN — SODIUM CHLORIDE: 0.9 INJECTION, SOLUTION INTRAVENOUS at 23:37

## 2025-08-03 RX ADMIN — OXYCODONE HYDROCHLORIDE 5 MG: 5 TABLET ORAL at 20:26

## 2025-08-03 RX ADMIN — OXYCODONE HYDROCHLORIDE 5 MG: 5 TABLET ORAL at 08:41

## 2025-08-03 RX ADMIN — POLYETHYLENE GLYCOL 3350 17 G: 17 POWDER, FOR SOLUTION ORAL at 16:43

## 2025-08-03 RX ADMIN — ACARBOSE 50 MG: 50 TABLET ORAL at 08:44

## 2025-08-03 RX ADMIN — LAMOTRIGINE 100 MG: 100 TABLET ORAL at 08:44

## 2025-08-03 RX ADMIN — SODIUM CHLORIDE: 0.9 INJECTION, SOLUTION INTRAVENOUS at 16:32

## 2025-08-03 RX ADMIN — AMOXICILLIN AND CLAVULANATE POTASSIUM 1 TABLET: 875; 125 TABLET, FILM COATED ORAL at 21:20

## 2025-08-03 RX ADMIN — VENLAFAXINE HYDROCHLORIDE 225 MG: 75 CAPSULE, EXTENDED RELEASE ORAL at 08:44

## 2025-08-03 RX ADMIN — AMOXICILLIN AND CLAVULANATE POTASSIUM 1 TABLET: 875; 125 TABLET, FILM COATED ORAL at 08:44

## 2025-08-03 RX ADMIN — QUETIAPINE FUMARATE 50 MG: 25 TABLET ORAL at 21:21

## 2025-08-03 RX ADMIN — PANTOPRAZOLE SODIUM 40 MG: 40 TABLET, DELAYED RELEASE ORAL at 08:41

## 2025-08-03 RX ADMIN — QUETIAPINE FUMARATE 25 MG: 25 TABLET ORAL at 21:21

## 2025-08-03 RX ADMIN — SODIUM CHLORIDE 500 ML: 0.9 INJECTION, SOLUTION INTRAVENOUS at 21:16

## 2025-08-03 RX ADMIN — LAMOTRIGINE 200 MG: 100 TABLET ORAL at 21:20

## 2025-08-03 RX ADMIN — IOPAMIDOL 80 ML: 755 INJECTION, SOLUTION INTRAVENOUS at 15:53

## 2025-08-03 ASSESSMENT — PAIN DESCRIPTION - LOCATION
LOCATION: SHOULDER
LOCATION: BACK;NECK
LOCATION: ARM

## 2025-08-03 ASSESSMENT — PAIN SCALES - GENERAL
PAINLEVEL_OUTOF10: 7
PAINLEVEL_OUTOF10: 6
PAINLEVEL_OUTOF10: 0
PAINLEVEL_OUTOF10: 8
PAINLEVEL_OUTOF10: 8

## 2025-08-03 ASSESSMENT — PAIN DESCRIPTION - PAIN TYPE
TYPE: SURGICAL PAIN
TYPE: ACUTE PAIN
TYPE: ACUTE PAIN

## 2025-08-03 ASSESSMENT — PAIN - FUNCTIONAL ASSESSMENT
PAIN_FUNCTIONAL_ASSESSMENT: PREVENTS OR INTERFERES SOME ACTIVE ACTIVITIES AND ADLS
PAIN_FUNCTIONAL_ASSESSMENT: ACTIVITIES ARE NOT PREVENTED
PAIN_FUNCTIONAL_ASSESSMENT: PREVENTS OR INTERFERES SOME ACTIVE ACTIVITIES AND ADLS

## 2025-08-03 ASSESSMENT — PAIN DESCRIPTION - ORIENTATION
ORIENTATION: LEFT
ORIENTATION: LEFT

## 2025-08-03 ASSESSMENT — PAIN DESCRIPTION - ONSET: ONSET: ON-GOING

## 2025-08-03 ASSESSMENT — PAIN DESCRIPTION - DESCRIPTORS
DESCRIPTORS: NAGGING
DESCRIPTORS: SORE
DESCRIPTORS: ACHING

## 2025-08-03 ASSESSMENT — PAIN DESCRIPTION - DIRECTION: RADIATING_TOWARDS: LEFT SHOULDER

## 2025-08-03 ASSESSMENT — PAIN DESCRIPTION - FREQUENCY: FREQUENCY: CONTINUOUS

## 2025-08-04 LAB
ANION GAP SERPL CALC-SCNC: 13 MEQ/L (ref 8–16)
BASOPHILS ABSOLUTE: 0 THOU/MM3 (ref 0–0.1)
BASOPHILS NFR BLD AUTO: 0.2 %
BUN SERPL-MCNC: 13 MG/DL (ref 8–23)
CALCIUM SERPL-MCNC: 8.2 MG/DL (ref 8.8–10.2)
CHLORIDE SERPL-SCNC: 105 MEQ/L (ref 98–111)
CO2 SERPL-SCNC: 23 MEQ/L (ref 22–29)
CREAT SERPL-MCNC: 0.5 MG/DL (ref 0.5–0.9)
DEPRECATED RDW RBC AUTO: 55.3 FL (ref 35–45)
EOSINOPHIL NFR BLD AUTO: 1.5 %
EOSINOPHILS ABSOLUTE: 0.1 THOU/MM3 (ref 0–0.4)
ERYTHROCYTE [DISTWIDTH] IN BLOOD BY AUTOMATED COUNT: 15.9 % (ref 11.5–14.5)
GFR SERPL CREATININE-BSD FRML MDRD: > 90 ML/MIN/1.73M2
GLUCOSE SERPL-MCNC: 86 MG/DL (ref 74–109)
HCT VFR BLD AUTO: 27.3 % (ref 37–47)
HGB BLD-MCNC: 8.4 GM/DL (ref 12–16)
IMM GRANULOCYTES # BLD AUTO: 0.07 THOU/MM3 (ref 0–0.07)
IMM GRANULOCYTES NFR BLD AUTO: 0.7 %
LACTATE SERPL-SCNC: 0.6 MMOL/L (ref 0.5–2.2)
LACTATE SERPL-SCNC: 0.6 MMOL/L (ref 0.5–2.2)
LYMPHOCYTES ABSOLUTE: 1.5 THOU/MM3 (ref 1–4.8)
LYMPHOCYTES NFR BLD AUTO: 15.8 %
MAGNESIUM SERPL-MCNC: 1.6 MG/DL (ref 1.6–2.6)
MCH RBC QN AUTO: 29.3 PG (ref 26–33)
MCHC RBC AUTO-ENTMCNC: 30.8 GM/DL (ref 32.2–35.5)
MCV RBC AUTO: 95.1 FL (ref 81–99)
MONOCYTES ABSOLUTE: 0.7 THOU/MM3 (ref 0.4–1.3)
MONOCYTES NFR BLD AUTO: 7.6 %
NEUTROPHILS ABSOLUTE: 7.3 THOU/MM3 (ref 1.8–7.7)
NEUTROPHILS NFR BLD AUTO: 74.2 %
NRBC BLD AUTO-RTO: 0 /100 WBC
PLATELET # BLD AUTO: 492 THOU/MM3 (ref 130–400)
PMV BLD AUTO: 8.9 FL (ref 9.4–12.4)
POTASSIUM SERPL-SCNC: 3.7 MEQ/L (ref 3.5–5.2)
RBC # BLD AUTO: 2.87 MILL/MM3 (ref 4.2–5.4)
SODIUM SERPL-SCNC: 141 MEQ/L (ref 135–145)
WBC # BLD AUTO: 9.8 THOU/MM3 (ref 4.8–10.8)

## 2025-08-04 PROCEDURE — 83735 ASSAY OF MAGNESIUM: CPT

## 2025-08-04 PROCEDURE — 2500000003 HC RX 250 WO HCPCS

## 2025-08-04 PROCEDURE — 36415 COLL VENOUS BLD VENIPUNCTURE: CPT

## 2025-08-04 PROCEDURE — 99232 SBSQ HOSP IP/OBS MODERATE 35: CPT | Performed by: INTERNAL MEDICINE

## 2025-08-04 PROCEDURE — 83605 ASSAY OF LACTIC ACID: CPT

## 2025-08-04 PROCEDURE — APPNB30 APP NON BILLABLE TIME 0-30 MINS

## 2025-08-04 PROCEDURE — 6370000000 HC RX 637 (ALT 250 FOR IP)

## 2025-08-04 PROCEDURE — 85025 COMPLETE CBC W/AUTO DIFF WBC: CPT

## 2025-08-04 PROCEDURE — 6370000000 HC RX 637 (ALT 250 FOR IP): Performed by: INTERNAL MEDICINE

## 2025-08-04 PROCEDURE — 80048 BASIC METABOLIC PNL TOTAL CA: CPT

## 2025-08-04 PROCEDURE — 6370000000 HC RX 637 (ALT 250 FOR IP): Performed by: STUDENT IN AN ORGANIZED HEALTH CARE EDUCATION/TRAINING PROGRAM

## 2025-08-04 PROCEDURE — 2060000000 HC ICU INTERMEDIATE R&B

## 2025-08-04 RX ADMIN — LAMOTRIGINE 200 MG: 100 TABLET ORAL at 22:15

## 2025-08-04 RX ADMIN — SODIUM CHLORIDE, PRESERVATIVE FREE 10 ML: 5 INJECTION INTRAVENOUS at 22:15

## 2025-08-04 RX ADMIN — SENNOSIDES, DOCUSATE SODIUM 2 TABLET: 50; 8.6 TABLET, FILM COATED ORAL at 07:57

## 2025-08-04 RX ADMIN — AMOXICILLIN AND CLAVULANATE POTASSIUM 1 TABLET: 875; 125 TABLET, FILM COATED ORAL at 07:57

## 2025-08-04 RX ADMIN — AMOXICILLIN AND CLAVULANATE POTASSIUM 1 TABLET: 875; 125 TABLET, FILM COATED ORAL at 22:15

## 2025-08-04 RX ADMIN — ACARBOSE 50 MG: 50 TABLET ORAL at 17:52

## 2025-08-04 RX ADMIN — QUETIAPINE FUMARATE 50 MG: 25 TABLET ORAL at 22:14

## 2025-08-04 RX ADMIN — PANTOPRAZOLE SODIUM 40 MG: 40 TABLET, DELAYED RELEASE ORAL at 07:57

## 2025-08-04 RX ADMIN — OXYCODONE HYDROCHLORIDE 5 MG: 5 TABLET ORAL at 04:14

## 2025-08-04 RX ADMIN — OXYCODONE HYDROCHLORIDE 2.5 MG: 5 TABLET ORAL at 12:03

## 2025-08-04 RX ADMIN — VENLAFAXINE HYDROCHLORIDE 225 MG: 75 CAPSULE, EXTENDED RELEASE ORAL at 07:57

## 2025-08-04 RX ADMIN — QUETIAPINE FUMARATE 25 MG: 25 TABLET ORAL at 22:14

## 2025-08-04 RX ADMIN — LAMOTRIGINE 100 MG: 100 TABLET ORAL at 07:57

## 2025-08-04 RX ADMIN — ACARBOSE 50 MG: 50 TABLET ORAL at 12:03

## 2025-08-04 RX ADMIN — ACARBOSE 50 MG: 50 TABLET ORAL at 07:57

## 2025-08-04 ASSESSMENT — PAIN SCALES - GENERAL
PAINLEVEL_OUTOF10: 6
PAINLEVEL_OUTOF10: 5

## 2025-08-04 ASSESSMENT — PAIN DESCRIPTION - ORIENTATION
ORIENTATION: RIGHT;LEFT
ORIENTATION: LEFT

## 2025-08-04 ASSESSMENT — PAIN DESCRIPTION - LOCATION
LOCATION: ARM
LOCATION: SHOULDER

## 2025-08-04 ASSESSMENT — PAIN DESCRIPTION - ONSET: ONSET: ON-GOING

## 2025-08-04 ASSESSMENT — PAIN DESCRIPTION - DESCRIPTORS
DESCRIPTORS: ACHING
DESCRIPTORS: NAGGING

## 2025-08-04 ASSESSMENT — PAIN DESCRIPTION - FREQUENCY: FREQUENCY: CONTINUOUS

## 2025-08-04 ASSESSMENT — PAIN DESCRIPTION - PAIN TYPE: TYPE: ACUTE PAIN

## 2025-08-05 LAB
ANION GAP SERPL CALC-SCNC: 15 MEQ/L (ref 8–16)
BACTERIA BLD AEROBE CULT: NORMAL
BACTERIA BLD AEROBE CULT: NORMAL
BUN SERPL-MCNC: 7 MG/DL (ref 8–23)
CALCIUM SERPL-MCNC: 8.4 MG/DL (ref 8.8–10.2)
CHLORIDE SERPL-SCNC: 104 MEQ/L (ref 98–111)
CO2 SERPL-SCNC: 24 MEQ/L (ref 22–29)
CREAT SERPL-MCNC: 0.4 MG/DL (ref 0.5–0.9)
DEPRECATED RDW RBC AUTO: 54.2 FL (ref 35–45)
ERYTHROCYTE [DISTWIDTH] IN BLOOD BY AUTOMATED COUNT: 15.8 % (ref 11.5–14.5)
GFR SERPL CREATININE-BSD FRML MDRD: > 90 ML/MIN/1.73M2
GLUCOSE SERPL-MCNC: 64 MG/DL (ref 74–109)
HCT VFR BLD AUTO: 26.1 % (ref 37–47)
HGB BLD-MCNC: 8.2 GM/DL (ref 12–16)
MAGNESIUM SERPL-MCNC: 1.7 MG/DL (ref 1.6–2.6)
MCH RBC QN AUTO: 29.5 PG (ref 26–33)
MCHC RBC AUTO-ENTMCNC: 31.4 GM/DL (ref 32.2–35.5)
MCV RBC AUTO: 93.9 FL (ref 81–99)
PLATELET # BLD AUTO: 459 THOU/MM3 (ref 130–400)
PMV BLD AUTO: 8.8 FL (ref 9.4–12.4)
POTASSIUM SERPL-SCNC: 3.2 MEQ/L (ref 3.5–5.2)
RBC # BLD AUTO: 2.78 MILL/MM3 (ref 4.2–5.4)
SODIUM SERPL-SCNC: 143 MEQ/L (ref 135–145)
WBC # BLD AUTO: 8.8 THOU/MM3 (ref 4.8–10.8)

## 2025-08-05 PROCEDURE — APPSS30 APP SPLIT SHARED TIME 16-30 MINUTES: Performed by: NURSE PRACTITIONER

## 2025-08-05 PROCEDURE — 80048 BASIC METABOLIC PNL TOTAL CA: CPT

## 2025-08-05 PROCEDURE — 6370000000 HC RX 637 (ALT 250 FOR IP): Performed by: INTERNAL MEDICINE

## 2025-08-05 PROCEDURE — 85027 COMPLETE CBC AUTOMATED: CPT

## 2025-08-05 PROCEDURE — 83735 ASSAY OF MAGNESIUM: CPT

## 2025-08-05 PROCEDURE — 6370000000 HC RX 637 (ALT 250 FOR IP): Performed by: STUDENT IN AN ORGANIZED HEALTH CARE EDUCATION/TRAINING PROGRAM

## 2025-08-05 PROCEDURE — 36415 COLL VENOUS BLD VENIPUNCTURE: CPT

## 2025-08-05 PROCEDURE — 6370000000 HC RX 637 (ALT 250 FOR IP)

## 2025-08-05 PROCEDURE — 1200000000 HC SEMI PRIVATE

## 2025-08-05 PROCEDURE — 99232 SBSQ HOSP IP/OBS MODERATE 35: CPT | Performed by: INTERNAL MEDICINE

## 2025-08-05 PROCEDURE — 97530 THERAPEUTIC ACTIVITIES: CPT

## 2025-08-05 PROCEDURE — 97110 THERAPEUTIC EXERCISES: CPT

## 2025-08-05 PROCEDURE — 2500000003 HC RX 250 WO HCPCS

## 2025-08-05 RX ORDER — GLUCAGON 1 MG/ML
1 KIT INJECTION PRN
Status: DISCONTINUED | OUTPATIENT
Start: 2025-08-05 | End: 2025-08-07 | Stop reason: HOSPADM

## 2025-08-05 RX ORDER — DEXTROSE MONOHYDRATE 100 MG/ML
INJECTION, SOLUTION INTRAVENOUS CONTINUOUS PRN
Status: DISCONTINUED | OUTPATIENT
Start: 2025-08-05 | End: 2025-08-07 | Stop reason: HOSPADM

## 2025-08-05 RX ORDER — POTASSIUM CHLORIDE 1500 MG/1
40 TABLET, EXTENDED RELEASE ORAL PRN
Status: DISCONTINUED | OUTPATIENT
Start: 2025-08-05 | End: 2025-08-07 | Stop reason: HOSPADM

## 2025-08-05 RX ORDER — POTASSIUM CHLORIDE 7.45 MG/ML
10 INJECTION INTRAVENOUS PRN
Status: DISCONTINUED | OUTPATIENT
Start: 2025-08-05 | End: 2025-08-07 | Stop reason: HOSPADM

## 2025-08-05 RX ADMIN — SODIUM CHLORIDE, PRESERVATIVE FREE 10 ML: 5 INJECTION INTRAVENOUS at 09:13

## 2025-08-05 RX ADMIN — PANTOPRAZOLE SODIUM 40 MG: 40 TABLET, DELAYED RELEASE ORAL at 09:11

## 2025-08-05 RX ADMIN — ACARBOSE 50 MG: 50 TABLET ORAL at 17:45

## 2025-08-05 RX ADMIN — LAMOTRIGINE 200 MG: 100 TABLET ORAL at 20:38

## 2025-08-05 RX ADMIN — AMOXICILLIN AND CLAVULANATE POTASSIUM 1 TABLET: 875; 125 TABLET, FILM COATED ORAL at 09:11

## 2025-08-05 RX ADMIN — ACARBOSE 50 MG: 50 TABLET ORAL at 09:11

## 2025-08-05 RX ADMIN — POTASSIUM CHLORIDE 40 MEQ: 1500 TABLET, EXTENDED RELEASE ORAL at 09:11

## 2025-08-05 RX ADMIN — OXYCODONE HYDROCHLORIDE 5 MG: 5 TABLET ORAL at 05:05

## 2025-08-05 RX ADMIN — QUETIAPINE FUMARATE 25 MG: 25 TABLET ORAL at 20:35

## 2025-08-05 RX ADMIN — LAMOTRIGINE 100 MG: 100 TABLET ORAL at 09:12

## 2025-08-05 RX ADMIN — ACARBOSE 50 MG: 50 TABLET ORAL at 12:52

## 2025-08-05 RX ADMIN — OXYCODONE HYDROCHLORIDE 2.5 MG: 5 TABLET ORAL at 20:35

## 2025-08-05 RX ADMIN — VENLAFAXINE HYDROCHLORIDE 225 MG: 75 CAPSULE, EXTENDED RELEASE ORAL at 09:11

## 2025-08-05 RX ADMIN — SODIUM CHLORIDE, PRESERVATIVE FREE 10 ML: 5 INJECTION INTRAVENOUS at 20:35

## 2025-08-05 RX ADMIN — SENNOSIDES, DOCUSATE SODIUM 2 TABLET: 50; 8.6 TABLET, FILM COATED ORAL at 09:11

## 2025-08-05 RX ADMIN — QUETIAPINE FUMARATE 50 MG: 25 TABLET ORAL at 20:35

## 2025-08-05 ASSESSMENT — PAIN SCALES - GENERAL
PAINLEVEL_OUTOF10: 0
PAINLEVEL_OUTOF10: 8
PAINLEVEL_OUTOF10: 8

## 2025-08-05 ASSESSMENT — PAIN DESCRIPTION - FREQUENCY
FREQUENCY: CONTINUOUS
FREQUENCY: CONTINUOUS

## 2025-08-05 ASSESSMENT — PAIN DESCRIPTION - DESCRIPTORS
DESCRIPTORS: ACHING
DESCRIPTORS: SHARP

## 2025-08-05 ASSESSMENT — PAIN DESCRIPTION - PAIN TYPE
TYPE: ACUTE PAIN
TYPE: ACUTE PAIN

## 2025-08-05 ASSESSMENT — PAIN DESCRIPTION - LOCATION
LOCATION: SHOULDER
LOCATION: SHOULDER

## 2025-08-05 ASSESSMENT — PAIN DESCRIPTION - ONSET
ONSET: ON-GOING
ONSET: ON-GOING

## 2025-08-05 ASSESSMENT — PAIN DESCRIPTION - ORIENTATION
ORIENTATION: LEFT
ORIENTATION: LEFT

## 2025-08-06 LAB
ANION GAP SERPL CALC-SCNC: 16 MEQ/L (ref 8–16)
BUN SERPL-MCNC: 6 MG/DL (ref 8–23)
CALCIUM SERPL-MCNC: 8.4 MG/DL (ref 8.8–10.2)
CHLORIDE SERPL-SCNC: 104 MEQ/L (ref 98–111)
CO2 SERPL-SCNC: 24 MEQ/L (ref 22–29)
CREAT SERPL-MCNC: 0.4 MG/DL (ref 0.5–0.9)
DEPRECATED RDW RBC AUTO: 54.2 FL (ref 35–45)
ERYTHROCYTE [DISTWIDTH] IN BLOOD BY AUTOMATED COUNT: 15.9 % (ref 11.5–14.5)
GFR SERPL CREATININE-BSD FRML MDRD: > 90 ML/MIN/1.73M2
GLUCOSE BLD STRIP.AUTO-MCNC: 65 MG/DL (ref 70–108)
GLUCOSE BLD STRIP.AUTO-MCNC: 75 MG/DL (ref 70–108)
GLUCOSE SERPL-MCNC: 63 MG/DL (ref 74–109)
HCT VFR BLD AUTO: 25.9 % (ref 37–47)
HGB BLD-MCNC: 8 GM/DL (ref 12–16)
MAGNESIUM SERPL-MCNC: 1.6 MG/DL (ref 1.6–2.6)
MCH RBC QN AUTO: 29.1 PG (ref 26–33)
MCHC RBC AUTO-ENTMCNC: 30.9 GM/DL (ref 32.2–35.5)
MCV RBC AUTO: 94.2 FL (ref 81–99)
PLATELET # BLD AUTO: 437 THOU/MM3 (ref 130–400)
PMV BLD AUTO: 8.9 FL (ref 9.4–12.4)
POTASSIUM SERPL-SCNC: 3.6 MEQ/L (ref 3.5–5.2)
RBC # BLD AUTO: 2.75 MILL/MM3 (ref 4.2–5.4)
SODIUM SERPL-SCNC: 144 MEQ/L (ref 135–145)
WBC # BLD AUTO: 8.7 THOU/MM3 (ref 4.8–10.8)

## 2025-08-06 PROCEDURE — 6370000000 HC RX 637 (ALT 250 FOR IP): Performed by: STUDENT IN AN ORGANIZED HEALTH CARE EDUCATION/TRAINING PROGRAM

## 2025-08-06 PROCEDURE — 99024 POSTOP FOLLOW-UP VISIT: CPT | Performed by: NURSE PRACTITIONER

## 2025-08-06 PROCEDURE — 82948 REAGENT STRIP/BLOOD GLUCOSE: CPT

## 2025-08-06 PROCEDURE — 6370000000 HC RX 637 (ALT 250 FOR IP): Performed by: INTERNAL MEDICINE

## 2025-08-06 PROCEDURE — 1200000000 HC SEMI PRIVATE

## 2025-08-06 PROCEDURE — 99221 1ST HOSP IP/OBS SF/LOW 40: CPT | Performed by: NURSE PRACTITIONER

## 2025-08-06 PROCEDURE — 6370000000 HC RX 637 (ALT 250 FOR IP)

## 2025-08-06 PROCEDURE — 51798 US URINE CAPACITY MEASURE: CPT

## 2025-08-06 PROCEDURE — 99232 SBSQ HOSP IP/OBS MODERATE 35: CPT

## 2025-08-06 PROCEDURE — 36415 COLL VENOUS BLD VENIPUNCTURE: CPT

## 2025-08-06 PROCEDURE — 80048 BASIC METABOLIC PNL TOTAL CA: CPT

## 2025-08-06 PROCEDURE — 83735 ASSAY OF MAGNESIUM: CPT

## 2025-08-06 PROCEDURE — 2500000003 HC RX 250 WO HCPCS

## 2025-08-06 PROCEDURE — 85027 COMPLETE CBC AUTOMATED: CPT

## 2025-08-06 PROCEDURE — APPSS30 APP SPLIT SHARED TIME 16-30 MINUTES: Performed by: NURSE PRACTITIONER

## 2025-08-06 RX ADMIN — SODIUM CHLORIDE, PRESERVATIVE FREE 10 ML: 5 INJECTION INTRAVENOUS at 20:31

## 2025-08-06 RX ADMIN — OXYCODONE HYDROCHLORIDE 5 MG: 5 TABLET ORAL at 20:31

## 2025-08-06 RX ADMIN — QUETIAPINE FUMARATE 25 MG: 25 TABLET ORAL at 20:31

## 2025-08-06 RX ADMIN — SODIUM CHLORIDE, PRESERVATIVE FREE 10 ML: 5 INJECTION INTRAVENOUS at 09:44

## 2025-08-06 RX ADMIN — LAMOTRIGINE 200 MG: 100 TABLET ORAL at 20:31

## 2025-08-06 RX ADMIN — LAMOTRIGINE 100 MG: 100 TABLET ORAL at 09:43

## 2025-08-06 RX ADMIN — QUETIAPINE FUMARATE 50 MG: 25 TABLET ORAL at 20:31

## 2025-08-06 RX ADMIN — PANTOPRAZOLE SODIUM 40 MG: 40 TABLET, DELAYED RELEASE ORAL at 09:44

## 2025-08-06 RX ADMIN — VENLAFAXINE HYDROCHLORIDE 225 MG: 75 CAPSULE, EXTENDED RELEASE ORAL at 09:43

## 2025-08-06 RX ADMIN — SENNOSIDES, DOCUSATE SODIUM 2 TABLET: 50; 8.6 TABLET, FILM COATED ORAL at 09:44

## 2025-08-06 ASSESSMENT — PAIN SCALES - GENERAL
PAINLEVEL_OUTOF10: 7
PAINLEVEL_OUTOF10: 0
PAINLEVEL_OUTOF10: 0

## 2025-08-06 ASSESSMENT — PAIN DESCRIPTION - FREQUENCY: FREQUENCY: CONTINUOUS

## 2025-08-06 ASSESSMENT — PAIN DESCRIPTION - ONSET: ONSET: ON-GOING

## 2025-08-06 ASSESSMENT — PAIN - FUNCTIONAL ASSESSMENT: PAIN_FUNCTIONAL_ASSESSMENT: ACTIVITIES ARE NOT PREVENTED

## 2025-08-06 ASSESSMENT — PAIN DESCRIPTION - LOCATION: LOCATION: SHOULDER

## 2025-08-06 ASSESSMENT — PAIN DESCRIPTION - PAIN TYPE: TYPE: ACUTE PAIN

## 2025-08-06 ASSESSMENT — PAIN DESCRIPTION - ORIENTATION: ORIENTATION: LEFT

## 2025-08-06 ASSESSMENT — PAIN DESCRIPTION - DESCRIPTORS: DESCRIPTORS: SHARP

## 2025-08-07 VITALS
HEART RATE: 95 BPM | RESPIRATION RATE: 18 BRPM | WEIGHT: 121.6 LBS | TEMPERATURE: 97.8 F | OXYGEN SATURATION: 98 % | SYSTOLIC BLOOD PRESSURE: 118 MMHG | BODY MASS INDEX: 19.54 KG/M2 | DIASTOLIC BLOOD PRESSURE: 61 MMHG | HEIGHT: 66 IN

## 2025-08-07 LAB
ANION GAP SERPL CALC-SCNC: 15 MEQ/L (ref 8–16)
BUN SERPL-MCNC: 10 MG/DL (ref 8–23)
CALCIUM SERPL-MCNC: 8.4 MG/DL (ref 8.8–10.2)
CHLORIDE SERPL-SCNC: 101 MEQ/L (ref 98–111)
CO2 SERPL-SCNC: 26 MEQ/L (ref 22–29)
CREAT SERPL-MCNC: 0.5 MG/DL (ref 0.5–0.9)
DEPRECATED RDW RBC AUTO: 54 FL (ref 35–45)
ERYTHROCYTE [DISTWIDTH] IN BLOOD BY AUTOMATED COUNT: 15.9 % (ref 11.5–14.5)
GFR SERPL CREATININE-BSD FRML MDRD: > 90 ML/MIN/1.73M2
GLUCOSE BLD STRIP.AUTO-MCNC: 78 MG/DL (ref 70–108)
GLUCOSE SERPL-MCNC: 87 MG/DL (ref 74–109)
HCT VFR BLD AUTO: 27.6 % (ref 37–47)
HGB BLD-MCNC: 8.8 GM/DL (ref 12–16)
MAGNESIUM SERPL-MCNC: 1.7 MG/DL (ref 1.6–2.6)
MCH RBC QN AUTO: 29.9 PG (ref 26–33)
MCHC RBC AUTO-ENTMCNC: 31.9 GM/DL (ref 32.2–35.5)
MCV RBC AUTO: 93.9 FL (ref 81–99)
PLATELET # BLD AUTO: 399 THOU/MM3 (ref 130–400)
PMV BLD AUTO: 9 FL (ref 9.4–12.4)
POTASSIUM SERPL-SCNC: 3.9 MEQ/L (ref 3.5–5.2)
RBC # BLD AUTO: 2.94 MILL/MM3 (ref 4.2–5.4)
SODIUM SERPL-SCNC: 142 MEQ/L (ref 135–145)
WBC # BLD AUTO: 10.4 THOU/MM3 (ref 4.8–10.8)

## 2025-08-07 PROCEDURE — 80048 BASIC METABOLIC PNL TOTAL CA: CPT

## 2025-08-07 PROCEDURE — 6370000000 HC RX 637 (ALT 250 FOR IP)

## 2025-08-07 PROCEDURE — 36415 COLL VENOUS BLD VENIPUNCTURE: CPT

## 2025-08-07 PROCEDURE — 83735 ASSAY OF MAGNESIUM: CPT

## 2025-08-07 PROCEDURE — 6370000000 HC RX 637 (ALT 250 FOR IP): Performed by: INTERNAL MEDICINE

## 2025-08-07 PROCEDURE — 6370000000 HC RX 637 (ALT 250 FOR IP): Performed by: STUDENT IN AN ORGANIZED HEALTH CARE EDUCATION/TRAINING PROGRAM

## 2025-08-07 PROCEDURE — 85027 COMPLETE CBC AUTOMATED: CPT

## 2025-08-07 PROCEDURE — 97530 THERAPEUTIC ACTIVITIES: CPT

## 2025-08-07 PROCEDURE — 99239 HOSP IP/OBS DSCHRG MGMT >30: CPT

## 2025-08-07 PROCEDURE — 2500000003 HC RX 250 WO HCPCS

## 2025-08-07 PROCEDURE — 82948 REAGENT STRIP/BLOOD GLUCOSE: CPT

## 2025-08-07 RX ORDER — OXYCODONE HYDROCHLORIDE 5 MG/1
5 TABLET ORAL EVERY 8 HOURS PRN
Status: SHIPPED | DISCHARGE
Start: 2025-08-07 | End: 2025-08-08 | Stop reason: ALTCHOICE

## 2025-08-07 RX ADMIN — SODIUM CHLORIDE, PRESERVATIVE FREE 10 ML: 5 INJECTION INTRAVENOUS at 09:07

## 2025-08-07 RX ADMIN — LAMOTRIGINE 100 MG: 100 TABLET ORAL at 09:07

## 2025-08-07 RX ADMIN — VENLAFAXINE HYDROCHLORIDE 225 MG: 75 CAPSULE, EXTENDED RELEASE ORAL at 09:06

## 2025-08-07 RX ADMIN — OXYCODONE HYDROCHLORIDE 5 MG: 5 TABLET ORAL at 09:06

## 2025-08-07 RX ADMIN — PANTOPRAZOLE SODIUM 40 MG: 40 TABLET, DELAYED RELEASE ORAL at 05:36

## 2025-08-07 RX ADMIN — SENNOSIDES, DOCUSATE SODIUM 2 TABLET: 50; 8.6 TABLET, FILM COATED ORAL at 09:06

## 2025-08-07 ASSESSMENT — PAIN DESCRIPTION - PAIN TYPE: TYPE: ACUTE PAIN;SURGICAL PAIN

## 2025-08-07 ASSESSMENT — PAIN - FUNCTIONAL ASSESSMENT: PAIN_FUNCTIONAL_ASSESSMENT: PREVENTS OR INTERFERES WITH MANY ACTIVE NOT PASSIVE ACTIVITIES

## 2025-08-07 ASSESSMENT — PAIN DESCRIPTION - DESCRIPTORS: DESCRIPTORS: ACHING;SHARP;THROBBING

## 2025-08-07 ASSESSMENT — PAIN DESCRIPTION - ORIENTATION: ORIENTATION: LEFT

## 2025-08-07 ASSESSMENT — PAIN DESCRIPTION - FREQUENCY: FREQUENCY: CONTINUOUS

## 2025-08-07 ASSESSMENT — PAIN DESCRIPTION - LOCATION: LOCATION: ARM;SHOULDER

## 2025-08-07 ASSESSMENT — PAIN SCALES - WONG BAKER: WONGBAKER_NUMERICALRESPONSE: NO HURT

## 2025-08-07 ASSESSMENT — PAIN SCALES - GENERAL
PAINLEVEL_OUTOF10: 8
PAINLEVEL_OUTOF10: 0

## 2025-08-07 ASSESSMENT — PAIN DESCRIPTION - ONSET: ONSET: ON-GOING

## 2025-08-11 ENCOUNTER — OFFICE VISIT (OUTPATIENT)
Dept: ONCOLOGY | Age: 75
End: 2025-08-11
Payer: MEDICARE

## 2025-08-11 ENCOUNTER — HOSPITAL ENCOUNTER (OUTPATIENT)
Dept: INFUSION THERAPY | Age: 75
Discharge: HOME | End: 2025-08-11
Payer: MEDICARE

## 2025-08-11 VITALS
TEMPERATURE: 97.8 F | DIASTOLIC BLOOD PRESSURE: 67 MMHG | BODY MASS INDEX: 20.89 KG/M2 | OXYGEN SATURATION: 94 % | SYSTOLIC BLOOD PRESSURE: 129 MMHG | HEIGHT: 66 IN | RESPIRATION RATE: 18 BRPM | WEIGHT: 130 LBS | HEART RATE: 111 BPM

## 2025-08-11 VITALS
DIASTOLIC BLOOD PRESSURE: 67 MMHG | HEART RATE: 111 BPM | HEIGHT: 66 IN | SYSTOLIC BLOOD PRESSURE: 129 MMHG | TEMPERATURE: 97.8 F | WEIGHT: 130 LBS | BODY MASS INDEX: 20.89 KG/M2 | RESPIRATION RATE: 18 BRPM | OXYGEN SATURATION: 94 %

## 2025-08-11 DIAGNOSIS — C18.7 MALIGNANT NEOPLASM OF SIGMOID COLON (HCC): Primary | ICD-10-CM

## 2025-08-11 PROCEDURE — 1111F DSCHRG MED/CURRENT MED MERGE: CPT | Performed by: INTERNAL MEDICINE

## 2025-08-11 PROCEDURE — G8400 PT W/DXA NO RESULTS DOC: HCPCS | Performed by: INTERNAL MEDICINE

## 2025-08-11 PROCEDURE — 99205 OFFICE O/P NEW HI 60 MIN: CPT | Performed by: INTERNAL MEDICINE

## 2025-08-11 PROCEDURE — 3078F DIAST BP <80 MM HG: CPT | Performed by: INTERNAL MEDICINE

## 2025-08-11 PROCEDURE — 99211 OFF/OP EST MAY X REQ PHY/QHP: CPT

## 2025-08-11 PROCEDURE — G8427 DOCREV CUR MEDS BY ELIG CLIN: HCPCS | Performed by: INTERNAL MEDICINE

## 2025-08-11 PROCEDURE — 1123F ACP DISCUSS/DSCN MKR DOCD: CPT | Performed by: INTERNAL MEDICINE

## 2025-08-11 PROCEDURE — 1125F AMNT PAIN NOTED PAIN PRSNT: CPT | Performed by: INTERNAL MEDICINE

## 2025-08-11 PROCEDURE — G8420 CALC BMI NORM PARAMETERS: HCPCS | Performed by: INTERNAL MEDICINE

## 2025-08-11 PROCEDURE — 1159F MED LIST DOCD IN RCRD: CPT | Performed by: INTERNAL MEDICINE

## 2025-08-11 PROCEDURE — 3017F COLORECTAL CA SCREEN DOC REV: CPT | Performed by: INTERNAL MEDICINE

## 2025-08-11 PROCEDURE — 1036F TOBACCO NON-USER: CPT | Performed by: INTERNAL MEDICINE

## 2025-08-11 PROCEDURE — 3074F SYST BP LT 130 MM HG: CPT | Performed by: INTERNAL MEDICINE

## 2025-08-11 PROCEDURE — 1090F PRES/ABSN URINE INCON ASSESS: CPT | Performed by: INTERNAL MEDICINE

## 2025-08-13 LAB
BASOPHILS ABSOLUTE: ABNORMAL
BASOPHILS RELATIVE PERCENT: ABNORMAL
EOSINOPHILS ABSOLUTE: ABNORMAL
EOSINOPHILS RELATIVE PERCENT: ABNORMAL
HCT VFR BLD CALC: 26.2 % (ref 36–46)
HEMOGLOBIN: 8.4 G/DL (ref 12–16)
LYMPHOCYTES ABSOLUTE: ABNORMAL
LYMPHOCYTES RELATIVE PERCENT: ABNORMAL
MCH RBC QN AUTO: ABNORMAL PG
MCHC RBC AUTO-ENTMCNC: ABNORMAL G/DL
MCV RBC AUTO: ABNORMAL FL
MONOCYTES ABSOLUTE: ABNORMAL
MONOCYTES RELATIVE PERCENT: ABNORMAL
NEUTROPHILS ABSOLUTE: ABNORMAL
NEUTROPHILS RELATIVE PERCENT: ABNORMAL
PLATELET # BLD: 374 K/ΜL
PMV BLD AUTO: ABNORMAL FL
RBC # BLD: 2.81 10^6/ΜL
WBC # BLD: 9.7 10^3/ML

## 2025-08-14 ENCOUNTER — SOCIAL WORK (OUTPATIENT)
Dept: INFUSION THERAPY | Age: 75
End: 2025-08-14

## 2025-08-15 ENCOUNTER — TELEPHONE (OUTPATIENT)
Age: 75
End: 2025-08-15

## 2025-08-18 ENCOUNTER — OFFICE VISIT (OUTPATIENT)
Dept: SURGERY | Age: 75
End: 2025-08-18

## 2025-08-18 VITALS
TEMPERATURE: 97.3 F | HEART RATE: 101 BPM | BODY MASS INDEX: 18.08 KG/M2 | WEIGHT: 112.5 LBS | SYSTOLIC BLOOD PRESSURE: 135 MMHG | OXYGEN SATURATION: 91 % | DIASTOLIC BLOOD PRESSURE: 76 MMHG | HEIGHT: 66 IN

## 2025-08-18 DIAGNOSIS — Z09 STATUS POST ABDOMINAL SURGERY, FOLLOW-UP EXAM: Primary | ICD-10-CM

## 2025-08-18 DIAGNOSIS — Z90.49 S/P PARTIAL COLECTOMY: ICD-10-CM

## 2025-08-18 DIAGNOSIS — C19 ADENOCARCINOMA OF RECTOSIGMOID JUNCTION (HCC): ICD-10-CM

## 2025-08-18 PROCEDURE — 99024 POSTOP FOLLOW-UP VISIT: CPT | Performed by: NURSE PRACTITIONER

## 2025-08-18 RX ORDER — SENNOSIDES 8.6 MG
TABLET ORAL
COMMUNITY
Start: 2025-05-20

## 2025-08-22 ENCOUNTER — OUTSIDE SERVICES (OUTPATIENT)
Dept: FAMILY MEDICINE CLINIC | Age: 75
End: 2025-08-22

## 2025-08-22 VITALS
OXYGEN SATURATION: 95 % | WEIGHT: 110.2 LBS | TEMPERATURE: 98.4 F | BODY MASS INDEX: 17.8 KG/M2 | SYSTOLIC BLOOD PRESSURE: 106 MMHG | HEART RATE: 72 BPM | RESPIRATION RATE: 18 BRPM | DIASTOLIC BLOOD PRESSURE: 67 MMHG

## 2025-08-22 DIAGNOSIS — F31.32 BIPOLAR 1 DISORDER, DEPRESSED, MODERATE (HCC): ICD-10-CM

## 2025-08-22 DIAGNOSIS — S42.202D CLOSED FRACTURE OF PROXIMAL END OF LEFT HUMERUS WITH ROUTINE HEALING, UNSPECIFIED FRACTURE MORPHOLOGY, SUBSEQUENT ENCOUNTER: ICD-10-CM

## 2025-08-22 DIAGNOSIS — C19 CANCER OF RECTOSIGMOID JUNCTION (HCC): ICD-10-CM

## 2025-08-22 DIAGNOSIS — E55.9 HYPOVITAMINOSIS D: ICD-10-CM

## 2025-08-22 DIAGNOSIS — F43.10 PTSD (POST-TRAUMATIC STRESS DISORDER): ICD-10-CM

## 2025-08-22 DIAGNOSIS — K21.9 GASTROESOPHAGEAL REFLUX DISEASE, UNSPECIFIED WHETHER ESOPHAGITIS PRESENT: ICD-10-CM

## 2025-08-22 DIAGNOSIS — K59.09 OTHER CONSTIPATION: ICD-10-CM

## 2025-08-22 DIAGNOSIS — G47.09 OTHER INSOMNIA: ICD-10-CM

## 2025-08-22 DIAGNOSIS — Z90.3 S/P GASTRECTOMY: ICD-10-CM

## 2025-08-22 DIAGNOSIS — D64.9 POSTOPERATIVE ANEMIA: ICD-10-CM
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
## 2025-08-22 DIAGNOSIS — K91.2 HYPOGLYCEMIA AFTER GI (GASTROINTESTINAL) SURGERY: ICD-10-CM

## 2025-08-22 DIAGNOSIS — I10 ESSENTIAL HYPERTENSION: ICD-10-CM

## 2025-08-22 DIAGNOSIS — M15.3 POST-TRAUMATIC OSTEOARTHRITIS OF MULTIPLE JOINTS: ICD-10-CM

## 2025-08-22 ASSESSMENT — ENCOUNTER SYMPTOMS
ABDOMINAL PAIN: 0
RHINORRHEA: 0
NAUSEA: 0
EYE REDNESS: 0
VOMITING: 0
CONSTIPATION: 0
COUGH: 0
SORE THROAT: 0
SHORTNESS OF BREATH: 0
WHEEZING: 0
DIARRHEA: 0

## 2025-08-30 DIAGNOSIS — S42.202D CLOSED FRACTURE OF PROXIMAL END OF LEFT HUMERUS WITH ROUTINE HEALING, UNSPECIFIED FRACTURE MORPHOLOGY, SUBSEQUENT ENCOUNTER: Primary | ICD-10-CM

## 2025-08-31 PROBLEM — Z01.810 PREOP CARDIOVASCULAR EXAM: Status: RESOLVED | Noted: 2025-08-01 | Resolved: 2025-08-31

## 2025-09-02 RX ORDER — TRAMADOL HYDROCHLORIDE 50 MG/1
50 TABLET ORAL EVERY 6 HOURS PRN
Qty: 120 TABLET | Refills: 1 | Status: SHIPPED | OUTPATIENT
Start: 2025-09-02 | End: 2025-11-01

## 2025-09-03 ENCOUNTER — TELEPHONE (OUTPATIENT)
Dept: ONCOLOGY | Age: 75
End: 2025-09-03

## 2025-09-03 DIAGNOSIS — Z91.89 ADJUSTMENT TO LIFE THREATENING ILLNESS: Primary | ICD-10-CM

## (undated) DEVICE — Device

## (undated) DEVICE — CLEAN CLOSURE PACK: Brand: MEDLINE INDUSTRIES, INC.

## (undated) DEVICE — 1LYRTR 16FR10ML100%SILTMPS SNP: Brand: MEDLINE INDUSTRIES, INC.

## (undated) DEVICE — BREAST HERNIA: Brand: MEDLINE INDUSTRIES, INC.

## (undated) DEVICE — SEALER ENDOSCP NANO COAT OPN DIV CRV L JAW LIGASURE IMPACT

## (undated) DEVICE — COVER TBL W44XL90IN STD POLYPR REINF DISP CONVERTORS

## (undated) DEVICE — DRAPE C ARM W16XL84IN W CLP FOR OEC GE 9600 9800

## (undated) DEVICE — RELOAD STPL L45MM H1.5-3.6MM REG TISS BLU GRIPPING SURF B

## (undated) DEVICE — SEALANT TISS 10 ML FIBRIN VISTASEAL

## (undated) DEVICE — SYRINGE CATH TIP 50ML

## (undated) DEVICE — PREMIUM DRY TRAY LF: Brand: MEDLINE INDUSTRIES, INC.

## (undated) DEVICE — SUTURE VICRYL + SZ 2-0 L27IN ABSRB WHT SH 1/2 CIR TAPERCUT VCP417H

## (undated) DEVICE — ADHESIVE SKIN CLOSURE WND 8.661X1.5 IN 22 CM LIQUIBAND SECUR

## (undated) DEVICE — BLADE ES L6IN ELASTOMERIC COAT EXT DURABLE BEND UPTO 90DEG

## (undated) DEVICE — STAPLER INT SZ 31MM H1.5-2.2MM OPN LEG L5.2MM PWR ADJ HT W/

## (undated) DEVICE — PACK-MAJOR

## (undated) DEVICE — SUTURE VICRYL SZ 3-0 L18IN ABSRB VLT L26MM SH 1/2 CIR J774D

## (undated) DEVICE — SOLUTION SCRB 4OZ 7.5% POVIDONE IOD ANTIMIC BTL

## (undated) DEVICE — GLOVE ORANGE PI 7 1/2   MSG9075

## (undated) DEVICE — SUTURE PROL SZ 2-0 L36IN NONABSORBABLE BLU SH L26MM 1/2 CIR 8523H

## (undated) DEVICE — SPONGE GZ W4XL8IN 12 PLY COT RADPQ FLUFFY HIGHLY ABSRB LO

## (undated) DEVICE — LIQUIBAND RAPID ADHESIVE 36/CS 0.8ML: Brand: MEDLINE

## (undated) DEVICE — GLOVE ORANGE PI 7   MSG9070

## (undated) DEVICE — STAPLER EXT 65MM S STL AUTO DISP PURSTRING

## (undated) DEVICE — SUTURE PERMA-HAND SZ 2-0 L30IN NONABSORBABLE BLK L26MM SH K833H

## (undated) DEVICE — SUTURE STRATAFIX SYMMETRIC SZ 1 L18IN ABSRB VLT CT1 L36CM SXPP1A404

## (undated) DEVICE — SLING ARM SM 28CM BLK MESH FAB EZ OPN FR PNL W DEROTATION

## (undated) DEVICE — SYSTEM WND DEBRIDEMENT AND CLEANSING IRRISEPT

## (undated) DEVICE — SPONGE GZ W4XL4IN COT 12 PLY TYP VII WVN C FLD DSGN STERILE

## (undated) DEVICE — PACK,LAPAROSCOPY,PK II,SIRUS: Brand: MEDLINE

## (undated) DEVICE — PREVENA INCISION MANAGEMENT SYSTEM- PEEL & PLACE DRESSING: Brand: PREVENA™ PEEL & PLACE™

## (undated) DEVICE — GLOVE ORANGE PI 8   MSG9080

## (undated) DEVICE — COVER EQUIP W30XL36IN CLR POLYETH INTENS BND DISPOSABLE

## (undated) DEVICE — BANDAGE COMPR E SGL LAYERED CLSR BGE W/ CLP W4INXL15FT

## (undated) DEVICE — DRAPE SURG W112XL137IN HIP SMS PCH FEN ABSRB REINF DISP

## (undated) DEVICE — SOLUTION PREP PAINT POV IOD FOR SKIN MUCOUS MEM

## (undated) DEVICE — 40586 ADVANCED TRENDELENBURG POSITIONING KIT: Brand: 40586 ADVANCED TRENDELENBURG POSITIONING KIT

## (undated) DEVICE — STAPLER ECHELON 3000 45MM STANDARD

## (undated) DEVICE — IMPLANTABLE DEVICE
Type: IMPLANTABLE DEVICE | Site: ARM | Status: NON-FUNCTIONAL
Removed: 2025-08-02

## (undated) DEVICE — BASIC SINGLE BASIN BTC-LF: Brand: MEDLINE INDUSTRIES, INC.

## (undated) DEVICE — SUTURE VICRYL + SZ 0 L18IN ABSRB TIE VCP106G

## (undated) DEVICE — PENCIL SMK EVAC ALL IN 1 DSGN ENH VISIBILITY IMPROVED AIR

## (undated) DEVICE — GOWN,SIRUS,NON REINFRCD,LARGE,SET IN SL: Brand: MEDLINE